# Patient Record
Sex: FEMALE | Race: BLACK OR AFRICAN AMERICAN | Employment: OTHER | URBAN - METROPOLITAN AREA
[De-identification: names, ages, dates, MRNs, and addresses within clinical notes are randomized per-mention and may not be internally consistent; named-entity substitution may affect disease eponyms.]

---

## 2017-06-01 ENCOUNTER — HOSPITAL ENCOUNTER (OUTPATIENT)
Dept: WOMENS IMAGING | Age: 62
Discharge: HOME | End: 2017-06-01
Attending: FAMILY MEDICINE | Admitting: FAMILY MEDICINE

## 2017-06-01 DIAGNOSIS — Z12.39 SCREENING BREAST EXAMINATION: ICD-10-CM

## 2018-03-26 VITALS — WEIGHT: 190 LBS | HEIGHT: 63 IN | BODY MASS INDEX: 33.66 KG/M2

## 2018-03-26 RX ORDER — ALBUTEROL SULFATE 90 UG/1
2 AEROSOL, METERED RESPIRATORY (INHALATION) EVERY 6 HOURS PRN
COMMUNITY
End: 2021-03-10 | Stop reason: SDUPTHER

## 2018-03-26 RX ORDER — SODIUM CHLORIDE 0.9 % (FLUSH) 0.9 %
10 SYRINGE (ML) INJECTION
Status: CANCELLED | OUTPATIENT
Start: 2018-03-28 | End: 2018-03-28

## 2018-03-28 ENCOUNTER — HOSPITAL ENCOUNTER (OUTPATIENT)
Dept: INTERVENTIONAL RADIOLOGY/VASCULAR | Age: 63
Discharge: HOME | End: 2018-04-26
Attending: RADIOLOGY | Admitting: RADIOLOGY

## 2018-06-08 ENCOUNTER — HOSPITAL ENCOUNTER (OUTPATIENT)
Dept: OTHER | Age: 63
Discharge: HOME | End: 2018-06-08
Admitting: INTERNAL MEDICINE

## 2018-06-08 DIAGNOSIS — Z12.31 VISIT FOR SCREENING MAMMOGRAM: ICD-10-CM

## 2019-06-04 ENCOUNTER — HOSPITAL ENCOUNTER (OUTPATIENT)
Age: 64
Setting detail: SPECIMEN
Discharge: HOME | End: 2019-06-04
Payer: COMMERCIAL

## 2019-06-04 LAB
ALBUMIN SERPL-MCNC: 4.9 GM/DL (ref 3.4–5)
ALP BLD-CCNC: 111 IU/L (ref 40–128)
ALT SERPL-CCNC: 25 U/L (ref 10–40)
ANION GAP SERPL CALCULATED.3IONS-SCNC: 17 MMOL/L (ref 4–16)
AST SERPL-CCNC: 29 IU/L (ref 15–37)
BILIRUB SERPL-MCNC: 0.4 MG/DL (ref 0–1)
BUN BLDV-MCNC: 13 MG/DL (ref 6–23)
CALCIUM SERPL-MCNC: 9.7 MG/DL (ref 8.3–10.6)
CHLORIDE BLD-SCNC: 99 MMOL/L (ref 99–110)
CO2: 23 MMOL/L (ref 21–32)
CREAT SERPL-MCNC: 0.9 MG/DL (ref 0.6–1.1)
GFR AFRICAN AMERICAN: >60 ML/MIN/1.73M2
GFR NON-AFRICAN AMERICAN: >60 ML/MIN/1.73M2
GLUCOSE BLD-MCNC: 72 MG/DL (ref 70–99)
POTASSIUM SERPL-SCNC: 4.6 MMOL/L (ref 3.5–5.1)
SODIUM BLD-SCNC: 139 MMOL/L (ref 135–145)
TOTAL PROTEIN: 8.4 GM/DL (ref 6.4–8.2)

## 2019-06-04 PROCEDURE — 80053 COMPREHEN METABOLIC PANEL: CPT

## 2019-06-05 ENCOUNTER — HOSPITAL ENCOUNTER (OUTPATIENT)
Dept: ULTRASOUND IMAGING | Age: 64
Discharge: HOME | End: 2019-06-05
Payer: COMMERCIAL

## 2019-06-05 DIAGNOSIS — K74.69 COMPENSATED CIRRHOSIS RELATED TO HEPATITIS C VIRUS (HCV) (HCC): ICD-10-CM

## 2019-06-05 DIAGNOSIS — B19.20 COMPENSATED CIRRHOSIS RELATED TO HEPATITIS C VIRUS (HCV) (HCC): ICD-10-CM

## 2019-06-05 PROCEDURE — 76705 ECHO EXAM OF ABDOMEN: CPT

## 2019-06-12 ENCOUNTER — HOSPITAL ENCOUNTER (OUTPATIENT)
Dept: WOMENS IMAGING | Age: 64
Discharge: HOME | End: 2019-06-12
Payer: COMMERCIAL

## 2019-06-12 DIAGNOSIS — Z12.31 ENCOUNTER FOR SCREENING MAMMOGRAM FOR BREAST CANCER: ICD-10-CM

## 2019-06-12 PROCEDURE — 77063 BREAST TOMOSYNTHESIS BI: CPT

## 2019-10-02 ENCOUNTER — HOSPITAL ENCOUNTER (OUTPATIENT)
Age: 64
Setting detail: SPECIMEN
Discharge: HOME | End: 2019-10-02
Payer: MEDICARE

## 2019-10-02 LAB
ALBUMIN SERPL-MCNC: 4.3 GM/DL (ref 3.4–5)
ALP BLD-CCNC: 105 IU/L (ref 40–128)
ALT SERPL-CCNC: 15 U/L (ref 10–40)
ANION GAP SERPL CALCULATED.3IONS-SCNC: 12 MMOL/L (ref 4–16)
AST SERPL-CCNC: 22 IU/L (ref 15–37)
BILIRUB SERPL-MCNC: 0.3 MG/DL (ref 0–1)
BUN BLDV-MCNC: 12 MG/DL (ref 6–23)
CALCIUM SERPL-MCNC: 9.1 MG/DL (ref 8.3–10.6)
CHLORIDE BLD-SCNC: 104 MMOL/L (ref 99–110)
CO2: 25 MMOL/L (ref 21–32)
CREAT SERPL-MCNC: 0.7 MG/DL (ref 0.6–1.1)
GFR AFRICAN AMERICAN: >60 ML/MIN/1.73M2
GFR NON-AFRICAN AMERICAN: >60 ML/MIN/1.73M2
GLUCOSE BLD-MCNC: 92 MG/DL (ref 70–99)
POTASSIUM SERPL-SCNC: 4.1 MMOL/L (ref 3.5–5.1)
SODIUM BLD-SCNC: 141 MMOL/L (ref 135–145)
TOTAL PROTEIN: 7.5 GM/DL (ref 6.4–8.2)

## 2019-10-02 PROCEDURE — 80053 COMPREHEN METABOLIC PANEL: CPT

## 2020-02-12 ENCOUNTER — HOSPITAL ENCOUNTER (OUTPATIENT)
Age: 65
Discharge: HOME | End: 2020-02-12
Payer: MEDICARE

## 2020-02-12 LAB
ALBUMIN SERPL-MCNC: 4.7 GM/DL (ref 3.4–5)
ALP BLD-CCNC: 100 IU/L (ref 40–129)
ALT SERPL-CCNC: 23 U/L (ref 10–40)
AST SERPL-CCNC: 25 IU/L (ref 15–37)
BASOPHILS ABSOLUTE: 0 K/CU MM
BASOPHILS RELATIVE PERCENT: 0.4 % (ref 0–1)
BILIRUB SERPL-MCNC: 0.2 MG/DL (ref 0–1)
BILIRUBIN DIRECT: 0.2 MG/DL (ref 0–0.3)
BILIRUBIN, INDIRECT: 0 MG/DL (ref 0–0.7)
DIFFERENTIAL TYPE: ABNORMAL
EOSINOPHILS ABSOLUTE: 0.2 K/CU MM
EOSINOPHILS RELATIVE PERCENT: 3.5 % (ref 0–3)
HAV IGM SER IA-ACNC: NON REACTIVE
HCT VFR BLD CALC: 40.4 % (ref 37–47)
HEMOGLOBIN: 12.5 GM/DL (ref 12.5–16)
HEPATITIS B CORE IGM ANTIBODY: NON REACTIVE
HEPATITIS B SURFACE ANTIGEN: NON REACTIVE
HEPATITIS C ANTIBODY: ABNORMAL
IMMATURE NEUTROPHIL %: 0.2 % (ref 0–0.43)
LYMPHOCYTES ABSOLUTE: 2.8 K/CU MM
LYMPHOCYTES RELATIVE PERCENT: 55.2 % (ref 24–44)
MCH RBC QN AUTO: 30.6 PG (ref 27–31)
MCHC RBC AUTO-ENTMCNC: 30.9 % (ref 32–36)
MCV RBC AUTO: 98.8 FL (ref 78–100)
MONOCYTES ABSOLUTE: 0.4 K/CU MM
MONOCYTES RELATIVE PERCENT: 7.8 % (ref 0–4)
NUCLEATED RBC %: 0 %
PDW BLD-RTO: 12.6 % (ref 11.7–14.9)
PLATELET # BLD: 273 K/CU MM (ref 140–440)
PMV BLD AUTO: 10.1 FL (ref 7.5–11.1)
RBC # BLD: 4.09 M/CU MM (ref 4.2–5.4)
SEGMENTED NEUTROPHILS ABSOLUTE COUNT: 1.7 K/CU MM
SEGMENTED NEUTROPHILS RELATIVE PERCENT: 32.9 % (ref 36–66)
TOTAL IMMATURE NEUTOROPHIL: 0.01 K/CU MM
TOTAL NUCLEATED RBC: 0 K/CU MM
TOTAL PROTEIN: 8 GM/DL (ref 6.4–8.2)
WBC # BLD: 5.1 K/CU MM (ref 4–10.5)

## 2020-02-12 PROCEDURE — 87902 NFCT AGT GNTYP ALYS HEP C: CPT

## 2020-02-12 PROCEDURE — 36415 COLL VENOUS BLD VENIPUNCTURE: CPT

## 2020-02-12 PROCEDURE — 87522 HEPATITIS C REVRS TRNSCRPJ: CPT

## 2020-02-12 PROCEDURE — 82105 ALPHA-FETOPROTEIN SERUM: CPT

## 2020-02-12 PROCEDURE — 80076 HEPATIC FUNCTION PANEL: CPT

## 2020-02-12 PROCEDURE — 85025 COMPLETE CBC W/AUTO DIFF WBC: CPT

## 2020-02-12 PROCEDURE — 80074 ACUTE HEPATITIS PANEL: CPT

## 2020-02-14 LAB
MS ALPHA-FETOPROTEIN: 8 NG/ML (ref 0–9)
MS ALPHA-FETOPROTEIN: NORMAL NG/ML (ref 0–9)

## 2020-02-15 LAB
HCV QUANTITATIVE: NOT DETECTED IU/ML
HEP CRNA PCR QNT INTERP: NORMAL
HEP CRNA PCR QNT INTERP: NOT DETECTED
HEPATITIS C RNA PCR QUANT: NOT DETECTED LOG IU/ML

## 2020-02-19 LAB
HEPATITIS C GENOTYPE: NORMAL
HEPATITIS C GENOTYPE: NORMAL

## 2020-03-12 RX ORDER — MONTELUKAST SODIUM 10 MG/1
10 TABLET ORAL NIGHTLY
COMMUNITY
End: 2020-09-10 | Stop reason: SDUPTHER

## 2020-03-12 NOTE — PROGRESS NOTES
.Surgery 3/16/20 @ 0830, arrival 0630                    1. Do not eat or drink anything after 12 midnight- unless instructed by your doctor prior to surgery. This includes                   no water, chewing gum or mints. 2. Follow your directions as prescribed by the doctor for your procedure and medications. 3. Check with your Doctor regarding stopping Plavix, Coumadin, Lovenox,Effient,Pradaxa,Xarelto, Fragmin or other blood thinners and                   follow their instructions. 4. Do not smoke, and do not drink any alcoholic beverages 24 hours prior to surgery. This includes NA Beer. 5. You may brush your teeth and gargle the morning of surgery. DO NOT SWALLOW WATER   6. You MUST make arrangements for a responsible adult to take you home after your surgery and be able to check on you every couple                   hours for the day. You will not be allowed to leave alone or drive yourself home. It is strongly suggested someone stay with you the first 24                   hrs. Your surgery will be cancelled if you do not have a ride home. 7. Please wear simple, loose fitting clothing to the hospital.  Daryl Shade not bring valuables (money, credit cards, checkbooks, etc.) Do not wear any                   makeup (including no eye makeup) or nail polish on your fingers or toes. 8. DO NOT wear any jewelry or piercings on day of surgery. All body piercing jewelry must be removed. 9. If you have dentures, they will be removed before going to the OR; we will provide you a container. If you wear contact lenses or glasses,                  they will be removed; please bring a case for them. 10. If you  have a Living Will and Durable Power of  for Healthcare, please bring in a copy. 11. Please bring picture ID,  insurance card, paperwork from the doctors office    (H & P, Consent, & card for implantable devices).            12. Take a shower the night before or

## 2020-03-16 ENCOUNTER — HOSPITAL ENCOUNTER (OUTPATIENT)
Dept: INTERVENTIONAL RADIOLOGY/VASCULAR | Age: 65
Discharge: HOME | End: 2020-03-16
Payer: MEDICARE

## 2020-03-16 VITALS
DIASTOLIC BLOOD PRESSURE: 63 MMHG | WEIGHT: 178 LBS | BODY MASS INDEX: 31.54 KG/M2 | OXYGEN SATURATION: 98 % | HEART RATE: 68 BPM | HEIGHT: 63 IN | RESPIRATION RATE: 16 BRPM | SYSTOLIC BLOOD PRESSURE: 123 MMHG | TEMPERATURE: 97 F

## 2020-03-16 LAB
APTT: 33.9 SECONDS (ref 25.1–37.1)
HCT VFR BLD CALC: 40.6 % (ref 37–47)
HEMOGLOBIN: 12.6 GM/DL (ref 12.5–16)
INR BLD: 1.03 INDEX
MCH RBC QN AUTO: 30.1 PG (ref 27–31)
MCHC RBC AUTO-ENTMCNC: 31 % (ref 32–36)
MCV RBC AUTO: 96.9 FL (ref 78–100)
PDW BLD-RTO: 12.7 % (ref 11.7–14.9)
PLATELET # BLD: 219 K/CU MM (ref 140–440)
PMV BLD AUTO: 10.2 FL (ref 7.5–11.1)
PROTHROMBIN TIME: 12.5 SECONDS (ref 11.7–14.5)
RBC # BLD: 4.19 M/CU MM (ref 4.2–5.4)
WBC # BLD: 4.8 K/CU MM (ref 4–10.5)

## 2020-03-16 PROCEDURE — 85730 THROMBOPLASTIN TIME PARTIAL: CPT

## 2020-03-16 PROCEDURE — 2709999900 HC NON-CHARGEABLE SUPPLY

## 2020-03-16 PROCEDURE — 2580000003 HC RX 258: Performed by: RADIOLOGY

## 2020-03-16 PROCEDURE — 85027 COMPLETE CBC AUTOMATED: CPT

## 2020-03-16 PROCEDURE — 85610 PROTHROMBIN TIME: CPT

## 2020-03-16 RX ORDER — SODIUM CHLORIDE 0.9 % (FLUSH) 0.9 %
10 SYRINGE (ML) INJECTION 2 TIMES DAILY
Status: DISCONTINUED | OUTPATIENT
Start: 2020-03-16 | End: 2020-03-17 | Stop reason: HOSPADM

## 2020-03-16 RX ORDER — LORATADINE 10 MG/1
10 TABLET ORAL DAILY
COMMUNITY
End: 2021-03-10

## 2020-03-16 RX ADMIN — Medication 10 ML: at 06:36

## 2020-03-16 ASSESSMENT — PAIN - FUNCTIONAL ASSESSMENT: PAIN_FUNCTIONAL_ASSESSMENT: 0-10

## 2020-04-24 PROBLEM — Z86.19: Status: ACTIVE | Noted: 2020-04-24

## 2020-04-24 PROBLEM — D70.9 NEUTROPENIA (HCC): Status: ACTIVE | Noted: 2020-04-24

## 2020-04-24 PROBLEM — D72.89 OTHER SPECIFIED DISORDERS OF WHITE BLOOD CELLS: Status: ACTIVE | Noted: 2020-04-24

## 2020-05-05 ENCOUNTER — HOSPITAL ENCOUNTER (OUTPATIENT)
Age: 65
Setting detail: SPECIMEN
Discharge: HOME | End: 2020-05-05

## 2020-05-05 PROCEDURE — U0002 COVID-19 LAB TEST NON-CDC: HCPCS

## 2020-05-07 LAB
SARS-COV-2: NOT DETECTED
SOURCE: NORMAL

## 2020-07-10 ENCOUNTER — HOSPITAL ENCOUNTER (OUTPATIENT)
Dept: WOMENS IMAGING | Age: 65
Discharge: HOME | End: 2020-07-10
Payer: MEDICARE

## 2020-07-10 ENCOUNTER — APPOINTMENT (OUTPATIENT)
Dept: WOMENS IMAGING | Age: 65
End: 2020-07-10
Payer: MEDICARE

## 2020-07-10 PROCEDURE — 77063 BREAST TOMOSYNTHESIS BI: CPT

## 2020-07-14 ENCOUNTER — HOSPITAL ENCOUNTER (OUTPATIENT)
Dept: INFUSION THERAPY | Age: 65
Discharge: HOME | End: 2020-07-14
Payer: MEDICARE

## 2020-07-14 PROCEDURE — 99211 OFF/OP EST MAY X REQ PHY/QHP: CPT

## 2020-08-07 ENCOUNTER — OFFICE VISIT (OUTPATIENT)
Dept: ORTHOPEDIC SURGERY | Age: 65
End: 2020-08-07
Payer: MEDICARE

## 2020-08-07 VITALS — RESPIRATION RATE: 14 BRPM | HEIGHT: 63 IN | WEIGHT: 184 LBS | BODY MASS INDEX: 32.6 KG/M2

## 2020-08-07 PROCEDURE — G8417 CALC BMI ABV UP PARAM F/U: HCPCS | Performed by: ORTHOPAEDIC SURGERY

## 2020-08-07 PROCEDURE — 3017F COLORECTAL CA SCREEN DOC REV: CPT | Performed by: ORTHOPAEDIC SURGERY

## 2020-08-07 PROCEDURE — 1036F TOBACCO NON-USER: CPT | Performed by: ORTHOPAEDIC SURGERY

## 2020-08-07 PROCEDURE — G8428 CUR MEDS NOT DOCUMENT: HCPCS | Performed by: ORTHOPAEDIC SURGERY

## 2020-08-07 PROCEDURE — G8400 PT W/DXA NO RESULTS DOC: HCPCS | Performed by: ORTHOPAEDIC SURGERY

## 2020-08-07 PROCEDURE — 1090F PRES/ABSN URINE INCON ASSESS: CPT | Performed by: ORTHOPAEDIC SURGERY

## 2020-08-07 PROCEDURE — 4040F PNEUMOC VAC/ADMIN/RCVD: CPT | Performed by: ORTHOPAEDIC SURGERY

## 2020-08-07 PROCEDURE — 1123F ACP DISCUSS/DSCN MKR DOCD: CPT | Performed by: ORTHOPAEDIC SURGERY

## 2020-08-07 PROCEDURE — 99203 OFFICE O/P NEW LOW 30 MIN: CPT | Performed by: ORTHOPAEDIC SURGERY

## 2020-08-07 RX ORDER — SENNA PLUS 8.6 MG/1
8.6 TABLET ORAL DAILY
COMMUNITY
End: 2020-12-10

## 2020-08-07 NOTE — PROGRESS NOTES
Patient is here today to discuss her left hip pain. She states that her pain began in February after she was laid off from her job. No known new injury or trauma. She states that she does have a history of back/hip pain which as treated with therapy. Pain does radiate distally to ankle. X-rays were obtained at [facility] which was provided today on CD. She has tried tylenol and ibuprofen for pain control. Pain level 5/10.

## 2020-08-07 NOTE — PROGRESS NOTES
CAROTID- Normal    Hepatitis C     dx 3/2018- for liver bx    Lumbar herniated disc     Thyroid disease     \"not on any medication- have low thyroid level\"     Family History   Problem Relation Age of Onset    No Known Problems Mother     Kidney Disease Father      Social History     Socioeconomic History    Marital status: Single     Spouse name: Not on file    Number of children: Not on file    Years of education: Not on file    Highest education level: Not on file   Occupational History    Not on file   Social Needs    Financial resource strain: Not on file    Food insecurity     Worry: Not on file     Inability: Not on file    Transportation needs     Medical: Not on file     Non-medical: Not on file   Tobacco Use    Smoking status: Never Smoker    Smokeless tobacco: Never Used   Substance and Sexual Activity    Alcohol use: Yes     Comment: Occasional wine/\"average glass of wine or beer  one time per month\"    Drug use: No    Sexual activity: Not on file   Lifestyle    Physical activity     Days per week: Not on file     Minutes per session: Not on file    Stress: Not on file   Relationships    Social connections     Talks on phone: Not on file     Gets together: Not on file     Attends [employer]ism service: Not on file     Active member of club or organization: Not on file     Attends meetings of clubs or organizations: Not on file     Relationship status: Not on file    Intimate partner violence     Fear of current or ex partner: Not on file     Emotionally abused: Not on file     Physically abused: Not on file     Forced sexual activity: Not on file   Other Topics Concern    Not on file   Social History Narrative    Not on file     Current Outpatient Medications   Medication Sig Dispense Refill    senna (SENOKOT) 8.6 MG tablet Take 8.6 mg by mouth daily      loratadine (CLARITIN) 10 MG tablet Take 10 mg by mouth daily Indications: alergies      montelukast (SINGULAIR) 10 MG tablet Take 10 mg by mouth nightly      albuterol sulfate  (90 Base) MCG/ACT inhaler Inhale 2 puffs into the lungs every 6 hours as needed for Wheezing      Multiple Vitamins-Minerals (MULTIVITAMIN PO) Take by mouth       No current facility-administered medications for this visit. Allergies   Allergen Reactions    Pcn [Penicillins] Hives       Review of Systems:   Review of Systems   Constitutional: Negative for chills and fever. HENT: Negative for congestion and sneezing. Eyes: Negative for pain and redness. Respiratory: Negative for chest tightness, shortness of breath and wheezing. Cardiovascular: Negative for chest pain and palpitations. Gastrointestinal: Negative for vomiting. Musculoskeletal: Positive for arthralgias. Skin: Negative for color change and rash. Psychiatric/Behavioral: Negative for agitation. The patient is not nervous/anxious. Examination:  General Exam:  Vitals: Resp 14   Ht 5' 3\" (1.6 m)   Wt 184 lb (83.5 kg)   BMI 32.59 kg/m²    Physical Exam  Vitals signs and nursing note reviewed. Constitutional:       Appearance: Normal appearance. HENT:      Head: Normocephalic and atraumatic. Eyes:      Conjunctiva/sclera: Conjunctivae normal.      Pupils: Pupils are equal, round, and reactive to light. Neck:      Musculoskeletal: Normal range of motion. Pulmonary:      Effort: Pulmonary effort is normal.   Musculoskeletal:      Right hip: She exhibits normal range of motion, normal strength, no tenderness, no bony tenderness, no swelling, no crepitus and no deformity. Right knee: Normal.      Left knee: She exhibits normal range of motion, no swelling, no effusion, no deformity, no erythema, normal alignment, no LCL laxity and no MCL laxity. No medial joint line and no lateral joint line tenderness noted. Lumbar back: She exhibits normal range of motion, no tenderness, no swelling and no deformity. Comments: Left Lower Extremity:  There is moderate tenderness to palpation diffusely throughout the hip both anteriorly and posteriorly. Range of motion is significantly limited and painful at the extremes of motion. Hip flexion present to 100 degrees, abduction 20 degrees, extension 5 degrees, internal rotation 10 degrees, external rotation 10 degrees. Strength is 5 out of 5 with hip flexion, extension, and abduction however this is somewhat limited due to pain with resistance testing. TRISHA and FADIR test reproduces pain to the groin and hip joint. Sensation is intact to light touch in the left lower extremity. Pulses are intact. Skin is intact without erythema. No lower extremity edema. Skin:     General: Skin is warm and dry. Neurological:      Mental Status: She is alert and oriented to person, place, and time. Psychiatric:         Mood and Affect: Mood normal.         Behavior: Behavior normal.            Diagnostic testing:  X-rays reviewed in office, I independently reviewed the films in the office today:   X-ray images from outside films done in [facility] reviewed by myself discussed with the patient:  There is evidence of degenerative joint disease of the left hip with loss of greater than 50% of the joint space throughout the hip joint with areas of subchondral sclerosis on both sides of the hip and small osteophyte formation at the acetabulum and femoral head. No evidence of acute abnormality or fracture. Normal bone density. No soft tissue swelling or effusion. No significant degenerative process identified in the right hip joint    Office Procedures:  No orders of the defined types were placed in this encounter. Assessment and Plan  1. Left hip primary osteoarthritis    I explained her that she is dealing with a flareup and possibly progression of her degenerative joint disease of the left hip. We discussed continuing nonoperative treatments for her left hip. She has responded in the past to a steroid injection and physical therapy. I recommended that we repeat this treatment course. I have ordered a an intra-articular corticosteroid injection to the left hip to be done by interventional radiology. I have ordered physical therapy to help with rehabilitation exercises to the left hip. Follow-up in 2 months for check of her response to this treatment plan    We briefly discussed potential for surgical intervention if she fails conservative measures and this would consist of a total hip replacement.     Electronically signed by Shannan Pinto MD on 8/7/2020 at 10:34 AM

## 2020-08-08 ASSESSMENT — ENCOUNTER SYMPTOMS
CHEST TIGHTNESS: 0
EYE PAIN: 0
SHORTNESS OF BREATH: 0
VOMITING: 0
WHEEZING: 0
EYE REDNESS: 0
COLOR CHANGE: 0

## 2020-09-10 ENCOUNTER — OFFICE VISIT (OUTPATIENT)
Dept: INTERNAL MEDICINE CLINIC | Age: 65
End: 2020-09-10
Payer: MEDICARE

## 2020-09-10 VITALS
DIASTOLIC BLOOD PRESSURE: 71 MMHG | WEIGHT: 180.8 LBS | SYSTOLIC BLOOD PRESSURE: 138 MMHG | HEIGHT: 62 IN | BODY MASS INDEX: 33.27 KG/M2 | OXYGEN SATURATION: 97 % | HEART RATE: 72 BPM | TEMPERATURE: 98.1 F

## 2020-09-10 PROBLEM — J30.2 SEASONAL ALLERGIC RHINITIS: Status: ACTIVE | Noted: 2020-09-10

## 2020-09-10 PROBLEM — D75.A G6PD DEFICIENCY: Status: ACTIVE | Noted: 2020-09-10

## 2020-09-10 PROBLEM — Z86.19 HISTORY OF HEPATITIS C: Status: ACTIVE | Noted: 2020-09-10

## 2020-09-10 PROBLEM — M25.552 LEFT HIP PAIN: Status: ACTIVE | Noted: 2020-09-10

## 2020-09-10 PROCEDURE — 99214 OFFICE O/P EST MOD 30 MIN: CPT | Performed by: INTERNAL MEDICINE

## 2020-09-10 PROCEDURE — 1123F ACP DISCUSS/DSCN MKR DOCD: CPT | Performed by: INTERNAL MEDICINE

## 2020-09-10 PROCEDURE — G8417 CALC BMI ABV UP PARAM F/U: HCPCS | Performed by: INTERNAL MEDICINE

## 2020-09-10 PROCEDURE — G8400 PT W/DXA NO RESULTS DOC: HCPCS | Performed by: INTERNAL MEDICINE

## 2020-09-10 PROCEDURE — 1036F TOBACCO NON-USER: CPT | Performed by: INTERNAL MEDICINE

## 2020-09-10 PROCEDURE — G8427 DOCREV CUR MEDS BY ELIG CLIN: HCPCS | Performed by: INTERNAL MEDICINE

## 2020-09-10 PROCEDURE — 4040F PNEUMOC VAC/ADMIN/RCVD: CPT | Performed by: INTERNAL MEDICINE

## 2020-09-10 PROCEDURE — 3017F COLORECTAL CA SCREEN DOC REV: CPT | Performed by: INTERNAL MEDICINE

## 2020-09-10 PROCEDURE — 1090F PRES/ABSN URINE INCON ASSESS: CPT | Performed by: INTERNAL MEDICINE

## 2020-09-10 RX ORDER — CETIRIZINE HYDROCHLORIDE 5 MG/1
5 TABLET ORAL DAILY
COMMUNITY
End: 2021-03-10

## 2020-09-10 RX ORDER — MONTELUKAST SODIUM 10 MG/1
10 TABLET ORAL NIGHTLY
Qty: 90 TABLET | Refills: 1 | Status: SHIPPED | OUTPATIENT
Start: 2020-09-10 | End: 2021-03-10 | Stop reason: SDUPTHER

## 2020-09-10 ASSESSMENT — PATIENT HEALTH QUESTIONNAIRE - PHQ9
2. FEELING DOWN, DEPRESSED OR HOPELESS: 0
SUM OF ALL RESPONSES TO PHQ QUESTIONS 1-9: 0
SUM OF ALL RESPONSES TO PHQ QUESTIONS 1-9: 0
SUM OF ALL RESPONSES TO PHQ9 QUESTIONS 1 & 2: 0
1. LITTLE INTEREST OR PLEASURE IN DOING THINGS: 0

## 2020-09-10 NOTE — PROGRESS NOTES
[facility]  1955      SUBJECTIVE:  Capri Butler is a 76 y. o.year old female presents today  For a follow up on hep C, lymphocytosis and allergies. Wants refills on Singulair  She is being followed by Dr. Tito Cordero for G6PD deficiency and lymphocytosis. She is also seeing Dr. Jono Gonzalez periodically for follow up as she was treated for Hep c about a year ago. She does complaint of pain in her left hip for which she is seeing Dr Vi Fields , an Orthopedic doctor. She is taking Tylenol and some Herbal medicine and  They seem to help her. Denies CP or SOB. No fever, cough or chest congestion. No other complaints. ALLERGIES:  Pcn [penicillins]    Current Outpatient Medications   Medication Sig Dispense Refill    cetirizine (ZYRTEC) 5 MG tablet Take 5 mg by mouth daily      montelukast (SINGULAIR) 10 MG tablet Take 1 tablet by mouth nightly 90 tablet 1    senna (SENOKOT) 8.6 MG tablet Take 8.6 mg by mouth daily      Multiple Vitamins-Minerals (MULTIVITAMIN PO) Take by mouth      loratadine (CLARITIN) 10 MG tablet Take 10 mg by mouth daily Indications: alergies      albuterol sulfate  (90 Base) MCG/ACT inhaler Inhale 2 puffs into the lungs every 6 hours as needed for Wheezing       No current facility-administered medications for this visit.         Past Medical History:   Diagnosis Date    Allergic rhinitis     Asthma     last flare up 2017 - follows with PCP    G-6-PD deficiency anemia ([address]     dx this when I was having children- per pt     H/O Doppler ultrasound 04/04/18    CAROTID- Normal    Hepatitis C     dx 3/2018- for liver bx    Lumbar herniated disc     Thyroid disease     \"not on any medication- have low thyroid level\"     Past Surgical History:   Procedure Laterality Date    COLONOSCOPY  2013    Polyps - Dr. Mary Sharp  1990's   [facility]    \"left one ovary \"    TONSILLECTOMY  1970's     Social History     Tobacco Use    Smoking status: Never Smoker    Smokeless tobacco: Never Used   Substance Use Topics    Alcohol use: Yes     Comment: Occasional wine/\"average glass of wine or beer  one time per month\"        REVIEW OF SYSTEMS:   see HPI/ Comprehensive review of systems negative except for the ones mentioned in HPI. OBJECTIVE: /71 (Site: Left Upper Arm, Position: Sitting, Cuff Size: Medium Adult)   Pulse 72   Temp 98.1 °F (36.7 °C)   Ht 5' 2\" (1.575 m)   Wt 180 lb 12.8 oz (82 kg)   SpO2 97%   BMI 33.07 kg/m²     Wt Readings from Last 3 Encounters:   09/10/20 180 lb 12.8 oz (82 kg)   08/07/20 184 lb (83.5 kg)   03/16/20 178 lb (80.7 kg)      GENERAL: - Alert, oriented, pleasant, in no apparent distress. HEENT: - Unremarkable. NECK: - Supple. No jugular venous distention noted. No carotid bruits. CARDIOVASCULAR: - Normal S1 and S2 without obvious murmur or gallop. PULMONARY: - No respiratory distress. No wheezes or rales. ABDOMEN: - No masses, tenderness, or organomegaly. EXTREMITIES: - No cyanosis, clubbing, or significant edema. Mild tenderness left hip. SKIN: Skin is warm and dry. There is no rash or diaphoresis. NEUROLOGICAL: - Cranial nerves II through XII are grossly intact. There were no gross focal neurologic abnormalities. LAB REVIEW:  CBC:   Lab Results   Component Value Date    WBC 4.8 03/16/2020    WBC 5.1 02/12/2020    HGB 12.6 03/16/2020    HGB 12.5 02/12/2020    HCT 40.6 03/16/2020    HCT 40.4 02/12/2020     03/16/2020     02/12/2020     Lipids: No results found for: CHOL, TRIG, HDL, LDLCALC, LDLDIRECT  Renal:   Lab Results   Component Value Date    BUN 12 10/02/2019    BUN 13 06/04/2019    CREATININE 0.7 10/02/2019    CREATININE 0.9 06/04/2019     10/02/2019     06/04/2019    K 4.1 10/02/2019    K 4.6 06/04/2019     PT/INR:   Lab Results   Component Value Date    INR 1.03 03/16/2020       IMPRESSION / RECOMMENDATIONS:        Encounter Diagnoses   Name Primary?    

## 2020-12-10 ENCOUNTER — OFFICE VISIT (OUTPATIENT)
Dept: INTERNAL MEDICINE CLINIC | Age: 65
End: 2020-12-10
Payer: MEDICARE

## 2020-12-10 VITALS
BODY MASS INDEX: 32.21 KG/M2 | HEART RATE: 84 BPM | TEMPERATURE: 97.4 F | DIASTOLIC BLOOD PRESSURE: 74 MMHG | OXYGEN SATURATION: 99 % | SYSTOLIC BLOOD PRESSURE: 126 MMHG | WEIGHT: 176.1 LBS

## 2020-12-10 PROBLEM — J30.9 ALLERGIC RHINITIS: Status: ACTIVE | Noted: 2020-09-10

## 2020-12-10 PROBLEM — R42 VERTIGO: Status: ACTIVE | Noted: 2020-12-10

## 2020-12-10 PROBLEM — J45.30 MILD PERSISTENT ASTHMA WITHOUT COMPLICATION: Status: ACTIVE | Noted: 2020-12-10

## 2020-12-10 PROCEDURE — G8400 PT W/DXA NO RESULTS DOC: HCPCS | Performed by: INTERNAL MEDICINE

## 2020-12-10 PROCEDURE — 3017F COLORECTAL CA SCREEN DOC REV: CPT | Performed by: INTERNAL MEDICINE

## 2020-12-10 PROCEDURE — 1090F PRES/ABSN URINE INCON ASSESS: CPT | Performed by: INTERNAL MEDICINE

## 2020-12-10 PROCEDURE — G8427 DOCREV CUR MEDS BY ELIG CLIN: HCPCS | Performed by: INTERNAL MEDICINE

## 2020-12-10 PROCEDURE — G8417 CALC BMI ABV UP PARAM F/U: HCPCS | Performed by: INTERNAL MEDICINE

## 2020-12-10 PROCEDURE — 1123F ACP DISCUSS/DSCN MKR DOCD: CPT | Performed by: INTERNAL MEDICINE

## 2020-12-10 PROCEDURE — 4040F PNEUMOC VAC/ADMIN/RCVD: CPT | Performed by: INTERNAL MEDICINE

## 2020-12-10 PROCEDURE — 99213 OFFICE O/P EST LOW 20 MIN: CPT | Performed by: INTERNAL MEDICINE

## 2020-12-10 PROCEDURE — G8484 FLU IMMUNIZE NO ADMIN: HCPCS | Performed by: INTERNAL MEDICINE

## 2020-12-10 PROCEDURE — 1036F TOBACCO NON-USER: CPT | Performed by: INTERNAL MEDICINE

## 2020-12-10 RX ORDER — MECLIZINE HYDROCHLORIDE 25 MG/1
25 TABLET ORAL 3 TIMES DAILY PRN
COMMUNITY
End: 2021-03-10 | Stop reason: SDUPTHER

## 2020-12-10 RX ORDER — PANTOPRAZOLE SODIUM 40 MG/1
40 TABLET, DELAYED RELEASE ORAL DAILY
COMMUNITY
End: 2021-03-10

## 2020-12-10 NOTE — PROGRESS NOTES
Name: Alondra Sanderson  [id number]  Age: 76 y.o. YOB: 1955  Sex: female    CHIEF COMPLAINT:    Chief Complaint   Patient presents with    Other     other chronic conditions, and Hepatitis c follow up       HISTORY OF PRESENT ILLNESS:     This is a pleasant  76 y.o. female  is seen today for management of chronic medical problems and medications refills. Previous records reviewed . Doing OK . Denies CP or SOB. No fever , sore throat or cough or congestion. Denies any abdominal pain. Appetite OK. Bowels moving [id number] [address]. She had EGD and Colonoscopy by Dr. Jaylon Flanagan on 12/8/20 . She was told she has Hiatal hernia and diverticulosis coli and a couple of polyps were removed. No urinary symptoms. Denies any significant arthritis. Left  Hip pain is better. Still with some dizziness but Meclizine helps. Takes about once daily. No other complaints. Past Medical History:    Patient Active Problem List   Diagnosis    Personal history of infectious disease    Other specified disorders of white blood cells    Neutropenia ([facility] [address] [address]    History of hepatitis C    Allergic rhinitis    Left hip pain    G6PD deficiency    Mild persistent asthma without complication    Vertigo        Past Surgical History:        Procedure Laterality Date    COLONOSCOPY  2013    Polyps - Dr. Filiberto Andres  1990's   [address]    \"left one ovary \"    TONSILLECTOMY  1970's       Social History:   Social History     Tobacco Use    Smoking status: Never Smoker    Smokeless tobacco: Never Used   Substance Use Topics    Alcohol use: Yes     Comment: Occasional wine/\"average glass of wine or beer  one time per month\"       Family History:       Problem Relation Age of Onset    No Known Problems Mother     Kidney Disease Father        Allergies:  Pcn [penicillins]    Current Medications :      Prior to Admission medications    Medication Sig Start Date End Date Taking?  Authorizing Provider ASSESSMENT/PLAN:    Allergic rhinitis, unspecified seasonality, unspecified trigger. On Claritin and singulair    Mild persistent asthma without complication. On Singulair and albuterol HFA as needed. G6PD deficiency. Stable. . being followed by Dr. Janusz Pathak. History of hepatitis C. Had been treated. .. stable. Left hip pain  Tylenol as needed. Vertigo. . On Antivert prn. I have recommended that the patient follow CDC guidelines for prevention of COVID-19 infection. Care discussed with patient. Questions answered and patient verbalizes understanding and agrees with plan. Medications reviewed and reconciled. Continue current medications. Appropriate prescriptions are ordered. Risks and benefits of meds are discussed. After visit summary provided. Advised to call for any problems, questions, or concerns. If symptoms worsen or don't improve as expected, to call us or go to ER. Follow up as directed, sooner if needed. Return in about 3 months (around 3/10/2021). This dictation was performed with a verbal recognition program and it was checked for errors. It is possible that there are still dictated errors within this office note. Any errors should be brought immediately to my attention for correction. All efforts were made to ensure that this office note is accurate.      Zak Hager MD

## 2021-01-05 ENCOUNTER — HOSPITAL ENCOUNTER (OUTPATIENT)
Dept: GENERAL RADIOLOGY | Age: 66
Discharge: HOME | End: 2021-01-05
Payer: MEDICARE

## 2021-01-05 DIAGNOSIS — D64.9 ANEMIA, UNSPECIFIED TYPE: ICD-10-CM

## 2021-01-05 PROCEDURE — 74248 X-RAY SM INT F-THRU STD: CPT

## 2021-01-08 NOTE — PROGRESS NOTES
Patient Name: Kash Tafoya  Patient : 1955  Patient MRN: [id number]     Primary Oncologist: Laron Bentley MD  Referring Provider: Tasha Inman MD     Date of Service: 2021      Chief Complaint:   Chief Complaint   Patient presents with    Follow-up     She came in for follow-up visit. Patient Active Problem List:     Personal history of infectious disease     Other specified disorders of white blood cells     Neutropenia (HCC)     History of hepatitis C     Allergic rhinitis     Left hip pain     G6PD deficiency     Mild persistent asthma without complication     Vertigo     HPI:   Perla Rosado is a pleasant 27-year-old -American female patient who was referred for evaluation of mild neutropenia. She was diagnosed with hepatitis C in . Ex spouse was IV drug user. She was treated with Harvoni for 12 weeks and completed in May 2019. I reviewed her blood test records. On May 15, 2019 WBC was 4.1, RBC 4.36, hemoglobin 13.9, hematocrit 42.3, MCV 97, platelet 354, absolute neutrophils 1.1, absolute lymphocytes 2.6. She denies any history of frequent infection. Ultrasound of abdomen in 2018 showed normal right upper quadrant ultrasound. Liver biopsy on 2018 showed chronic hepatitis grade 2-3, stage II. Mammogram in 2019 is negative. US of abdomen in 2019 showed unremarkable study. Labs in 2019 were reviewed. She has nonspecific elevated total protein. CBC is unremarkable. Labs in 2019 were reviewed. Total protein is normal. Leukopenia is stable. She denied frequent infections. In 2020 she had acute viral hepatitis serology which came back positive for hepatitis C antibodies. Serum AFP was 8. Hepatitis C RNA by PCR was negative. Hepatitis C RNA genotype was indeterminate. Mammogram in 2020 was benign. She was at [facility] emergency room on 2020 due to food poisoning. . WBC was 7.5.  Hemoglobin was 13.2, hemoglobin 13.2, platelet 738. CMP was grossly unremarkable with normal AST at 19, ALT 25. Alk phos was 92. Total bilirubin was 0.6. On January 14, 2020 when she came in for follow-up visit. Colonoscopy in December 2020 by Dr. Hugo Dailey showed diverticulosis and hemorrhoids. She was told that she has kidney stone. She has flu shot in 2020 and COVID-19 vaccine on January 3, 2021. Mammogram in July 2020 was benign. She is agreeable to have labs today. She denied acute pain. No NVD. No fever or chills. No drenching night sweat. No depression. Past Medical History  Asthma, neutropenia, hepatitis C, G6PD, thyroid disease. Surgical History  Partial hysterectomy in 2002 related to tubal pregnancy. Tonsillectomy. She had colonoscopy x2 and the last one was in 2013. Social History  Smoking Status Never smoker  She denies any illicit drug use. She drinks alcohol occasionally. She has 2 children. Family History  No malignancy in the family. Review of Systems: \"Per interval history; otherwise 10 point ROS is negative. \"     Vital Signs:  /67 (Site: Left Upper Arm, Position: Sitting, Cuff Size: Large Adult)   Pulse 73   Temp 97.9 °F (36.6 °C) (Infrared)   Resp 18   Ht 5' 2\" (1.575 m)   Wt 177 lb 3.2 oz (80.4 kg)   SpO2 97%   BMI 32.41 kg/m²     Physical Exam:  CONSTITUTIONAL: awake, alert, cooperative, no apparent distress   EYES: pupils equal, round and reactive to light, sclera clear and conjunctiva normal  ENT: Normocephalic, without obvious abnormality, atraumatic  NECK: supple, symmetrical, no jugular venous distension and no carotid bruits   HEMATOLOGIC/LYMPHATIC: no cervical, supraclavicular or axillary lymphadenopathy   LUNGS: no increased work of breathing and clear to auscultation   CARDIOVASCULAR: regular rate and rhythm, normal S1 and S2, no murmur noted  ABDOMEN: normal bowel sounds x 4, soft, non-distended, non-tender, no masses palpated, no hepatosplenomegaly   MUSCULOSKELETAL: full range of was unremarkable. 2. She completed treatment for hepatitis C in May 2019. She will follow up with GI. She is in remission. 3. Mammogram in June 2019 was benign. Colonoscopy was In 2013. Mammogram in July 2020 was benign. Colonoscopy in December 2020 showed diverticulosis and hemorrhoids. Repeat study in 5 years was recommended. 4. We discussed about healthy diet and lifestyle. He had flu shot in 2020. She had COVID-19 vaccine in January 2021. Return to clinic in 6 months or sooner. All of her questions been answered for today. Time spent was about 21 minutes. Recent imaging and labs were reviewed and discussed with the patient.       Electronically signed by Gailen Siemens, MD on 1/8/21 at 7:54 AM EST

## 2021-01-14 ENCOUNTER — OFFICE VISIT (OUTPATIENT)
Dept: ONCOLOGY | Age: 66
End: 2021-01-14
Payer: MEDICARE

## 2021-01-14 ENCOUNTER — HOSPITAL ENCOUNTER (OUTPATIENT)
Dept: INFUSION THERAPY | Age: 66
Discharge: HOME | End: 2021-01-14
Payer: MEDICARE

## 2021-01-14 VITALS
OXYGEN SATURATION: 97 % | BODY MASS INDEX: 32.61 KG/M2 | HEART RATE: 73 BPM | TEMPERATURE: 97.9 F | SYSTOLIC BLOOD PRESSURE: 138 MMHG | HEIGHT: 62 IN | WEIGHT: 177.2 LBS | RESPIRATION RATE: 18 BRPM | DIASTOLIC BLOOD PRESSURE: 67 MMHG

## 2021-01-14 DIAGNOSIS — D70.9 NEUTROPENIA, UNSPECIFIED TYPE (HCC): ICD-10-CM

## 2021-01-14 DIAGNOSIS — D70.9 NEUTROPENIA, UNSPECIFIED TYPE (HCC): Primary | ICD-10-CM

## 2021-01-14 LAB
ALBUMIN SERPL-MCNC: 4.3 GM/DL (ref 3.4–5)
ALP BLD-CCNC: 71 IU/L (ref 40–128)
ALT SERPL-CCNC: 15 U/L (ref 10–40)
ANION GAP SERPL CALCULATED.3IONS-SCNC: 10 MMOL/L (ref 4–16)
AST SERPL-CCNC: 20 IU/L (ref 15–37)
BASOPHILS ABSOLUTE: 0 K/CU MM
BASOPHILS RELATIVE PERCENT: 0.2 % (ref 0–1)
BILIRUB SERPL-MCNC: 0.3 MG/DL (ref 0–1)
BUN BLDV-MCNC: 10 MG/DL (ref 6–23)
CALCIUM SERPL-MCNC: 9.2 MG/DL (ref 8.3–10.6)
CHLORIDE BLD-SCNC: 104 MMOL/L (ref 99–110)
CO2: 25 MMOL/L (ref 21–32)
CREAT SERPL-MCNC: 0.8 MG/DL (ref 0.6–1.1)
DIFFERENTIAL TYPE: ABNORMAL
EOSINOPHILS ABSOLUTE: 0.1 K/CU MM
EOSINOPHILS RELATIVE PERCENT: 2.8 % (ref 0–3)
GFR AFRICAN AMERICAN: >60 ML/MIN/1.73M2
GFR NON-AFRICAN AMERICAN: >60 ML/MIN/1.73M2
GLUCOSE BLD-MCNC: 112 MG/DL (ref 70–99)
HCT VFR BLD CALC: 37.3 % (ref 37–47)
HEMOGLOBIN: 12.1 GM/DL (ref 12.5–16)
LYMPHOCYTES ABSOLUTE: 1.8 K/CU MM
LYMPHOCYTES RELATIVE PERCENT: 37.9 % (ref 24–44)
MCH RBC QN AUTO: 31.2 PG (ref 27–31)
MCHC RBC AUTO-ENTMCNC: 32.4 % (ref 32–36)
MCV RBC AUTO: 96.1 FL (ref 78–100)
MONOCYTES ABSOLUTE: 0.4 K/CU MM
MONOCYTES RELATIVE PERCENT: 8.8 % (ref 0–4)
PDW BLD-RTO: 12.9 % (ref 11.7–14.9)
PLATELET # BLD: 228 K/CU MM (ref 140–440)
PMV BLD AUTO: 9.7 FL (ref 7.5–11.1)
POTASSIUM SERPL-SCNC: 4.7 MMOL/L (ref 3.5–5.1)
RBC # BLD: 3.88 M/CU MM (ref 4.2–5.4)
SEGMENTED NEUTROPHILS ABSOLUTE COUNT: 2.4 K/CU MM
SEGMENTED NEUTROPHILS RELATIVE PERCENT: 50.3 % (ref 36–66)
SODIUM BLD-SCNC: 139 MMOL/L (ref 135–145)
TOTAL PROTEIN: 7.4 GM/DL (ref 6.4–8.2)
WBC # BLD: 4.7 K/CU MM (ref 4–10.5)

## 2021-01-14 PROCEDURE — G8417 CALC BMI ABV UP PARAM F/U: HCPCS | Performed by: INTERNAL MEDICINE

## 2021-01-14 PROCEDURE — 1123F ACP DISCUSS/DSCN MKR DOCD: CPT | Performed by: INTERNAL MEDICINE

## 2021-01-14 PROCEDURE — G8400 PT W/DXA NO RESULTS DOC: HCPCS | Performed by: INTERNAL MEDICINE

## 2021-01-14 PROCEDURE — 99211 OFF/OP EST MAY X REQ PHY/QHP: CPT

## 2021-01-14 PROCEDURE — G8484 FLU IMMUNIZE NO ADMIN: HCPCS | Performed by: INTERNAL MEDICINE

## 2021-01-14 PROCEDURE — 99213 OFFICE O/P EST LOW 20 MIN: CPT | Performed by: INTERNAL MEDICINE

## 2021-01-14 PROCEDURE — 85025 COMPLETE CBC W/AUTO DIFF WBC: CPT

## 2021-01-14 PROCEDURE — 36415 COLL VENOUS BLD VENIPUNCTURE: CPT

## 2021-01-14 PROCEDURE — 1090F PRES/ABSN URINE INCON ASSESS: CPT | Performed by: INTERNAL MEDICINE

## 2021-01-14 PROCEDURE — 3017F COLORECTAL CA SCREEN DOC REV: CPT | Performed by: INTERNAL MEDICINE

## 2021-01-14 PROCEDURE — G8427 DOCREV CUR MEDS BY ELIG CLIN: HCPCS | Performed by: INTERNAL MEDICINE

## 2021-01-14 PROCEDURE — 4040F PNEUMOC VAC/ADMIN/RCVD: CPT | Performed by: INTERNAL MEDICINE

## 2021-01-14 PROCEDURE — 1036F TOBACCO NON-USER: CPT | Performed by: INTERNAL MEDICINE

## 2021-01-14 PROCEDURE — 80053 COMPREHEN METABOLIC PANEL: CPT

## 2021-03-10 ENCOUNTER — OFFICE VISIT (OUTPATIENT)
Dept: INTERNAL MEDICINE CLINIC | Age: 66
End: 2021-03-10
Payer: MEDICARE

## 2021-03-10 VITALS
WEIGHT: 178.4 LBS | SYSTOLIC BLOOD PRESSURE: 140 MMHG | BODY MASS INDEX: 32.83 KG/M2 | OXYGEN SATURATION: 99 % | TEMPERATURE: 96.7 F | DIASTOLIC BLOOD PRESSURE: 92 MMHG | HEIGHT: 62 IN | HEART RATE: 77 BPM

## 2021-03-10 DIAGNOSIS — J30.2 SEASONAL ALLERGIC RHINITIS, UNSPECIFIED TRIGGER: ICD-10-CM

## 2021-03-10 DIAGNOSIS — Z00.00 ROUTINE GENERAL MEDICAL EXAMINATION AT A HEALTH CARE FACILITY: ICD-10-CM

## 2021-03-10 DIAGNOSIS — D70.9 NEUTROPENIA, UNSPECIFIED TYPE (HCC): ICD-10-CM

## 2021-03-10 DIAGNOSIS — R42 VERTIGO: ICD-10-CM

## 2021-03-10 DIAGNOSIS — Z86.19 HISTORY OF HEPATITIS C: ICD-10-CM

## 2021-03-10 DIAGNOSIS — J45.20 MILD INTERMITTENT ASTHMA WITHOUT COMPLICATION: Primary | ICD-10-CM

## 2021-03-10 DIAGNOSIS — D75.A G6PD DEFICIENCY: ICD-10-CM

## 2021-03-10 PROCEDURE — 3017F COLORECTAL CA SCREEN DOC REV: CPT | Performed by: INTERNAL MEDICINE

## 2021-03-10 PROCEDURE — G8417 CALC BMI ABV UP PARAM F/U: HCPCS | Performed by: INTERNAL MEDICINE

## 2021-03-10 PROCEDURE — G0439 PPPS, SUBSEQ VISIT: HCPCS | Performed by: INTERNAL MEDICINE

## 2021-03-10 PROCEDURE — 1090F PRES/ABSN URINE INCON ASSESS: CPT | Performed by: INTERNAL MEDICINE

## 2021-03-10 PROCEDURE — G8400 PT W/DXA NO RESULTS DOC: HCPCS | Performed by: INTERNAL MEDICINE

## 2021-03-10 PROCEDURE — 4040F PNEUMOC VAC/ADMIN/RCVD: CPT | Performed by: INTERNAL MEDICINE

## 2021-03-10 PROCEDURE — G8427 DOCREV CUR MEDS BY ELIG CLIN: HCPCS | Performed by: INTERNAL MEDICINE

## 2021-03-10 PROCEDURE — G8484 FLU IMMUNIZE NO ADMIN: HCPCS | Performed by: INTERNAL MEDICINE

## 2021-03-10 PROCEDURE — 1036F TOBACCO NON-USER: CPT | Performed by: INTERNAL MEDICINE

## 2021-03-10 PROCEDURE — 1123F ACP DISCUSS/DSCN MKR DOCD: CPT | Performed by: INTERNAL MEDICINE

## 2021-03-10 PROCEDURE — 99214 OFFICE O/P EST MOD 30 MIN: CPT | Performed by: INTERNAL MEDICINE

## 2021-03-10 RX ORDER — ALBUTEROL SULFATE 90 UG/1
2 AEROSOL, METERED RESPIRATORY (INHALATION) EVERY 6 HOURS PRN
Qty: 1 INHALER | Refills: 3 | Status: SHIPPED | OUTPATIENT
Start: 2021-03-10 | End: 2021-09-07 | Stop reason: SDUPTHER

## 2021-03-10 RX ORDER — MECLIZINE HYDROCHLORIDE 25 MG/1
25 TABLET ORAL 3 TIMES DAILY PRN
Qty: 30 TABLET | Refills: 3 | Status: SHIPPED | OUTPATIENT
Start: 2021-03-10 | End: 2021-09-07 | Stop reason: SDUPTHER

## 2021-03-10 RX ORDER — MONTELUKAST SODIUM 10 MG/1
10 TABLET ORAL NIGHTLY
Qty: 90 TABLET | Refills: 1 | Status: SHIPPED | OUTPATIENT
Start: 2021-03-10 | End: 2021-09-07 | Stop reason: SDUPTHER

## 2021-03-10 ASSESSMENT — LIFESTYLE VARIABLES
HOW OFTEN DURING THE LAST YEAR HAVE YOU FAILED TO DO WHAT WAS NORMALLY EXPECTED FROM YOU BECAUSE OF DRINKING: NEVER
AUDIT-C TOTAL SCORE: 0
HOW OFTEN DURING THE LAST YEAR HAVE YOU BEEN UNABLE TO REMEMBER WHAT HAPPENED THE NIGHT BEFORE BECAUSE YOU HAD BEEN DRINKING: NEVER
HOW OFTEN DURING THE LAST YEAR HAVE YOU NEEDED AN ALCOHOLIC DRINK FIRST THING IN THE MORNING TO GET YOURSELF GOING AFTER A NIGHT OF HEAVY DRINKING: 0
HOW OFTEN DO YOU HAVE A DRINK CONTAINING ALCOHOL: TWO TO THREE TIMES A WEEK
HOW OFTEN DO YOU HAVE SIX OR MORE DRINKS ON ONE OCCASION: NEVER
HOW OFTEN DURING THE LAST YEAR HAVE YOU FOUND THAT YOU WERE NOT ABLE TO STOP DRINKING ONCE YOU HAD STARTED: NEVER
AUDIT TOTAL SCORE: 3
HOW OFTEN DO YOU HAVE SIX OR MORE DRINKS ON ONE OCCASION: 0
HOW OFTEN DURING THE LAST YEAR HAVE YOU NEEDED AN ALCOHOLIC DRINK FIRST THING IN THE MORNING TO GET YOURSELF GOING AFTER A NIGHT OF HEAVY DRINKING: NEVER
HOW OFTEN DURING THE LAST YEAR HAVE YOU BEEN UNABLE TO REMEMBER WHAT HAPPENED THE NIGHT BEFORE BECAUSE YOU HAD BEEN DRINKING: 0
HOW OFTEN DURING THE LAST YEAR HAVE YOU FAILED TO DO WHAT WAS NORMALLY EXPECTED FROM YOU BECAUSE OF DRINKING: 0
AUDIT TOTAL SCORE: 0
HAVE YOU OR SOMEONE ELSE BEEN INJURED AS A RESULT OF YOUR DRINKING: NO
HAS A RELATIVE, FRIEND, DOCTOR, OR ANOTHER HEALTH PROFESSIONAL EXPRESSED CONCERN ABOUT YOUR DRINKING OR SUGGESTED YOU CUT DOWN: NO
HOW MANY STANDARD DRINKS CONTAINING ALCOHOL DO YOU HAVE ON A TYPICAL DAY: ONE OR TWO
HAS A RELATIVE, FRIEND, DOCTOR, OR ANOTHER HEALTH PROFESSIONAL EXPRESSED CONCERN ABOUT YOUR DRINKING OR SUGGESTED YOU CUT DOWN: 0
HOW OFTEN DURING THE LAST YEAR HAVE YOU HAD A FEELING OF GUILT OR REMORSE AFTER DRINKING: NEVER
HAVE YOU OR SOMEONE ELSE BEEN INJURED AS A RESULT OF YOUR DRINKING: 0

## 2021-03-10 ASSESSMENT — PATIENT HEALTH QUESTIONNAIRE - PHQ9
SUM OF ALL RESPONSES TO PHQ QUESTIONS 1-9: 0
SUM OF ALL RESPONSES TO PHQ9 QUESTIONS 1 & 2: 0
SUM OF ALL RESPONSES TO PHQ QUESTIONS 1-9: 0

## 2021-03-10 NOTE — PROGRESS NOTES
Care Team:  Racquel Guillaume MD as PCP - General (Internal Medicine)  Racquel Guillaume MD as PCP - [facility]    Wt Readings from Last 3 Encounters:   03/10/21 178 lb 6.4 oz (80.9 kg)   01/14/21 177 lb 3.2 oz (80.4 kg)   12/10/20 176 lb 1.6 oz (79.9 kg)     Vitals:    03/10/21 1240   BP: (!) 140/92   Site: Right Upper Arm   Position: Sitting   Cuff Size: Medium Adult   Pulse: 77   Temp: 96.7 °F (35.9 °C)   SpO2: 99%   Weight: 178 lb 6.4 oz (80.9 kg)   Height: 5' 2\" (1.575 m)     Body mass index is 32.63 kg/m². Based upon direct observation of the patient, evaluation of cognition reveals recent and remote memory intact. na    Patient's complete Health Risk Assessment and screening values have been reviewed and are found in Flowsheets. The following problems were reviewed today and where indicated follow up appointments were made and/or referrals ordered. Positive Risk Factor Screenings with Interventions:            General Health and ACP:  General  In general, how would you say your health is?: Fair  In the past 7 days, have you experienced any of the following? New or Increased Pain, New or Increased Fatigue, Loneliness, Social Isolation, Stress or Anger?: None of These  Do you get the social and emotional support that you need?: Yes  Do you have a Living Will?: (!) No  Advance Directives     Power of [address] [place] [address] Will ACP-Advance Directive ACP-Power of     Not on File Not on File Not on File Not on File      General Health Risk Interventions:  · No Living Will: patient will make a living will soon.     Health Habits/Nutrition:  Health Habits/Nutrition  Do you exercise for at least 20 minutes 2-3 times per week?: Yes  Have you lost any weight without trying in the past 3 months?: No  Do you eat only one meal per day?: No  Have you seen the dentist within the past year?: (!) No  Body mass index: (!) 32.63  Health Habits/Nutrition Interventions:  · Dental exam overdue:  patient encouraged to make appointment with his/her dentist    Hearing/Vision:  No exam data present  Hearing/Vision  Do you or your family notice any trouble with your hearing that hasn't been managed with hearing aids?: No  Do you have difficulty driving, watching TV, or doing any of your daily activities because of your eyesight?: No  Have you had an eye exam within the past year?: (!) No  Hearing/Vision Interventions:  · Vision concerns:  patient encouraged to make appointment with his/her eye specialist    Safety:  Safety  Do you have working smoke detectors?: Yes  Have all throw rugs been removed or fastened?: (!) No  Do you have non-slip mats or surfaces in all bathtubs/showers?: Yes  Do all of your stairways have a railing or banister?: (!) No  Are your doorways, halls and stairs free of clutter?: (!) No  Do you always fasten your seatbelt when you are in a car?: Yes  Safety Interventions:  · Home safety tips provided     Personalized Preventive Plan   Current Health Maintenance Status  Immunization History   Administered Date(s) Administered    Influenza, Triv, inactivated, subunit, adjuvanted, IM (Fluad 65 yrs and older) 08/19/2019    Pneumococcal Conjugate 13-valent (Coretha Cody) 08/19/2019    Tdap (Boostrix, Adacel) 07/15/2020    Zoster Recombinant (Shingrix) 01/28/2020, 07/15/2020        Health Maintenance   Topic Date Due    COVID-19 Vaccine (1 of 2) Never done    Hepatitis B vaccine (1 of 3 - Risk 3-dose series) Never done    Lipid screen  Never done    Diabetes screen  Never done    DEXA (modify frequency per FRAX score)  Never done    Annual Wellness Visit (AWV)  Never done    Pneumococcal 65+ years Vaccine (2 of 2 - PPSV23) 08/19/2020    Flu vaccine (1) 09/01/2020    Breast cancer screen  07/10/2022    DTaP/Tdap/Td vaccine (2 - Td) 07/15/2030    Colon cancer screen colonoscopy  12/23/2030    Shingles Vaccine  Completed    Hepatitis A vaccine  Aged Out    Hib vaccine  Aged Out    Meningococcal (ACWY) vaccine  Aged Out     Recommendations for [employer] Due: see orders and patient instructions/AVS.  . Recommended screening schedule for the next 5-10 years is provided to the patient in written form: see Patient Instructions/AVS.    Samia Banks was seen today for medicare awv and allergic rhinitis . Diagnoses and all orders for this visit:    Mild intermittent asthma without complication  -     albuterol sulfate  (90 Base) MCG/ACT inhaler; Inhale 2 puffs into the lungs every 6 hours as needed for Wheezing  -     [place] OFFICE OUTPATIENT VISIT 25 MINUTES    Seasonal allergic rhinitis, unspecified trigger  -     montelukast (SINGULAIR) 10 MG tablet; Take 1 tablet by mouth nightly  -     [place] OFFICE OUTPATIENT VISIT 25 MINUTES    History of hepatitis C  -     [place] OFFICE OUTPATIENT VISIT 25 MINUTES    G6PD deficiency  -     [place] OFFICE OUTPATIENT VISIT 25 MINUTES    Neutropenia, unspecified type (HCC)  -     [place] OFFICE OUTPATIENT VISIT 25 MINUTES    Vertigo  -     meclizine (ANTIVERT) 25 MG tablet; Take 1 tablet by mouth 3 times daily as needed for Dizziness  -     [place] OFFICE OUTPATIENT VISIT 25 MINUTES    Routine general medical examination at a health care facility             Name: Kj Rg  [id number]  Age: 76 y.o. YOB: 1955  Sex: female    CHIEF COMPLAINT:    Chief Complaint   Patient presents with    Medicare AWV    Allergic Rhinitis        HISTORY OF PRESENT ILLNESS:     This is a pleasant  76 y.o. female  is seen today for management of chronic medical problems and medications refills. Previous records reviewed . Doing OK . Denies CP or SOB. Asthma is better. No fever , sore throat or cough or congestion. Allergy symptoms are better now. Denies any abdominal pain. Appetite OK. Bowels moving [id number] No urinary symptoms. Denies any significant arthritis. Hearing is ok. Vision Ok with glasses. Denies  any significant skin lesions.   Denies any significant depression or anxiety. Gets dizzy very rarely. Uses meclizine which helps her. No other complaints. Patient had Covid infection 6 weeks ago. Had sinus symptoms with Covid infection and improved on its own slowly. Recently got vaccinated for Covid, first dose 4 weeks ago. She had a colonoscopy in December 2020 and had some diverticulosis coli. Dr. Ellis Has wanted it repeated in about 5 years. She had CT scan of the abdomen which showed small nonobstructing kidney stones and small 3 mm noncalcified pulmonary nodule which appeared to be benign. Past Medical History:    Patient Active Problem List   Diagnosis    Personal history of infectious disease    Other specified disorders of white blood cells    Neutropenia ([address]    History of hepatitis C    Allergic rhinitis    Left hip pain    G6PD deficiency    Mild persistent asthma without complication    Vertigo    Mild intermittent asthma without complication        Past Surgical History:        Procedure Laterality Date    COLONOSCOPY  2013    Polyps - Dr. Vince Velasquez  1990's   [facility]    \"left one ovary \"    TONSILLECTOMY  1970's       Social History:   Social History     Tobacco Use    Smoking status: Never Smoker    Smokeless tobacco: Never Used   Substance Use Topics    Alcohol use: Yes     Comment: Occasional wine/\"average glass of wine or beer  one time per month\"       Family History:       Problem Relation Age of Onset    No Known Problems Mother     Kidney Disease Father        Allergies:  Pcn [penicillins]    Current Medications :      Prior to Admission medications    Medication Sig Start Date End Date Taking?  Authorizing Provider   albuterol sulfate  (90 Base) MCG/ACT inhaler Inhale 2 puffs into the lungs every 6 hours as needed for Wheezing 3/10/21  Yes Alexandru Kenyon MD   montelukast (SINGULAIR) 10 MG tablet Take 1 tablet by mouth nightly 3/10/21  Yes Alexandru Kenyon MD   meclizine (ANTIVERT) 25 MG tablet Take 1 tablet by mouth 3 times daily as needed for Dizziness 3/10/21  Yes Candance Amabile, MD   Multiple Vitamins-Minerals (MULTIVITAMIN PO) Take by mouth   Yes Historical Provider, MD       LAB DATA: Reviewed. REVIEW OF SYSTEMS:   see HPI/ Comprehensive review of systems negative except for the ones mentioned in HPI. PHYSICAL EXAMINATION:   BP (!) 140/92 (Site: Right Upper Arm, Position: Sitting, Cuff Size: Medium Adult)   Pulse 77   Temp 96.7 °F (35.9 °C)   Ht 5' 2\" (1.575 m)   Wt 178 lb 6.4 oz (80.9 kg)   SpO2 99%   BMI 32.63 kg/m²      GENERAL APPEARANCE:    Alert, oriented x 3, well developed, cooperative, not in any distress, appears stated age. HEAD:   Normocephalic, atraumatic   EYES:   PERRLA, EOMI, lids normal, conjuctivea clear, sclera anicteric. NECK:    Supple, symmetrical,  trachea midline, no thyromegaly, no JVD, no lymphadenopathy. LUNGS:    Clear to auscultation bilaterally, respirations unlabored, accessory muscles are not used. HEART:     Regular rate and rhythm, S1 and S2 normal, no murmur, rub or gallop. PMI in MCL. ABDOMEN:    Soft, non-tender, bowel sounds are normoactive, no masses, no hepatospleenomegaly. EXTREMITY:   no bipedal edema  NEURO:  Alert, oriented to person, place and time. Grossly intact. Musculoskeletal:         No kyphosis or scoliosis, no deformity in any extremity noted, muscle strength and tone are normal.  Skin:                            Warm and dry. No rash or obvious suspicious lesions. PSYCH:  Mood euthymic, insight and judgement good. ASSESSMENT/PLAN:    1. Seasonal allergic rhinitis, unspecified trigger  Can take Claritin OTC as needed. - montelukast (SINGULAIR) 10 MG tablet; Take 1 tablet by mouth nightly  Dispense: 90 tablet; Refill: 1      2. Mild intermittent asthma without complication  - albuterol sulfate  (90 Base) MCG/ACT inhaler;  Inhale 2 puffs into the lungs every 6 hours as needed for Wheezing  Dispense: 1

## 2021-03-10 NOTE — PATIENT INSTRUCTIONS
Personalized Preventive Plan for Kristopher Huerta - 3/10/2021  Medicare offers a range of preventive health benefits. Some of the tests and screenings are paid in full while other may be subject to a deductible, co-insurance, and/or copay. Some of these benefits include a comprehensive review of your medical history including lifestyle, illnesses that may run in your family, and various assessments and screenings as appropriate. After reviewing your medical record and screening and assessments performed today your provider may have ordered immunizations, labs, imaging, and/or referrals for you. A list of these orders (if applicable) as well as your Preventive Care list are included within your After Visit Summary for your review. Other Preventive Recommendations:    · A preventive eye exam performed by an eye specialist is recommended every 1-2 years to screen for glaucoma; cataracts, macular degeneration, and other eye disorders. · A preventive dental visit is recommended every 6 months. · Try to get at least 150 minutes of exercise per week or 10,000 steps per day on a pedometer . · Order or download the FREE \"Exercise & Physical Activity: Your Everyday Guide\" from [employer]. Call 1-415.622.3346 or search [employer] online. · You need 7051-7669 mg of calcium and 1136-2113 IU of vitamin D per day. It is possible to meet your calcium requirement with diet alone, but a vitamin D supplement is usually necessary to meet this goal.  · When exposed to the sun, use a sunscreen that protects against both UVA and UVB radiation with an SPF of 30 or greater. Reapply every 2 to 3 hours or after sweating, drying off with a towel, or swimming. · Always wear a seat belt when traveling in a car. Always wear a helmet when riding a bicycle or motorcycle.

## 2021-06-09 ENCOUNTER — OFFICE VISIT (OUTPATIENT)
Dept: INTERNAL MEDICINE CLINIC | Age: 66
End: 2021-06-09
Payer: MEDICARE

## 2021-06-09 VITALS
HEIGHT: 62 IN | SYSTOLIC BLOOD PRESSURE: 122 MMHG | BODY MASS INDEX: 32.57 KG/M2 | TEMPERATURE: 97.4 F | HEART RATE: 74 BPM | OXYGEN SATURATION: 97 % | WEIGHT: 177 LBS | DIASTOLIC BLOOD PRESSURE: 88 MMHG

## 2021-06-09 DIAGNOSIS — J45.20 MILD INTERMITTENT ASTHMA WITHOUT COMPLICATION: Primary | ICD-10-CM

## 2021-06-09 DIAGNOSIS — M25.552 LEFT HIP PAIN: ICD-10-CM

## 2021-06-09 DIAGNOSIS — R42 VERTIGO: ICD-10-CM

## 2021-06-09 DIAGNOSIS — Z12.31 ENCOUNTER FOR SCREENING MAMMOGRAM FOR MALIGNANT NEOPLASM OF BREAST: ICD-10-CM

## 2021-06-09 DIAGNOSIS — Z13.220 SCREENING FOR CHOLESTEROL LEVEL: ICD-10-CM

## 2021-06-09 DIAGNOSIS — D70.9 NEUTROPENIA, UNSPECIFIED TYPE (HCC): ICD-10-CM

## 2021-06-09 DIAGNOSIS — J30.9 ALLERGIC RHINITIS, UNSPECIFIED SEASONALITY, UNSPECIFIED TRIGGER: ICD-10-CM

## 2021-06-09 DIAGNOSIS — E66.9 OBESITY (BMI 30.0-34.9): ICD-10-CM

## 2021-06-09 DIAGNOSIS — Z13.820 SCREENING FOR OSTEOPOROSIS: ICD-10-CM

## 2021-06-09 DIAGNOSIS — Z13.1 SCREENING FOR DIABETES MELLITUS: ICD-10-CM

## 2021-06-09 DIAGNOSIS — Z86.19 HISTORY OF HEPATITIS C: ICD-10-CM

## 2021-06-09 PROBLEM — J45.30 MILD PERSISTENT ASTHMA WITHOUT COMPLICATION: Status: RESOLVED | Noted: 2020-12-10 | Resolved: 2021-06-09

## 2021-06-09 PROBLEM — E66.811 OBESITY (BMI 30.0-34.9): Status: ACTIVE | Noted: 2021-06-09

## 2021-06-09 LAB
A/G RATIO: 1.4 (ref 1.1–2.2)
ALBUMIN SERPL-MCNC: 4.7 G/DL (ref 3.4–5)
ALP BLD-CCNC: 78 U/L (ref 40–129)
ALT SERPL-CCNC: 14 U/L (ref 10–40)
ANION GAP SERPL CALCULATED.3IONS-SCNC: 16 MMOL/L (ref 3–16)
AST SERPL-CCNC: 17 U/L (ref 15–37)
BASOPHILS ABSOLUTE: 0 K/UL (ref 0–0.2)
BASOPHILS RELATIVE PERCENT: 0.3 %
BILIRUB SERPL-MCNC: 0.3 MG/DL (ref 0–1)
BUN BLDV-MCNC: 13 MG/DL (ref 7–20)
CALCIUM SERPL-MCNC: 9.7 MG/DL (ref 8.3–10.6)
CHLORIDE BLD-SCNC: 100 MMOL/L (ref 99–110)
CHOLESTEROL, TOTAL: 214 MG/DL (ref 0–199)
CO2: 24 MMOL/L (ref 21–32)
CREAT SERPL-MCNC: 0.7 MG/DL (ref 0.6–1.2)
EOSINOPHILS ABSOLUTE: 0.1 K/UL (ref 0–0.6)
EOSINOPHILS RELATIVE PERCENT: 2.1 %
GFR AFRICAN AMERICAN: >60
GFR NON-AFRICAN AMERICAN: >60
GLOBULIN: 3.4 G/DL
GLUCOSE BLD-MCNC: 82 MG/DL (ref 70–99)
HCT VFR BLD CALC: 36.5 % (ref 36–48)
HDLC SERPL-MCNC: 89 MG/DL (ref 40–60)
HEMOGLOBIN: 12.3 G/DL (ref 12–16)
LDL CHOLESTEROL CALCULATED: 112 MG/DL
LYMPHOCYTES ABSOLUTE: 2.5 K/UL (ref 1–5.1)
LYMPHOCYTES RELATIVE PERCENT: 47.6 %
MCH RBC QN AUTO: 31.9 PG (ref 26–34)
MCHC RBC AUTO-ENTMCNC: 33.6 G/DL (ref 31–36)
MCV RBC AUTO: 94.8 FL (ref 80–100)
MONOCYTES ABSOLUTE: 0.6 K/UL (ref 0–1.3)
MONOCYTES RELATIVE PERCENT: 11.7 %
NEUTROPHILS ABSOLUTE: 2 K/UL (ref 1.7–7.7)
NEUTROPHILS RELATIVE PERCENT: 38.3 %
PDW BLD-RTO: 13.6 % (ref 12.4–15.4)
PLATELET # BLD: 221 K/UL (ref 135–450)
PMV BLD AUTO: 8.6 FL (ref 5–10.5)
POTASSIUM SERPL-SCNC: 4.5 MMOL/L (ref 3.5–5.1)
RBC # BLD: 3.85 M/UL (ref 4–5.2)
SODIUM BLD-SCNC: 140 MMOL/L (ref 136–145)
TOTAL PROTEIN: 8.1 G/DL (ref 6.4–8.2)
TRIGL SERPL-MCNC: 65 MG/DL (ref 0–150)
VLDLC SERPL CALC-MCNC: 13 MG/DL
WBC # BLD: 5.2 K/UL (ref 4–11)

## 2021-06-09 PROCEDURE — 99214 OFFICE O/P EST MOD 30 MIN: CPT | Performed by: INTERNAL MEDICINE

## 2021-06-09 PROCEDURE — 36415 COLL VENOUS BLD VENIPUNCTURE: CPT | Performed by: INTERNAL MEDICINE

## 2021-06-09 RX ORDER — MELOXICAM 7.5 MG/1
7.5 TABLET ORAL DAILY
Qty: 30 TABLET | Refills: 3 | Status: SHIPPED | OUTPATIENT
Start: 2021-06-09 | End: 2022-03-10 | Stop reason: SDUPTHER

## 2021-06-09 SDOH — ECONOMIC STABILITY: FOOD INSECURITY: WITHIN THE PAST 12 MONTHS, YOU WORRIED THAT YOUR FOOD WOULD RUN OUT BEFORE YOU GOT MONEY TO BUY MORE.: NEVER TRUE

## 2021-06-09 SDOH — ECONOMIC STABILITY: FOOD INSECURITY: WITHIN THE PAST 12 MONTHS, THE FOOD YOU BOUGHT JUST DIDN'T LAST AND YOU DIDN'T HAVE MONEY TO GET MORE.: NEVER TRUE

## 2021-06-09 ASSESSMENT — SOCIAL DETERMINANTS OF HEALTH (SDOH): HOW HARD IS IT FOR YOU TO PAY FOR THE VERY BASICS LIKE FOOD, HOUSING, MEDICAL CARE, AND HEATING?: NOT HARD AT ALL

## 2021-06-09 NOTE — PROGRESS NOTES
Name: Tressa Jesus  [id number]  Age: 76 y.o. YOB: 1955  Sex: female    CHIEF COMPLAINT:    Chief Complaint   Patient presents with    Hepatitis C    Other     brught hip disc to review    Other     other chronic conditions        HISTORY OF PRESENT ILLNESS:     This is a pleasant  76 y.o. female  is seen today for management of chronic medical problems and medications refills. Previous records reviewed . Doing OK . Denies CP or SOB. No fever , sore throat or cough or congestion. Asthma is better. .   Denies any abdominal pain. Appetite OK. Bowels [id number] No urinary symptoms. Complains of significant pain in her left hip. Was seeing an orthopedic doctor in [facility] and he was giving her injections in her hip without much help. She made an appointment herself with Dr. Reza Kamara and he is going to see her on 6/23/2021. She is taking Tylenol without much help. Hearing is ok. Vision Ok with glasses. Denies  any significant skin lesions. Denies any significant depression or anxiety. Dizziness is better. . uses meclizine very rarely. No other complaints. She had both shots of Covid vaccination. Sueellen Payment last one @ 2 months ago. Lg Martínez. .      Past Medical History:    Patient Active Problem List   Diagnosis    Personal history of infectious disease    Other specified disorders of white blood cells    Neutropenia (HCC)    History of hepatitis C    Allergic rhinitis    Left hip pain    G6PD deficiency    Vertigo    Mild intermittent asthma without complication    Screening for cholesterol level    Obesity (BMI 30.0-34. 9)        Past Surgical History:        Procedure Laterality Date    COLONOSCOPY  2013    Polyps - Dr. Clementina Marlow  1990's   [facility]    \"left one ovary \"    TONSILLECTOMY  1970's       Social History:   Social History     Tobacco Use    Smoking status: Never Smoker    Smokeless tobacco: Never Used   Substance Use Topics    Alcohol use: Yes     Comment: Occasional wine/\"average glass of wine or beer  one time per month\"       Family History:       Problem Relation Age of Onset    No Known Problems Mother     Kidney Disease Father        Allergies:  Pcn [penicillins]    Current Medications :      Prior to Admission medications    Medication Sig Start Date End Date Taking? Authorizing Provider   meloxicam (MOBIC) 7.5 MG tablet Take 1 tablet by mouth daily 6/9/21  Yes Jay Betancourt MD   albuterol sulfate  (90 Base) MCG/ACT inhaler Inhale 2 puffs into the lungs every 6 hours as needed for Wheezing 3/10/21  Yes Jay Betancourt MD   montelukast (SINGULAIR) 10 MG tablet Take 1 tablet by mouth nightly 3/10/21  Yes Jay Betancourt MD   meclizine (ANTIVERT) 25 MG tablet Take 1 tablet by mouth 3 times daily as needed for Dizziness 3/10/21  Yes Jay Betancourt MD   Multiple Vitamins-Minerals (MULTIVITAMIN PO) Take by mouth   Yes Historical Provider, MD       LAB DATA: Reviewed. REVIEW OF SYSTEMS:   see HPI/ Comprehensive review of systems negative except for the ones mentioned in HPI. PHYSICAL EXAMINATION:   /88 (Site: Left Upper Arm, Position: Sitting, Cuff Size: Medium Adult)   Pulse 74   Temp 97.4 °F (36.3 °C)   Ht 5' 2\" (1.575 m)   Wt 177 lb (80.3 kg)   SpO2 97%   BMI 32.37 kg/m²      GENERAL APPEARANCE:    Alert, oriented x 3, well developed, cooperative, not in any distress, appears stated age. HEAD:   Normocephalic, atraumatic   EYES:   PERRLA, EOMI, lids normal, conjuctivea clear, sclera anicteric. NECK:    Supple, symmetrical,  trachea midline, no thyromegaly, no JVD, no lymphadenopathy. LUNGS:    Clear to auscultation bilaterally, respirations unlabored, accessory muscles are not used. HEART:     Regular rate and rhythm, S1 and S2 normal, no murmur, rub or gallop. PMI in MCL. ABDOMEN:    Soft, non-tender, bowel sounds are normoactive, no masses, no hepatospleenomegaly.   EXTREMITY:   no bipedal edema, tenderness of the left hip. NEURO:  Alert, oriented to person, place and time. Grossly intact. Musculoskeletal:         No kyphosis or scoliosis, no deformity in any extremity noted, muscle strength and tone are normal.  Skin:                            Warm and dry. No rash or obvious suspicious lesions. PSYCH:  Mood euthymic, insight and judgement good. ASSESSMENT/PLAN:    1. Mild intermittent asthma without complication  Continue Singulair and albuterol HFA as needed    2. Neutropenia, unspecified type [facility])  Being followed by Dr. Franco Sethi periodically. Neutropenia is mild and stable  - CBC Auto Differential    3. History of hepatitis C  Has been treated. In remission  - Comprehensive Metabolic Panel    4. Allergic rhinitis, unspecified seasonality, unspecified trigger  Continue Singulair    5. Vertigo  On meclizine as needed    6. Encounter for screening mammogram for malignant neoplasm of breast  - DEBBI DIGITAL SCREEN W OR WO CAD BILATERAL; Future    7. Screening for osteoporosis  - DEXA BONE DENSITY AXIAL SKELETON; Future    8. Screening for diabetes mellitus  - Hemoglobin A1C    9. Screening for cholesterol level  - Lipid Panel; Future    10. Obesity (BMI 30.0-34. 9)  Extensively advised about diet, exercise and weight loss    11. Left hip pain  We will start her on meloxicam 7.5 mg daily. Advised take Tylenol as needed. Advised to keep appointment with her orthopedic doctor. - meloxicam (MOBIC) 7.5 MG tablet; Take 1 tablet by mouth daily  Dispense: 30 tablet; Refill: 3    Advised to use COVID-19 precautions    Care discussed with patient. Questions answered and patient verbalizes understanding and agrees with plan. Medications reviewed and reconciled. Continue current medications. Appropriate prescriptions are ordered. Risks and benefits of meds are discussed. After visit summary provided. Advised to call for any problems, questions, or concerns.    If symptoms worsen or don't improve as expected, to call us or go to ER. Follow up as directed, sooner if needed. Return in about 3 months (around 9/9/2021). This dictation was performed with a verbal recognition program and it was checked for errors. It is possible that there are still dictated errors within this office note. Any errors should be brought immediately to my attention for correction. All efforts were made to ensure that this office note is accurate.      Dakota Bermudez MD MD

## 2021-06-10 LAB
ESTIMATED AVERAGE GLUCOSE: 102.5 MG/DL
HBA1C MFR BLD: 5.2 %

## 2021-06-15 NOTE — PROGRESS NOTES
Patient Name: Chapo Mullins  Patient : 1955  Patient MRN: [id number]     Primary Oncologist: Vince Ponce MD  Referring Provider: Alexandro March MD     Date of Service: 7/15/2021      Chief Complaint:   Chief Complaint   Patient presents with    Follow-up     She came in for follow-up visit. Patient Active Problem List:     Personal history of infectious disease     Other specified disorders of white blood cells     Neutropenia (HCC)     History of hepatitis C     Allergic rhinitis     Left hip pain     G6PD deficiency     Mild persistent asthma without complication     Vertigo     HPI:   Katie Dietrich is a pleasant 59-year-old -American female patient who was referred for evaluation of mild neutropenia. She was diagnosed with hepatitis C in . Ex spouse was IV drug user. She was treated with Harvoni for 12 weeks and completed in May 2019. I reviewed her blood test records. On May 15, 2019 WBC was 4.1, RBC 4.36, hemoglobin 13.9, hematocrit 42.3, MCV 97, platelet 092, absolute neutrophils 1.1, absolute lymphocytes 2.6. She denies any history of frequent infection. Ultrasound of abdomen in 2018 showed normal right upper quadrant ultrasound. Liver biopsy on 2018 showed chronic hepatitis grade 2-3, stage II. Mammogram in 2019 is negative. US of abdomen in 2019 showed unremarkable study. Labs in 2019 were reviewed. She has nonspecific elevated total protein. CBC is unremarkable. Labs in 2019 were reviewed. Total protein is normal. Leukopenia is stable. She denied frequent infections. In 2020 she had acute viral hepatitis serology which came back positive for hepatitis C antibodies. Serum AFP was 8. Hepatitis C RNA by PCR was negative. Hepatitis C RNA genotype was indeterminate. Mammogram in 2020 was benign. She was at [facility] emergency room on 2020 due to food poisoning. . WBC was 7.5.  Hemoglobin was 13.2, hemoglobin 13.2, platelet 756. CMP was grossly unremarkable with normal AST at 19, ALT 25. Alk phos was 92. Total bilirubin was 0.6. Colonoscopy in December 2020 by Dr. Ja Elias showed diverticulosis and hemorrhoids. She was told that she has kidney stone. She has flu shot in 2020 and COVID-19 vaccine on January 3, 2021. Mammogram in July 2020 was benign. On July 15, 2021 she came in for follow-up visit. In June 2021 WBC was 5.2, hemoglobin 12.3, platelet 777. She is currently asymptomatic. Reportedly she will have mammogram every 2 years. Due again in July 2021. She will follow-up with family doctor closely. I will see her in the office on as needed basis. She denied acute pain. No nausea, vomiting or diarrhea. No fever or chills. No drenching night sweat. No depression. Past Medical History  Asthma, neutropenia, hepatitis C, G6PD, thyroid disease. Surgical History  Partial hysterectomy in 2002 related to tubal pregnancy. Tonsillectomy. She had colonoscopy x2 and the last one was in 2013. Social History  Smoking Status Never smoker  She denies any illicit drug use. She drinks alcohol occasionally. She has 2 children. Family History  No malignancy in the family. Review of Systems: \"Per interval history; otherwise 10 point ROS is negative. \"     Vital Signs:  /61 (Site: Right Upper Arm, Position: Sitting, Cuff Size: Large Adult)   Pulse 80   Temp 96.5 °F (35.8 °C) (Temporal)   Ht 5' 2\" (1.575 m)   Wt 183 lb (83 kg)   SpO2 97%   BMI 33.47 kg/m²     Physical Exam:  CONSTITUTIONAL: awake, alert, cooperative, no apparent distress   EYES: pupils equal, round and reactive to light, sclera clear and conjunctiva normal  ENT: Normocephalic, without obvious abnormality, atraumatic  NECK: supple, symmetrical, no jugular venous distension and no carotid bruits   HEMATOLOGIC/LYMPHATIC: no cervical, supraclavicular or axillary lymphadenopathy   LUNGS: no increased work of breathing and clear to auscultation CARDIOVASCULAR: regular rate and rhythm, normal S1 and S2, no murmur   ABDOMEN: normal bowel sound, soft, non-distended, non-tender, no masses palpated, no hepatosplenomegaly   MUSCULOSKELETAL: full range of motion noted, tone is normal  NEUROLOGIC: awake, alert, oriented to name, place and time. Motor skills grossly intact. Cranial nerves II through XII grossly intact. SKIN: Normal skin color, texture, turgor and no jaundice. appears intact   EXTREMITIES: no LE edema. No cyanosis. Labs:  Hematology:  Lab Results   Component Value Date    WBC 5.2 06/09/2021    RBC 3.85 (L) 06/09/2021    HGB 12.3 06/09/2021    HCT 36.5 06/09/2021    MCV 94.8 06/09/2021    MCH 31.9 06/09/2021    MCHC 33.6 06/09/2021    RDW 13.6 06/09/2021     06/09/2021    MPV 8.6 06/09/2021    SEGSPCT 50.3 01/14/2021    EOSRELPCT 2.1 06/09/2021    BASOPCT 0.3 06/09/2021    LYMPHOPCT 47.6 06/09/2021    MONOPCT 11.7 06/09/2021    SEGSABS 2.4 01/14/2021    EOSABS 0.1 06/09/2021    BASOSABS 0.0 06/09/2021    LYMPHSABS 2.5 06/09/2021    MONOSABS 0.6 06/09/2021    DIFFTYPE AUTOMATED DIFFERENTIAL 01/14/2021     Chemistry:  Lab Results   Component Value Date     06/09/2021    K 4.5 06/09/2021     06/09/2021    CO2 24 06/09/2021    BUN 13 06/09/2021    CREATININE 0.7 06/09/2021    GLUCOSE 82 06/09/2021    CALCIUM 9.7 06/09/2021    PROT 8.1 06/09/2021    LABALBU 4.7 06/09/2021    BILITOT 0.3 06/09/2021    ALKPHOS 78 06/09/2021    AST 17 06/09/2021    ALT 14 06/09/2021    LABGLOM >60 06/09/2021    GFRAA >60 06/09/2021    AGRATIO 1.4 06/09/2021    GLOB 3.4 06/09/2021     Lab Results   Component Value Date    TSHHS 1.182 05/13/2013     Immunology:  Lab Results   Component Value Date    PROT 8.1 06/09/2021     Coagulation Panel:  Lab Results   Component Value Date    PROTIME 12.5 03/16/2020    INR 1.03 03/16/2020    APTT 33.9 03/16/2020     Observations:  PHQ-9 Total Score: 1 (7/15/2021 11:26 AM)        Assessment & Plan:    1.  She was referred for mild neutropenia. This could be related to being -American. CBC in June 2019 was unremarkable. CBC in June 2020 was unremarkable. US of abdomen in June 2019 was unremarkable. In June 2021 WBC was 5.2, hemoglobin 12.3, platelet 725. I recommend she continue to follow-up with family doctor closely and have CBC checked every 6 months. 2. She completed treatment for hepatitis C in May 2019. She will follow up with GI. She is in remission. 3. Mammogram in June 2019 was benign. Colonoscopy was In 2013. Mammogram in July 2020 was benign. Colonoscopy in December 2020 showed diverticulosis and hemorrhoids. Repeat study in 5 years was recommended. We discussed about healthy diet and lifestyle. She had COVID-19 vaccine in January 2021. Return to clinic as needed. All of her questions have been answered for today. Recent imaging and labs were reviewed and discussed with the patient.       Electronically signed by Dick Sexton MD on 1/8/21 at 7:54 AM EST

## 2021-07-09 PROBLEM — Z13.220 SCREENING FOR CHOLESTEROL LEVEL: Status: RESOLVED | Noted: 2021-06-09 | Resolved: 2021-07-09

## 2021-07-15 ENCOUNTER — OFFICE VISIT (OUTPATIENT)
Dept: ONCOLOGY | Age: 66
End: 2021-07-15
Payer: MEDICARE

## 2021-07-15 ENCOUNTER — HOSPITAL ENCOUNTER (OUTPATIENT)
Dept: INFUSION THERAPY | Age: 66
Discharge: HOME | End: 2021-07-15
Payer: MEDICARE

## 2021-07-15 VITALS
BODY MASS INDEX: 33.68 KG/M2 | SYSTOLIC BLOOD PRESSURE: 133 MMHG | HEIGHT: 62 IN | OXYGEN SATURATION: 97 % | DIASTOLIC BLOOD PRESSURE: 61 MMHG | TEMPERATURE: 96.5 F | WEIGHT: 183 LBS | HEART RATE: 80 BPM

## 2021-07-15 DIAGNOSIS — D70.9 NEUTROPENIA, UNSPECIFIED TYPE (HCC): Primary | ICD-10-CM

## 2021-07-15 PROCEDURE — 99211 OFF/OP EST MAY X REQ PHY/QHP: CPT

## 2021-07-15 PROCEDURE — 99213 OFFICE O/P EST LOW 20 MIN: CPT | Performed by: INTERNAL MEDICINE

## 2021-07-15 ASSESSMENT — PATIENT HEALTH QUESTIONNAIRE - PHQ9
SUM OF ALL RESPONSES TO PHQ QUESTIONS 1-9: 1
1. LITTLE INTEREST OR PLEASURE IN DOING THINGS: 0
2. FEELING DOWN, DEPRESSED OR HOPELESS: 1
SUM OF ALL RESPONSES TO PHQ9 QUESTIONS 1 & 2: 1
SUM OF ALL RESPONSES TO PHQ QUESTIONS 1-9: 1
SUM OF ALL RESPONSES TO PHQ QUESTIONS 1-9: 1

## 2021-07-15 NOTE — PROGRESS NOTES
MA Rooming Questions  Patient: Alondra Sanderson  MRN: [id number]    Date: 7/15/2021        1. Do you have any new issues?   no         2. Do you need any refills on medications?    no    3. Have you had any imaging done since your last visit?   no    4. Have you been hospitalized or seen in the emergency room since your last visit here?   no    5. Did the patient have a depression screening completed today?  Yes    PHQ-9 Total Score: 1 (7/15/2021 11:26 AM)       PHQ-9 Given to (if applicable):               PHQ-9 Score (if applicable):                     [] Positive     []  Negative              Does question #9 need addressed (if applicable)                     [] Yes    []  No               Beto Valenzuela CMA

## 2021-09-07 ENCOUNTER — TELEPHONE (OUTPATIENT)
Dept: INTERNAL MEDICINE CLINIC | Age: 66
End: 2021-09-07

## 2021-09-07 ENCOUNTER — OFFICE VISIT (OUTPATIENT)
Dept: INTERNAL MEDICINE CLINIC | Age: 66
End: 2021-09-07
Payer: MEDICARE

## 2021-09-07 VITALS
WEIGHT: 186 LBS | BODY MASS INDEX: 34.02 KG/M2 | TEMPERATURE: 97.2 F | HEART RATE: 92 BPM | DIASTOLIC BLOOD PRESSURE: 66 MMHG | OXYGEN SATURATION: 98 % | SYSTOLIC BLOOD PRESSURE: 126 MMHG

## 2021-09-07 DIAGNOSIS — M25.552 LEFT HIP PAIN: ICD-10-CM

## 2021-09-07 DIAGNOSIS — E66.9 OBESITY (BMI 30.0-34.9): ICD-10-CM

## 2021-09-07 DIAGNOSIS — Z12.31 ENCOUNTER FOR SCREENING MAMMOGRAM FOR MALIGNANT NEOPLASM OF BREAST: ICD-10-CM

## 2021-09-07 DIAGNOSIS — J30.2 SEASONAL ALLERGIC RHINITIS, UNSPECIFIED TRIGGER: ICD-10-CM

## 2021-09-07 DIAGNOSIS — J45.20 MILD INTERMITTENT ASTHMA WITHOUT COMPLICATION: ICD-10-CM

## 2021-09-07 DIAGNOSIS — M81.0 AGE-RELATED OSTEOPOROSIS WITHOUT CURRENT PATHOLOGICAL FRACTURE: ICD-10-CM

## 2021-09-07 DIAGNOSIS — Z12.31 ENCOUNTER FOR SCREENING MAMMOGRAM FOR MALIGNANT NEOPLASM OF BREAST: Primary | ICD-10-CM

## 2021-09-07 DIAGNOSIS — R42 VERTIGO: Primary | ICD-10-CM

## 2021-09-07 DIAGNOSIS — R35.0 URINARY FREQUENCY: ICD-10-CM

## 2021-09-07 PROCEDURE — 99214 OFFICE O/P EST MOD 30 MIN: CPT | Performed by: INTERNAL MEDICINE

## 2021-09-07 RX ORDER — ALBUTEROL SULFATE 90 UG/1
2 AEROSOL, METERED RESPIRATORY (INHALATION) EVERY 6 HOURS PRN
Qty: 3 EACH | Refills: 1 | Status: SHIPPED | OUTPATIENT
Start: 2021-09-07 | End: 2022-03-24 | Stop reason: SDUPTHER

## 2021-09-07 RX ORDER — OXYBUTYNIN CHLORIDE 10 MG/1
10 TABLET, EXTENDED RELEASE ORAL DAILY
Qty: 30 TABLET | Refills: 3 | Status: SHIPPED | OUTPATIENT
Start: 2021-09-07 | End: 2022-08-23

## 2021-09-07 RX ORDER — MONTELUKAST SODIUM 10 MG/1
10 TABLET ORAL NIGHTLY
Qty: 90 TABLET | Refills: 1 | Status: SHIPPED | OUTPATIENT
Start: 2021-09-07 | End: 2022-03-24 | Stop reason: SDUPTHER

## 2021-09-07 RX ORDER — MECLIZINE HYDROCHLORIDE 25 MG/1
25 TABLET ORAL 3 TIMES DAILY PRN
Qty: 30 TABLET | Refills: 3 | Status: SHIPPED | OUTPATIENT
Start: 2021-09-07 | End: 2022-06-09 | Stop reason: SDUPTHER

## 2021-09-07 NOTE — PROGRESS NOTES
Name: Vasu Chen  [id number]  Age: 78 y.o. YOB: 1955  Sex: female    CHIEF COMPLAINT:    Chief Complaint   Patient presents with    Other     f/u on hip replacement about a month ago. HISTORY OF PRESENT ILLNESS:     This is a pleasant  78 y.o. female  is seen today for management of chronic medical problems and medications refills. Previous records reviewed . Doing OK . Denies CP or SOB. No fever , sore throat or cough or congestion. Asthma is better. .   Denies any abdominal pain. Appetite OK. Bowels moving Loistine Manny. C/O urinary frequency from last several days. Left hip pain is better. She had a hip replacement by Dr. Mati Rush at [facility] on 8/16/2021. Getting home PT and improving. She is taking Tylenol as needed. Sheryl Holmdel Hearing is ok. Vision Ok with glasses. Denies  any significant skin lesions. Denies any significant depression or anxiety. Dizziness is better. . uses meclizine very rarely. No other complaints. She did see Dr. Rolanda Cramer for history of G6PD def and neutropenia and he told her she is OK and no further work up needed. She had both shots of Covid vaccination. Sheryl Holmdel last one @ 2 months ago. Florentin Uribe. Past Medical History:    Patient Active Problem List   Diagnosis    Personal history of infectious disease    Other specified disorders of white blood cells    Neutropenia (HCC)    History of hepatitis C    Allergic rhinitis    Left hip pain    G6PD deficiency    Vertigo    Mild intermittent asthma without complication    Encounter for screening mammogram for malignant neoplasm of breast    Obesity (BMI 30.0-34. 9)    Urinary frequency        Past Surgical History:        Procedure Laterality Date    COLONOSCOPY  2013    Polyps - Dr. Miesha Johnson  1990's   [facility]    \"left one ovary \"    TONSILLECTOMY  1970's       Social History:   Social History     Tobacco Use    Smoking status: Never Smoker    Smokeless tobacco: Never Used Substance Use Topics    Alcohol use: Yes     Comment: Occasional wine/\"average glass of wine or beer  one time per month\"       Family History:       Problem Relation Age of Onset    No Known Problems Mother     Kidney Disease Father        Allergies:  Pcn [penicillins]    Current Medications :      Prior to Admission medications    Medication Sig Start Date End Date Taking? Authorizing Provider   albuterol sulfate  (90 Base) MCG/ACT inhaler Inhale 2 puffs into the lungs every 6 hours as needed for Wheezing 9/7/21  Yes Janet Cruz MD   montelukast (SINGULAIR) 10 MG tablet Take 1 tablet by mouth nightly 9/7/21  Yes Janet Cruz MD   meclizine (ANTIVERT) 25 MG tablet Take 1 tablet by mouth 3 times daily as needed for Dizziness 9/7/21  Yes Janet Cruz MD   oxybutynin (DITROPAN XL) 10 MG extended release tablet Take 1 tablet by mouth daily 9/7/21  Yes Janet Cruz MD   meloxicam (MOBIC) 7.5 MG tablet Take 1 tablet by mouth daily 6/9/21  Yes Janet Cruz MD   Multiple Vitamins-Minerals (MULTIVITAMIN PO) Take by mouth   Yes Historical Provider, MD       LAB DATA: Reviewed. REVIEW OF SYSTEMS:   see HPI/ Comprehensive review of systems negative except for the ones mentioned in HPI. PHYSICAL EXAMINATION:   /66   Pulse 92   Temp 97.2 °F (36.2 °C)   Wt 186 lb (84.4 kg)   SpO2 98%   BMI 34.02 kg/m²      GENERAL APPEARANCE:    Alert, oriented x 3, well developed, cooperative, not in any distress, appears stated age. HEAD:   Normocephalic, atraumatic   EYES:   PERRLA, EOMI, lids normal, conjuctivea clear, sclera anicteric. NECK:    Supple, symmetrical,  trachea midline, no thyromegaly, no JVD, no lymphadenopathy. LUNGS:    Clear to auscultation bilaterally, respirations unlabored, accessory muscles are not used. HEART:     Regular rate and rhythm, S1 and S2 normal, no murmur, rub or gallop. PMI in MCL.   ABDOMEN:    Soft, non-tender, bowel sounds are normoactive, no masses, no hepatospleenomegaly. EXTREMITY:   no bipedal edema, S/P left JAKE  NEURO:  Alert, oriented to person, place and time. Grossly intact. Musculoskeletal:         No kyphosis or scoliosis, no deformity in any extremity noted, muscle strength and tone are normal.  Skin:                            Warm and dry. No rash or obvious suspicious lesions. PSYCH:  Mood euthymic, insight and judgement good. ASSESSMENT/PLAN:     1. Mild intermittent asthma without complication  Continue Sigulair and albuterol HFA as needed. - albuterol sulfate  (90 Base) MCG/ACT inhaler; Inhale 2 puffs into the lungs every 6 hours as needed for Wheezing  Dispense: 3 each; Refill: 1    2. Seasonal allergic rhinitis, unspecified trigger  On Singulair. Claritin as needed. - montelukast (SINGULAIR) 10 MG tablet; Take 1 tablet by mouth nightly  Dispense: 90 tablet; Refill: 1    3. Vertigo  On Meclizine PRN. 4. Obesity (BMI 30.0-34. 9)  Advised diet , exercise and weight loss. 5. Left hip pain  S/P left JAKE    7. Urinary frequency. .  Ditropan xl 10 mg daily. Care discussed with patient. Questions answered and patient verbalizes understanding and agrees with plan. Medications reviewed and reconciled. Continue current medications. Appropriate prescriptions are ordered. Risks and benefits of meds are discussed. After visit summary provided. Advised to call for any problems, questions, or concerns. If symptoms worsen or don't improve as expected, to call us or go to ER. Follow up as directed, sooner if needed. Return in about 3 months (around 12/7/2021). This dictation was performed with a verbal recognition program and it was checked for errors. It is possible that there are still dictated errors within this office note. Any errors should be brought immediately to my attention for correction. All efforts were made to ensure that this office note is accurate.      Jane Allen MD MD

## 2021-09-07 NOTE — TELEPHONE ENCOUNTER
Vicky from central scheduling called and stated that pt's mammogram order needs to be changed to diagnostic due to pt having pain and also the ICD 10 code needs changed on the DEXA Bone Density due to Medicare not accepting it.  New orders are pending

## 2021-09-08 DIAGNOSIS — Z78.0 POST-MENOPAUSAL: ICD-10-CM

## 2021-09-08 DIAGNOSIS — N64.4 MASTALGIA: Primary | ICD-10-CM

## 2021-09-13 ENCOUNTER — HOSPITAL ENCOUNTER (OUTPATIENT)
Dept: WOMENS IMAGING | Age: 66
Discharge: HOME | End: 2021-09-13
Payer: MEDICARE

## 2021-09-13 DIAGNOSIS — Z78.0 POST-MENOPAUSAL: ICD-10-CM

## 2021-09-13 PROCEDURE — 77080 DXA BONE DENSITY AXIAL: CPT

## 2021-09-15 ENCOUNTER — HOSPITAL ENCOUNTER (OUTPATIENT)
Dept: ULTRASOUND IMAGING | Age: 66
Discharge: HOME | End: 2021-09-15
Payer: MEDICARE

## 2021-09-15 ENCOUNTER — HOSPITAL ENCOUNTER (OUTPATIENT)
Dept: WOMENS IMAGING | Age: 66
Discharge: HOME | End: 2021-09-15
Payer: MEDICARE

## 2021-09-15 DIAGNOSIS — N64.4 MASTALGIA: ICD-10-CM

## 2021-09-15 DIAGNOSIS — N64.4 PAINFUL BREASTS: ICD-10-CM

## 2021-09-15 DIAGNOSIS — N63.20 BREAST MASS, LEFT: Primary | ICD-10-CM

## 2021-09-15 DIAGNOSIS — R92.8 OTHER ABNORMAL AND INCONCLUSIVE FINDINGS ON DIAGNOSTIC IMAGING OF BREAST: ICD-10-CM

## 2021-09-15 PROCEDURE — 76642 ULTRASOUND BREAST LIMITED: CPT

## 2021-09-15 PROCEDURE — G0279 TOMOSYNTHESIS, MAMMO: HCPCS

## 2021-09-19 NOTE — PROGRESS NOTES
Patient Name: Reilly Ramirez  Patient : 1955  Patient MRN: [id number]     Primary Oncologist: Pooja Simon MD  Referring Provider: Ariel Leary MD     Date of Service: 2021      Reason for consult  New left breast mass. Chief Complaint:   Chief Complaint   Patient presents with    Follow-up     Patient Active Problem List:     Personal history of infectious disease     Other specified disorders of white blood cells     Neutropenia (HCC)     History of hepatitis C     Allergic rhinitis     Left hip pain     G6PD deficiency     Mild persistent asthma without complication     Vertigo     HPI:   Jenny Yu is a pleasant 40-year-old -American female patient who was referred for evaluation of mild neutropenia. She was diagnosed with hepatitis C in . Ex spouse was IV drug user. She was treated with Harvoni for 12 weeks and completed in May 2019. I reviewed her blood test records. On May 15, 2019 WBC was 4.1, RBC 4.36, hemoglobin 13.9, hematocrit 42.3, MCV 97, platelet 402, absolute neutrophils 1.1, absolute lymphocytes 2.6. She denies any history of frequent infection. Ultrasound of abdomen in 2018 showed normal right upper quadrant ultrasound. Liver biopsy on 2018 showed chronic hepatitis grade 2-3, stage II. Mammogram in 2019 is negative. US of abdomen in 2019 showed unremarkable study. Labs in 2019 were reviewed. She has nonspecific elevated total protein. CBC is unremarkable. Labs in 2019 were reviewed. Total protein is normal. Leukopenia is stable. She denied frequent infections. In 2020 she had acute viral hepatitis serology which came back positive left breast mass. hepatitis C antibodies. Serum AFP was 8. Hepatitis C RNA by PCR was negative. Hepatitis C RNA genotype was indeterminate. Mammogram in 2020 was benign. She was at [facility] emergency room on 2020 due to food poisoning. . WBC was 7.5.  Hemoglobin was 13.2, hemoglobin 13.2, platelet 110. CMP was grossly unremarkable with normal AST at 19, ALT 25. Alk phos was 92. Total bilirubin was 0.6. Colonoscopy in December 2020 by Dr. Gabriela Aguilar showed diverticulosis and hemorrhoids. She was told that she has kidney stone. She has flu shot in 2020 and COVID-19 vaccine on January 3, 2021. Mammogram in July 2020 was benign. In June 2021 WBC was 5.2, hemoglobin 12.3, platelet 441. She is currently asymptomatic. Reportedly she will have mammogram every 2 years. Due again in July 2021. She will follow-up with family doctor closely. I will see her in the office on as needed basis. On September 20, 2021 she was referred left breast mass. Bone density on September 13, 2021 showed osteopenia. Mammogram on September 15, 2021 showed  1.  Suspicious mass in the left breast at 11 o'clock, 11 cm from the nipple along with enlarged lymph nodes in the left axilla.  Ultrasound-guided biopsy of the left breast mass and left axillary lymph node is advised. [facility] results and recommendations were discussed with the patient by Dr. Nanette Flores. She will be assisted to schedule the recommended biopsy by the breast Imaging navigator.  An order will be requested from her referring physician.   2.  No mammographic evidence of malignancy in the right breast.     BIRADS - CATEGORY 4B   Intermediate suspicion for malignancy.     Suspicious Abnormality. Biopsy should be considered at this time.     OVERALL ASSESSMENT - SUSPICIOUS    In June 2021 WBC was 5.2, hemoglobin 12.3, platelet 800. CMP was grossly unremarkable. I showed her mammogram finding and she is scheduled for the left breast biopsy on September 27, 2021. If indeed she had a breast cancer, we would discuss about surgical evaluation and other options of treatment depending on the type of the breast cancer. No acute pain. Denies any nausea, vomiting or diarrhea. No fever or chills.   No chest pain, shortness of breath or palpitation. No headaches or dizzy spell. No specific bone pain. No melena or hematochezia. Denied any dysuria or hematuria. Past Medical History  Asthma, neutropenia, hepatitis C, G6PD, thyroid disease. Surgical History  Partial hysterectomy in 2002 related to tubal pregnancy. Tonsillectomy. She had colonoscopy x2 and the last one was in 2013. She had left hip replacement    Social History  Smoking Status Never smoker  She denies any illicit drug use. She drinks alcohol occasionally. She has 2 children. Family History  No malignancy in the family. Review of Systems: \"Per interval history; otherwise 10 point ROS is negative. \"     Vital Signs:  /61 (Site: Left Upper Arm, Position: Sitting, Cuff Size: Large Adult)   Pulse 83   Temp 98.2 °F (36.8 °C) (Temporal)   Resp 18   Ht 5' 2\" (1.575 m)   Wt 185 lb 12.8 oz (84.3 kg)   SpO2 94%   BMI 33.98 kg/m²     Physical Exam:  CONSTITUTIONAL: awake, alert, cooperative, no apparent distress   EYES: pupils equal, round and reactive to light, sclera clear and conjunctiva normal  ENT: Normocephalic, without obvious abnormality, atraumatic  NECK: supple, symmetrical, no jugular venous distension and no carotid bruits   HEMATOLOGIC/LYMPHATIC: no cervical, supraclavicular or axillary lymphadenopathy   LUNGS: no increased work of breathing and clear to auscultation   BREAST: I do not feel any palpable lump to either breast.  No palpable axillary lymphadenopathy. CARDIOVASCULAR: regular rate and rhythm, normal S1 and S2, no murmur   ABDOMEN: normal bowel sound, soft, non-distended, non-tender, no masses palpated, no hepatosplenomegaly   MUSCULOSKELETAL: full range of motion noted, tone is normal  NEUROLOGIC: awake, alert, oriented to name, place and time. Motor skills grossly intact. Cranial nerves II through XII grossly intact. SKIN: Normal skin color, texture, turgor and no jaundice. appears intact   EXTREMITIES: no LE edema.    No cyanosis. Labs:  Hematology:  Lab Results   Component Value Date    WBC 5.2 06/09/2021    RBC 3.85 (L) 06/09/2021    HGB 12.3 06/09/2021    HCT 36.5 06/09/2021    MCV 94.8 06/09/2021    MCH 31.9 06/09/2021    MCHC 33.6 06/09/2021    RDW 13.6 06/09/2021     06/09/2021    MPV 8.6 06/09/2021    SEGSPCT 50.3 01/14/2021    EOSRELPCT 2.1 06/09/2021    BASOPCT 0.3 06/09/2021    LYMPHOPCT 47.6 06/09/2021    MONOPCT 11.7 06/09/2021    SEGSABS 2.4 01/14/2021    EOSABS 0.1 06/09/2021    BASOSABS 0.0 06/09/2021    LYMPHSABS 2.5 06/09/2021    MONOSABS 0.6 06/09/2021    DIFFTYPE AUTOMATED DIFFERENTIAL 01/14/2021     Chemistry:  Lab Results   Component Value Date     06/09/2021    K 4.5 06/09/2021     06/09/2021    CO2 24 06/09/2021    BUN 13 06/09/2021    CREATININE 0.7 06/09/2021    GLUCOSE 82 06/09/2021    CALCIUM 9.7 06/09/2021    PROT 8.1 06/09/2021    LABALBU 4.7 06/09/2021    BILITOT 0.3 06/09/2021    ALKPHOS 78 06/09/2021    AST 17 06/09/2021    ALT 14 06/09/2021    LABGLOM >60 06/09/2021    GFRAA >60 06/09/2021    AGRATIO 1.4 06/09/2021    GLOB 3.4 06/09/2021     Lab Results   Component Value Date    TSHHS 1.182 05/13/2013     Immunology:  Lab Results   Component Value Date    PROT 8.1 06/09/2021     Coagulation Panel:  Lab Results   Component Value Date    PROTIME 12.5 03/16/2020    INR 1.03 03/16/2020    APTT 33.9 03/16/2020     Observations:  No data recorded        Assessment & Plan:    1. She was referred for mild neutropenia. She is an -American. CBC in June 2019 was unremarkable. CBC in June 2020 was unremarkable. US of abdomen in June 2019 was unremarkable. In June 2021 WBC was 5.2, hemoglobin 12.3, platelet 635. I recommend she continue to follow-up with family doctor closely and have CBC checked every 6 months. 2. She completed treatment for hepatitis C in May 2019. She will follow up with GI. She is in remission. 3. Mammogram in June 2019 was benign.   Colonoscopy was In 2013. Mammogram in July 2020 was benign. She had abnormal left breast mammogram and scheduled for the biopsy on September 27, 2021. I will see her back after the biopsy. 4. Colonoscopy in December 2020 showed diverticulosis and hemorrhoids. Repeat study in 5 years was recommended. We discussed about healthy diet and lifestyle. She had COVID-19 vaccine in January 2021. Return to clinic after the left breast biopsy. All of her questions have been answered for today. Recent imaging and labs were reviewed and discussed with the patient.

## 2021-09-22 ENCOUNTER — OFFICE VISIT (OUTPATIENT)
Dept: ONCOLOGY | Age: 66
End: 2021-09-22
Payer: MEDICARE

## 2021-09-22 ENCOUNTER — HOSPITAL ENCOUNTER (OUTPATIENT)
Dept: INFUSION THERAPY | Age: 66
Discharge: HOME | End: 2021-09-22
Payer: MEDICARE

## 2021-09-22 VITALS
SYSTOLIC BLOOD PRESSURE: 129 MMHG | HEIGHT: 62 IN | TEMPERATURE: 98.2 F | BODY MASS INDEX: 34.19 KG/M2 | HEART RATE: 83 BPM | OXYGEN SATURATION: 94 % | RESPIRATION RATE: 18 BRPM | WEIGHT: 185.8 LBS | DIASTOLIC BLOOD PRESSURE: 61 MMHG

## 2021-09-22 DIAGNOSIS — N63.20 LEFT BREAST MASS: Primary | ICD-10-CM

## 2021-09-22 PROCEDURE — 99211 OFF/OP EST MAY X REQ PHY/QHP: CPT

## 2021-09-22 PROCEDURE — 99204 OFFICE O/P NEW MOD 45 MIN: CPT | Performed by: INTERNAL MEDICINE

## 2021-09-22 RX ORDER — CELECOXIB 200 MG/1
CAPSULE ORAL
COMMUNITY
Start: 2021-08-17 | End: 2021-10-06

## 2021-09-22 RX ORDER — ONDANSETRON 4 MG/1
TABLET, FILM COATED ORAL
COMMUNITY
Start: 2021-08-17 | End: 2021-11-02

## 2021-09-22 RX ORDER — RIVAROXABAN 10 MG/1
TABLET, FILM COATED ORAL
COMMUNITY
Start: 2021-08-17 | End: 2021-09-22

## 2021-09-22 RX ORDER — OXYCODONE HYDROCHLORIDE AND ACETAMINOPHEN 5; 325 MG/1; MG/1
TABLET ORAL
COMMUNITY
Start: 2021-08-17 | End: 2022-03-08 | Stop reason: ALTCHOICE

## 2021-09-22 NOTE — PROGRESS NOTES
MA Rooming Questions  Patient: Gilberto Marquez  MRN: [id number]    Date: 9/22/2021        1. Do you have any new issues? Yes- had BX of left breast  sched on 9/27        2. Do you need any refills on medications?    no    3. Have you had any imaging done since your last visit? yes -     4. Have you been hospitalized or seen in the emergency room since your last visit here?   yes - had Left hip replacement    5. Did the patient have a depression screening completed today?  No    No data recorded     PHQ-9 Given to (if applicable):               PHQ-9 Score (if applicable):                     [] Positive     []  Negative              Does question #9 need addressed (if applicable)                     [] Yes    []  No               Marshall Miguel CMA

## 2021-09-27 ENCOUNTER — HOSPITAL ENCOUNTER (OUTPATIENT)
Dept: WOMENS IMAGING | Age: 66
Discharge: HOME | End: 2021-09-27
Payer: MEDICARE

## 2021-09-27 ENCOUNTER — HOSPITAL ENCOUNTER (OUTPATIENT)
Dept: ULTRASOUND IMAGING | Age: 66
Discharge: HOME | End: 2021-09-27
Payer: MEDICARE

## 2021-09-27 DIAGNOSIS — R92.8 OTHER ABNORMAL AND INCONCLUSIVE FINDINGS ON DIAGNOSTIC IMAGING OF BREAST: ICD-10-CM

## 2021-09-27 DIAGNOSIS — N63.20 BREAST MASS, LEFT: ICD-10-CM

## 2021-09-27 PROCEDURE — 77065 DX MAMMO INCL CAD UNI: CPT

## 2021-09-27 PROCEDURE — 88341 IMHCHEM/IMCYTCHM EA ADD ANTB: CPT

## 2021-09-27 PROCEDURE — 88374 M/PHMTRC ALYS ISHQUANT/SEMIQ: CPT

## 2021-09-27 PROCEDURE — 88342 IMHCHEM/IMCYTCHM 1ST ANTB: CPT

## 2021-09-27 PROCEDURE — 2720000010 US BREAST BIOPSY W LOC DEVICE 1ST LESION LEFT

## 2021-09-27 PROCEDURE — 2709999900 US BREAST BIOPSY W LOC DEVICE EACH ADDL LESION LEFT

## 2021-09-27 PROCEDURE — 88305 TISSUE EXAM BY PATHOLOGIST: CPT

## 2021-09-27 PROCEDURE — 88360 TUMOR IMMUNOHISTOCHEM/MANUAL: CPT

## 2021-10-01 ENCOUNTER — TELEPHONE (OUTPATIENT)
Dept: SURGERY | Age: 66
End: 2021-10-01

## 2021-10-01 ENCOUNTER — OFFICE VISIT (OUTPATIENT)
Dept: INTERNAL MEDICINE CLINIC | Age: 66
End: 2021-10-01
Payer: MEDICARE

## 2021-10-01 VITALS
DIASTOLIC BLOOD PRESSURE: 82 MMHG | HEART RATE: 83 BPM | SYSTOLIC BLOOD PRESSURE: 138 MMHG | TEMPERATURE: 97.4 F | BODY MASS INDEX: 34.02 KG/M2 | WEIGHT: 186 LBS | OXYGEN SATURATION: 96 %

## 2021-10-01 DIAGNOSIS — R35.0 URINARY FREQUENCY: ICD-10-CM

## 2021-10-01 DIAGNOSIS — J30.9 ALLERGIC RHINITIS, UNSPECIFIED SEASONALITY, UNSPECIFIED TRIGGER: ICD-10-CM

## 2021-10-01 DIAGNOSIS — K21.9 GASTROESOPHAGEAL REFLUX DISEASE WITHOUT ESOPHAGITIS: ICD-10-CM

## 2021-10-01 DIAGNOSIS — C50.912 MALIGNANT NEOPLASM OF LEFT FEMALE BREAST, UNSPECIFIED ESTROGEN RECEPTOR STATUS, UNSPECIFIED SITE OF BREAST (HCC): Primary | ICD-10-CM

## 2021-10-01 DIAGNOSIS — J45.20 MILD INTERMITTENT ASTHMA WITHOUT COMPLICATION: ICD-10-CM

## 2021-10-01 DIAGNOSIS — K58.9 IRRITABLE BOWEL SYNDROME, UNSPECIFIED TYPE: ICD-10-CM

## 2021-10-01 PROBLEM — E66.811 OBESITY (BMI 30.0-34.9): Status: RESOLVED | Noted: 2021-06-09 | Resolved: 2021-10-01

## 2021-10-01 PROBLEM — E66.9 OBESITY (BMI 30.0-34.9): Status: RESOLVED | Noted: 2021-06-09 | Resolved: 2021-10-01

## 2021-10-01 PROCEDURE — 99214 OFFICE O/P EST MOD 30 MIN: CPT | Performed by: INTERNAL MEDICINE

## 2021-10-01 RX ORDER — OMEPRAZOLE 20 MG/1
20 CAPSULE, DELAYED RELEASE ORAL DAILY
Qty: 30 CAPSULE | Refills: 3 | Status: SHIPPED | OUTPATIENT
Start: 2021-10-01 | End: 2021-12-23 | Stop reason: SDUPTHER

## 2021-10-01 RX ORDER — DICYCLOMINE HYDROCHLORIDE 10 MG/1
10 CAPSULE ORAL 4 TIMES DAILY PRN
Qty: 120 CAPSULE | Refills: 3 | Status: SHIPPED | OUTPATIENT
Start: 2021-10-01 | End: 2021-12-23 | Stop reason: SDUPTHER

## 2021-10-02 NOTE — PROGRESS NOTES
Name: Barbara Kapadia  [id number]  Age: 78 y.o. YOB: 1955  Sex: female    CHIEF COMPLAINT:    Chief Complaint   Patient presents with    Discuss Labs       HISTORY OF PRESENT ILLNESS:     This is a pleasant  78 y.o. female  is seen today for management of chronic medical problems and medications refills. Previous records reviewed . Patient recently had a mammogram which was abnormal.  Further work-up with ultrasound and diagnostic mammogram and later on biopsy of the left breast mass led to the diagnosis of breast cancer and metastatic lymph node in the left axilla. Patient is concerned about it. She follows with Dr. Franco Marks for G6PD deficiency and neutropenia. Advised her to see Dr. Franco Marks and also will make an appointment with Dr. Yan Clements  for further recommendations. Doing OK otherwise. Denies CP or SOB. No fever , sore throat or cough or congestion. Asthma is better. .   Denies any abdominal pain. Appetite OK. Bowels moving [id number] [address]  Denies any urinary symptoms. Left hip pain is better. She had a hip replacement by Dr. Margarito Timmons at [facility] on 8/16/2021. She is taking Tylenol as needed. Latricia Sanchez Hearing is ok. Vision Ok with glasses. Denies  any significant skin lesions. Denies any significant depression or anxiety. Dizziness is better. . uses meclizine very rarely. No other complaints.   She had been fully vaccinated for COVID-19    Past Medical History:    Patient Active Problem List   Diagnosis    Personal history of infectious disease    Other specified disorders of white blood cells    Neutropenia ([address]    History of hepatitis C    Allergic rhinitis    Left hip pain    G6PD deficiency    Vertigo    Mild intermittent asthma without complication    Encounter for screening mammogram for malignant neoplasm of breast    Urinary frequency    Left breast mass    Gastroesophageal reflux disease without esophagitis    Irritable bowel syndrome        Past Surgical History:        Procedure Laterality Date    COLONOSCOPY  2013    Polyps - Dr. Nieves Patella  1990's    HYSTERECTOMY  1998    \"left one ovary \"    TONSILLECTOMY  1970's    US BREAST BIOPSY NEEDLE ADDITIONAL LEFT Left 9/27/2021    US BREAST BIOPSY NEEDLE ADDITIONAL [facility]2021 [facility] Left 9/27/2021     BREAST NEEDLE BIOPSY LEFT 9/27/2021 Joy Parson [facility]       Social History:   Social History     Tobacco Use    Smoking status: Never Smoker    Smokeless tobacco: Never Used   Substance Use Topics    Alcohol use: Yes     Comment: Occasional wine/\"average glass of wine or beer  one time per month\"       Family History:       Problem Relation Age of Onset    No Known Problems Mother     Kidney Disease Father        Allergies:  Pcn [penicillins]    Current Medications :      Prior to Admission medications    Medication Sig Start Date End Date Taking? Authorizing Provider   omeprazole (PRILOSEC) 20 MG delayed release capsule Take 1 capsule by mouth daily 10/1/21  Yes Marzella Castleman, MD   dicyclomine (BENTYL) 10 MG capsule Take 1 capsule by mouth 4 times daily as needed (abdominal bloating and gas) 10/1/21  Yes Marzella Castleman, MD   ondansetron (ZOFRAN) 4 MG tablet take 1 4 milligram tablet by mouth every 6 hours if needed for nausea 8/17/21  Yes Historical Provider, MD   celecoxib (CELEBREX) 200 MG capsule take 200 milligram tablet by mouth twice a day for 4 days 8/17/21  Yes Historical Provider, MD   oxyCODONE-acetaminophen (PERCOCET) 5-325 MG per tablet take 1 tablet by mouth every 6 hours 1 weekly if needed for pain . ..  (REFER TO PRESCRIPTION NOTES).  8/17/21  Yes Historical Provider, MD   albuterol sulfate  (90 Base) MCG/ACT inhaler Inhale 2 puffs into the lungs every 6 hours as needed for Wheezing 9/7/21  Yes Marzella Castleman, MD   montelukast (SINGULAIR) 10 MG tablet Take 1 tablet by mouth nightly 9/7/21  Yes Marzella Castleman, MD meclizine (ANTIVERT) 25 MG tablet Take 1 tablet by mouth 3 times daily as needed for Dizziness 9/7/21  Yes Gabriella Zelaya MD   oxybutynin (DITROPAN XL) 10 MG extended release tablet Take 1 tablet by mouth daily 9/7/21  Yes Gabriella Zelaya MD   meloxicam (MOBIC) 7.5 MG tablet Take 1 tablet by mouth daily 6/9/21  Yes Gabriella Zelaya MD   Multiple Vitamins-Minerals (MULTIVITAMIN PO) Take by mouth   Yes Historical Provider, MD       LAB DATA: Reviewed. REVIEW OF SYSTEMS:   see HPI/ Comprehensive review of systems negative except for the ones mentioned in HPI. PHYSICAL EXAMINATION:   /82   Pulse 83   Temp 97.4 °F (36.3 °C)   Wt 186 lb (84.4 kg)   SpO2 96%   BMI 34.02 kg/m²      GENERAL APPEARANCE:    Alert, oriented x 3, well developed, cooperative, not in any distress, appears stated age. HEAD:   Normocephalic, atraumatic   EYES:   PERRLA, EOMI, lids normal, conjuctivea clear, sclera anicteric. NECK:    Supple, symmetrical,  trachea midline, no thyromegaly, no JVD, no lymphadenopathy. LUNGS:    Clear to auscultation bilaterally, respirations unlabored, accessory muscles are not used. HEART:     Regular rate and rhythm, S1 and S2 normal, no murmur, rub or gallop. PMI in MCL. ABDOMEN:    Soft, non-tender, bowel sounds are normoactive, no masses, no hepatospleenomegaly. EXTREMITY:   no bipedal edema, status post recent left total hip arthroplasty, healing  NEURO:  Alert, oriented to person, place and time. Grossly intact. Musculoskeletal:         No kyphosis or scoliosis, no deformity in any extremity noted, muscle strength and tone are normal.  Skin:                            Warm and dry. No rash or obvious suspicious lesions. PSYCH:  Mood euthymic, insight and judgement good. ASSESSMENT/PLAN:    1.  Malignant neoplasm of left female breast, unspecified estrogen receptor status, unspecified site of breast ([facility] [address]  Will refer to surgery and also he will be following with oncologist.  - Elian Fernandez MD, General Surgery, [place]    2. Mild intermittent asthma without complication  Continue Singulair and albuterol HFA as needed. 3. Allergic rhinitis, unspecified seasonality, unspecified trigger  Continue Singulair and can take Claritin as needed    4. Urinary frequency  Patient on Ditropan XL    5. Gastroesophageal reflux disease without esophagitis  Continue Prilosec  - omeprazole (PRILOSEC) 20 MG delayed release capsule; Take 1 capsule by mouth daily  Dispense: 30 capsule; Refill: 3    6. Irritable bowel syndrome, unspecified type  On Bentyl as needed  - dicyclomine (BENTYL) 10 MG capsule; Take 1 capsule by mouth 4 times daily as needed (abdominal bloating and gas)  Dispense: 120 capsule; Refill: 3    Advised to follow COVID-19 precautions    Care discussed with patient. Questions answered and patient verbalizes understanding and agrees with plan. Medications reviewed and reconciled. Continue current medications. Appropriate prescriptions are ordered. Risks and benefits of meds are discussed. After visit summary provided. Advised to call for any problems, questions, or concerns. If symptoms worsen or don't improve as expected, to call us or go to ER. Follow up as directed, sooner if needed. Return As scheduled. This dictation was performed with a verbal recognition program and it was checked for errors. It is possible that there are still dictated errors within this office note. Any errors should be brought immediately to my attention for correction. All efforts were made to ensure that this office note is accurate.      Sharonda Wood MD MD

## 2021-10-04 ENCOUNTER — OFFICE VISIT (OUTPATIENT)
Dept: SURGERY | Age: 66
End: 2021-10-04
Payer: MEDICARE

## 2021-10-04 VITALS
HEART RATE: 80 BPM | SYSTOLIC BLOOD PRESSURE: 92 MMHG | DIASTOLIC BLOOD PRESSURE: 60 MMHG | BODY MASS INDEX: 34.41 KG/M2 | HEIGHT: 62 IN | WEIGHT: 187 LBS

## 2021-10-04 DIAGNOSIS — C50.912 MALIGNANT NEOPLASM OF LEFT BREAST IN FEMALE, ESTROGEN RECEPTOR NEGATIVE, UNSPECIFIED SITE OF BREAST (HCC): Primary | ICD-10-CM

## 2021-10-04 DIAGNOSIS — Z17.1 MALIGNANT NEOPLASM OF LEFT BREAST IN FEMALE, ESTROGEN RECEPTOR NEGATIVE, UNSPECIFIED SITE OF BREAST (HCC): Primary | ICD-10-CM

## 2021-10-04 PROCEDURE — 99204 OFFICE O/P NEW MOD 45 MIN: CPT | Performed by: SURGERY

## 2021-10-04 NOTE — PROGRESS NOTES
social history and review of systems with the patienttoday in the office.           Past Surgical History:   Procedure Laterality Date    COLONOSCOPY  2013    Polyps - Dr. Destini Pratt  1990's   [facility]    \"left one ovary \"    TONSILLECTOMY  1970's    US BREAST BIOPSY NEEDLE ADDITIONAL LEFT Left 9/27/2021    US BREAST BIOPSY NEEDLE ADDITIONAL LEFT 9/27/2021 Devante Santos [facility]    US BREAST NEEDLE BIOPSY LEFT Left 9/27/2021    US BREAST NEEDLE BIOPSY LEFT 9/27/2021 Devante Santos  E Meliza Lucas     Past Medical History:   Diagnosis Date    Allergic rhinitis     Asthma     last flare up 2017 - follows with PCP    G-6-PD deficiency anemia ([address]     dx this when I was having children- per pt     H/O Doppler ultrasound 04/04/18    CAROTID- Normal    Hepatitis C     dx 3/2018- for liver bx    Lumbar herniated disc     Malignant neoplasm of left breast in female, estrogen receptor negative ([address] 10/6/2021    Thyroid disease     \"not on any medication- have low thyroid level\"     Family History   Problem Relation Age of Onset    No Known Problems Mother     Kidney Disease Father      Social History     Socioeconomic History    Marital status: Single     Spouse name: Not on file    Number of children: Not on file    Years of education: Not on file    Highest education level: Not on file   Occupational History    Not on file   Tobacco Use    Smoking status: Never Smoker    Smokeless tobacco: Never Used   Vaping Use    Vaping Use: Never used   Substance and Sexual Activity    Alcohol use: Yes     Comment: Occasional wine/\"average glass of wine or beer  one time per month\"    Drug use: No    Sexual activity: Not on file   Other Topics Concern    Not on file   Social History Narrative    Not on file     Social Determinants of Health     Financial Resource Strain: Low Risk     Difficulty of Paying Living Expenses: Not hard at all   Food current facility-administered medications for this visit. Allergies   Allergen Reactions    Pcn [Penicillins] Hives       Review of Systems:         Review of Systems   Constitutional: Negative for chills and fever. HENT: Negative for ear pain, mouth sores, sore throat and tinnitus. Eyes: Negative for photophobia, redness and itching. Respiratory: Negative for apnea, choking and stridor. Cardiovascular: Negative for chest pain and palpitations. Gastrointestinal: Negative for anal bleeding, constipation and rectal pain. Endocrine: Negative for polydipsia. Genitourinary: Negative for enuresis, flank pain and hematuria. Musculoskeletal: Negative for back pain, joint swelling and myalgias. Skin: Negative for color change and pallor. Allergic/Immunologic: Negative for environmental allergies. Neurological: Negative for syncope and speech difficulty. Psychiatric/Behavioral: Negative for confusion and hallucinations. OBJECTIVE:  Physical Exam:    BP 92/60 (Site: Left Upper Arm, Position: Sitting, Cuff Size: Medium Adult)   Pulse 80   Ht 5' 2\" (1.575 m)   Wt 187 lb (84.8 kg)   BMI 34.20 kg/m²      Physical Exam  Constitutional:       Appearance: She is well-developed. HENT:      Head: Normocephalic. Eyes:      Pupils: Pupils are equal, round, and reactive to light. Cardiovascular:      Rate and Rhythm: Normal rate. Pulmonary:      Effort: Pulmonary effort is normal.   Chest:       Abdominal:      General: There is no distension. Palpations: Abdomen is soft. There is no mass. Tenderness: There is no abdominal tenderness. There is no guarding or rebound. Musculoskeletal:         General: Normal range of motion. Cervical back: Normal range of motion and neck supple. Skin:     General: Skin is warm. Neurological:      Mental Status: She is alert and oriented to person, place, and time.        Breasts: breasts appear normal, no suspicious masses, no skin or nipple changes or axillary nodes, abnormal mass palpable left breast.    ASSESSMENT:  1. Malignant neoplasm of left breast in female, estrogen receptor negative, unspecified site of breast ([facility] [address]          PLAN:  Treatment: Given her triple negative breast cancer we will discuss care with Dr. Bhargav Melton.  Patient is likely to need neoadjuvant chemo first.  Patientcounseled on risks, benefits, and alternatives of treatment plan at length today. Patient states an understanding and willingness to proceed with plan. No orders of the defined types were placed in this encounter. No orders of the defined types were placed in this encounter. Follow Up:  No follow-ups on file.       Kathie Brown MD

## 2021-10-04 NOTE — PROGRESS NOTES
Patient Name: Sita March  Patient : 1955  Patient MRN: [id number]     Primary Oncologist: Zoey Obrien MD  Referring Provider: Cecilia Delcid MD     Date of Service: 10/6/2021        Chief Complaint:   Chief Complaint   Patient presents with    Follow-up     She came in with mother for follow up visit. Patient Active Problem List:     Personal history of infectious disease     Other specified disorders of white blood cells     Neutropenia     History of hepatitis C     Allergic rhinitis     Left hip pain     G6PD deficiency     Mild persistent asthma without complication     Vertigo     HPI:   Chiki Kidd is a pleasant 54-year-old -American female patient who was referred for evaluation of mild neutropenia. She was diagnosed with hepatitis C in . Ex spouse was IV drug user. She was treated with Harvoni for 12 weeks and completed in May 2019. I reviewed her blood test records. On May 15, 2019 WBC was 4.1, RBC 4.36, hemoglobin 13.9, hematocrit 42.3, MCV 97, platelet 526, absolute neutrophils 1.1, absolute lymphocytes 2.6. She denies any history of frequent infection. Ultrasound of abdomen in 2018 showed normal right upper quadrant ultrasound. Liver biopsy on 2018 showed chronic hepatitis grade 2-3, stage II. Mammogram in 2019 is negative. US of abdomen in 2019 showed unremarkable study. Labs in 2019 were reviewed. She has nonspecific elevated total protein. CBC is unremarkable. Labs in 2019 were reviewed. Total protein is normal. Leukopenia is stable. She denied frequent infection. In 2020 she had acute viral hepatitis serology which came back positive for hepatitis C antibody. Serum AFP was 8. Hepatitis C RNA by PCR was negative. Hepatitis C RNA genotype was indeterminate. Mammogram in 2020 was benign. She was at [facility] emergency room on 2020 due to food poisoning. . WBC was 7.5.  Hemoglobin was 13.2, hemoglobin 13.2, platelet 059. CMP was grossly unremarkable with normal AST at 19, ALT 25. Alk phos was 92. Total bilirubin was 0.6. Colonoscopy in December 2020 by Dr. Angelica Knutson showed diverticulosis and hemorrhoids. She was told that she has kidney stone. She has flu shot in 2020 and COVID-19 vaccine on January 3, 2021. Mammogram in July 2020 was benign. In June 2021 WBC was 5.2, hemoglobin 12.3, platelet 602. On September 20, 2021 she was referred for left breast mass. Bone density on September 13, 2021 showed osteopenia. Mammogram on September 15, 2021 showed : There is a new 1.5 cm mass identified in the left breast at 12 o'clock position, at posterior depth, about 12 cm from the left nipple.  This is best seen on left CC michaela slice 50 and left MLO michaela slice 54.   Left breast ultrasound 11 o'clock, 11 cm from the nipple: On ultrasound evaluation, there is a 1.1 x 1.3 x 0.5 cm oval, parallel hypoechoic mass, with microlobulated margins without any posterior acoustic shadowing or internal vascularity.  This mass corresponds to the mammographic finding.   Left axilla: Couple enlarged lymph nodes identified in the left axilla with cortical thickness of 8-9 mm.   No new suspicious masses or microcalcifications are identified in the right breast.    In June 2021 WBC was 5.2, hemoglobin 12.3, platelet 601. CMP was grossly unremarkable. On October 4, 2021 she came in for follow up visit. Path report of left breast biopsy on 9/27/2021:  A.  Breast, left at 11 o'clock, ultrasound guided needle   core biopsy:   -  INVASIVE DUCTAL CARCINOMA WITH A MARKED CHRONIC INFLAMMATORY REACTION (see comment, see stains report). - Pegge Sicard is pending and will be reported in the final   diagnosis. B.  Lymph node, left axilla, ultrasound guided needle core   biopsy:   -  METASTATIC HIGH GRADE CARCINOMA IS IDENTIFIED (see comment).      Preliminary Comment   Microscopic examination of the left breast biopsy (Part A)   reveals invasive ductal carcinoma with a marked chronic   inflammatory reaction.  Such changes may represent a   medullary carcinoma.  However, complete evaluation of the excised mass is necessary to make this distinction.       The case was reviewed in intradepartmental consultation with agreement of the diagnosis (SAF). BREAST INVASIVE CARCINOMA SUMMARY - CORE BIOPSY   Procedure: Ultrasound guided needle core biopsy. Laterality and tumor site: Left breast at 11 o'clock. Tumor size: 7 mm in greatest dimension within biopsy material present. Histologic type: Invasive ductal carcinoma with marked chronic inflammatory reaction. Histologic Grade ([place]): Grade 3        -Tubular score 3, Nuclear score 3, Mitosis score 2 (16/10 hpf)   Ductal Carcinoma In Situ (DCIS): Not present   Lobular Carcinoma In Situ (LCIS): Not present. Lympho-vascular invasion: Not identified. Microcalcifications: Not identified. Additional Findings: Marked chronic inflammatory reaction. Ancillary studies: ER/PgR/Her2 results from the current   biopsy are as follows:   -ER by [facility]*: Negative (0% staining). -PgR by IHC*: Negative (0% staining)   -Her2 by IHC*: Negative (Score 0)   -Her2 by FISH*:  Pending.   -Avg. #HER2 signals/nucleus: , Avg. #CEP17 signals/nucleus: , HER2/CEP17 ratio:     I will discuss with pathologist about follow up official report. She was seen by Dr Freedom Bautista. Most likely she has triple negative breast cancer with lymph node involvement. I discussed about neoadjuvant chemo, AC + TC. Potential AE including hair loss, increased risk of infection, secondary malignancy and etc was discussed with her. She has left hip pain s/p hip replacement. She has nonspecific chest pain and abdominal pain. I will order CT CAP. I also order ECHO and mediport placement. I will discuss with Dr Freedom Bautista also. Denies any nausea, vomiting or diarrhea. No fever or chills. No shortness of breath or palpitation.   No headaches or dizzy spell. No melena or hematochezia. Denied any dysuria or hematuria. Past Medical History  Asthma, neutropenia, hepatitis C, G6PD, thyroid disease. Surgical History  Partial hysterectomy in 2002 related to tubal pregnancy. Tonsillectomy. She had colonoscopy x2 and the last one was in 2013. She had left hip replacement    Social History  Smoking Status Never smoker  She denies any illicit drug use. She drinks alcohol occasionally. She has 2 children. Family History  Maternal great aunt had breast cancer. Review of Systems: \"Per interval history; otherwise 10 point ROS is negative. \"     Vital Signs:  /63 (Site: Right Upper Arm, Position: Sitting, Cuff Size: Medium Adult)   Pulse 88   Temp 96.6 °F (35.9 °C) (Infrared)   Ht 5' 2\" (1.575 m)   Wt 187 lb 3.2 oz (84.9 kg)   SpO2 99%   BMI 34.24 kg/m²     Physical Exam:  CONSTITUTIONAL: awake, alert, cooperative, no apparent distress   EYES: pupils equal, round and reactive to light, sclera clear and conjunctiva normal  ENT: Normocephalic, without obvious abnormality, atraumatic  NECK: supple, symmetrical, no jugular venous distension and no carotid bruits   HEMATOLOGIC/LYMPHATIC: no cervical, supraclavicular or axillary lymphadenopathy   LUNGS: no increased work of breathing and clear to auscultation   BREAST: s/p left breast biopsy. CARDIOVASCULAR: regular rate and rhythm, normal S1 and S2, no murmur   ABDOMEN: normal bowel sound, soft, non-distended, non-tender, no masses palpated, no hepatosplenomegaly   MUSCULOSKELETAL: full range of motion noted, tone is normal  NEUROLOGIC: awake, alert, oriented to name, place and time. Motor skills grossly intact. Cranial nerves II through XII grossly intact. SKIN: Normal skin color, texture, turgor and no jaundice. appears intact   EXTREMITIES: no LE edema. No cyanosis.     Labs:  Hematology:  Lab Results   Component Value Date    WBC 5.2 06/09/2021    RBC 3.85 (L) 06/09/2021    HGB 12.3 06/09/2021    HCT 36.5 06/09/2021    MCV 94.8 06/09/2021    MCH 31.9 06/09/2021    MCHC 33.6 06/09/2021    RDW 13.6 06/09/2021     06/09/2021    MPV 8.6 06/09/2021    SEGSPCT 50.3 01/14/2021    EOSRELPCT 2.1 06/09/2021    BASOPCT 0.3 06/09/2021    LYMPHOPCT 47.6 06/09/2021    MONOPCT 11.7 06/09/2021    SEGSABS 2.4 01/14/2021    EOSABS 0.1 06/09/2021    BASOSABS 0.0 06/09/2021    LYMPHSABS 2.5 06/09/2021    MONOSABS 0.6 06/09/2021    DIFFTYPE AUTOMATED DIFFERENTIAL 01/14/2021     Chemistry:  Lab Results   Component Value Date     06/09/2021    K 4.5 06/09/2021     06/09/2021    CO2 24 06/09/2021    BUN 13 06/09/2021    CREATININE 0.7 06/09/2021    GLUCOSE 82 06/09/2021    CALCIUM 9.7 06/09/2021    PROT 8.1 06/09/2021    LABALBU 4.7 06/09/2021    BILITOT 0.3 06/09/2021    ALKPHOS 78 06/09/2021    AST 17 06/09/2021    ALT 14 06/09/2021    LABGLOM >60 06/09/2021    GFRAA >60 06/09/2021    AGRATIO 1.4 06/09/2021    GLOB 3.4 06/09/2021     Lab Results   Component Value Date    TSHHS 1.182 05/13/2013     Immunology:  Lab Results   Component Value Date    PROT 8.1 06/09/2021     Coagulation Panel:  Lab Results   Component Value Date    PROTIME 12.5 03/16/2020    INR 1.03 03/16/2020    APTT 33.9 03/16/2020     Observations:  No data recorded        Assessment & Plan:    1. She was referred for mild neutropenia. She is an -American. CBC in June 2019 was unremarkable. CBC in June 2020 was unremarkable. US of abdomen in June 2019 was unremarkable. In June 2021 WBC was 5.2, hemoglobin 12.3, platelet 658. I recommend she continue to follow-up with family doctor closely and have CBC checked every 6 months. 2. She completed treatment for hepatitis C in May 2019. She will follow up with GI. She is in remission. 3. Mammogram in June 2019 was benign. Colonoscopy was In 2013. Mammogram in July 2020 was benign.   She had abnormal left breast mammogram and scheduled for the biopsy on September 27, 2021. She was found to have likely triple negative left breast cancer s/p biopsy, T1, N1 at least from biopsy. I offered neoadjuvant chemotherapy. Potential AE was discussed with her. I also discussed with Dr Minal Steel who will put mediport placement. I ordered CT CAP, bone scan and ECHO. 4. Colonoscopy in December 2020 showed diverticulosis and hemorrhoids. Repeat study in 5 years was recommended. We discussed about healthy diet and lifestyle. She had COVID-19 vaccine in January 2021. Return to clinic in 2 weeks or sooner. All of her questions have been answered for today. Recent imaging and labs were reviewed and discussed with the patient.

## 2021-10-06 ENCOUNTER — OFFICE VISIT (OUTPATIENT)
Dept: ONCOLOGY | Age: 66
End: 2021-10-06
Payer: MEDICARE

## 2021-10-06 ENCOUNTER — HOSPITAL ENCOUNTER (OUTPATIENT)
Dept: INFUSION THERAPY | Age: 66
Discharge: HOME | End: 2021-10-06
Payer: MEDICARE

## 2021-10-06 VITALS
BODY MASS INDEX: 34.45 KG/M2 | DIASTOLIC BLOOD PRESSURE: 63 MMHG | HEIGHT: 62 IN | HEART RATE: 88 BPM | SYSTOLIC BLOOD PRESSURE: 139 MMHG | OXYGEN SATURATION: 99 % | WEIGHT: 187.2 LBS | TEMPERATURE: 96.6 F

## 2021-10-06 DIAGNOSIS — Z17.1 MALIGNANT NEOPLASM OF LEFT BREAST IN FEMALE, ESTROGEN RECEPTOR NEGATIVE, UNSPECIFIED SITE OF BREAST (HCC): ICD-10-CM

## 2021-10-06 DIAGNOSIS — Z17.1 MALIGNANT NEOPLASM OF LEFT BREAST IN FEMALE, ESTROGEN RECEPTOR NEGATIVE, UNSPECIFIED SITE OF BREAST (HCC): Primary | ICD-10-CM

## 2021-10-06 DIAGNOSIS — C50.912 MALIGNANT NEOPLASM OF LEFT BREAST IN FEMALE, ESTROGEN RECEPTOR NEGATIVE, UNSPECIFIED SITE OF BREAST (HCC): Primary | ICD-10-CM

## 2021-10-06 DIAGNOSIS — C50.912 MALIGNANT NEOPLASM OF LEFT BREAST IN FEMALE, ESTROGEN RECEPTOR NEGATIVE, UNSPECIFIED SITE OF BREAST (HCC): ICD-10-CM

## 2021-10-06 DIAGNOSIS — Z79.890 NEED FOR PROPHYLACTIC HORMONE REPLACEMENT THERAPY (POSTMENOPAUSAL): ICD-10-CM

## 2021-10-06 LAB
ALBUMIN SERPL-MCNC: 4.7 GM/DL (ref 3.4–5)
ALP BLD-CCNC: 87 IU/L (ref 40–128)
ALT SERPL-CCNC: 16 U/L (ref 10–40)
ANION GAP SERPL CALCULATED.3IONS-SCNC: 11 MMOL/L (ref 4–16)
AST SERPL-CCNC: 22 IU/L (ref 15–37)
BASOPHILS ABSOLUTE: 0 K/CU MM
BASOPHILS RELATIVE PERCENT: 0.2 % (ref 0–1)
BILIRUB SERPL-MCNC: 0.2 MG/DL (ref 0–1)
BUN BLDV-MCNC: 11 MG/DL (ref 6–23)
CALCIUM SERPL-MCNC: 9.7 MG/DL (ref 8.3–10.6)
CHLORIDE BLD-SCNC: 105 MMOL/L (ref 99–110)
CO2: 24 MMOL/L (ref 21–32)
CREAT SERPL-MCNC: 0.7 MG/DL (ref 0.6–1.1)
DIFFERENTIAL TYPE: ABNORMAL
EOSINOPHILS ABSOLUTE: 0.1 K/CU MM
EOSINOPHILS RELATIVE PERCENT: 3 % (ref 0–3)
GFR AFRICAN AMERICAN: >60 ML/MIN/1.73M2
GFR NON-AFRICAN AMERICAN: >60 ML/MIN/1.73M2
GLUCOSE BLD-MCNC: 105 MG/DL (ref 70–99)
HCT VFR BLD CALC: 38.6 % (ref 37–47)
HEMOGLOBIN: 12.3 GM/DL (ref 12.5–16)
LYMPHOCYTES ABSOLUTE: 1.8 K/CU MM
LYMPHOCYTES RELATIVE PERCENT: 44.9 % (ref 24–44)
MCH RBC QN AUTO: 29.2 PG (ref 27–31)
MCHC RBC AUTO-ENTMCNC: 31.9 % (ref 32–36)
MCV RBC AUTO: 91.7 FL (ref 78–100)
MONOCYTES ABSOLUTE: 0.3 K/CU MM
MONOCYTES RELATIVE PERCENT: 8.4 % (ref 0–4)
PDW BLD-RTO: 13.3 % (ref 11.7–14.9)
PLATELET # BLD: 235 K/CU MM (ref 140–440)
PMV BLD AUTO: 10 FL (ref 7.5–11.1)
POTASSIUM SERPL-SCNC: 4.6 MMOL/L (ref 3.5–5.1)
RBC # BLD: 4.21 M/CU MM (ref 4.2–5.4)
SEGMENTED NEUTROPHILS ABSOLUTE COUNT: 1.8 K/CU MM
SEGMENTED NEUTROPHILS RELATIVE PERCENT: 43.5 % (ref 36–66)
SODIUM BLD-SCNC: 140 MMOL/L (ref 135–145)
TOTAL PROTEIN: 8.3 GM/DL (ref 6.4–8.2)
WBC # BLD: 4.1 K/CU MM (ref 4–10.5)

## 2021-10-06 PROCEDURE — 99211 OFF/OP EST MAY X REQ PHY/QHP: CPT

## 2021-10-06 PROCEDURE — 36415 COLL VENOUS BLD VENIPUNCTURE: CPT

## 2021-10-06 PROCEDURE — 85025 COMPLETE CBC W/AUTO DIFF WBC: CPT

## 2021-10-06 PROCEDURE — 99215 OFFICE O/P EST HI 40 MIN: CPT | Performed by: INTERNAL MEDICINE

## 2021-10-06 PROCEDURE — 80053 COMPREHEN METABOLIC PANEL: CPT

## 2021-10-06 PROCEDURE — 86300 IMMUNOASSAY TUMOR CA 15-3: CPT

## 2021-10-07 ENCOUNTER — TELEPHONE (OUTPATIENT)
Dept: ONCOLOGY | Age: 66
End: 2021-10-07

## 2021-10-07 PROBLEM — Z12.31 ENCOUNTER FOR SCREENING MAMMOGRAM FOR MALIGNANT NEOPLASM OF BREAST: Status: RESOLVED | Noted: 2021-06-09 | Resolved: 2021-10-07

## 2021-10-07 NOTE — TELEPHONE ENCOUNTER
Called patient regarding her CT and NM. NM is 10.13.2021 at [facility] arr 0930 then return at 1330. On 10.15.2021 for CT and echo at [facility] arr 0830. I left prep and direct number for questions.

## 2021-10-09 LAB — CA 27.29: 17.7 U/ML

## 2021-10-13 ENCOUNTER — HOSPITAL ENCOUNTER (OUTPATIENT)
Dept: NUCLEAR MEDICINE | Age: 66
Discharge: HOME | End: 2021-10-13
Payer: MEDICARE

## 2021-10-13 ENCOUNTER — TELEPHONE (OUTPATIENT)
Dept: ONCOLOGY | Age: 66
End: 2021-10-13

## 2021-10-13 DIAGNOSIS — C50.912 MALIGNANT NEOPLASM OF LEFT BREAST IN FEMALE, ESTROGEN RECEPTOR NEGATIVE, UNSPECIFIED SITE OF BREAST (HCC): ICD-10-CM

## 2021-10-13 DIAGNOSIS — Z17.1 MALIGNANT NEOPLASM OF LEFT BREAST IN FEMALE, ESTROGEN RECEPTOR NEGATIVE, UNSPECIFIED SITE OF BREAST (HCC): ICD-10-CM

## 2021-10-13 PROCEDURE — 78306 BONE IMAGING WHOLE BODY: CPT

## 2021-10-13 PROCEDURE — A9503 TC99M MEDRONATE: HCPCS | Performed by: INTERNAL MEDICINE

## 2021-10-13 PROCEDURE — 3430000000 HC RX DIAGNOSTIC RADIOPHARMACEUTICAL: Performed by: INTERNAL MEDICINE

## 2021-10-13 RX ORDER — TC 99M MEDRONATE 20 MG/10ML
26.1 INJECTION, POWDER, LYOPHILIZED, FOR SOLUTION INTRAVENOUS
Status: COMPLETED | OUTPATIENT
Start: 2021-10-13 | End: 2021-10-13

## 2021-10-13 RX ADMIN — TC 99M MEDRONATE 26.1 MILLICURIE: 20 INJECTION, POWDER, LYOPHILIZED, FOR SOLUTION INTRAVENOUS at 11:41

## 2021-10-13 NOTE — TELEPHONE ENCOUNTER
Patient called and stated that she does not want to have any surgeries or hospitalixatoion at [facility]. She will have diagnostic at this location but nothing else. The patient sees the Dr on 10/21 and I asked that she also speak with Dr. Herlinda Marcano about this.  DF

## 2021-10-14 ENCOUNTER — OFFICE VISIT (OUTPATIENT)
Dept: SURGERY | Age: 66
End: 2021-10-14
Payer: MEDICARE

## 2021-10-14 VITALS
BODY MASS INDEX: 32.96 KG/M2 | TEMPERATURE: 98.2 F | OXYGEN SATURATION: 96 % | HEART RATE: 78 BPM | DIASTOLIC BLOOD PRESSURE: 78 MMHG | HEIGHT: 63 IN | SYSTOLIC BLOOD PRESSURE: 132 MMHG | WEIGHT: 186 LBS

## 2021-10-14 DIAGNOSIS — C50.912 MALIGNANT NEOPLASM OF LEFT BREAST IN FEMALE, ESTROGEN RECEPTOR NEGATIVE, UNSPECIFIED SITE OF BREAST (HCC): Primary | ICD-10-CM

## 2021-10-14 DIAGNOSIS — Z17.1 MALIGNANT NEOPLASM OF LEFT BREAST IN FEMALE, ESTROGEN RECEPTOR NEGATIVE, UNSPECIFIED SITE OF BREAST (HCC): Primary | ICD-10-CM

## 2021-10-14 PROCEDURE — 99214 OFFICE O/P EST MOD 30 MIN: CPT | Performed by: SURGERY

## 2021-10-15 ENCOUNTER — HOSPITAL ENCOUNTER (OUTPATIENT)
Dept: NON INVASIVE DIAGNOSTICS | Age: 66
Discharge: HOME | End: 2021-10-15
Payer: MEDICARE

## 2021-10-15 ENCOUNTER — HOSPITAL ENCOUNTER (OUTPATIENT)
Dept: CT IMAGING | Age: 66
Discharge: HOME | End: 2021-10-15
Payer: MEDICARE

## 2021-10-15 ENCOUNTER — CLINICAL DOCUMENTATION (OUTPATIENT)
Dept: CASE MANAGEMENT | Age: 66
End: 2021-10-15

## 2021-10-15 DIAGNOSIS — C50.912 MALIGNANT NEOPLASM OF LEFT BREAST IN FEMALE, ESTROGEN RECEPTOR NEGATIVE, UNSPECIFIED SITE OF BREAST (HCC): ICD-10-CM

## 2021-10-15 DIAGNOSIS — Z17.1 MALIGNANT NEOPLASM OF LEFT BREAST IN FEMALE, ESTROGEN RECEPTOR NEGATIVE, UNSPECIFIED SITE OF BREAST (HCC): ICD-10-CM

## 2021-10-15 DIAGNOSIS — Z79.890 NEED FOR PROPHYLACTIC HORMONE REPLACEMENT THERAPY (POSTMENOPAUSAL): ICD-10-CM

## 2021-10-15 LAB
LV EF: 53 %
LVEF MODALITY: NORMAL

## 2021-10-15 PROCEDURE — 6360000004 HC RX CONTRAST MEDICATION: Performed by: INTERNAL MEDICINE

## 2021-10-15 PROCEDURE — 71260 CT THORAX DX C+: CPT

## 2021-10-15 PROCEDURE — 2580000003 HC RX 258: Performed by: INTERNAL MEDICINE

## 2021-10-15 PROCEDURE — 74177 CT ABD & PELVIS W/CONTRAST: CPT

## 2021-10-15 PROCEDURE — 93306 TTE W/DOPPLER COMPLETE: CPT

## 2021-10-15 RX ORDER — SODIUM CHLORIDE 0.9 % (FLUSH) 0.9 %
10 SYRINGE (ML) INJECTION PRN
Status: DISCONTINUED | OUTPATIENT
Start: 2021-10-15 | End: 2021-10-16 | Stop reason: HOSPADM

## 2021-10-15 RX ADMIN — IOHEXOL 50 ML: 240 INJECTION, SOLUTION INTRATHECAL; INTRAVASCULAR; INTRAVENOUS; ORAL at 08:30

## 2021-10-15 RX ADMIN — IOPAMIDOL 80 ML: 755 INJECTION, SOLUTION INTRAVENOUS at 10:17

## 2021-10-15 RX ADMIN — Medication 10 ML: at 10:17

## 2021-10-15 NOTE — PROGRESS NOTES
Called patient with bone scan results. Per Dr. Adilene Delaney - if patient having thoracic/lumbar spine pain order MRI. Patient denies any bone pain other than her left hip replacement. Instructed patient if she starts to have spine pain to inform us and we would then order additional imaging - voiced understanding. Patient had echo and CT's chest/A/P today - EF 50-55% and no evidence of metastatic disease in chest/A/P. Patient scheduled at [facility] on 11/1/21 with Dr. Ron Cortez for port placement. Will get patient scheduled for treatment planning in near future and start chemo beginning of November. Patient voiced understanding of all above.

## 2021-10-18 ENCOUNTER — TELEPHONE (OUTPATIENT)
Dept: SURGERY | Age: 66
End: 2021-10-18

## 2021-10-18 NOTE — TELEPHONE ENCOUNTER
SPOKE TO  Cheri Zamarripa REGARDING SURGERY (mediport placement) SCHEDULED @ [facility].  NOTIFIED OF DATES, TIMES AND LOCATION    PHONE ASSESSMENT   COVID - will be done at [facility] - 11/1/21 @ 200  P/O - 11/8/21 @ 1112    NPO AFTER MIDNIGHT  HOLD BLOOD THINNERS - not on blood thinners

## 2021-10-21 NOTE — PROGRESS NOTES
Patient Name: Cheri Zamarripa  Patient : 1955  Patient MRN: [id number]     Primary Oncologist: Artem Mcguire MD  Referring Provider: Arleen Darnell MD     Date of Service: 10/25/2021      Reason for consult  New left breast mass. Chief Complaint:   No chief complaint on file. Patient Active Problem List:     Personal history of infectious disease     Other specified disorders of white blood cells     Neutropenia      History of hepatitis C     Allergic rhinitis     Left hip pain     G6PD deficiency     Mild persistent asthma without complication     Vertigo     HPI:   Roby Corrigan is a pleasant 69-year-old -American female patient who was referred for evaluation of mild neutropenia. She was diagnosed with hepatitis C in . Ex spouse was IV drug user. She was treated with Harvoni for 12 weeks and completed in May 2019. I reviewed her blood test records. On May 15, 2019 WBC was 4.1, RBC 4.36, hemoglobin 13.9, hematocrit 42.3, MCV 97, platelet 246, absolute neutrophils 1.1, absolute lymphocytes 2.6. She denies any history of frequent infection. Ultrasound of abdomen in 2018 showed normal right upper quadrant ultrasound. Liver biopsy on 2018 showed chronic hepatitis grade 2-3, stage II. Mammogram in 2019 is negative. US of abdomen in 2019 showed unremarkable study. Labs in 2019 were reviewed. She has nonspecific elevated total protein. CBC is unremarkable. Labs in 2019 were reviewed. Total protein is normal. Leukopenia is stable. She denied frequent infections. In 2020 she had acute viral hepatitis serology which came back positive left breast mass. hepatitis C antibodies. Serum AFP was 8. Hepatitis C RNA by PCR was negative. Hepatitis C RNA genotype was indeterminate. Mammogram in 2020 was benign. She was at [facility] emergency room on 2020 due to food poisoning. . WBC was 7.5.  Hemoglobin was 13.2, hemoglobin 13.2, platelet 224. CMP was grossly unremarkable with normal AST at 19, ALT 25. Alk phos was 92. Total bilirubin was 0.6. Colonoscopy in December 2020 by Dr. Balbina Duenas showed diverticulosis and hemorrhoids. She was told that she has kidney stone. She has flu shot in 2020 and COVID-19 vaccine on January 3, 2021. Mammogram in July 2020 was benign. In June 2021 WBC was 5.2, hemoglobin 12.3, platelet 661. She is currently asymptomatic. Reportedly she will have mammogram every 2 years. Due again in July 2021. She will follow-up with family doctor closely. I will see her in the office on as needed basis. On September 20, 2021 she was referred left breast mass. Bone density on September 13, 2021 showed osteopenia. Mammogram on September 15, 2021 showed  1.  Suspicious mass in the left breast at 11 o'clock, 11 cm from the nipple along with enlarged lymph nodes in the left axilla.  Ultrasound-guided biopsy of the left breast mass and left axillary lymph node is advised. Tatum Galan results and recommendations were discussed with the patient by Dr. Shilpi Oconnell. She will be assisted to schedule the recommended biopsy by the breast Imaging navigator.  An order will be requested from her referring physician.   2.  No mammographic evidence of malignancy in the right breast.     BIRADS - CATEGORY 4B   Intermediate suspicion for malignancy.     Suspicious Abnormality. Biopsy should be considered at this time.     OVERALL ASSESSMENT - SUSPICIOUS    In June 2021 WBC was 5.2, hemoglobin 12.3, platelet 063. CMP was grossly unremarkable. I showed her mammogram finding and she is scheduled for the left breast biopsy on September 27, 2021. If indeed she had a breast cancer, we would discuss about surgical evaluation and other options of treatment depending on the type of the breast cancer. No acute pain. Denies any nausea, vomiting or diarrhea. No fever or chills. No chest pain, shortness of breath or palpitation.   No headaches or dizzy spell. No specific bone pain. No melena or hematochezia. Denied any dysuria or hematuria. Past Medical History  Asthma, neutropenia, hepatitis C, G6PD, thyroid disease. Surgical History  Partial hysterectomy in 2002 related to tubal pregnancy. Tonsillectomy. She had colonoscopy x2 and the last one was in 2013. She had left hip replacement    Social History  Smoking Status Never smoker  She denies any illicit drug use. She drinks alcohol occasionally. She has 2 children. Family History  No malignancy in the family. Review of Systems: \"Per interval history; otherwise 10 point ROS is negative. \"     Vital Signs: There were no vitals taken for this visit. Physical Exam:  CONSTITUTIONAL: awake, alert, cooperative, no apparent distress   EYES: pupils equal, round and reactive to light, sclera clear and conjunctiva normal  ENT: Normocephalic, without obvious abnormality, atraumatic  NECK: supple, symmetrical, no jugular venous distension and no carotid bruits   HEMATOLOGIC/LYMPHATIC: no cervical, supraclavicular or axillary lymphadenopathy   LUNGS: no increased work of breathing and clear to auscultation   BREAST: I do not feel any palpable lump to either breast.  No palpable axillary lymphadenopathy. CARDIOVASCULAR: regular rate and rhythm, normal S1 and S2, no murmur   ABDOMEN: normal bowel sound, soft, non-distended, non-tender, no masses palpated, no hepatosplenomegaly. MUSCULOSKELETAL: full range of motion noted, tone is normal  NEUROLOGIC: awake, alert, oriented to name, place and time. Motor skills grossly intact. Cranial nerves II through XII grossly intact. SKIN: Normal skin color, texture, turgor and no jaundice. appears intact   EXTREMITIES: no LE edema. No cyanosis.     Labs:  Hematology:  Lab Results   Component Value Date    WBC 4.1 10/06/2021    RBC 4.21 10/06/2021    HGB 12.3 (L) 10/06/2021    HCT 38.6 10/06/2021    MCV 91.7 10/06/2021    MCH 29.2 10/06/2021    MCHC 31.9 (L) 10/06/2021    RDW 13.3 10/06/2021     10/06/2021    MPV 10.0 10/06/2021    SEGSPCT 43.5 10/06/2021    EOSRELPCT 3.0 10/06/2021    BASOPCT 0.2 10/06/2021    LYMPHOPCT 44.9 (H) 10/06/2021    MONOPCT 8.4 (H) 10/06/2021    SEGSABS 1.8 10/06/2021    EOSABS 0.1 10/06/2021    BASOSABS 0.0 10/06/2021    LYMPHSABS 1.8 10/06/2021    MONOSABS 0.3 10/06/2021    DIFFTYPE AUTOMATED DIFFERENTIAL 10/06/2021     Chemistry:  Lab Results   Component Value Date     10/06/2021    K 4.6 10/06/2021     10/06/2021    CO2 24 10/06/2021    BUN 11 10/06/2021    CREATININE 0.7 10/06/2021    GLUCOSE 105 (H) 10/06/2021    CALCIUM 9.7 10/06/2021    PROT 8.3 (H) 10/06/2021    LABALBU 4.7 10/06/2021    BILITOT 0.2 10/06/2021    ALKPHOS 87 10/06/2021    AST 22 10/06/2021    ALT 16 10/06/2021    LABGLOM >60 10/06/2021    GFRAA >60 10/06/2021    AGRATIO 1.4 06/09/2021    GLOB 3.4 06/09/2021     Lab Results   Component Value Date    TSHHS 1.182 05/13/2013     Immunology:  Lab Results   Component Value Date    PROT 8.3 (H) 10/06/2021     Coagulation Panel:  Lab Results   Component Value Date    PROTIME 12.5 03/16/2020    INR 1.03 03/16/2020    APTT 33.9 03/16/2020     Observations:  No data recorded        Assessment & Plan:    1. She was referred for mild neutropenia. She is an -American. CBC in June 2019 was unremarkable. CBC in June 2020 was unremarkable. US of abdomen in June 2019 was unremarkable. In June 2021 WBC was 5.2, hemoglobin 12.3, platelet 211. I recommend she continue to follow-up with family doctor closely and have CBC checked every 6 months. 2. She completed treatment for hepatitis C in May 2019. She will follow up with GI. She is in remission. 3. Mammogram in June 2019 was benign. Colonoscopy was In 2013. Mammogram in July 2020 was benign. She had abnormal left breast mammogram and scheduled for the biopsy on September 27, 2021. I will see her back after the biopsy.     4. Colonoscopy in December 2020 showed diverticulosis and hemorrhoids. Repeat study in 5 years was recommended. We discussed about healthy diet and lifestyle. She had COVID-19 vaccine in January 2021. Return to clinic after the left breast biopsy. All of her questions have been answered for today. Recent imaging and labs were reviewed and discussed with the patient.

## 2021-10-24 ASSESSMENT — ENCOUNTER SYMPTOMS
STRIDOR: 0
APNEA: 0
SORE THROAT: 0
BACK PAIN: 0
CHOKING: 0
APNEA: 0
COLOR CHANGE: 0
COLOR CHANGE: 0
EYE REDNESS: 0
CONSTIPATION: 0
EYE ITCHING: 0
PHOTOPHOBIA: 0
STRIDOR: 0
CONSTIPATION: 0
EYE REDNESS: 0
CHOKING: 0
ANAL BLEEDING: 0
BACK PAIN: 0
SORE THROAT: 0
ANAL BLEEDING: 0
RECTAL PAIN: 0
PHOTOPHOBIA: 0
RECTAL PAIN: 0
EYE ITCHING: 0

## 2021-10-24 NOTE — PROGRESS NOTES
Chief Complaint   Patient presents with    Follow-up     consult port placement Dr Art Garcia:  HPI: Patient presentsfor evaluation of a breast abnormality found on imaging. Change was noted a few days ago. Patient does routinely doself breast exams. Breast cancer risk factors include menarche before age 14. Patient is currentlypostmenopausal. Patient denies hormonal therapy. Patient denies nippledischarge or retraction. Patient denies to previous breast biopsy. Patient denies a personal history of breast cancer. Breast mammogram showed  Impression   1.  Suspicious mass in the left breast at 11 o'clock, 11 cm from the nipple   along with enlarged lymph nodes in the left axilla.  Ultrasound-guided biopsy   of the left breast mass and left axillary lymph node is advised. "[employer]er   results and recommendations were discussed with the patient by Dr. Justin Collier. She will be assisted to schedule the recommended biopsy by the breast Imaging   navigator.  An order will be requested from her referring physician.         Biopsy was obtained and path shows    Final Pathologic Diagnosis:   A.  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 B.  Lymph node, left axilla, ultrasound guided needle core   biopsy:   -  METASTATIC HIGH GRADE CARCINOMA IS IDENTIFIED CONSISTENT   WITH METASTATIC BREAST CARCINOMA (see comment). .  Ancillary studies: ER/PgR/Her2 results from the current   biopsy are as follows:   -ER by [facility]*: Negative (0% staining)   -PgR by IHC*: Negative (0% staining)   -Her2 by IHC*: Negative (score 0)   -Her2 by FISH*: Negative (Group 5)   -Avg. #HER2 signals/nucleus: 2.4, Avg.  #CEP17   signals/nucleus: 2.2, HER2/CEP17 ratio: 1.09     I have reviewed the patient's(pertinent information to this visit) medical history, family history(scanned in  the Media tab under \"patient questioner\"), social history and review of systems with the patienttoday in the office.           Past Surgical History:   Procedure Laterality Date    COLONOSCOPY  2013    Polyps - Dr. Enrico Plascencia  1990's [facility]    \"left one ovary \"    TONSILLECTOMY  1970's    US BREAST BIOPSY NEEDLE ADDITIONAL LEFT Left 9/27/2021    US BREAST BIOPSY NEEDLE ADDITIONAL LEFT 9/27/2021 [facility] Gomez [facility]    US BREAST NEEDLE BIOPSY LEFT Left 9/27/2021    US BREAST NEEDLE BIOPSY LEFT 9/27/2021 Ivy Gomez  [facility] [place]     Past Medical History:   Diagnosis Date    Allergic rhinitis     Asthma     last flare up 2017 - follows with PCP    G-6-PD deficiency anemia ([address]     dx this when I was having children- per pt     H/O Doppler ultrasound 04/04/18    CAROTID- Normal    Hepatitis C     dx 3/2018- for liver bx    Lumbar herniated disc     Malignant neoplasm of left breast in female, estrogen receptor negative ([address] 10/6/2021    Thyroid disease     \"not on any medication- have low thyroid level\"     Family History   Problem Relation Age of Onset    No Known Problems Mother     Kidney Disease Father      Social History     Socioeconomic History    Marital status: Single     Spouse name: Not on file    Number of children: Not on file    Years of education: Not on file    Highest education level: Not on file   Occupational History    Not on file   Tobacco Use    Smoking status: Never Smoker    Smokeless tobacco: Never Used   Vaping Use    Vaping Use: Never used   Substance and Sexual Activity    Alcohol use: Yes     Comment: Occasional wine/\"average glass of wine or beer  one time per month\"    Drug use: No    Sexual activity: Not on file   Other Topics Concern    Not on file   Social History Narrative    Not on file     Social Determinants of Health     Financial Resource Strain: Low Risk     Difficulty of Paying Living Expenses: Not hard at all   Food Insecurity: No Food Insecurity    Worried About [address] [place] [address] in the Last Year: Never true    [place]lan of Food in the Last Year: Never true   Transportation Needs:     Lack of Transportation (Medical):  Lack of Transportation (Non-Medical):    Physical Activity:     Days of Exercise per Week:     Minutes of Exercise per Session:    Stress:     Feeling of Stress :    Social Connections:     Frequency of Communication with Friends and Family:     Frequency of Social Gatherings with Friends and Family:     Attends Denominational Services:     Active Member of Clubs or Organizations:     Attends Club or Organization Meetings:     Marital Status:    Intimate Partner Violence:     Fear of Current or Ex-Partner:     Emotionally Abused:     Physically Abused:     Sexually Abused:        Current Outpatient Medications   Medication Sig Dispense Refill    omeprazole (PRILOSEC) 20 MG delayed release capsule Take 1 capsule by mouth daily 30 capsule 3    dicyclomine (BENTYL) 10 MG capsule Take 1 capsule by mouth 4 times daily as needed (abdominal bloating and gas) 120 capsule 3    ondansetron (ZOFRAN) 4 MG tablet take 1 4 milligram tablet by mouth every 6 hours if needed for nausea      oxyCODONE-acetaminophen (PERCOCET) 5-325 MG per tablet take 1 tablet by mouth every 6 hours 1 weekly if needed for pain . ..  (REFER TO PRESCRIPTION NOTES).       albuterol sulfate  (90 Base) MCG/ACT inhaler Inhale 2 puffs into the lungs every 6 hours as needed for Wheezing 3 each 1    montelukast (SINGULAIR) 10 MG tablet Take 1 tablet by mouth nightly 90 tablet 1    meclizine (ANTIVERT) 25 MG tablet Take 1 tablet by mouth 3 times daily as needed for Dizziness 30 tablet 3    oxybutynin (DITROPAN XL) 10 MG extended release tablet Take 1 tablet by mouth daily 30 tablet 3    meloxicam (MOBIC) 7.5 MG tablet Take 1 tablet by mouth daily 30 tablet 3    Multiple Vitamins-Minerals (MULTIVITAMIN PO) Take by mouth       No current facility-administered medications for this visit. Allergies   Allergen Reactions    Pcn [Penicillins] Hives       Review of Systems:         Review of Systems   Constitutional: Negative for chills and fever. HENT: Negative for ear pain, mouth sores, sore throat and tinnitus. Eyes: Negative for photophobia, redness and itching. Respiratory: Negative for apnea, choking and stridor. Cardiovascular: Negative for chest pain and palpitations. Gastrointestinal: Negative for anal bleeding, constipation and rectal pain. Endocrine: Negative for polydipsia. Genitourinary: Negative for enuresis, flank pain and hematuria. Musculoskeletal: Negative for back pain, joint swelling and myalgias. Skin: Negative for color change and pallor. Allergic/Immunologic: Negative for environmental allergies. Neurological: Negative for syncope and speech difficulty. Psychiatric/Behavioral: Negative for confusion and hallucinations. OBJECTIVE:  Physical Exam:    /78   Pulse 78   Temp 98.2 °F (36.8 °C)   Ht 5' 3\" (1.6 m)   Wt 186 lb (84.4 kg)   SpO2 96%   BMI 32.95 kg/m²      Physical Exam  Constitutional:       Appearance: She is well-developed. HENT:      Head: Normocephalic. Eyes:      Pupils: Pupils are equal, round, and reactive to light. Cardiovascular:      Rate and Rhythm: Normal rate. Pulmonary:      Effort: Pulmonary effort is normal.   Chest:       Abdominal:      General: There is no distension. Palpations: Abdomen is soft. There is no mass. Tenderness: There is no abdominal tenderness. There is no guarding or rebound. Musculoskeletal:         General: Normal range of motion. Cervical back: Normal range of motion and neck supple. Skin:     General: Skin is warm. Neurological:      Mental Status: She is alert and oriented to person, place, and time.        Breasts: breasts appear normal, no suspicious masses, no skin or nipple changes or axillary nodes, abnormal mass palpable left breast.    ASSESSMENT:  1. Malignant neoplasm of left breast in female, estrogen receptor negative, unspecified site of breast ([facility] [address]          PLAN:  Treatment: We will proceed with Mediport placement. Patientcounseled on risks, benefits, and alternatives of treatment plan at length today. Patient states an understanding and willingness to proceed with plan. No orders of the defined types were placed in this encounter. No orders of the defined types were placed in this encounter. Follow Up:  No follow-ups on file.       Samuel Quiroga MD

## 2021-10-25 ENCOUNTER — HOSPITAL ENCOUNTER (OUTPATIENT)
Dept: INFUSION THERAPY | Age: 66
Discharge: HOME | End: 2021-10-25
Payer: MEDICARE

## 2021-10-25 ENCOUNTER — OFFICE VISIT (OUTPATIENT)
Dept: ONCOLOGY | Age: 66
End: 2021-10-25
Payer: MEDICARE

## 2021-10-25 VITALS
DIASTOLIC BLOOD PRESSURE: 67 MMHG | WEIGHT: 185 LBS | RESPIRATION RATE: 12 BRPM | TEMPERATURE: 96.1 F | HEART RATE: 83 BPM | OXYGEN SATURATION: 99 % | BODY MASS INDEX: 32.78 KG/M2 | HEIGHT: 63 IN | SYSTOLIC BLOOD PRESSURE: 142 MMHG

## 2021-10-25 DIAGNOSIS — E04.1 THYROID NODULE: Primary | ICD-10-CM

## 2021-10-25 PROCEDURE — 99211 OFF/OP EST MAY X REQ PHY/QHP: CPT

## 2021-10-25 PROCEDURE — 99214 OFFICE O/P EST MOD 30 MIN: CPT | Performed by: INTERNAL MEDICINE

## 2021-10-25 NOTE — PROGRESS NOTES
Patient Name: Eli Toure  Patient : 1955  Patient MRN: [id number]     Primary Oncologist: Adarsh Chavez MD  Referring Provider: Devonte Rivera MD     Date of Service: 10/25/2021        Chief Complaint:   Chief Complaint   Patient presents with    Follow-up     She came in for follow-up visit. Patient Active Problem List:     Personal history of infectious disease     Other specified disorders of white blood cells     Neutropenia     History of hepatitis C     Allergic rhinitis     Left hip pain     G6PD deficiency     Mild persistent asthma without complication     Vertigo     HPI:   Aime Rubin is a pleasant 70-year-old -American female patient who was referred for evaluation of mild neutropenia. She was diagnosed with hepatitis C in . Ex spouse was IV drug user. She was treated with Harvoni for 12 weeks and completed in May 2019. I reviewed her blood test records. On May 15, 2019 WBC was 4.1, RBC 4.36, hemoglobin 13.9, hematocrit 42.3, MCV 97, platelet 578, absolute neutrophils 1.1, absolute lymphocytes 2.6. She denies any history of frequent infection. Ultrasound of abdomen in 2018 showed normal right upper quadrant ultrasound. Liver biopsy on 2018 showed chronic hepatitis grade 2-3, stage II. Mammogram in 2019 is negative. US of abdomen in 2019 showed unremarkable study. Labs in 2019 were reviewed. She has nonspecific elevated total protein. CBC is unremarkable. Labs in 2019 were reviewed. Total protein is normal. Leukopenia is stable. She denied frequent infection. In 2020 she had acute viral hepatitis serology which came back positive for hepatitis C antibody. Serum AFP was 8. Hepatitis C RNA by PCR was negative. Hepatitis C RNA genotype was indeterminate. Mammogram in 2020 was benign. She was at [facility] emergency room on 2020 due to food poisoning. . WBC was 7.5.  Hemoglobin was 13.2, hemoglobin 13.2, platelet 224. CMP was grossly unremarkable with normal AST at 19, ALT 25. Alk phos was 92. Total bilirubin was 0.6. Colonoscopy in December 2020 by Dr. Carlos Pope showed diverticulosis and hemorrhoids. She was told that she has kidney stone. She has flu shot in 2020 and COVID-19 vaccine on January 3, 2021. Mammogram in July 2020 was benign. In June 2021 WBC was 5.2, hemoglobin 12.3, platelet 398. On September 20, 2021 she was referred for left breast mass. Bone density on September 13, 2021 showed osteopenia. Mammogram on September 15, 2021 showed : There is a new 1.5 cm mass identified in the left breast at 12 o'clock position, at posterior depth, about 12 cm from the left nipple.  This is best seen on left CC michaela slice 50 and left MLO michaela slice 48.   Left breast ultrasound 11 o'clock, 11 cm from the nipple: On ultrasound evaluation, there is a 1.1 x 1.3 x 0.5 cm oval, parallel hypoechoic mass, with microlobulated margins without any posterior acoustic shadowing or internal vascularity.  This mass corresponds to the mammographic finding.   Left axilla: Couple enlarged lymph nodes identified in the left axilla with cortical thickness of 8-9 mm.   No new suspicious masses or microcalcifications are identified in the right breast.    In June 2021 WBC was 5.2, hemoglobin 12.3, platelet 214. CMP was grossly unremarkable. Path report of left breast biopsy on 9/27/2021:  A.  Breast, left at 11 o'clock, ultrasound guided needle core biopsy:   -  INVASIVE DUCTAL CARCINOMA WITH A MARKED CHRONIC INFLAMMATORY REACTION  - Earlimart Francisca FISH is negative  B.  Lymph node, left axilla, ultrasound guided needle core biopsy:   -  METASTATIC HIGH GRADE CARCINOMA IS IDENTIFIED (see comment). BREAST INVASIVE CARCINOMA SUMMARY - CORE BIOPSY   Procedure: Ultrasound guided needle core biopsy. Laterality and tumor site: Left breast at 11 o'clock. Tumor size: 7 mm in greatest dimension within biopsy material present.    Histologic type: Invasive ductal carcinoma with marked chronic inflammatory reaction. Histologic Grade ([place]): Grade 3        -Tubular score 3, Nuclear score 3, Mitosis score 2 (16/10 hpf)   Ductal Carcinoma In Situ (DCIS): Not present   Lobular Carcinoma In Situ (LCIS): Not present. Lympho-vascular invasion: Not identified. Microcalcifications: Not identified. Additional Findings: Marked chronic inflammatory reaction. Ancillary studies: ER/PgR/Her2 results from the current   biopsy are as follows:   -ER by [facility]*: Negative (0% staining). -PgR by IHC*: Negative (0% staining)   -Her2 by IHC*: Negative (Score 0)   -Her2 by FISH*:  negative  -Avg. #HER2 signals/nucleus: , Avg. #CEP17 signals/nucleus: , HER2/CEP17 ratio:       She was seen by Dr Antoine Glover. I discussed about neoadjuvant chemo, AC + TC. Potential AE including hair loss, increased risk of infection, secondary malignancy and etc was discussed with her. She has left hip pain s/p hip replacement in August 2021. On 2021-10-25 she came in for follow-up visit. Bone scan on 2021-10-13 showed  Focal activity within the midthoracic and lower lumbar spine, typically degenerative.  Metastatic disease cannot be entirely excluded in this baseline study.  If warranted, MR could be performed for further characterization.  Otherwise, continued attention on follow-up is recommended.    Moderate activity is seen about the left hip arthroplasty.  If surgery has not been recent, loosening or infection could be considered.    Multifocal degenerative changes are otherwise favored as outlined above. CT CAP on 10/15/2021:  1. Left breast mass with level 1 left axillary lymphadenopathy. 2. Four small pulmonary nodules measuring up to 3 mm in the left lung favoring a benign etiology.  Attention at follow-up imaging recommended. 3. Otherwise no metastatic disease in the chest, abdomen or pelvis.   4. Incidental left thyroid lobe 18 mm nodule.  Thyroid ultrasound can be considered. Cardiogram in October 2021 showed LVEF at 50 to 55%. She will have chemo education on October 26, 2021. She will have Mediport placement on November 1, 2021 at [facility].  Hopefully she can start the chemo on November 2, 2021. No change on her breast exam today. Denied any nausea, vomiting or diarrhea. No fever or chills. No shortness of breath or palpitation. No headaches or dizzy spell. No melena or hematochezia. Denied any dysuria or hematuria. Past Medical History  Asthma, neutropenia, hepatitis C, G6PD, thyroid disease. Surgical History  Partial hysterectomy in 2002 related to tubal pregnancy. Tonsillectomy. She had colonoscopy x2 and the last one was in 2013. She had left hip replacement    Social History  Smoking Status Never smoker  She denies any illicit drug use. She drinks alcohol occasionally. She has 2 children. Family History  Maternal great aunt had breast cancer. Review of Systems: \"Per interval history; otherwise 10 point ROS is negative. \"     Vital Signs:  BP (!) 142/67 (Site: Right Upper Arm, Position: Sitting, Cuff Size: Medium Adult)   Pulse 83   Temp 96.1 °F (35.6 °C) (Infrared)   Resp 12   Ht 5' 3\" (1.6 m)   Wt 185 lb (83.9 kg)   SpO2 99%   BMI 32.77 kg/m²     Physical Exam:  CONSTITUTIONAL: awake, alert, cooperative, no apparent distress   EYES: pupils equal, round and reactive to light, sclera clear and conjunctiva normal  ENT: Normocephalic, without obvious abnormality, atraumatic  NECK: supple, symmetrical, no jugular venous distension and no carotid bruits   HEMATOLOGIC/LYMPHATIC: no cervical, supraclavicular or axillary lymphadenopathy   LUNGS: no increased work of breathing and clear to auscultation   BREAST: s/p left breast biopsy.   No palpable lump to the right breast.  CARDIOVASCULAR: regular rate and rhythm, normal S1 and S2, no murmur   ABDOMEN: normal bowel sound, soft, non-distended, non-tender, no masses palpated, no hepatosplenomegaly   MUSCULOSKELETAL: full range of motion noted, tone is normal  NEUROLOGIC: awake, alert, oriented to name, place and time. Motor skills grossly intact. CN II through XII grossly intact. SKIN: Normal skin color, texture, turgor and no jaundice. appears intact   EXTREMITIES: no LE edema. No cyanosis. Labs:  Hematology:  Lab Results   Component Value Date    WBC 4.1 10/06/2021    RBC 4.21 10/06/2021    HGB 12.3 (L) 10/06/2021    HCT 38.6 10/06/2021    MCV 91.7 10/06/2021    MCH 29.2 10/06/2021    MCHC 31.9 (L) 10/06/2021    RDW 13.3 10/06/2021     10/06/2021    MPV 10.0 10/06/2021    SEGSPCT 43.5 10/06/2021    EOSRELPCT 3.0 10/06/2021    BASOPCT 0.2 10/06/2021    LYMPHOPCT 44.9 (H) 10/06/2021    MONOPCT 8.4 (H) 10/06/2021    SEGSABS 1.8 10/06/2021    EOSABS 0.1 10/06/2021    BASOSABS 0.0 10/06/2021    LYMPHSABS 1.8 10/06/2021    MONOSABS 0.3 10/06/2021    DIFFTYPE AUTOMATED DIFFERENTIAL 10/06/2021     Chemistry:  Lab Results   Component Value Date     10/06/2021    K 4.6 10/06/2021     10/06/2021    CO2 24 10/06/2021    BUN 11 10/06/2021    CREATININE 0.7 10/06/2021    GLUCOSE 105 (H) 10/06/2021    CALCIUM 9.7 10/06/2021    PROT 8.3 (H) 10/06/2021    LABALBU 4.7 10/06/2021    BILITOT 0.2 10/06/2021    ALKPHOS 87 10/06/2021    AST 22 10/06/2021    ALT 16 10/06/2021    LABGLOM >60 10/06/2021    GFRAA >60 10/06/2021    AGRATIO 1.4 06/09/2021    GLOB 3.4 06/09/2021     Lab Results   Component Value Date    TSHHS 1.182 05/13/2013     Immunology:  Lab Results   Component Value Date    PROT 8.3 (H) 10/06/2021     Coagulation Panel:  Lab Results   Component Value Date    PROTIME 12.5 03/16/2020    INR 1.03 03/16/2020    APTT 33.9 03/16/2020     Observations:  No data recorded        Assessment & Plan:    1. She was referred for mild neutropenia. She is an -American. CBC in June 2019 was unremarkable. CBC in June 2020 was unremarkable.   US of abdomen in June 2019 was unremarkable. In June 2021 WBC was 5.2, hemoglobin 12.3, platelet 502. I will continue to monitor CBC. .    2. She completed treatment for hepatitis C in May 2019. She will follow up with GI. She is in remission. 3. Mammogram in June 2019 was benign. Colonoscopy was In 2013. Mammogram in July 2020 was benign. She had abnormal left breast mammogram and scheduled for the biopsy on September 27, 2021. She was found to have likely triple negative left breast cancer s/p biopsy, T1, N1 at least from biopsy. She is agreeable to neoadjuvant chemotherapy. CT chest, abdomen pelvis, bone scan and echocardiogram in October 2021 were reviewed. She is scheduled for Med[facility] on October 1, 2021 at [facility].  She will have chemo indications on October 26, 2021. Hopefully she can start the chemo sooner after Mediport placement. 4. Colonoscopy in December 2020 showed diverticulosis and hemorrhoids. Repeat study in 5 years was recommended. 5.  She is agreeable to have ultrasound of the thyroid within 1 week. We discussed about healthy diet and lifestyle. She had COVID-19 vaccine in January 2021. Return to clinic in 3 weeks or sooner. All of her questions have been answered for today. Recent imaging and labs were reviewed and discussed with the patient.

## 2021-10-25 NOTE — PROGRESS NOTES
MA Rooming Questions  Patient: Lolis Stewart  MRN: [id number]    Date: 10/25/2021        1. Do you have any new issues?   no         2. Do you need any refills on medications?    no    3. Have you had any imaging done since your last visit? yes - CT 10/15/21    4. Have you been hospitalized or seen in the emergency room since your last visit here?   no    5. Did the patient have a depression screening completed today?  No    No data recorded     PHQ-9 Given to (if applicable):               PHQ-9 Score (if applicable):                     [] Positive     []  Negative              Does question #9 need addressed (if applicable)                     [] Yes    []  No               Deb Mckay MA

## 2021-10-27 ENCOUNTER — TELEPHONE (OUTPATIENT)
Dept: ONCOLOGY | Age: 66
End: 2021-10-27

## 2021-10-27 NOTE — TELEPHONE ENCOUNTER
Spoke with patients mother regarding her appt for her [address] on 10.29.2021 at [facility] arr 1200. mother stated that would work and they will be there.

## 2021-10-28 LAB
SARS-COV-2: NEGATIVE
SARS-COV-2: NORMAL

## 2021-11-01 DIAGNOSIS — E04.1 THYROID NODULE: Primary | ICD-10-CM

## 2021-11-02 ENCOUNTER — CLINICAL DOCUMENTATION (OUTPATIENT)
Dept: CASE MANAGEMENT | Age: 66
End: 2021-11-02

## 2021-11-02 ENCOUNTER — HOSPITAL ENCOUNTER (OUTPATIENT)
Dept: INFUSION THERAPY | Age: 66
Discharge: HOME | End: 2021-11-02
Payer: MEDICARE

## 2021-11-02 ENCOUNTER — OFFICE VISIT (OUTPATIENT)
Dept: ONCOLOGY | Age: 66
End: 2021-11-02
Payer: MEDICARE

## 2021-11-02 VITALS
DIASTOLIC BLOOD PRESSURE: 62 MMHG | RESPIRATION RATE: 18 BRPM | OXYGEN SATURATION: 99 % | TEMPERATURE: 98.2 F | HEART RATE: 83 BPM | WEIGHT: 189.2 LBS | BODY MASS INDEX: 33.52 KG/M2 | HEIGHT: 63 IN | SYSTOLIC BLOOD PRESSURE: 135 MMHG

## 2021-11-02 DIAGNOSIS — Z17.1 MALIGNANT NEOPLASM OF LEFT BREAST IN FEMALE, ESTROGEN RECEPTOR NEGATIVE, UNSPECIFIED SITE OF BREAST (HCC): Primary | ICD-10-CM

## 2021-11-02 DIAGNOSIS — Z79.890 NEED FOR PROPHYLACTIC HORMONE REPLACEMENT THERAPY (POSTMENOPAUSAL): ICD-10-CM

## 2021-11-02 DIAGNOSIS — C50.912 MALIGNANT NEOPLASM OF LEFT BREAST IN FEMALE, ESTROGEN RECEPTOR NEGATIVE, UNSPECIFIED SITE OF BREAST (HCC): Primary | ICD-10-CM

## 2021-11-02 DIAGNOSIS — E04.1 THYROID NODULE: ICD-10-CM

## 2021-11-02 LAB
ALBUMIN SERPL-MCNC: 4.7 GM/DL (ref 3.4–5)
ALP BLD-CCNC: 90 IU/L (ref 40–128)
ALT SERPL-CCNC: 14 U/L (ref 10–40)
ANION GAP SERPL CALCULATED.3IONS-SCNC: 10 MMOL/L (ref 4–16)
AST SERPL-CCNC: 17 IU/L (ref 15–37)
BASOPHILS ABSOLUTE: 0 K/CU MM
BASOPHILS RELATIVE PERCENT: 0.1 % (ref 0–1)
BILIRUB SERPL-MCNC: 0.2 MG/DL (ref 0–1)
BUN BLDV-MCNC: 14 MG/DL (ref 6–23)
CALCIUM SERPL-MCNC: 9.6 MG/DL (ref 8.3–10.6)
CHLORIDE BLD-SCNC: 105 MMOL/L (ref 99–110)
CO2: 24 MMOL/L (ref 21–32)
CREAT SERPL-MCNC: 0.7 MG/DL (ref 0.6–1.1)
DIFFERENTIAL TYPE: ABNORMAL
EOSINOPHILS ABSOLUTE: 0 K/CU MM
EOSINOPHILS RELATIVE PERCENT: 0.3 % (ref 0–3)
GFR AFRICAN AMERICAN: >60 ML/MIN/1.73M2
GFR NON-AFRICAN AMERICAN: >60 ML/MIN/1.73M2
GLUCOSE BLD-MCNC: 93 MG/DL (ref 70–99)
HCT VFR BLD CALC: 36.7 % (ref 37–47)
HEMOGLOBIN: 11.8 GM/DL (ref 12.5–16)
LYMPHOCYTES ABSOLUTE: 2.8 K/CU MM
LYMPHOCYTES RELATIVE PERCENT: 29.1 % (ref 24–44)
MCH RBC QN AUTO: 28.8 PG (ref 27–31)
MCHC RBC AUTO-ENTMCNC: 32.2 % (ref 32–36)
MCV RBC AUTO: 89.5 FL (ref 78–100)
MONOCYTES ABSOLUTE: 1 K/CU MM
MONOCYTES RELATIVE PERCENT: 10 % (ref 0–4)
PDW BLD-RTO: 13.4 % (ref 11.7–14.9)
PLATELET # BLD: 221 K/CU MM (ref 140–440)
PMV BLD AUTO: 9.8 FL (ref 7.5–11.1)
POTASSIUM SERPL-SCNC: 4.4 MMOL/L (ref 3.5–5.1)
RBC # BLD: 4.1 M/CU MM (ref 4.2–5.4)
SEGMENTED NEUTROPHILS ABSOLUTE COUNT: 5.8 K/CU MM
SEGMENTED NEUTROPHILS RELATIVE PERCENT: 60.5 % (ref 36–66)
SODIUM BLD-SCNC: 139 MMOL/L (ref 135–145)
TOTAL PROTEIN: 8.2 GM/DL (ref 6.4–8.2)
WBC # BLD: 9.5 K/CU MM (ref 4–10.5)

## 2021-11-02 PROCEDURE — 36415 COLL VENOUS BLD VENIPUNCTURE: CPT

## 2021-11-02 PROCEDURE — 99215 OFFICE O/P EST HI 40 MIN: CPT | Performed by: NURSE PRACTITIONER

## 2021-11-02 PROCEDURE — 99213 OFFICE O/P EST LOW 20 MIN: CPT

## 2021-11-02 PROCEDURE — 85025 COMPLETE CBC W/AUTO DIFF WBC: CPT

## 2021-11-02 PROCEDURE — 80053 COMPREHEN METABOLIC PANEL: CPT

## 2021-11-02 RX ORDER — SODIUM CHLORIDE 9 MG/ML
INJECTION, SOLUTION INTRAVENOUS CONTINUOUS
Status: CANCELLED | OUTPATIENT
Start: 2021-11-08

## 2021-11-02 RX ORDER — ONDANSETRON HYDROCHLORIDE 8 MG/1
8 TABLET, FILM COATED ORAL EVERY 8 HOURS PRN
Qty: 30 TABLET | Refills: 0 | Status: SHIPPED | OUTPATIENT
Start: 2021-11-02 | End: 2022-08-23

## 2021-11-02 RX ORDER — EPINEPHRINE 1 MG/ML
0.3 INJECTION, SOLUTION, CONCENTRATE INTRAVENOUS PRN
Status: CANCELLED | OUTPATIENT
Start: 2021-11-08

## 2021-11-02 RX ORDER — DIPHENHYDRAMINE HYDROCHLORIDE 50 MG/ML
50 INJECTION INTRAMUSCULAR; INTRAVENOUS ONCE
Status: CANCELLED | OUTPATIENT
Start: 2021-11-08 | End: 2021-11-03

## 2021-11-02 RX ORDER — SODIUM CHLORIDE 9 MG/ML
25 INJECTION, SOLUTION INTRAVENOUS PRN
Status: CANCELLED | OUTPATIENT
Start: 2021-11-08

## 2021-11-02 RX ORDER — SODIUM CHLORIDE 0.9 % (FLUSH) 0.9 %
5-40 SYRINGE (ML) INJECTION PRN
Status: CANCELLED | OUTPATIENT
Start: 2021-11-08

## 2021-11-02 RX ORDER — DEXAMETHASONE 4 MG/1
TABLET ORAL
Qty: 16 TABLET | Refills: 0 | Status: SHIPPED | OUTPATIENT
Start: 2021-11-02 | End: 2021-12-20 | Stop reason: SDUPTHER

## 2021-11-02 RX ORDER — SODIUM CHLORIDE 9 MG/ML
20 INJECTION, SOLUTION INTRAVENOUS ONCE
Status: CANCELLED | OUTPATIENT
Start: 2021-11-08 | End: 2021-11-03

## 2021-11-02 RX ORDER — METHYLPREDNISOLONE SODIUM SUCCINATE 125 MG/2ML
125 INJECTION, POWDER, LYOPHILIZED, FOR SOLUTION INTRAMUSCULAR; INTRAVENOUS ONCE
Status: CANCELLED | OUTPATIENT
Start: 2021-11-08 | End: 2021-11-03

## 2021-11-02 RX ORDER — HEPARIN SODIUM (PORCINE) LOCK FLUSH IV SOLN 100 UNIT/ML 100 UNIT/ML
500 SOLUTION INTRAVENOUS PRN
Status: CANCELLED | OUTPATIENT
Start: 2021-11-08

## 2021-11-02 RX ORDER — OLANZAPINE 5 MG/1
TABLET ORAL
Qty: 16 TABLET | Refills: 0 | Status: SHIPPED | OUTPATIENT
Start: 2021-11-02 | End: 2021-11-22 | Stop reason: SDUPTHER

## 2021-11-02 RX ORDER — DOXORUBICIN HYDROCHLORIDE 2 MG/ML
60 INJECTION, SOLUTION INTRAVENOUS ONCE
Status: CANCELLED | OUTPATIENT
Start: 2021-11-08

## 2021-11-02 RX ORDER — PALONOSETRON 0.05 MG/ML
0.25 INJECTION, SOLUTION INTRAVENOUS ONCE
Status: CANCELLED | OUTPATIENT
Start: 2021-11-08

## 2021-11-02 NOTE — PROGRESS NOTES
Patient Name: Lolis Stewart    Patient : 1955     Patient MRN: [id number]       Date: 2021                                                                                                   CHEMOTHERAPY TREATMENT PLAN    Care Team  Medical Oncologist: Andrew Merino MD  Surgeon: Dr. Theresa Hollis Oncologist:   Primary Care Physician: Sharonda Wood MD     Learning Needs Assessment  Before the treatment plan was discussed and the consent was signed, the care team assessed the patient's learning needs. This includes their ability to assume responsibility for managing their therapy. Diagnosis      Malignant neoplasm of left breast ER negative    The Goal of My Therapy is    (x) To cure my cancer  (  ) To shrink the tumor prior to surgery  (  ) Increase my chance of cure after surgery  (  ) Help me live as long as possible with the highest quality of life. I know that a cure is not possible.      Prognosis    (X) Curable  (  ) Palliative    Plan Of Treatment    Intent:  (  ) Neoadjuvant   (  ) Adjuvant   (X) Control    Treatment Regimen  (including supportive meds)                             Frequency                                               Duration     AC every two weeks x 4 with neulasta  Followed by tc    Expected Response to Treatment    (X) This therapy could cure your disease  (  ) This therapy has a good chance of helping you live 1 year or more compared to without it  (  ) This therapy has a good chance of helping you feel better or making you live months longer compared to without it     Quality Of Life    (  ) Expect that you will be able to continue all normal activities  (X) Expect that you will have some side effects but should be able to maintain normal activities  (  ) Expect that you will be unable to work but able to perform light duties at home  (  ) Expect that you will be able to perform light duties and will need assistance with self care    Treatment Benefits and Harms 1.    Patient taught on all common and rare toxicities regarding their treatment, disease process and drug regimen. This includes the short and long-         term effects of therapy (including infertility risks when appropriate). This also includes drug-drug and drug-food interactions when appropriate. Patient was educated when to call [facility] with adverse effects and symptoms. 2.    Patient was taught safe handling and storage of medications in the home (when appropriate) and procedures for handling body sections and             waste in the home. 3.    Patient was taught on planned for missed doses and follow-up plans during treatment, including nj of provider visits and labs. 4.    Patient signed consent on treatment and drug information sheets were given. Alternative To Recommended Treatment    (  ) Palliative or Hospice  (  ) Second Opinion  (x) Other    Patient Care Management     Advance Care Planning completed: paperwork provided   ( x ) Yes   (  ) No   Pain Care Plan entered (as applicable): n/a   (  ) Yes   (  ) No   Comments:  PHQ-9 completed (Follow-up plan as applicable):   (x) Yes   (  ) No   Comments:  Distress needs addressed with the patient: see below   (  ) Yes   (  ) No   Distress areas needing addressed further:     Patient was educated on the expectations and their responsibility to schedule their follow-up appointments. The patient was also educated that if they refuse to show-up to their appointment or refuse to schedule a follow-up appointment that it is their responsibility to call [facility] in a timely manner to schedule their next appointment. The patient was educated on [facility] policy and that the patient is ultimately responsible for monitoring their appointments and adhering to the schedule. (X) Yes   (   ) No    Estimated Out of Pocket Costs discussed per the patient per financial navigator. Patient Consented and taught per Nurse Navigator.      .  Today, time spent with the patient discussing the intent and schedule of their treatment. The patient was given a copy of their treatment summary. Questions and concerns were addressed with the patient. Time spent with the patient face to face today was 60 minutes, counseling and coordination of care dominated more than 50% of this patient encounter. Milla Otero  presented for chemo education for adriamycin/cytoxan. We discussed potential side effects, including, but not limited to: myelosuppresion, cardiac toxicity, nausea, vomiting, diarrhea, stomatitis, discolored urine, alopecia, secondary blood cancers, discoloration of nail beds, TLS, diarrhea, hemorrahagic cysitis, fatigue, etc. She is given olanzapine and dexamethsone per protocol. She is also given zofran PRN. She is asked to call our office for temperature 100.4 or above. She verbalized understanding and is willing to proceed. Advance directive: paperwork provided    Counseling: referral placed    [place]: referral placed    Indigestion: omeprazole, tums PRN    Dry skin: lotion bid    Fatigue: adequate nutrition, hydration, exercise and sleep    Financial: to see FN    /62 (Site: Left Upper Arm, Position: Sitting, Cuff Size: Large Adult)   Pulse 83   Temp 98.2 °F (36.8 °C) (Temporal)   Resp 18   Ht 5' 3\" (1.6 m)   Wt 189 lb 3.2 oz (85.8 kg)   SpO2 99%   BMI 33.52 kg/m²     Physical Exam  Vitals reviewed. HENT:      Head: Normocephalic. Mouth/Throat:      Mouth: Mucous membranes are moist.      Pharynx: Oropharynx is clear. Cardiovascular:      Rate and Rhythm: Normal rate and regular rhythm. Heart sounds: Normal heart sounds. Pulmonary:      Effort: Pulmonary effort is normal.      Breath sounds: Normal breath sounds. Musculoskeletal:      Cervical back: Neck supple. Skin:     General: Skin is warm. Neurological:      Mental Status: She is alert and oriented to person, place, and time.    Psychiatric:         Mood and Affect: Mood normal.         Behavior: Behavior normal.         Thought Content:  Thought content normal.         Judgment: Judgment normal.       Keep scheduled follow up

## 2021-11-02 NOTE — PROGRESS NOTES
Patient arrived with her mother for treatment planning and chemotherapy education. Discussed treatment plan, potential side effects, prevention, and symptom management. Reviewed chemotherapy education folder and drug monograph. Patient's regimen will be AC with Neulasta every 2 weeks x4 cycles then Taxol weekly x12 cycles with Carboplatin added in every 3rd cycle. Patient signed chemotherapy consent for Adriamycin, Cytoxan, Taxol and Neulasta On-pro (instructed patient to take Claritin daily for possible bone pain from Neulasta). Copy of consent given to patient. Reviewed chemotherapy schedule/calendar. Rx for Zofran, Olanzapine and Dexamethasone sent to pharmacy per Jennifer Daily, APRN-CNP. Patient given calendar and written instructions for these medications and how/when to take. Reviewed ECHO EF 50-55%. Mediport to right upper chest intact without redness or drainage. Script for cranial prosthesis with list of local boutiques given to patient. Patient given on-call phone number for after office hours and weekends. CBC and CMP drawn today. Confirmed first chemotherapy appointment for [facility] for tomorrow, 11/3/21 at 1045.

## 2021-11-03 ENCOUNTER — HOSPITAL ENCOUNTER (OUTPATIENT)
Dept: INFUSION THERAPY | Age: 66
Discharge: HOME | End: 2021-11-03
Payer: MEDICARE

## 2021-11-03 VITALS
BODY MASS INDEX: 33.38 KG/M2 | HEART RATE: 72 BPM | WEIGHT: 188.4 LBS | OXYGEN SATURATION: 94 % | TEMPERATURE: 97 F | HEIGHT: 63 IN | RESPIRATION RATE: 16 BRPM | DIASTOLIC BLOOD PRESSURE: 66 MMHG | SYSTOLIC BLOOD PRESSURE: 133 MMHG

## 2021-11-03 RX ORDER — SULFAMETHOXAZOLE AND TRIMETHOPRIM 800; 160 MG/1; MG/1
1 TABLET ORAL 2 TIMES DAILY
Qty: 10 TABLET | Refills: 0 | Status: SHIPPED | OUTPATIENT
Start: 2021-11-03 | End: 2021-11-08

## 2021-11-03 NOTE — PROGRESS NOTES
Pt ambulated into clinic for first chemo treatment. Had Mediport placed on 01 Nov 2021. Has some soreness, redness and warmth over Mediport. Dr. Gi Martinez examined Pt at chair side. Prescribed septra DS BID and RTC on 08 Nov 2021 for treatment. Pt is in agreement.

## 2021-11-04 ENCOUNTER — OFFICE VISIT (OUTPATIENT)
Dept: SURGERY | Age: 66
End: 2021-11-04

## 2021-11-04 VITALS
HEIGHT: 63 IN | SYSTOLIC BLOOD PRESSURE: 128 MMHG | BODY MASS INDEX: 33.68 KG/M2 | WEIGHT: 190.1 LBS | DIASTOLIC BLOOD PRESSURE: 88 MMHG | HEART RATE: 91 BPM | OXYGEN SATURATION: 99 %

## 2021-11-04 DIAGNOSIS — Z48.89 ENCOUNTER FOR POSTOPERATIVE CARE: ICD-10-CM

## 2021-11-04 DIAGNOSIS — Z98.890 HISTORY OF VASCULAR ACCESS DEVICE: Primary | ICD-10-CM

## 2021-11-04 PROCEDURE — 99024 POSTOP FOLLOW-UP VISIT: CPT | Performed by: PHYSICIAN ASSISTANT

## 2021-11-04 PROCEDURE — APPNB30 APP NON BILLABLE TIME 0-30 MINS: Performed by: PHYSICIAN ASSISTANT

## 2021-11-05 NOTE — PROGRESS NOTES
Chief Complaint   Patient presents with   [address] @ [address] 11/1/21         SUBJECTIVE:  Patient presents today for her 1st post op visit following mediport placement. Pt reports that pain is mild at the incisions. Pt was seen at [facility] yesterday for her first chemo treatment. At that time, pt was noted to have some redness and warmth over the port, so she was prescribed Bactrim and recommended to come here for evaluation of the port site. Her treatment is not scheduled to start next week. Incisions: min bruising and no discharge.  Glue intact    Past Surgical History:   Procedure Laterality Date    COLONOSCOPY  2013    Polyps - Dr. Panchito Youssef  1990's   [address] [place] [address] Av    \"left one ovary \"    TONSILLECTOMY  1970's     BREAST BIOPSY NEEDLE ADDITIONAL LEFT Left 9/27/2021    US BREAST BIOPSY NEEDLE ADDITIONAL LEFT 9/27/2021 Frankie Nunez MD [address] [facility]    US BREAST NEEDLE BIOPSY LEFT Left 9/27/2021     BREAST NEEDLE BIOPSY LEFT 9/27/2021 Frankie Nunez  [facility]     Past Medical History:   Diagnosis Date    Allergic rhinitis     Asthma     last flare up 2017 - follows with PCP    G-6-PD deficiency anemia ([address]     dx this when I was having children- per pt     H/O Doppler ultrasound 04/04/18    CAROTID- Normal    Hepatitis C     dx 3/2018- for liver bx    Lumbar herniated disc     Malignant neoplasm of left breast in female, estrogen receptor negative ([address] 10/6/2021    Thyroid disease     \"not on any medication- have low thyroid level\"     Family History   Problem Relation Age of Onset    No Known Problems Mother     Kidney Disease Father      Social History     Socioeconomic History    Marital status: Single     Spouse name: Not on file    Number of children: Not on file    Years of education: Not on file    Highest education level: Not on file   Occupational History    Not on file   Tobacco Use    Smoking status: Never Smoker    Smokeless tobacco: Never Used   Vaping Use    Vaping Use: Never used   Substance and Sexual Activity    Alcohol use: Yes     Comment: Occasional wine/\"average glass of wine or beer  one time per month\"    Drug use: No    Sexual activity: Not on file   Other Topics Concern    Not on file   Social History Narrative    Not on file     Social Determinants of Health     Financial Resource Strain: Low Risk     Difficulty of Paying Living Expenses: Not hard at all   Food Insecurity: No Food Insecurity    Worried About Running Out of Food in the Last Year: Never true    Harlan of Food in the Last Year: Never true   Transportation Needs:     Lack of Transportation (Medical):  Lack of Transportation (Non-Medical):    Physical Activity:     Days of Exercise per Week:     Minutes of Exercise per Session:    Stress:     Feeling of Stress :    Social Connections:     Frequency of Communication with Friends and Family:     Frequency of Social Gatherings with Friends and Family:     Attends [employer] Services:     Active Member of Clubs or Organizations:     Attends Club or Organization Meetings:     Marital Status:    Intimate Partner Violence:     Fear of Current or Ex-Partner:     Emotionally Abused:     Physically Abused:     Sexually Abused:        OBJECTIVE:  Physical Exam  Constitutional:       Appearance: She is well-developed. HENT:      Head: Normocephalic. Eyes:      Pupils: Pupils are equal, round, and reactive to light. Cardiovascular:      Rate and Rhythm: Normal rate. Pulmonary:      Effort: Pulmonary effort is normal.   Chest:          Comments: R chest port incisions well approximated with glue intact. There is surrounding erythema. I do not appreciate any warmth. Mild tenderness to palpation. No open wound or drainage. Abdominal:      General: There is no distension. Palpations: Abdomen is soft. There is no mass. Tenderness:  There is no abdominal tenderness. There is no guarding or rebound. Musculoskeletal:         General: Normal range of motion. Cervical back: Normal range of motion and neck supple. Skin:     General: Skin is warm. Neurological:      Mental Status: She is alert and oriented to person, place, and time. ASSESSMENT:  Patient doing well on this post operative check. Wounds well healed. There is some surrounding erythema at incisions. 1. History of vascular access device    2. Encounter for postoperative care        PLAN:  I have low suspicion for infection based on my exam today (understanding that she has been on abx for about 24 hours now). However, I do agree with prophylactic Abx and holding chemo to be safe. Assuming that redness improves, she will likely be fine to get chemo next week. If erythema does not improve, she should call us and return early next week. Pt is to increase activities as tolerated. Will return here PRN. No orders of the defined types were placed in this encounter. No orders of the defined types were placed in this encounter. Follow Up: No follow-ups on file.     Neva Garcia PA-C

## 2021-11-08 ENCOUNTER — HOSPITAL ENCOUNTER (OUTPATIENT)
Dept: INFUSION THERAPY | Age: 66
Discharge: HOME | End: 2021-11-08
Payer: MEDICARE

## 2021-11-08 VITALS
HEART RATE: 99 BPM | WEIGHT: 186.4 LBS | SYSTOLIC BLOOD PRESSURE: 135 MMHG | BODY MASS INDEX: 33.03 KG/M2 | DIASTOLIC BLOOD PRESSURE: 62 MMHG | TEMPERATURE: 96.8 F | HEIGHT: 63 IN

## 2021-11-08 DIAGNOSIS — C50.912 MALIGNANT NEOPLASM OF LEFT BREAST IN FEMALE, ESTROGEN RECEPTOR NEGATIVE, UNSPECIFIED SITE OF BREAST (HCC): ICD-10-CM

## 2021-11-08 DIAGNOSIS — E04.1 THYROID NODULE: Primary | ICD-10-CM

## 2021-11-08 DIAGNOSIS — Z17.1 MALIGNANT NEOPLASM OF LEFT BREAST IN FEMALE, ESTROGEN RECEPTOR NEGATIVE, UNSPECIFIED SITE OF BREAST (HCC): ICD-10-CM

## 2021-11-08 LAB
BASOPHILS ABSOLUTE: 0 K/CU MM
BASOPHILS RELATIVE PERCENT: 0.4 % (ref 0–1)
DIFFERENTIAL TYPE: ABNORMAL
EOSINOPHILS ABSOLUTE: 0.1 K/CU MM
EOSINOPHILS RELATIVE PERCENT: 2.6 % (ref 0–3)
HCT VFR BLD CALC: 36.9 % (ref 37–47)
HEMOGLOBIN: 11.8 GM/DL (ref 12.5–16)
LYMPHOCYTES ABSOLUTE: 2 K/CU MM
LYMPHOCYTES RELATIVE PERCENT: 38.9 % (ref 24–44)
MCH RBC QN AUTO: 28.8 PG (ref 27–31)
MCHC RBC AUTO-ENTMCNC: 32 % (ref 32–36)
MCV RBC AUTO: 90 FL (ref 78–100)
MONOCYTES ABSOLUTE: 0.6 K/CU MM
MONOCYTES RELATIVE PERCENT: 12.4 % (ref 0–4)
PDW BLD-RTO: 14.1 % (ref 11.7–14.9)
PLATELET # BLD: 229 K/CU MM (ref 140–440)
PMV BLD AUTO: 9.7 FL (ref 7.5–11.1)
RBC # BLD: 4.1 M/CU MM (ref 4.2–5.4)
SEGMENTED NEUTROPHILS ABSOLUTE COUNT: 2.3 K/CU MM
SEGMENTED NEUTROPHILS RELATIVE PERCENT: 45.7 % (ref 36–66)
WBC # BLD: 5.1 K/CU MM (ref 4–10.5)

## 2021-11-08 PROCEDURE — 96367 TX/PROPH/DG ADDL SEQ IV INF: CPT

## 2021-11-08 PROCEDURE — 96377 APPLICATON ON-BODY INJECTOR: CPT

## 2021-11-08 PROCEDURE — 96411 CHEMO IV PUSH ADDL DRUG: CPT

## 2021-11-08 PROCEDURE — 85025 COMPLETE CBC W/AUTO DIFF WBC: CPT

## 2021-11-08 PROCEDURE — 96413 CHEMO IV INFUSION 1 HR: CPT

## 2021-11-08 PROCEDURE — 2580000003 HC RX 258: Performed by: INTERNAL MEDICINE

## 2021-11-08 PROCEDURE — 96375 TX/PRO/DX INJ NEW DRUG ADDON: CPT

## 2021-11-08 PROCEDURE — 6360000002 HC RX W HCPCS: Performed by: INTERNAL MEDICINE

## 2021-11-08 RX ORDER — HEPARIN SODIUM (PORCINE) LOCK FLUSH IV SOLN 100 UNIT/ML 100 UNIT/ML
500 SOLUTION INTRAVENOUS PRN
Status: DISCONTINUED | OUTPATIENT
Start: 2021-11-08 | End: 2021-11-09 | Stop reason: HOSPADM

## 2021-11-08 RX ORDER — SODIUM CHLORIDE 9 MG/ML
20 INJECTION, SOLUTION INTRAVENOUS ONCE
Status: DISCONTINUED | OUTPATIENT
Start: 2021-11-08 | End: 2021-11-09 | Stop reason: HOSPADM

## 2021-11-08 RX ORDER — PALONOSETRON 0.05 MG/ML
0.25 INJECTION, SOLUTION INTRAVENOUS ONCE
Status: COMPLETED | OUTPATIENT
Start: 2021-11-08 | End: 2021-11-08

## 2021-11-08 RX ORDER — DOXORUBICIN HYDROCHLORIDE 2 MG/ML
60 INJECTION, SOLUTION INTRAVENOUS ONCE
Status: COMPLETED | OUTPATIENT
Start: 2021-11-08 | End: 2021-11-08

## 2021-11-08 RX ADMIN — DEXAMETHASONE SODIUM PHOSPHATE 12 MG: 4 INJECTION, SOLUTION INTRAMUSCULAR; INTRAVENOUS at 11:04

## 2021-11-08 RX ADMIN — FOSAPREPITANT 150 MG: 150 INJECTION, POWDER, LYOPHILIZED, FOR SOLUTION INTRAVENOUS at 10:25

## 2021-11-08 RX ADMIN — DOXORUBICIN HYDROCHLORIDE 116 MG: 2 INJECTION, SOLUTION INTRAVENOUS at 11:26

## 2021-11-08 RX ADMIN — CYCLOPHOSPHAMIDE 1160 MG: 1 INJECTION, POWDER, FOR SOLUTION INTRAVENOUS; ORAL at 11:45

## 2021-11-08 RX ADMIN — SODIUM CHLORIDE 20 ML/HR: 9 INJECTION, SOLUTION INTRAVENOUS at 10:24

## 2021-11-08 RX ADMIN — PALONOSETRON 0.25 MG: 0.25 INJECTION, SOLUTION INTRAVENOUS at 10:21

## 2021-11-08 RX ADMIN — PEGFILGRASTIM 6 MG: KIT SUBCUTANEOUS at 12:22

## 2021-11-08 RX ADMIN — HEPARIN 500 UNITS: 100 SYRINGE at 12:23

## 2021-11-08 NOTE — PROGRESS NOTES
New patient ambulated to infusion area, here today for first day of chemotherapy. Chemo education completed 11/2. Right chest port assessed, slight bruising and redness noted at incision site. Slight discomfort noted while accessing port. Patient was placed on Bactrim BID x5 days by Dr. Melissa Stringer for redness and warmth over Mediport which was finished on 11/7. CBC drawn and is within defined limits. Chemo administered as ordered. Call light within reach. Adriamycin administered IVP in free-flowing normal saline. Blood return noted every 2-3mL during administration. Tolerated infusion without incident. Neulasta on-body education video reviewed with patient, verbally reinforced and answered all questions by patient. Neulasta placed on Right arm. Patient RTC 11/15 for OV with Teresa Francis. Discharge instructions provided. Status appropriately assessed and documented. All required labs and results reviewed. Treatment approved by provider. Treatment orders and medications verified by 2 Registered Nurses where applicable. Treatment plan was confirmed with patient prior to administration, and educated the need to report any treatment-related symptoms      Prior to administration, when applicable, the following 8 elements of medication administration were reviewed with 2nd Registered Nurse prior to dosing: drug name, drug dose, infusion volume when prepared in a syringe, rate of administration, expiration dates and/or times, appearance and integrity of drug(s), and rate of pump for infusion. The 5 rights of medication administration have been verified.

## 2021-11-09 ENCOUNTER — HOSPITAL ENCOUNTER (OUTPATIENT)
Dept: ULTRASOUND IMAGING | Age: 66
Discharge: HOME | End: 2021-11-09
Payer: MEDICARE

## 2021-11-09 DIAGNOSIS — E04.1 THYROID NODULE: ICD-10-CM

## 2021-11-09 PROCEDURE — 76536 US EXAM OF HEAD AND NECK: CPT

## 2021-11-10 ENCOUNTER — CLINICAL DOCUMENTATION (OUTPATIENT)
Dept: RADIATION ONCOLOGY | Age: 66
End: 2021-11-10

## 2021-11-10 DIAGNOSIS — R93.89 ABNORMAL CAT SCAN: Primary | ICD-10-CM

## 2021-11-10 NOTE — CARE COORDINATION
Pt referred to [facility] counselor Artist Lenora, [facility] for supportive counseling services.

## 2021-11-15 ENCOUNTER — HOSPITAL ENCOUNTER (OUTPATIENT)
Dept: LAB | Age: 66
Discharge: HOME | End: 2021-11-15
Payer: MEDICARE

## 2021-11-15 DIAGNOSIS — Z17.1 MALIGNANT NEOPLASM OF LEFT BREAST IN FEMALE, ESTROGEN RECEPTOR NEGATIVE, UNSPECIFIED SITE OF BREAST (HCC): Primary | ICD-10-CM

## 2021-11-15 DIAGNOSIS — C50.912 MALIGNANT NEOPLASM OF LEFT BREAST IN FEMALE, ESTROGEN RECEPTOR NEGATIVE, UNSPECIFIED SITE OF BREAST (HCC): Primary | ICD-10-CM

## 2021-11-15 PROCEDURE — U0005 INFEC AGEN DETEC AMPLI PROBE: HCPCS

## 2021-11-15 PROCEDURE — U0003 INFECTIOUS AGENT DETECTION BY NUCLEIC ACID (DNA OR RNA); SEVERE ACUTE RESPIRATORY SYNDROME CORONAVIRUS 2 (SARS-COV-2) (CORONAVIRUS DISEASE [COVID-19]), AMPLIFIED PROBE TECHNIQUE, MAKING USE OF HIGH THROUGHPUT TECHNOLOGIES AS DESCRIBED BY CMS-2020-01-R: HCPCS

## 2021-11-15 NOTE — PRE-PROCEDURE INSTRUCTIONS
Made contact with patient. Reviewed arrival time, department information. Patient has stopped anticoagulants. Patient may take meds. with sips of water. Patient verbalized understanding.

## 2021-11-16 ENCOUNTER — CLINICAL DOCUMENTATION (OUTPATIENT)
Dept: RADIATION ONCOLOGY | Age: 66
End: 2021-11-16

## 2021-11-16 LAB — SARS-COV-2: NOT DETECTED

## 2021-11-18 ENCOUNTER — HOSPITAL ENCOUNTER (OUTPATIENT)
Dept: INTERVENTIONAL RADIOLOGY/VASCULAR | Age: 66
Discharge: HOME | End: 2021-11-18
Payer: MEDICARE

## 2021-11-18 VITALS
RESPIRATION RATE: 18 BRPM | HEART RATE: 88 BPM | TEMPERATURE: 97 F | OXYGEN SATURATION: 99 % | DIASTOLIC BLOOD PRESSURE: 65 MMHG | SYSTOLIC BLOOD PRESSURE: 115 MMHG

## 2021-11-18 DIAGNOSIS — R93.89 ABNORMAL RADIOLOGICAL FINDINGS IN SKIN AND SUBCUTANEOUS TISSUE: ICD-10-CM

## 2021-11-18 LAB
APTT: 26.9 SECONDS (ref 25.1–37.1)
INR BLD: 0.95 INDEX
PROTHROMBIN TIME: 12.3 SECONDS (ref 11.7–14.5)

## 2021-11-18 PROCEDURE — 2580000003 HC RX 258: Performed by: RADIOLOGY

## 2021-11-18 PROCEDURE — 88305 TISSUE EXAM BY PATHOLOGIST: CPT

## 2021-11-18 PROCEDURE — 88172 CYTP DX EVAL FNA 1ST EA SITE: CPT

## 2021-11-18 PROCEDURE — 85730 THROMBOPLASTIN TIME PARTIAL: CPT

## 2021-11-18 PROCEDURE — 7100000010 HC PHASE II RECOVERY - FIRST 15 MIN

## 2021-11-18 PROCEDURE — 88173 CYTOPATH EVAL FNA REPORT: CPT

## 2021-11-18 PROCEDURE — 7100000011 HC PHASE II RECOVERY - ADDTL 15 MIN

## 2021-11-18 PROCEDURE — 60300 ASPIR/INJ THYROID CYST: CPT

## 2021-11-18 PROCEDURE — 76942 ECHO GUIDE FOR BIOPSY: CPT

## 2021-11-18 PROCEDURE — 2709999900 HC NON-CHARGEABLE SUPPLY

## 2021-11-18 PROCEDURE — 85610 PROTHROMBIN TIME: CPT

## 2021-11-18 PROCEDURE — 88177 CYTP FNA EVAL EA ADDL: CPT

## 2021-11-18 RX ORDER — SODIUM CHLORIDE 0.9 % (FLUSH) 0.9 %
10 SYRINGE (ML) INJECTION ONCE
Status: COMPLETED | OUTPATIENT
Start: 2021-11-18 | End: 2021-11-18

## 2021-11-18 RX ADMIN — Medication 10 ML: at 10:29

## 2021-11-18 ASSESSMENT — PAIN SCALES - GENERAL
PAINLEVEL_OUTOF10: 0
PAINLEVEL_OUTOF10: 0

## 2021-11-18 NOTE — PROGRESS NOTES
1320 Pt received from IR and report received. Band aid right neck puncture site dry and intact. Right neck puncture site soft and no hematoma. Pt declined beverage offer. Mother at bedside. Call light in reach. Ice pack given and put on left neck. Instructed pt 20 minutes on and 20 off at a time. 1345 Pt up to side of bed with assist. Pt tolerated well and ready to get dressed to go home. Call light in reach. Mother at bedside. Band aid right neck puncture site dry and intact. Right neck puncture site soft and no hematoma. 1400 Pt discharged to home ambulatory with mother. Pt declined wheelchair.

## 2021-11-18 NOTE — PROGRESS NOTES
PROCEDURE PERFORMED Thyroid biopsy    PRIMARY INDICATION FOR PROCEDURE: Nodules    INFORMED CONSENT:  Obtained prior to procedure. Consent placed in chart. MILLY SCORE PRE PROCEDURE:  10    PT TRANSPORTED FROM:  [facility] 23        TO THE IR ROOM:  Small    PT IN THE ROOM AT WHAT TIME: 7304    ASSESSMENT: Pt a/o,  expresses understanding of procedure. TIME OUT COMPLETE: 1302    BARRIER PRECAUTIONS & STERILE TECHNIQUE:               Pt positioned supine for comfort. Warm blankets offered. Pt placed on Cardiac Monitor. Pt prepped and draped in a sterile fashion with chlorhexadine.     PAIN/LOCAL ANESTHESIA/SEDATION MANAGEMENT:           Local: Lidocaine given by Dr Smith Junior:           ACCESS TIME: 1308          US/FLUORO:  10 US images  #25 aspiration needles used    STERILE DRESSINGS:  bandaid    SPECIMENS: 4 aspirations    EBL: <1 ml    FOLLOW- UP X-RAY:     STAFF PRESENT DURING PROCEDURE: Dr. Princess Urrutia, Jordan Melo, Cytology staff    MILLY SCORE POST PROCEDURE: 10    REPORT CALLED TO: [facility] nurse     PT LEFT ROOM AT WHAT TIME: 6870

## 2021-11-22 ENCOUNTER — HOSPITAL ENCOUNTER (OUTPATIENT)
Dept: INFUSION THERAPY | Age: 66
Discharge: HOME | End: 2021-11-22
Payer: MEDICARE

## 2021-11-22 VITALS
HEART RATE: 95 BPM | OXYGEN SATURATION: 95 % | SYSTOLIC BLOOD PRESSURE: 144 MMHG | WEIGHT: 191.2 LBS | HEIGHT: 63 IN | TEMPERATURE: 97.6 F | BODY MASS INDEX: 33.88 KG/M2 | DIASTOLIC BLOOD PRESSURE: 64 MMHG

## 2021-11-22 DIAGNOSIS — E04.1 THYROID NODULE: Primary | ICD-10-CM

## 2021-11-22 DIAGNOSIS — C50.912 MALIGNANT NEOPLASM OF LEFT BREAST IN FEMALE, ESTROGEN RECEPTOR NEGATIVE, UNSPECIFIED SITE OF BREAST (HCC): ICD-10-CM

## 2021-11-22 DIAGNOSIS — Z17.1 MALIGNANT NEOPLASM OF LEFT BREAST IN FEMALE, ESTROGEN RECEPTOR NEGATIVE, UNSPECIFIED SITE OF BREAST (HCC): ICD-10-CM

## 2021-11-22 LAB
ALBUMIN SERPL-MCNC: 4.2 GM/DL (ref 3.4–5)
ALP BLD-CCNC: 108 IU/L (ref 40–129)
ALT SERPL-CCNC: 18 U/L (ref 10–40)
ANION GAP SERPL CALCULATED.3IONS-SCNC: 12 MMOL/L (ref 4–16)
AST SERPL-CCNC: 18 IU/L (ref 15–37)
BASOPHILS ABSOLUTE: 0 K/CU MM
BASOPHILS RELATIVE PERCENT: 0.3 % (ref 0–1)
BILIRUB SERPL-MCNC: 0.1 MG/DL (ref 0–1)
BUN BLDV-MCNC: 9 MG/DL (ref 6–23)
CALCIUM SERPL-MCNC: 9 MG/DL (ref 8.3–10.6)
CHLORIDE BLD-SCNC: 104 MMOL/L (ref 99–110)
CO2: 23 MMOL/L (ref 21–32)
CREAT SERPL-MCNC: 0.7 MG/DL (ref 0.6–1.1)
DIFFERENTIAL TYPE: ABNORMAL
EOSINOPHILS ABSOLUTE: 0 K/CU MM
EOSINOPHILS RELATIVE PERCENT: 0.5 % (ref 0–3)
GFR AFRICAN AMERICAN: >60 ML/MIN/1.73M2
GFR NON-AFRICAN AMERICAN: >60 ML/MIN/1.73M2
GLUCOSE BLD-MCNC: 153 MG/DL (ref 70–99)
HCT VFR BLD CALC: 31.7 % (ref 37–47)
HEMOGLOBIN: 10 GM/DL (ref 12.5–16)
LYMPHOCYTES ABSOLUTE: 1.6 K/CU MM
LYMPHOCYTES RELATIVE PERCENT: 25.2 % (ref 24–44)
MCH RBC QN AUTO: 28.7 PG (ref 27–31)
MCHC RBC AUTO-ENTMCNC: 31.5 % (ref 32–36)
MCV RBC AUTO: 90.8 FL (ref 78–100)
MONOCYTES ABSOLUTE: 0.7 K/CU MM
MONOCYTES RELATIVE PERCENT: 10.9 % (ref 0–4)
PDW BLD-RTO: 14.1 % (ref 11.7–14.9)
PLATELET # BLD: 219 K/CU MM (ref 140–440)
PMV BLD AUTO: 9.3 FL (ref 7.5–11.1)
POTASSIUM SERPL-SCNC: 4.5 MMOL/L (ref 3.5–5.1)
RBC # BLD: 3.49 M/CU MM (ref 4.2–5.4)
SEGMENTED NEUTROPHILS ABSOLUTE COUNT: 4 K/CU MM
SEGMENTED NEUTROPHILS RELATIVE PERCENT: 63.1 % (ref 36–66)
SODIUM BLD-SCNC: 139 MMOL/L (ref 135–145)
TOTAL PROTEIN: 7 GM/DL (ref 6.4–8.2)
WBC # BLD: 6.4 K/CU MM (ref 4–10.5)

## 2021-11-22 PROCEDURE — 2580000003 HC RX 258: Performed by: INTERNAL MEDICINE

## 2021-11-22 PROCEDURE — 96411 CHEMO IV PUSH ADDL DRUG: CPT

## 2021-11-22 PROCEDURE — 96367 TX/PROPH/DG ADDL SEQ IV INF: CPT

## 2021-11-22 PROCEDURE — 96413 CHEMO IV INFUSION 1 HR: CPT

## 2021-11-22 PROCEDURE — 85025 COMPLETE CBC W/AUTO DIFF WBC: CPT

## 2021-11-22 PROCEDURE — 96375 TX/PRO/DX INJ NEW DRUG ADDON: CPT

## 2021-11-22 PROCEDURE — 80053 COMPREHEN METABOLIC PANEL: CPT

## 2021-11-22 PROCEDURE — 6360000002 HC RX W HCPCS: Performed by: INTERNAL MEDICINE

## 2021-11-22 PROCEDURE — 96377 APPLICATON ON-BODY INJECTOR: CPT

## 2021-11-22 PROCEDURE — 96409 CHEMO IV PUSH SNGL DRUG: CPT

## 2021-11-22 RX ORDER — SODIUM CHLORIDE 9 MG/ML
25 INJECTION, SOLUTION INTRAVENOUS PRN
Status: CANCELLED | OUTPATIENT
Start: 2021-12-06

## 2021-11-22 RX ORDER — DIPHENHYDRAMINE HYDROCHLORIDE 50 MG/ML
50 INJECTION INTRAMUSCULAR; INTRAVENOUS ONCE
Status: CANCELLED | OUTPATIENT
Start: 2021-11-22 | End: 2021-11-22

## 2021-11-22 RX ORDER — SODIUM CHLORIDE 9 MG/ML
20 INJECTION, SOLUTION INTRAVENOUS ONCE
Status: CANCELLED | OUTPATIENT
Start: 2021-12-06 | End: 2021-12-06

## 2021-11-22 RX ORDER — HEPARIN SODIUM (PORCINE) LOCK FLUSH IV SOLN 100 UNIT/ML 100 UNIT/ML
500 SOLUTION INTRAVENOUS PRN
Status: CANCELLED | OUTPATIENT
Start: 2021-11-22

## 2021-11-22 RX ORDER — SODIUM CHLORIDE 9 MG/ML
20 INJECTION, SOLUTION INTRAVENOUS ONCE
Status: DISCONTINUED | OUTPATIENT
Start: 2021-11-22 | End: 2021-11-23 | Stop reason: HOSPADM

## 2021-11-22 RX ORDER — SODIUM CHLORIDE 9 MG/ML
INJECTION, SOLUTION INTRAVENOUS CONTINUOUS
Status: CANCELLED | OUTPATIENT
Start: 2021-11-22

## 2021-11-22 RX ORDER — METHYLPREDNISOLONE SODIUM SUCCINATE 125 MG/2ML
125 INJECTION, POWDER, LYOPHILIZED, FOR SOLUTION INTRAMUSCULAR; INTRAVENOUS ONCE
Status: CANCELLED | OUTPATIENT
Start: 2021-11-22 | End: 2021-11-22

## 2021-11-22 RX ORDER — PALONOSETRON 0.05 MG/ML
0.25 INJECTION, SOLUTION INTRAVENOUS ONCE
Status: COMPLETED | OUTPATIENT
Start: 2021-11-22 | End: 2021-11-22

## 2021-11-22 RX ORDER — SODIUM CHLORIDE 0.9 % (FLUSH) 0.9 %
5-40 SYRINGE (ML) INJECTION PRN
Status: CANCELLED | OUTPATIENT
Start: 2021-12-06

## 2021-11-22 RX ORDER — SODIUM CHLORIDE 0.9 % (FLUSH) 0.9 %
5-40 SYRINGE (ML) INJECTION PRN
Status: CANCELLED | OUTPATIENT
Start: 2021-11-22

## 2021-11-22 RX ORDER — SODIUM CHLORIDE 9 MG/ML
20 INJECTION, SOLUTION INTRAVENOUS ONCE
Status: CANCELLED | OUTPATIENT
Start: 2021-11-22 | End: 2021-11-22

## 2021-11-22 RX ORDER — SODIUM CHLORIDE 9 MG/ML
INJECTION, SOLUTION INTRAVENOUS CONTINUOUS
Status: CANCELLED | OUTPATIENT
Start: 2021-12-06

## 2021-11-22 RX ORDER — DOXORUBICIN HYDROCHLORIDE 2 MG/ML
60 INJECTION, SOLUTION INTRAVENOUS ONCE
Status: COMPLETED | OUTPATIENT
Start: 2021-11-22 | End: 2021-11-22

## 2021-11-22 RX ORDER — DOXORUBICIN HYDROCHLORIDE 2 MG/ML
60 INJECTION, SOLUTION INTRAVENOUS ONCE
Status: CANCELLED | OUTPATIENT
Start: 2021-11-22

## 2021-11-22 RX ORDER — HEPARIN SODIUM (PORCINE) LOCK FLUSH IV SOLN 100 UNIT/ML 100 UNIT/ML
500 SOLUTION INTRAVENOUS PRN
Status: CANCELLED | OUTPATIENT
Start: 2021-12-06

## 2021-11-22 RX ORDER — DOXORUBICIN HYDROCHLORIDE 2 MG/ML
60 INJECTION, SOLUTION INTRAVENOUS ONCE
Status: CANCELLED | OUTPATIENT
Start: 2021-12-06

## 2021-11-22 RX ORDER — SODIUM CHLORIDE 9 MG/ML
25 INJECTION, SOLUTION INTRAVENOUS PRN
Status: CANCELLED | OUTPATIENT
Start: 2021-11-22

## 2021-11-22 RX ORDER — PALONOSETRON 0.05 MG/ML
0.25 INJECTION, SOLUTION INTRAVENOUS ONCE
Status: CANCELLED | OUTPATIENT
Start: 2021-11-22

## 2021-11-22 RX ORDER — EPINEPHRINE 1 MG/ML
0.3 INJECTION, SOLUTION, CONCENTRATE INTRAVENOUS PRN
Status: CANCELLED | OUTPATIENT
Start: 2021-11-22

## 2021-11-22 RX ORDER — METHYLPREDNISOLONE SODIUM SUCCINATE 125 MG/2ML
125 INJECTION, POWDER, LYOPHILIZED, FOR SOLUTION INTRAMUSCULAR; INTRAVENOUS ONCE
Status: CANCELLED | OUTPATIENT
Start: 2021-12-06 | End: 2021-12-06

## 2021-11-22 RX ORDER — HEPARIN SODIUM (PORCINE) LOCK FLUSH IV SOLN 100 UNIT/ML 100 UNIT/ML
500 SOLUTION INTRAVENOUS PRN
Status: DISCONTINUED | OUTPATIENT
Start: 2021-11-22 | End: 2021-11-23 | Stop reason: HOSPADM

## 2021-11-22 RX ORDER — OLANZAPINE 5 MG/1
TABLET ORAL
Qty: 16 TABLET | Refills: 0 | Status: SHIPPED | OUTPATIENT
Start: 2021-11-22 | End: 2022-01-03

## 2021-11-22 RX ORDER — PALONOSETRON 0.05 MG/ML
0.25 INJECTION, SOLUTION INTRAVENOUS ONCE
Status: CANCELLED | OUTPATIENT
Start: 2021-12-06

## 2021-11-22 RX ORDER — DIPHENHYDRAMINE HYDROCHLORIDE 50 MG/ML
50 INJECTION INTRAMUSCULAR; INTRAVENOUS ONCE
Status: CANCELLED | OUTPATIENT
Start: 2021-12-06 | End: 2021-12-06

## 2021-11-22 RX ORDER — EPINEPHRINE 1 MG/ML
0.3 INJECTION, SOLUTION, CONCENTRATE INTRAVENOUS PRN
Status: CANCELLED | OUTPATIENT
Start: 2021-12-06

## 2021-11-22 RX ADMIN — SODIUM CHLORIDE 20 ML/HR: 9 INJECTION, SOLUTION INTRAVENOUS at 09:50

## 2021-11-22 RX ADMIN — DEXAMETHASONE SODIUM PHOSPHATE 12 MG: 4 INJECTION, SOLUTION INTRAMUSCULAR; INTRAVENOUS at 10:46

## 2021-11-22 RX ADMIN — DOXORUBICIN HYDROCHLORIDE 116 MG: 2 INJECTION, SOLUTION INTRAVENOUS at 11:11

## 2021-11-22 RX ADMIN — PEGFILGRASTIM 6 MG: KIT SUBCUTANEOUS at 12:04

## 2021-11-22 RX ADMIN — FOSAPREPITANT 150 MG: 150 INJECTION, POWDER, LYOPHILIZED, FOR SOLUTION INTRAVENOUS at 09:59

## 2021-11-22 RX ADMIN — CYCLOPHOSPHAMIDE 1160 MG: 1 INJECTION, POWDER, FOR SOLUTION INTRAVENOUS; ORAL at 11:25

## 2021-11-22 RX ADMIN — PALONOSETRON 0.25 MG: 0.25 INJECTION, SOLUTION INTRAVENOUS at 09:50

## 2021-11-22 RX ADMIN — HEPARIN 500 UNITS: 100 SYRINGE at 12:01

## 2021-11-22 NOTE — PROGRESS NOTES
Ambulated to infusion area, here today for chemotherapy. No concerns at this time. Right chest mediport accessed, positive blood return noted. Labs drawn. Labs within defined limits. Chemo administered as ordered. Adriacymin administered via freeflowing IV, checking blood return pre, during every 2-3mL, and post administration Call light within reach. Tolerated infusion without incident. Neulasta onbody placed on R arm. Discharge instructions provided. Patient RTC 12/1 for office visit with Dr. Dai Holliday. Prior to administration, when applicable, the following 8 elements of medication administration were reviewed with 2nd Registered Nurse prior to dosing: drug name, drug dose, infusion volume when prepared in a syringe, rate of administration, expiration dates and/or times, appearance and integrity of drug(s), and rate of pump for infusion. The 5 rights of medication administration have been verified. Prior to administration, when applicable, the following 8 elements of medication administration were reviewed with 2nd Registered Nurse prior to dosing: drug name, drug dose, infusion volume when prepared in a syringe, rate of administration, expiration dates and/or times, appearance and integrity of drug(s), and rate of pump for infusion. The 5 rights of medication administration have been verified.

## 2021-11-23 ENCOUNTER — CLINICAL DOCUMENTATION (OUTPATIENT)
Dept: CASE MANAGEMENT | Age: 66
End: 2021-11-23

## 2021-11-23 NOTE — PROGRESS NOTES
Patient called in stating she took her Neulasta cartridge off and it had not infused yet - stated still flashing green and saying full. Patient instructed she took it off too early and that it infuses 27 hours after placement. Patient states she has put the cartridge into a plastic bag to bring in - scheduled for tomorrow, 11/24/21 at 0915 for Neulasta injection.

## 2021-11-24 ENCOUNTER — HOSPITAL ENCOUNTER (OUTPATIENT)
Dept: INFUSION THERAPY | Age: 66
Discharge: HOME | End: 2021-11-24
Payer: MEDICARE

## 2021-11-24 PROCEDURE — 6360000002 HC RX W HCPCS: Performed by: INTERNAL MEDICINE

## 2021-11-24 PROCEDURE — 96372 THER/PROPH/DIAG INJ SC/IM: CPT

## 2021-11-24 RX ADMIN — PEGFILGRASTIM 6 MG: 6 INJECTION SUBCUTANEOUS at 09:52

## 2021-12-01 NOTE — PROGRESS NOTES
Patient Name: Fredrick Varghese  Patient : 1955  Patient MRN: [id number]     Primary Oncologist: Magalis Arreola MD  Referring Provider: Jb Black MD     Date of Service: 2021        Chief Complaint:   Chief Complaint   Patient presents with    Follow-up     She came in for follow-up visit. Patient Active Problem List:     Personal history of infectious disease     Other specified disorders of white blood cells     Neutropenia     History of hepatitis C     Allergic rhinitis     Left hip pain     G6PD deficiency     Mild persistent asthma without complication     Vertigo     HPI:   Etienne Drake is a pleasant 41-year-old -American female patient who was referred for evaluation of mild neutropenia. She was diagnosed with hepatitis C in . Ex spouse was IV drug user. She was treated with Harvoni for 12 weeks and completed in May 2019. I reviewed her blood test records. On May 15, 2019 WBC was 4.1, RBC 4.36, hemoglobin 13.9, hematocrit 42.3, MCV 97, platelet 549, absolute neutrophils 1.1, absolute lymphocytes 2.6. She denies any history of frequent infection. Ultrasound of abdomen in 2018 showed normal right upper quadrant ultrasound. Liver biopsy on 2018 showed chronic hepatitis grade 2-3, stage II. Mammogram in 2019 is negative. US of abdomen in 2019 showed unremarkable study. Labs in 2019 were reviewed. She has nonspecific elevated total protein. CBC is unremarkable. Labs in 2019 were reviewed. Total protein is normal. Leukopenia is stable. She denied frequent infection. In 2020 she had acute viral hepatitis serology which came back positive for hepatitis C antibody. Serum AFP was 8. Hepatitis C RNA by PCR was negative. Hepatitis C RNA genotype was indeterminate. Mammogram in 2020 was benign. [facility]. . WBC was 7.5.  Hemoglobin was 13.2, hemoglobin 13.2, platelet 224. CMP was grossly unremarkable with normal AST at 19, ALT 25. Alk phos was 92. Total bilirubin was 0.6. Colonoscopy in December 2020 by Dr. Kayla Negron showed diverticulosis and hemorrhoids. She was told that she has kidney stone. She has flu shot in 2020 and COVID-19 vaccine on January 3, 2021. Mammogram in July 2020 was benign. In June 2021 WBC was 5.2, hemoglobin 12.3, platelet 313. On September 20, 2021 she was referred for left breast mass. Bone density on September 13, 2021 showed osteopenia. Mammogram on September 15, 2021 showed : There is a new 1.5 cm mass identified in the left breast at 12 o'clock position, at posterior depth, about 12 cm from the left nipple.  This is best seen on left CC michaela slice 50 and left MLO michaela slice 21.   Left breast ultrasound 11 o'clock, 11 cm from the nipple: On ultrasound evaluation, there is a 1.1 x 1.3 x 0.5 cm oval, parallel hypoechoic mass, with microlobulated margins without any posterior acoustic shadowing or internal vascularity.  This mass corresponds to the mammographic finding.   Left axilla: Couple enlarged lymph nodes identified in the left axilla with cortical thickness of 8-9 mm.   No new suspicious masses or microcalcifications are identified in the right breast.    In June 2021 WBC was 5.2, hemoglobin 12.3, platelet 064. CMP was grossly unremarkable. Path report of left breast biopsy on 9/27/2021:  A.  Breast, left at 11 o'clock, ultrasound guided needle core biopsy:   -  INVASIVE DUCTAL CARCINOMA WITH A MARKED CHRONIC INFLAMMATORY REACTION  - Blakeyndolyn Yolette FISH is negative  B.  Lymph node, left axilla, ultrasound guided needle core biopsy:   -  METASTATIC HIGH GRADE CARCINOMA IS IDENTIFIED (see comment). BREAST INVASIVE CARCINOMA SUMMARY - CORE BIOPSY   Procedure: Ultrasound guided needle core biopsy. Laterality and tumor site: Left breast at 11 o'clock. Tumor size: 7 mm in greatest dimension within biopsy material present.    Histologic type: Invasive ductal carcinoma with marked chronic inflammatory reaction. Histologic Grade (Bypro): Grade 3        -Tubular score 3, Nuclear score 3, Mitosis score 2 (16/10 hpf)   Ductal Carcinoma In Situ (DCIS): Not present   Lobular Carcinoma In Situ (LCIS): Not present. Lympho-vascular invasion: Not identified. Microcalcifications: Not identified. Additional Findings: Marked chronic inflammatory reaction. Ancillary studies: ER/PgR/Her2 results from the current   biopsy are as follows:   -ER by [facility]*: Negative (0% staining). -PgR by IHC*: Negative (0% staining)   -Her2 by IHC*: Negative (Score 0)   -Her2 by FISH*:  negative  -Avg. #HER2 signals/nucleus: , Avg. #CEP17 signals/nucleus: , HER2/CEP17 ratio:     She was seen by Dr Cherise Pinzon. I discussed about neoadjuvant chemo, AC + TC. Potential AE including hair loss, increased risk of infection, secondary malignancy and etc was discussed with her. She has left hip pain s/p hip replacement in August 2021. Bone scan on 2021-10-13 showed  Focal activity within the midthoracic and lower lumbar spine, typically degenerative. Moderate activity is seen about the left hip arthroplasty.  If surgery has not been recent, loosening or infection could be considered.    Multifocal degenerative changes are otherwise favored as outlined above. CT CAP on 10/15/2021:  1. Left breast mass with level 1 left axillary lymphadenopathy. 2. Four small pulmonary nodules measuring up to 3 mm in the left lung favoring a benign etiology.  Attention at follow-up imaging recommended. 3. Otherwise no metastatic disease in the chest, abdomen or pelvis. 4. Incidental left thyroid lobe 18 mm nodule.  Thyroid ultrasound can be considered. ECHO in October 2021 showed LVEF at 50 to 55%. She will have chemo education on October 26, 2021. She will have Mediport placement on November 1, 2021 at [facility].  Hopefully she can start the chemo on November 2, 2021.   No change on her breast exam today. On December 6, 2021 she came in for follow-up visit. She started chemotherapy on November 8, 2021. Apart from fatigue she has been tolerating the chemotherapy well. Denied any grade 3 toxicity related to chemo. Cytology of FNA of thyroid nodule was reviewed. I referred to endocrinologist.  She is agreeable to continue with the test third cycle of the chemo today. I will schedule MRI of both breasts after completion of the fourth cycle of the [facility] to see the response. Depending on the report we will decide about potential additional carboplatin to Taxol. She denied any nausea, vomiting or diarrhea. No fever or chills. No shortness of breath or palpitation. No headache or dizzy spell. No melena or hematochezia. Denied any dysuria or hematuria. Past Medical History  Asthma, neutropenia, hepatitis C, G6PD, thyroid disease. Surgical History  Partial hysterectomy in 2002 related to tubal pregnancy. Tonsillectomy. She had colonoscopy x2 and the last one was in 2013. She had left hip replacement    Social History  Smoking Status Never smoker  She denies any illicit drug use. She drinks alcohol occasionally. She has 2 children. Family History  Maternal great aunt had breast cancer. Review of Systems: \"Per interval history; otherwise 10 point ROS is negative. \"     Vital Signs: There were no vitals taken for this visit. Physical Exam:  CONSTITUTIONAL: awake, alert, cooperative, no apparent distress   EYES: pupils equal, round and reactive to light, sclera clear and conjunctiva normal  ENT: Normocephalic, without obvious abnormality, atraumatic  NECK: supple, symmetrical, no jugular venous distension and no carotid bruits   HEMATOLOGIC/LYMPHATIC: no cervical, supraclavicular or axillary lymphadenopathy   LUNGS: no increased work of breathing and clear to auscultation  Port to the right anterior chest wall noted. BREAST: s/p left breast biopsy.   No palpable lump to the right breast.  CARDIOVASCULAR: regular rate and rhythm, normal S1 and S2, no murmur   ABDOMEN: normal bowel sound, soft, non-distended, non-tender, no masses palpated, no hepatosplenomegaly   MUSCULOSKELETAL: full range of motion noted, tone is normal  NEUROLOGIC: awake, alert, oriented to name, place and time. Motor and sensory grossly intact. CN II through XII grossly intact. SKIN: Normal skin color, texture, turgor and no jaundice. appears intact   EXTREMITIES: no LE edema. No cyanosis. Labs:  Hematology:  Lab Results   Component Value Date    WBC 6.4 11/22/2021    RBC 3.49 (L) 11/22/2021    HGB 10.0 (L) 11/22/2021    HCT 31.7 (L) 11/22/2021    MCV 90.8 11/22/2021    MCH 28.7 11/22/2021    MCHC 31.5 (L) 11/22/2021    RDW 14.1 11/22/2021     11/22/2021    MPV 9.3 11/22/2021    SEGSPCT 63.1 11/22/2021    EOSRELPCT 0.5 11/22/2021    BASOPCT 0.3 11/22/2021    LYMPHOPCT 25.2 11/22/2021    MONOPCT 10.9 (H) 11/22/2021    SEGSABS 4.0 11/22/2021    EOSABS 0.0 11/22/2021    BASOSABS 0.0 11/22/2021    LYMPHSABS 1.6 11/22/2021    MONOSABS 0.7 11/22/2021    DIFFTYPE AUTOMATED DIFFERENTIAL 11/22/2021     Chemistry:  Lab Results   Component Value Date     11/22/2021    K 4.5 11/22/2021     11/22/2021    CO2 23 11/22/2021    BUN 9 11/22/2021    CREATININE 0.7 11/22/2021    GLUCOSE 153 (H) 11/22/2021    CALCIUM 9.0 11/22/2021    PROT 7.0 11/22/2021    LABALBU 4.2 11/22/2021    BILITOT 0.1 11/22/2021    ALKPHOS 108 11/22/2021    AST 18 11/22/2021    ALT 18 11/22/2021    LABGLOM >60 11/22/2021    GFRAA >60 11/22/2021    AGRATIO 1.4 06/09/2021    GLOB 3.4 06/09/2021     Lab Results   Component Value Date    TSHHS 1.182 05/13/2013     Immunology:  Lab Results   Component Value Date    PROT 7.0 11/22/2021     Coagulation Panel:  Lab Results   Component Value Date    PROTIME 12.3 11/18/2021    INR 0.95 11/18/2021    APTT 26.9 11/18/2021     Observations:  No data recorded        Assessment & Plan:    1.  She was sooner. All of her questions have been answered for today. Recent imaging and labs were reviewed and discussed with the patient.

## 2021-12-06 ENCOUNTER — OFFICE VISIT (OUTPATIENT)
Dept: ONCOLOGY | Age: 66
End: 2021-12-06
Payer: MEDICARE

## 2021-12-06 ENCOUNTER — HOSPITAL ENCOUNTER (OUTPATIENT)
Dept: INFUSION THERAPY | Age: 66
Discharge: HOME | End: 2021-12-06
Payer: MEDICARE

## 2021-12-06 VITALS
WEIGHT: 189.6 LBS | TEMPERATURE: 96.8 F | OXYGEN SATURATION: 98 % | RESPIRATION RATE: 18 BRPM | SYSTOLIC BLOOD PRESSURE: 139 MMHG | DIASTOLIC BLOOD PRESSURE: 68 MMHG | HEART RATE: 110 BPM | HEIGHT: 63 IN | BODY MASS INDEX: 33.59 KG/M2

## 2021-12-06 DIAGNOSIS — C50.912 MALIGNANT NEOPLASM OF LEFT BREAST IN FEMALE, ESTROGEN RECEPTOR NEGATIVE, UNSPECIFIED SITE OF BREAST (HCC): ICD-10-CM

## 2021-12-06 DIAGNOSIS — E04.1 THYROID NODULE: Primary | ICD-10-CM

## 2021-12-06 DIAGNOSIS — Z17.1 MALIGNANT NEOPLASM OF LEFT BREAST IN FEMALE, ESTROGEN RECEPTOR NEGATIVE, UNSPECIFIED SITE OF BREAST (HCC): ICD-10-CM

## 2021-12-06 LAB
ALBUMIN SERPL-MCNC: 4.3 GM/DL (ref 3.4–5)
ALP BLD-CCNC: 123 IU/L (ref 40–128)
ALT SERPL-CCNC: 15 U/L (ref 10–40)
ANION GAP SERPL CALCULATED.3IONS-SCNC: 11 MMOL/L (ref 4–16)
AST SERPL-CCNC: 16 IU/L (ref 15–37)
BASOPHILS ABSOLUTE: 0 K/CU MM
BASOPHILS RELATIVE PERCENT: 0.3 % (ref 0–1)
BILIRUB SERPL-MCNC: 0.2 MG/DL (ref 0–1)
BUN BLDV-MCNC: 7 MG/DL (ref 6–23)
CALCIUM SERPL-MCNC: 9.2 MG/DL (ref 8.3–10.6)
CHLORIDE BLD-SCNC: 106 MMOL/L (ref 99–110)
CO2: 27 MMOL/L (ref 21–32)
CREAT SERPL-MCNC: 0.6 MG/DL (ref 0.6–1.1)
DIFFERENTIAL TYPE: ABNORMAL
EOSINOPHILS ABSOLUTE: 0.1 K/CU MM
EOSINOPHILS RELATIVE PERCENT: 1.2 % (ref 0–3)
GFR AFRICAN AMERICAN: >60 ML/MIN/1.73M2
GFR NON-AFRICAN AMERICAN: >60 ML/MIN/1.73M2
GLUCOSE BLD-MCNC: 117 MG/DL (ref 70–99)
HCT VFR BLD CALC: 29.9 % (ref 37–47)
HEMOGLOBIN: 9.6 GM/DL (ref 12.5–16)
LYMPHOCYTES ABSOLUTE: 1.6 K/CU MM
LYMPHOCYTES RELATIVE PERCENT: 15.1 % (ref 24–44)
MCH RBC QN AUTO: 28.7 PG (ref 27–31)
MCHC RBC AUTO-ENTMCNC: 32.1 % (ref 32–36)
MCV RBC AUTO: 89.3 FL (ref 78–100)
MONOCYTES ABSOLUTE: 1.4 K/CU MM
MONOCYTES RELATIVE PERCENT: 13.8 % (ref 0–4)
PDW BLD-RTO: 15.5 % (ref 11.7–14.9)
PLATELET # BLD: 162 K/CU MM (ref 140–440)
PMV BLD AUTO: 9.2 FL (ref 7.5–11.1)
POTASSIUM SERPL-SCNC: 4.1 MMOL/L (ref 3.5–5.1)
RBC # BLD: 3.35 M/CU MM (ref 4.2–5.4)
SEGMENTED NEUTROPHILS ABSOLUTE COUNT: 7.2 K/CU MM
SEGMENTED NEUTROPHILS RELATIVE PERCENT: 69.6 % (ref 36–66)
SODIUM BLD-SCNC: 144 MMOL/L (ref 135–145)
TOTAL PROTEIN: 7 GM/DL (ref 6.4–8.2)
WBC # BLD: 10.4 K/CU MM (ref 4–10.5)

## 2021-12-06 PROCEDURE — 2580000003 HC RX 258: Performed by: INTERNAL MEDICINE

## 2021-12-06 PROCEDURE — 6360000002 HC RX W HCPCS: Performed by: INTERNAL MEDICINE

## 2021-12-06 PROCEDURE — 96411 CHEMO IV PUSH ADDL DRUG: CPT

## 2021-12-06 PROCEDURE — 96417 CHEMO IV INFUS EACH ADDL SEQ: CPT

## 2021-12-06 PROCEDURE — 80053 COMPREHEN METABOLIC PANEL: CPT

## 2021-12-06 PROCEDURE — 96375 TX/PRO/DX INJ NEW DRUG ADDON: CPT

## 2021-12-06 PROCEDURE — 96413 CHEMO IV INFUSION 1 HR: CPT

## 2021-12-06 PROCEDURE — 96374 THER/PROPH/DIAG INJ IV PUSH: CPT

## 2021-12-06 PROCEDURE — 85025 COMPLETE CBC W/AUTO DIFF WBC: CPT

## 2021-12-06 PROCEDURE — 96409 CHEMO IV PUSH SNGL DRUG: CPT

## 2021-12-06 PROCEDURE — 96377 APPLICATON ON-BODY INJECTOR: CPT

## 2021-12-06 PROCEDURE — 99214 OFFICE O/P EST MOD 30 MIN: CPT | Performed by: INTERNAL MEDICINE

## 2021-12-06 PROCEDURE — 96367 TX/PROPH/DG ADDL SEQ IV INF: CPT

## 2021-12-06 RX ORDER — DOXORUBICIN HYDROCHLORIDE 2 MG/ML
60 INJECTION, SOLUTION INTRAVENOUS ONCE
Status: COMPLETED | OUTPATIENT
Start: 2021-12-06 | End: 2021-12-06

## 2021-12-06 RX ORDER — SODIUM CHLORIDE 0.9 % (FLUSH) 0.9 %
5-40 SYRINGE (ML) INJECTION PRN
Status: CANCELLED | OUTPATIENT
Start: 2022-01-03

## 2021-12-06 RX ORDER — DOXORUBICIN HYDROCHLORIDE 2 MG/ML
60 INJECTION, SOLUTION INTRAVENOUS ONCE
Status: CANCELLED | OUTPATIENT
Start: 2021-12-20

## 2021-12-06 RX ORDER — EPINEPHRINE 1 MG/ML
0.3 INJECTION, SOLUTION, CONCENTRATE INTRAVENOUS PRN
Status: CANCELLED | OUTPATIENT
Start: 2022-01-03

## 2021-12-06 RX ORDER — SODIUM CHLORIDE 0.9 % (FLUSH) 0.9 %
5-40 SYRINGE (ML) INJECTION PRN
Status: DISCONTINUED | OUTPATIENT
Start: 2021-12-06 | End: 2021-12-07 | Stop reason: HOSPADM

## 2021-12-06 RX ORDER — SODIUM CHLORIDE 9 MG/ML
INJECTION, SOLUTION INTRAVENOUS CONTINUOUS
Status: CANCELLED | OUTPATIENT
Start: 2022-01-03

## 2021-12-06 RX ORDER — DIPHENHYDRAMINE HYDROCHLORIDE 50 MG/ML
50 INJECTION INTRAMUSCULAR; INTRAVENOUS ONCE
Status: CANCELLED | OUTPATIENT
Start: 2022-01-03 | End: 2022-01-03

## 2021-12-06 RX ORDER — SODIUM CHLORIDE 9 MG/ML
20 INJECTION, SOLUTION INTRAVENOUS ONCE
Status: CANCELLED | OUTPATIENT
Start: 2022-01-03 | End: 2022-01-03

## 2021-12-06 RX ORDER — SODIUM CHLORIDE 9 MG/ML
INJECTION, SOLUTION INTRAVENOUS CONTINUOUS
Status: CANCELLED | OUTPATIENT
Start: 2021-12-20

## 2021-12-06 RX ORDER — PALONOSETRON 0.05 MG/ML
0.25 INJECTION, SOLUTION INTRAVENOUS ONCE
Status: CANCELLED | OUTPATIENT
Start: 2021-12-20

## 2021-12-06 RX ORDER — DIPHENHYDRAMINE HYDROCHLORIDE 50 MG/ML
50 INJECTION INTRAMUSCULAR; INTRAVENOUS ONCE
Status: CANCELLED | OUTPATIENT
Start: 2022-01-03

## 2021-12-06 RX ORDER — SODIUM CHLORIDE 9 MG/ML
25 INJECTION, SOLUTION INTRAVENOUS PRN
Status: CANCELLED | OUTPATIENT
Start: 2021-12-20

## 2021-12-06 RX ORDER — HEPARIN SODIUM (PORCINE) LOCK FLUSH IV SOLN 100 UNIT/ML 100 UNIT/ML
500 SOLUTION INTRAVENOUS PRN
Status: CANCELLED | OUTPATIENT
Start: 2022-01-03

## 2021-12-06 RX ORDER — DIPHENHYDRAMINE HYDROCHLORIDE 50 MG/ML
50 INJECTION INTRAMUSCULAR; INTRAVENOUS ONCE
Status: CANCELLED | OUTPATIENT
Start: 2021-12-20 | End: 2021-12-20

## 2021-12-06 RX ORDER — PALONOSETRON 0.05 MG/ML
0.25 INJECTION, SOLUTION INTRAVENOUS ONCE
Status: COMPLETED | OUTPATIENT
Start: 2021-12-06 | End: 2021-12-06

## 2021-12-06 RX ORDER — MEPERIDINE HYDROCHLORIDE 50 MG/ML
12.5 INJECTION INTRAMUSCULAR; INTRAVENOUS; SUBCUTANEOUS ONCE
Status: CANCELLED | OUTPATIENT
Start: 2022-01-03 | End: 2022-01-03

## 2021-12-06 RX ORDER — SODIUM CHLORIDE 9 MG/ML
20 INJECTION, SOLUTION INTRAVENOUS ONCE
Status: CANCELLED | OUTPATIENT
Start: 2021-12-20 | End: 2021-12-20

## 2021-12-06 RX ORDER — SODIUM CHLORIDE 9 MG/ML
20 INJECTION, SOLUTION INTRAVENOUS ONCE
Status: DISCONTINUED | OUTPATIENT
Start: 2021-12-06 | End: 2021-12-07 | Stop reason: HOSPADM

## 2021-12-06 RX ORDER — ONDANSETRON 2 MG/ML
8 INJECTION INTRAMUSCULAR; INTRAVENOUS ONCE
Status: CANCELLED | OUTPATIENT
Start: 2022-01-03

## 2021-12-06 RX ORDER — HEPARIN SODIUM (PORCINE) LOCK FLUSH IV SOLN 100 UNIT/ML 100 UNIT/ML
500 SOLUTION INTRAVENOUS PRN
Status: CANCELLED | OUTPATIENT
Start: 2021-12-20

## 2021-12-06 RX ORDER — HEPARIN SODIUM (PORCINE) LOCK FLUSH IV SOLN 100 UNIT/ML 100 UNIT/ML
500 SOLUTION INTRAVENOUS PRN
Status: DISCONTINUED | OUTPATIENT
Start: 2021-12-06 | End: 2021-12-07 | Stop reason: HOSPADM

## 2021-12-06 RX ORDER — SODIUM CHLORIDE 9 MG/ML
25 INJECTION, SOLUTION INTRAVENOUS PRN
Status: CANCELLED | OUTPATIENT
Start: 2022-01-03

## 2021-12-06 RX ORDER — SODIUM CHLORIDE 0.9 % (FLUSH) 0.9 %
5-40 SYRINGE (ML) INJECTION PRN
Status: CANCELLED | OUTPATIENT
Start: 2021-12-20

## 2021-12-06 RX ORDER — EPINEPHRINE 1 MG/ML
0.3 INJECTION, SOLUTION, CONCENTRATE INTRAVENOUS PRN
Status: CANCELLED | OUTPATIENT
Start: 2021-12-20

## 2021-12-06 RX ORDER — METHYLPREDNISOLONE SODIUM SUCCINATE 125 MG/2ML
125 INJECTION, POWDER, LYOPHILIZED, FOR SOLUTION INTRAMUSCULAR; INTRAVENOUS ONCE
Status: CANCELLED | OUTPATIENT
Start: 2022-01-03 | End: 2022-01-03

## 2021-12-06 RX ORDER — METHYLPREDNISOLONE SODIUM SUCCINATE 125 MG/2ML
125 INJECTION, POWDER, LYOPHILIZED, FOR SOLUTION INTRAMUSCULAR; INTRAVENOUS ONCE
Status: CANCELLED | OUTPATIENT
Start: 2021-12-20 | End: 2021-12-20

## 2021-12-06 RX ADMIN — HEPARIN 500 UNITS: 100 SYRINGE at 12:18

## 2021-12-06 RX ADMIN — DOXORUBICIN HYDROCHLORIDE 116 MG: 2 INJECTION, SOLUTION INTRAVENOUS at 11:16

## 2021-12-06 RX ADMIN — FOSAPREPITANT 150 MG: 150 INJECTION, POWDER, LYOPHILIZED, FOR SOLUTION INTRAVENOUS at 09:56

## 2021-12-06 RX ADMIN — PALONOSETRON 0.25 MG: 0.25 INJECTION, SOLUTION INTRAVENOUS at 09:53

## 2021-12-06 RX ADMIN — CYCLOPHOSPHAMIDE 1160 MG: 1 INJECTION, POWDER, FOR SOLUTION INTRAVENOUS; ORAL at 11:25

## 2021-12-06 RX ADMIN — SODIUM CHLORIDE 20 ML/HR: 9 INJECTION, SOLUTION INTRAVENOUS at 09:52

## 2021-12-06 RX ADMIN — PEGFILGRASTIM 6 MG: KIT SUBCUTANEOUS at 12:21

## 2021-12-06 RX ADMIN — SODIUM CHLORIDE, PRESERVATIVE FREE 10 ML: 5 INJECTION INTRAVENOUS at 12:18

## 2021-12-06 RX ADMIN — DEXAMETHASONE SODIUM PHOSPHATE 12 MG: 4 INJECTION, SOLUTION INTRAMUSCULAR; INTRAVENOUS at 10:47

## 2021-12-06 NOTE — PROGRESS NOTES
Pt. Here for cycle 3, pt. Denies any questions or concerns. Right upper chest mediport accessed without difficulty, good blood return noted. Lab work drawn as ordered. Labs reviewed and treatment administered without incident. Doxorubicin administered IVP with free flowing normal saline and blood return noted prior to, every 2-5 ml during push, and immediately post-push. Neulasta on-body applied to right upper arm. Discharge AVS given. Patient's status assessed and documented appropriately. All labs and required results were also reviewed today. Treatment parameters have been reviewed. Today's treatment has been approved by the provider. Treatment orders and medication sequencing (when applicable) was verified by 2 registered nurses. The treatment plan was confirmed with the patient prior to administration, and the patient understands the need to report any treatment-related symptoms. Prior to administration, when applicable, the following 8 elements of medication administration were reviewed with 2nd Registered Nurse prior to dosing: drug name, drug dose, infusion volume when prepared in a syringe, rate of administration, expiration dates and/or times, appearance and integrity of drug(s), and rate of pump for infusion. The 5 rights of medication administration have been verified.

## 2021-12-06 NOTE — PROGRESS NOTES
MA Rooming Questions  Patient: Yulissa Kendrick  MRN: [id number]    Date: 12/6/2021        1. Do you have any new issues?   no         2. Do you need any refills on medications?    no    3. Have you had any imaging done since your last visit? Yes- had thyroid BX    4. Have you been hospitalized or seen in the emergency room since your last visit here?   no    5. Did the patient have a depression screening completed today?  No    No data recorded     PHQ-9 Given to (if applicable):               PHQ-9 Score (if applicable):                     [] Positive     []  Negative              Does question #9 need addressed (if applicable)                     [] Yes    []  No               Deon Gold CMA

## 2021-12-08 ENCOUNTER — TELEPHONE (OUTPATIENT)
Dept: ONCOLOGY | Age: 66
End: 2021-12-08

## 2021-12-08 NOTE — TELEPHONE ENCOUNTER
Pt is going to [facility] on 12/22 930 arrival for a 1000 appt-- no metal in clothes-- pt is aware and stated understanding

## 2021-12-10 NOTE — PROGRESS NOTES
Patient Name: Becka Koehler  Patient : 1955  Patient MRN: [id number]     Primary Oncologist: Silvana Aase, MD  Referring Provider: Anjali Magallon MD     Date of Service: 1/3/2022        Chief Complaint:   Chief Complaint   Patient presents with   [address]    Chemotherapy     She came in for follow-up visit. Patient Active Problem List:     Personal history of infectious disease     Other specified disorders of white blood cells     Neutropenia     History of hepatitis C     Allergic rhinitis     Left hip pain     G6PD deficiency     Mild persistent asthma without complication     Vertigo     HPI:   Bruce Bah is a pleasant 59-year-old -American female patient who was referred for evaluation of mild neutropenia. She was diagnosed with hepatitis C in . Ex spouse was IV drug user. She was treated with Harvoni for 12 weeks and completed in May 2019. I reviewed her blood test records. On May 15, 2019 WBC was 4.1, RBC 4.36, hemoglobin 13.9, hematocrit 42.3, MCV 97, platelet 170, absolute neutrophils 1.1, absolute lymphocytes 2.6. She denies any history of frequent infection. Ultrasound of abdomen in 2018 showed normal right upper quadrant ultrasound. Liver biopsy on 2018 showed chronic hepatitis grade 2-3, stage II. Mammogram in 2019 is negative. US of abdomen in 2019 showed unremarkable study. Labs in 2019 were reviewed. She has nonspecific elevated total protein. CBC is unremarkable. Labs in 2019 were reviewed. Total protein is normal. Leukopenia is stable. She denied frequent infection. In 2020 she had acute viral hepatitis serology which came back positive for hepatitis C antibody. Serum AFP was 8. Hepatitis C RNA by PCR was negative. Hepatitis C RNA genotype was indeterminate. Mammogram in 2020 was benign. She was at [facility] emergency room on 2020 due to food poisoning. . WBC was 7.5.  Hemoglobin was 13.2, hemoglobin 13.2, platelet 517. CMP was grossly unremarkable with normal AST at 19, ALT 25. Alk phos was 92. Total bilirubin was 0.6. Colonoscopy in December 2020 by Dr. Delaney Villalta showed diverticulosis and hemorrhoids. She was told that she has kidney stone. She has flu shot in 2020 and COVID-19 vaccine on January 3, 2021. Mammogram in July 2020 was benign. In June 2021 WBC was 5.2, hemoglobin 12.3, platelet 803. On September 20, 2021 she was referred for left breast mass. Bone density on September 13, 2021 showed osteopenia. Mammogram on September 15, 2021 showed : There is a new 1.5 cm mass identified in the left breast at 12 o'clock position, at posterior depth, about 12 cm from the left nipple.  This is best seen on left CC michaela slice 50 and left MLO michaela slice 95.   Left breast ultrasound 11 o'clock, 11 cm from the nipple: On ultrasound evaluation, there is a 1.1 x 1.3 x 0.5 cm oval, parallel hypoechoic mass, with microlobulated margins without any posterior acoustic shadowing or internal vascularity.  This mass corresponds to the mammographic finding.   Left axilla: Couple enlarged lymph nodes identified in the left axilla with cortical thickness of 8-9 mm.   No new suspicious masses or microcalcifications are identified in the right breast.    In June 2021 WBC was 5.2, hemoglobin 12.3, platelet 420. CMP was grossly unremarkable. Path report of left breast biopsy on 9/27/2021:  A.  Breast, left at 11 o'clock, ultrasound guided needle core biopsy:   -  INVASIVE DUCTAL CARCINOMA WITH A MARKED CHRONIC INFLAMMATORY REACTION  - Sameul Rim FISH is negative  B.  Lymph node, left axilla, ultrasound guided needle core biopsy:   -  METASTATIC HIGH GRADE CARCINOMA IS IDENTIFIED (see comment). BREAST INVASIVE CARCINOMA SUMMARY - CORE BIOPSY   Procedure: Ultrasound guided needle core biopsy. Laterality and tumor site: Left breast at 11 o'clock.    Tumor size: 7 mm in greatest dimension within biopsy material present. Histologic type: Invasive ductal carcinoma with marked chronic inflammatory reaction. Histologic Grade ([place]): Grade 3        -Tubular score 3, Nuclear score 3, Mitosis score 2 (16/10 hpf)   Ductal Carcinoma In Situ (DCIS): Not present   Lobular Carcinoma In Situ (LCIS): Not present. Lympho-vascular invasion: Not identified. Microcalcifications: Not identified. Additional Findings: Marked chronic inflammatory reaction. Ancillary studies: ER/PgR/Her2 results from the current   biopsy are as follows:   -ER by [facility]*: Negative (0% staining). -PgR by IHC*: Negative (0% staining)   -Her2 by IHC*: Negative (Score 0)   -Her2 by FISH*:  negative  -Avg. #HER2 signals/nucleus: , Avg. #CEP17 signals/nucleus: , HER2/CEP17 ratio:     She was seen by Dr Thor Jane. I discussed about neoadjuvant chemo, AC + TC. Potential AE including hair loss, increased risk of infection, secondary malignancy and etc was discussed with her. She has left hip pain s/p hip replacement in August 2021. Bone scan on 2021-10-13 showed  Focal activity within the midthoracic and lower lumbar spine, typically degenerative. Moderate activity is seen about the left hip arthroplasty.  If surgery has not been recent, loosening or infection could be considered.    Multifocal degenerative changes are otherwise favored as outlined above. CT CAP on 10/15/2021:  1. Left breast mass with level 1 left axillary lymphadenopathy. 2. Four small pulmonary nodules measuring up to 3 mm in the left lung favoring a benign etiology.  Attention at follow-up imaging recommended. 3. Otherwise no metastatic disease in the chest, abdomen or pelvis. 4. Incidental left thyroid lobe 18 mm nodule.  Thyroid ultrasound can be considered. ECHO in October 2021 showed LVEF at 50 to 55%. She will have chemo education on October 26, 2021. She started [facility] on November 8, 2021. Cytology of FNA of thyroid nodule was reviewed.   I referred to endocrinologist.      On January 3, 2022 she came in for follow-up visit  MRI of the brain showed favorable response to therapy. She will start weekly Taxol cycle 1 on January 2, 2022. We again revisited the potential side effects of Taxol. I advised that she remains active during the treatment. Recommend to eat healthy diet and drink enough fluid. We will see endocrinologist on January 4, 2022. She denied any nausea, vomiting or diarrhea. No fever or chills. No shortness of breath or palpitation. No headache or dizzy spell. No melena or hematochezia. Denied any dysuria or hematuria. Past Medical History  Asthma, neutropenia, hepatitis C, G6PD, thyroid disease. Surgical History  Partial hysterectomy in 2002 related to tubal pregnancy. Tonsillectomy. She had colonoscopy x2 and the last one was in 2013. She had left hip replacement    Social History  Smoking Status Never smoker  She denies any illicit drug use. She drinks alcohol occasionally. She has 2 children. Family History  Maternal great aunt had breast cancer. Review of Systems: \"Per interval history; otherwise 10 point ROS is negative. \"     Vital Signs:  BP (!) 154/77 (Site: Right Upper Arm, Position: Sitting, Cuff Size: Medium Adult)   Pulse 116   Temp 97.1 °F (36.2 °C) (Infrared)   Resp 16   Ht 5' 3\" (1.6 m)   Wt 191 lb (86.6 kg)   SpO2 96%   BMI 33.83 kg/m²     Physical Exam:  CONSTITUTIONAL: awake, alert, cooperative, no apparent distress   EYES: pupils equal, round and reactive to light, sclera clear and conjunctiva pallor  ENT: Normocephalic, without obvious abnormality, atraumatic  NECK: supple, symmetrical, no jugular venous distension and no carotid bruits   HEMATOLOGIC/LYMPHATIC: no cervical, supraclavicular or axillary lymphadenopathy   LUNGS: no increased work of breathing and clear to auscultation  Port to the right anterior chest wall noted. BREAST: s/p left breast biopsy.   No palpable lump to the right breast.  CARDIOVASCULAR: regular rate and rhythm, normal S1 and S2, no murmur   ABDOMEN: normal bowel sound, soft, non-distended, non-tender, no masses palpated, no hepatosplenomegaly   MUSCULOSKELETAL: full range of motion noted, tone is normal  NEUROLOGIC: awake, alert, oriented to name, place and time. Motor is grossly intact. CN II through XII grossly intact. SKIN: Normal skin color, texture, turgor and no jaundice. appears intact   EXTREMITIES: no LE edema. No cyanosis.     Labs:  Hematology:  Lab Results   Component Value Date    WBC 8.1 12/20/2021    RBC 2.97 (L) 12/20/2021    HGB 8.6 (L) 12/20/2021    HCT 27.4 (L) 12/20/2021    MCV 92.3 12/20/2021    MCH 29.0 12/20/2021    MCHC 31.4 (L) 12/20/2021    RDW 17.3 (H) 12/20/2021     12/20/2021    MPV 9.4 12/20/2021    SEGSPCT 64.1 12/20/2021    EOSRELPCT 0.7 12/20/2021    BASOPCT 0.4 12/20/2021    LYMPHOPCT 17.8 (L) 12/20/2021    MONOPCT 17.0 (H) 12/20/2021    SEGSABS 5.2 12/20/2021    EOSABS 0.1 12/20/2021    BASOSABS 0.0 12/20/2021    LYMPHSABS 1.5 12/20/2021    MONOSABS 1.4 12/20/2021    DIFFTYPE AUTOMATED DIFFERENTIAL 12/20/2021     Chemistry:  Lab Results   Component Value Date     12/20/2021    K 4.1 12/20/2021     12/20/2021    CO2 25 12/20/2021    BUN 8 12/20/2021    CREATININE 0.6 12/20/2021    GLUCOSE 139 (H) 12/20/2021    CALCIUM 8.9 12/20/2021    PROT 6.7 12/20/2021    LABALBU 4.2 12/20/2021    BILITOT 0.2 12/20/2021    ALKPHOS 122 12/20/2021    AST 20 12/20/2021    ALT 17 12/20/2021    LABGLOM >60 12/20/2021    GFRAA >60 12/20/2021    AGRATIO 1.4 06/09/2021    GLOB 3.4 06/09/2021     Lab Results   Component Value Date    TSHHS 1.182 05/13/2013     Immunology:  Lab Results   Component Value Date    PROT 6.7 12/20/2021     Coagulation Panel:  Lab Results   Component Value Date    PROTIME 12.3 11/18/2021    INR 0.95 11/18/2021    APTT 26.9 11/18/2021     Observations:  PHQ-9 Total Score: 0 (1/3/2022  9:44 AM)        Assessment & Plan:    1. She was referred for mild neutropenia. She is an -American. CBC in June 2019 was unremarkable. CBC in June 2020 was unremarkable. US of abdomen in June 2019 was unremarkable. In June 2021 WBC was 5.2, hemoglobin 12.3, platelet 146. I will continue to monitor CBC. 2. She completed treatment for hepatitis C in May 2019. She will follow up with GI. She is in remission. 3. Mammogram in June 2019 was benign. Colonoscopy was In 2013. Mammogram in July 2020 was benign. She had abnormal left breast mammogram and scheduled for the biopsy on September 27, 2021. She was found to have likely triple negative left breast cancer s/p biopsy, T1, N1 at least from biopsy. CT chest, abdomen pelvis, bone scan and echocardiogram in October 2021 were reviewed. Started neoadjuvant chemotherapy November 8, 2021. No grade 3 toxicity related to chemotherapy. She is due for the third cycle today. I scheduled follow-up MRI of breast after the completion of the fourth cycle of AC to see the response. Depending on the response we will decide about potential additional carboplatin to Taxol. Advised to stay active and drink enough fluid. We will check labs today prior to the chemo. 4. Colonoscopy in December 2020 showed diverticulosis and hemorrhoids. Repeat study in 5 years was recommended. 5.  Ultrasound of the thyroid on November 9, 2021 showed  TR 4 nodule of the left thyroid lobe corresponding to recent CT findings. FNA recommended for further evaluation based on TI-RADS criteria. Cytology report on November 18, 2021 showed  Final Cytologic Diagnosis:    Left Thyroid Fine Needle Aspirate Cytology with Cell Block:   - Atypical follicular cells of undetermined significance     She will see endocrinologist on January 4, 2022    6. Anemia related to chemotherapy. I will continue to monitor CBC. I may consider anemic work-up if she remains anemic after completion of the chemo.     We discussed about healthy diet and lifestyle. She had COVID-19 vaccine in January 2021. Return to clinic in 3 -4  weeks or sooner. All of her questions have been answered for today. Recent imaging and labs were reviewed and discussed with the patient.

## 2021-12-20 ENCOUNTER — HOSPITAL ENCOUNTER (OUTPATIENT)
Dept: INFUSION THERAPY | Age: 66
Discharge: HOME | End: 2021-12-20
Payer: MEDICARE

## 2021-12-20 VITALS
HEIGHT: 63 IN | HEART RATE: 104 BPM | SYSTOLIC BLOOD PRESSURE: 130 MMHG | DIASTOLIC BLOOD PRESSURE: 60 MMHG | RESPIRATION RATE: 18 BRPM | BODY MASS INDEX: 33.59 KG/M2 | TEMPERATURE: 97.1 F | OXYGEN SATURATION: 98 % | WEIGHT: 189.6 LBS

## 2021-12-20 DIAGNOSIS — C50.912 MALIGNANT NEOPLASM OF LEFT BREAST IN FEMALE, ESTROGEN RECEPTOR NEGATIVE, UNSPECIFIED SITE OF BREAST (HCC): Primary | ICD-10-CM

## 2021-12-20 DIAGNOSIS — E04.1 THYROID NODULE: Primary | ICD-10-CM

## 2021-12-20 DIAGNOSIS — C50.912 MALIGNANT NEOPLASM OF LEFT BREAST IN FEMALE, ESTROGEN RECEPTOR NEGATIVE, UNSPECIFIED SITE OF BREAST (HCC): ICD-10-CM

## 2021-12-20 DIAGNOSIS — Z17.1 MALIGNANT NEOPLASM OF LEFT BREAST IN FEMALE, ESTROGEN RECEPTOR NEGATIVE, UNSPECIFIED SITE OF BREAST (HCC): Primary | ICD-10-CM

## 2021-12-20 DIAGNOSIS — Z17.1 MALIGNANT NEOPLASM OF LEFT BREAST IN FEMALE, ESTROGEN RECEPTOR NEGATIVE, UNSPECIFIED SITE OF BREAST (HCC): ICD-10-CM

## 2021-12-20 LAB
ALBUMIN SERPL-MCNC: 4.2 GM/DL (ref 3.4–5)
ALP BLD-CCNC: 122 IU/L (ref 40–128)
ALT SERPL-CCNC: 17 U/L (ref 10–40)
ANION GAP SERPL CALCULATED.3IONS-SCNC: 11 MMOL/L (ref 4–16)
AST SERPL-CCNC: 20 IU/L (ref 15–37)
BASOPHILS ABSOLUTE: 0 K/CU MM
BASOPHILS RELATIVE PERCENT: 0.4 % (ref 0–1)
BILIRUB SERPL-MCNC: 0.2 MG/DL (ref 0–1)
BUN BLDV-MCNC: 8 MG/DL (ref 6–23)
CALCIUM SERPL-MCNC: 8.9 MG/DL (ref 8.3–10.6)
CHLORIDE BLD-SCNC: 104 MMOL/L (ref 99–110)
CO2: 25 MMOL/L (ref 21–32)
CREAT SERPL-MCNC: 0.6 MG/DL (ref 0.6–1.1)
DIFFERENTIAL TYPE: ABNORMAL
EOSINOPHILS ABSOLUTE: 0.1 K/CU MM
EOSINOPHILS RELATIVE PERCENT: 0.7 % (ref 0–3)
GFR AFRICAN AMERICAN: >60 ML/MIN/1.73M2
GFR NON-AFRICAN AMERICAN: >60 ML/MIN/1.73M2
GLUCOSE BLD-MCNC: 139 MG/DL (ref 70–99)
HCT VFR BLD CALC: 27.4 % (ref 37–47)
HEMOGLOBIN: 8.6 GM/DL (ref 12.5–16)
LYMPHOCYTES ABSOLUTE: 1.5 K/CU MM
LYMPHOCYTES RELATIVE PERCENT: 17.8 % (ref 24–44)
MCH RBC QN AUTO: 29 PG (ref 27–31)
MCHC RBC AUTO-ENTMCNC: 31.4 % (ref 32–36)
MCV RBC AUTO: 92.3 FL (ref 78–100)
MONOCYTES ABSOLUTE: 1.4 K/CU MM
MONOCYTES RELATIVE PERCENT: 17 % (ref 0–4)
PDW BLD-RTO: 17.3 % (ref 11.7–14.9)
PLATELET # BLD: 215 K/CU MM (ref 140–440)
PMV BLD AUTO: 9.4 FL (ref 7.5–11.1)
POTASSIUM SERPL-SCNC: 4.1 MMOL/L (ref 3.5–5.1)
RBC # BLD: 2.97 M/CU MM (ref 4.2–5.4)
SEGMENTED NEUTROPHILS ABSOLUTE COUNT: 5.2 K/CU MM
SEGMENTED NEUTROPHILS RELATIVE PERCENT: 64.1 % (ref 36–66)
SODIUM BLD-SCNC: 140 MMOL/L (ref 135–145)
TOTAL PROTEIN: 6.7 GM/DL (ref 6.4–8.2)
WBC # BLD: 8.1 K/CU MM (ref 4–10.5)

## 2021-12-20 PROCEDURE — 6360000002 HC RX W HCPCS: Performed by: INTERNAL MEDICINE

## 2021-12-20 PROCEDURE — 85025 COMPLETE CBC W/AUTO DIFF WBC: CPT

## 2021-12-20 PROCEDURE — 96413 CHEMO IV INFUSION 1 HR: CPT

## 2021-12-20 PROCEDURE — 2580000003 HC RX 258: Performed by: INTERNAL MEDICINE

## 2021-12-20 PROCEDURE — 96411 CHEMO IV PUSH ADDL DRUG: CPT

## 2021-12-20 PROCEDURE — 96375 TX/PRO/DX INJ NEW DRUG ADDON: CPT

## 2021-12-20 PROCEDURE — 80053 COMPREHEN METABOLIC PANEL: CPT

## 2021-12-20 PROCEDURE — 96377 APPLICATON ON-BODY INJECTOR: CPT

## 2021-12-20 PROCEDURE — 96417 CHEMO IV INFUS EACH ADDL SEQ: CPT

## 2021-12-20 PROCEDURE — 96367 TX/PROPH/DG ADDL SEQ IV INF: CPT

## 2021-12-20 RX ORDER — SODIUM CHLORIDE 0.9 % (FLUSH) 0.9 %
5-40 SYRINGE (ML) INJECTION PRN
Status: DISCONTINUED | OUTPATIENT
Start: 2021-12-20 | End: 2021-12-21 | Stop reason: HOSPADM

## 2021-12-20 RX ORDER — HEPARIN SODIUM (PORCINE) LOCK FLUSH IV SOLN 100 UNIT/ML 100 UNIT/ML
500 SOLUTION INTRAVENOUS PRN
Status: DISCONTINUED | OUTPATIENT
Start: 2021-12-20 | End: 2021-12-21 | Stop reason: HOSPADM

## 2021-12-20 RX ORDER — DEXAMETHASONE 2 MG/1
2 TABLET ORAL
Qty: 28 TABLET | Refills: 0 | Status: SHIPPED | OUTPATIENT
Start: 2021-12-20 | End: 2022-03-24 | Stop reason: ALTCHOICE

## 2021-12-20 RX ORDER — PALONOSETRON HYDROCHLORIDE 0.05 MG/ML
0.25 INJECTION, SOLUTION INTRAVENOUS ONCE
Status: COMPLETED | OUTPATIENT
Start: 2021-12-20 | End: 2021-12-20

## 2021-12-20 RX ORDER — DOXORUBICIN HYDROCHLORIDE 2 MG/ML
60 INJECTION, SOLUTION INTRAVENOUS ONCE
Status: COMPLETED | OUTPATIENT
Start: 2021-12-20 | End: 2021-12-20

## 2021-12-20 RX ORDER — SODIUM CHLORIDE 9 MG/ML
20 INJECTION, SOLUTION INTRAVENOUS ONCE
Status: COMPLETED | OUTPATIENT
Start: 2021-12-20 | End: 2021-12-20

## 2021-12-20 RX ADMIN — CYCLOPHOSPHAMIDE 1160 MG: 1 INJECTION, POWDER, FOR SOLUTION INTRAVENOUS; ORAL at 12:35

## 2021-12-20 RX ADMIN — DEXAMETHASONE SODIUM PHOSPHATE 12 MG: 4 INJECTION INTRA-ARTICULAR; INTRALESIONAL; INTRAMUSCULAR; INTRAVENOUS; SOFT TISSUE at 10:32

## 2021-12-20 RX ADMIN — PEGFILGRASTIM 6 MG: KIT SUBCUTANEOUS at 13:39

## 2021-12-20 RX ADMIN — SODIUM CHLORIDE, PRESERVATIVE FREE 30 ML: 5 INJECTION INTRAVENOUS at 13:43

## 2021-12-20 RX ADMIN — DOXORUBICIN HYDROCHLORIDE 116 MG: 2 INJECTION, SOLUTION INTRAVENOUS at 13:20

## 2021-12-20 RX ADMIN — HEPARIN 500 UNITS: 100 SYRINGE at 13:43

## 2021-12-20 RX ADMIN — PALONOSETRON 0.25 MG: 0.25 INJECTION, SOLUTION INTRAVENOUS at 10:28

## 2021-12-20 RX ADMIN — FOSAPREPITANT 150 MG: 150 INJECTION, POWDER, LYOPHILIZED, FOR SOLUTION INTRAVENOUS at 11:31

## 2021-12-20 RX ADMIN — SODIUM CHLORIDE 20 ML/HR: 9 INJECTION, SOLUTION INTRAVENOUS at 10:31

## 2021-12-20 NOTE — PROGRESS NOTES
Patient will be starting weekly Taxol infusions starting 1/3/22. New calendar given to patient with dates and how/when to take dexamethasone. New script sent for dexamethasone 2 mg to [facility] on [address] [facility]. Patient instructed to take a total of 8 mg (4 tabs) the night before first chemo infusion only - voiced understanding. To then take 2 mg in the am x2 days after each chemo infusion. Instructed patient to call with any questions/concerns.

## 2021-12-20 NOTE — PROGRESS NOTES
Pt. Here for Day 1 cycle 4, pt. Denies any questions or concerns. Pt had an Echo done on 10/15/2021 and had an EF of 50-55%. Right upper chest mediport accessed without difficulty, good blood return noted. Lab work drawn as ordered. Labs reviewed and treatment administered without incident. Doxorubicin administered IVP with free flowing normal saline and blood return noted prior to, every 2-5 ml during push, and immediately post-push. Neulasta on-body applied to right upper arm. Discharge AVS given.      Patient's status assessed and documented appropriately. All labs and required results were also reviewed today. Treatment parameters have been reviewed. Today's treatment has been approved by the provider. Treatment orders and medication sequencing (when applicable) was verified by 2 registered nurses. The treatment plan was confirmed with the patient prior to administration, and the patient understands the need to report any treatment-related symptoms.     Prior to administration, when applicable, the following 8 elements of medication administration were reviewed with 2nd Registered Nurse prior to dosing: drug name, drug dose, infusion volume when prepared in a syringe, rate of administration, expiration dates and/or times, appearance and integrity of drug(s), and rate of pump for infusion. The 5 rights of medication administration have been verified.

## 2021-12-22 ENCOUNTER — HOSPITAL ENCOUNTER (OUTPATIENT)
Dept: MRI IMAGING | Age: 66
Discharge: HOME | End: 2021-12-22

## 2021-12-22 DIAGNOSIS — C50.912 MALIGNANT NEOPLASM OF LEFT BREAST IN FEMALE, ESTROGEN RECEPTOR NEGATIVE, UNSPECIFIED SITE OF BREAST (HCC): ICD-10-CM

## 2021-12-22 DIAGNOSIS — Z17.1 MALIGNANT NEOPLASM OF LEFT BREAST IN FEMALE, ESTROGEN RECEPTOR NEGATIVE, UNSPECIFIED SITE OF BREAST (HCC): ICD-10-CM

## 2021-12-23 ENCOUNTER — OFFICE VISIT (OUTPATIENT)
Dept: INTERNAL MEDICINE CLINIC | Age: 66
End: 2021-12-23
Payer: MEDICARE

## 2021-12-23 VITALS
BODY MASS INDEX: 33.84 KG/M2 | SYSTOLIC BLOOD PRESSURE: 132 MMHG | HEIGHT: 63 IN | HEART RATE: 62 BPM | WEIGHT: 191 LBS | OXYGEN SATURATION: 97 % | TEMPERATURE: 97.2 F | DIASTOLIC BLOOD PRESSURE: 80 MMHG

## 2021-12-23 DIAGNOSIS — K58.9 IRRITABLE BOWEL SYNDROME, UNSPECIFIED TYPE: ICD-10-CM

## 2021-12-23 DIAGNOSIS — Z17.1 MALIGNANT NEOPLASM OF LEFT BREAST IN FEMALE, ESTROGEN RECEPTOR NEGATIVE, UNSPECIFIED SITE OF BREAST (HCC): ICD-10-CM

## 2021-12-23 DIAGNOSIS — J45.20 MILD INTERMITTENT ASTHMA WITHOUT COMPLICATION: Primary | ICD-10-CM

## 2021-12-23 DIAGNOSIS — Z86.19 HISTORY OF HEPATITIS C: ICD-10-CM

## 2021-12-23 DIAGNOSIS — R35.0 URINARY FREQUENCY: ICD-10-CM

## 2021-12-23 DIAGNOSIS — C50.912 MALIGNANT NEOPLASM OF LEFT BREAST IN FEMALE, ESTROGEN RECEPTOR NEGATIVE, UNSPECIFIED SITE OF BREAST (HCC): ICD-10-CM

## 2021-12-23 DIAGNOSIS — K21.9 GASTROESOPHAGEAL REFLUX DISEASE WITHOUT ESOPHAGITIS: ICD-10-CM

## 2021-12-23 DIAGNOSIS — E04.1 THYROID NODULE: ICD-10-CM

## 2021-12-23 DIAGNOSIS — J30.9 ALLERGIC RHINITIS, UNSPECIFIED SEASONALITY, UNSPECIFIED TRIGGER: ICD-10-CM

## 2021-12-23 PROCEDURE — 99214 OFFICE O/P EST MOD 30 MIN: CPT | Performed by: INTERNAL MEDICINE

## 2021-12-23 RX ORDER — DICYCLOMINE HYDROCHLORIDE 10 MG/1
10 CAPSULE ORAL 4 TIMES DAILY PRN
Qty: 120 CAPSULE | Refills: 3 | Status: SHIPPED | OUTPATIENT
Start: 2021-12-23 | End: 2022-03-08 | Stop reason: SDUPTHER

## 2021-12-23 RX ORDER — OMEPRAZOLE 20 MG/1
20 CAPSULE, DELAYED RELEASE ORAL DAILY
Qty: 30 CAPSULE | Refills: 3 | Status: SHIPPED | OUTPATIENT
Start: 2021-12-23 | End: 2022-03-08 | Stop reason: SDUPTHER

## 2021-12-23 NOTE — PROGRESS NOTES
Name: Fredrick Varghese  [id number]  Age: 78 y.o. YOB: 1955  Sex: female    CHIEF COMPLAINT:    Chief Complaint   Patient presents with    Asthma    Gastroesophageal Reflux    Other     other chronic conditions       HISTORY OF PRESENT ILLNESS:     This is a pleasant  78 y.o. female  is seen today for management of chronic medical problems and medications refills. Previous records reviewed . Patient  had a mammogram in September 2021 which was abnormal .  Further work-up with ultrasound and diagnostic mammogram and later on biopsy of the left breast mass led to the diagnosis of breast cancer and metastatic lymph node in the left axilla. She was referred to oncologist.  She had been following with Dr. Rena Guardado for G6PD deficiency and neutropenia. She is now seeing Dr. Rena Guardado for the breast cancer also. She had extensive work-up done by Dr. Rena Guardado which included tests as below:    Bone scan on 2021-10-13 showed  Focal activity within the midthoracic and lower lumbar spine, typically degenerative. Moderate activity is seen about the left hip arthroplasty.  If surgery has not been recent, loosening or infection could be considered.    Multifocal degenerative changes are otherwise favored as outlined above.     CT CAP on 10/15/2021:  1. Left breast mass with level 1 left axillary lymphadenopathy. 2. Four small pulmonary nodules measuring up to 3 mm in the left lung favoring a benign etiology.  Attention at follow-up imaging recommended. 3. Otherwise no metastatic disease in the chest, abdomen or pelvis. 4. Incidental left thyroid lobe 18 mm nodule.  Thyroid ultrasound can be considered.     ECHO in October 2021 showed LVEF at 50 to 55%. She will have chemo education on October 26, 2021. She will have Med[facility] on November 1, 2021 [facility].  Hopefully she can start the chemo on November 2, 2021. No change on her breast exam today.     She was started on chemotherapy on November 8, 2021.  She is going to have MRI of the breast after completion of the fourth cycle of chemotherapy to see the response. Also she was found to have left thyroid nodule and 4 mm nodule and she is going to have a thyroid biopsy soon.     Doing OK otherwise. Denies CP or SOB. No fever , sore throat or cough or congestion. Asthma is better. .   Denies any abdominal pain. Appetite OK. Bowels moving [id number] Edilma Lorenz Denies any urinary symptoms. Left hip pain is better. She had a hip replacement by Dr. Racquel White at [facility] on 8/16/2021. She is taking Tylenol as needed. Sarah Casiano Hearing is ok. Vision Ok with glasses. Denies  any significant skin lesions. Denies any significant depression or anxiety. Dizziness is better. . uses meclizine very rarely. No other complaints. She had been fully vaccinated for COVID-19 and also had a booster dose and a flu shot. Labs from the 12/20/2021 reviewed and explained to the patient.     Past Medical History:    Patient Active Problem List   Diagnosis    Personal history of infectious disease    Other specified disorders of white blood cells    Neutropenia ([address]    History of hepatitis C    Allergic rhinitis    Left hip pain    G6PD deficiency    Vertigo    Mild intermittent asthma without complication    Urinary frequency    Left breast mass    Gastroesophageal reflux disease without esophagitis    Irritable bowel syndrome    Malignant neoplasm of left breast in female, estrogen receptor negative ([address]        Past Surgical History:        Procedure Laterality Date    COLONOSCOPY  2013    Polyps - Dr. Humberto Esteban  1990's   [facility]    \"left one ovary \"    IR BIOPSY THYROID PERC CORE NEEDLE  11/18/2021    IR BIOPSY THYROID PERC CORE NEEDLE 11/18/2021 [facility]    TONSILLECTOMY  1970's    US BREAST BIOPSY NEEDLE ADDITIONAL LEFT Left 9/27/2021    US BREAST BIOPSY NEEDLE ADDITIONAL LEFT 9/27/2021 Sohail Luis  [facility]  BREAST NEEDLE BIOPSY LEFT Left 9/27/2021    US BREAST NEEDLE BIOPSY LEFT 9/27/2021 Matthias Govea MD [facility]       Social History:   Social History     Tobacco Use    Smoking status: Never Smoker    Smokeless tobacco: Never Used   Substance Use Topics    Alcohol use: Yes     Comment: Occasional wine/\"average glass of wine or beer  one time per month\"       Family History:       Problem Relation Age of Onset    No Known Problems Mother     Kidney Disease Father        Allergies:  Pcn [penicillins]    Current Medications :      Prior to Admission medications    Medication Sig Start Date End Date Taking? Authorizing Provider   omeprazole (PRILOSEC) 20 MG delayed release capsule Take 1 capsule by mouth daily 12/23/21  Yes Theresa Mcgregor MD   dicyclomine (BENTYL) 10 MG capsule Take 1 capsule by mouth 4 times daily as needed (abdominal bloating and gas) 12/23/21  Yes Theresa Mcgregor MD   dexamethasone (DECADRON) 2 MG tablet Take 1 tablet by mouth daily (with breakfast) for two days AFTER chemotherapy. Take 8 mg (4 tabs) the night before 1st chemo ONLY. 12/20/21  Yes Sandy Sanchez MD   OLANZapine (ZYPREXA) 5 MG tablet One tablet HS for four nights starting the night before chemotherapy 11/22/21  Yes Sandy Sanchez MD   ondansetron (ZOFRAN) 8 MG tablet Take 1 tablet by mouth every 8 hours as needed for Nausea or Vomiting 11/2/21  Yes LUIS CARLOS Cruz - CNP   oxyCODONE-acetaminophen (PERCOCET) 5-325 MG per tablet take 1 tablet by mouth every 6 hours 1 weekly if needed for pain . ..  (REFER TO PRESCRIPTION NOTES).  8/17/21  Yes Historical Provider, MD   albuterol sulfate  (90 Base) MCG/ACT inhaler Inhale 2 puffs into the lungs every 6 hours as needed for Wheezing 9/7/21  Yes Theresa Mcgregor MD   montelukast (SINGULAIR) 10 MG tablet Take 1 tablet by mouth nightly 9/7/21  Yes Theresa Mcgregor MD   meclizine (ANTIVERT) 25 MG tablet Take 1 tablet by mouth 3 times daily as needed for Dizziness 9/7/21  Yes Clemetine Pilar MD Tavia   oxybutynin (DITROPAN XL) 10 MG extended release tablet Take 1 tablet by mouth daily 9/7/21  Yes Tatiana Steen MD   meloxicam (MOBIC) 7.5 MG tablet Take 1 tablet by mouth daily 6/9/21  Yes Tatiana Steen MD   Multiple Vitamins-Minerals (MULTIVITAMIN PO) Take by mouth   Yes Historical Provider, MD       LAB DATA: Reviewed. REVIEW OF SYSTEMS:   see HPI/ Comprehensive review of systems negative except for the ones mentioned in HPI. PHYSICAL EXAMINATION:   /80 (Site: Left Upper Arm, Position: Sitting, Cuff Size: Medium Adult)   Pulse 62   Temp 97.2 °F (36.2 °C)   Ht 5' 3\" (1.6 m)   Wt 191 lb (86.6 kg)   SpO2 97%   BMI 33.83 kg/m²      GENERAL APPEARANCE:    Alert, oriented x 3, well developed, cooperative, not in any distress, appears stated age. HEAD:   Normocephalic, atraumatic   EYES:   PERRLA, EOMI, lids normal, conjuctivea clear, sclera anicteric. NECK:    Supple, symmetrical,  trachea midline, no thyromegaly, no JVD, no lymphadenopathy. LUNGS:    Clear to auscultation bilaterally, respirations unlabored, accessory muscles are not used. HEART:     Regular rate and rhythm, S1 and S2 normal, no murmur, rub or gallop. PMI in MCL. ABDOMEN:    Soft, non-tender, bowel sounds are normoactive, no masses, no hepatospleenomegaly. .  Patient is obese  EXTREMITY:   no bipedal edema  NEURO:  Alert, oriented to person, place and time. Grossly intact. Musculoskeletal:         No kyphosis or scoliosis, no deformity in any extremity noted, muscle strength and tone are normal.  Skin:                            Warm and dry. No rash or obvious suspicious lesions. PSYCH:  Mood euthymic, insight and judgement good. ASSESSMENT/PLAN:    1. Mild intermittent asthma without complication  Continue Singulair and albuterol HFA as needed. 2. Allergic rhinitis, unspecified seasonality, unspecified trigger  Continue Singulair and Claritin as needed    3.  Gastroesophageal reflux disease without esophagitis  Patient on Prilosec  - omeprazole (PRILOSEC) 20 MG delayed release capsule; Take 1 capsule by mouth daily  Dispense: 30 capsule; Refill: 3    4. Irritable bowel syndrome, unspecified type  On Bentyl as needed  - dicyclomine (BENTYL) 10 MG capsule; Take 1 capsule by mouth 4 times daily as needed (abdominal bloating and gas)  Dispense: 120 capsule; Refill: 3    5. Malignant neoplasm of left breast in female, estrogen receptor negative, unspecified site of breast ([facility] [address]  Getting chemotherapy and is being followed by oncologist.    6. Urinary frequency  Patient is on Ditropan XL    7. History of hepatitis C  She was treated with Harvoni for 12 weeks and completed therapy in May 2019. In remission. 8. Thyroid nodule  Left thyroid nodule, for biopsy soon. 9.  Anemia: Stable, being followed by oncologist.    Castillo Leyva to follow COVID-19 precautions    Care discussed with patient. Questions answered and patient verbalizes understanding and agrees with plan. Medications reviewed and reconciled. Continue current medications. Appropriate prescriptions are ordered. Risks and benefits of meds are discussed. After visit summary provided. Advised to call for any problems, questions, or concerns. If symptoms worsen or don't improve as expected, to call us or go to ER. Follow up as directed, sooner if needed. Return in about 3 months (around 3/23/2022). This dictation was performed with a verbal recognition program and it was checked for errors. It is possible that there are still dictated errors within this office note. Any errors should be brought immediately to my attention for correction. All efforts were made to ensure that this office note is accurate.      Cecilia Delcid MD MD

## 2021-12-30 ENCOUNTER — HOSPITAL ENCOUNTER (OUTPATIENT)
Dept: MRI IMAGING | Age: 66
Discharge: HOME | End: 2021-12-30
Payer: MEDICARE

## 2021-12-30 PROCEDURE — 77049 MRI BREAST C-+ W/CAD BI: CPT

## 2021-12-30 PROCEDURE — A9577 INJ MULTIHANCE: HCPCS | Performed by: INTERNAL MEDICINE

## 2021-12-30 PROCEDURE — 6360000004 HC RX CONTRAST MEDICATION: Performed by: INTERNAL MEDICINE

## 2021-12-30 RX ADMIN — GADOBENATE DIMEGLUMINE 18 ML: 529 INJECTION, SOLUTION INTRAVENOUS at 12:40

## 2022-01-03 ENCOUNTER — OFFICE VISIT (OUTPATIENT)
Dept: ONCOLOGY | Age: 67
End: 2022-01-03
Payer: MEDICARE

## 2022-01-03 ENCOUNTER — HOSPITAL ENCOUNTER (OUTPATIENT)
Dept: INFUSION THERAPY | Age: 67
Discharge: HOME | End: 2022-01-03
Payer: MEDICARE

## 2022-01-03 VITALS
SYSTOLIC BLOOD PRESSURE: 154 MMHG | HEIGHT: 63 IN | DIASTOLIC BLOOD PRESSURE: 77 MMHG | TEMPERATURE: 97.1 F | BODY MASS INDEX: 33.84 KG/M2 | RESPIRATION RATE: 16 BRPM | WEIGHT: 191 LBS | OXYGEN SATURATION: 96 % | HEART RATE: 116 BPM

## 2022-01-03 DIAGNOSIS — Z17.1 MALIGNANT NEOPLASM OF LEFT BREAST IN FEMALE, ESTROGEN RECEPTOR NEGATIVE, UNSPECIFIED SITE OF BREAST (HCC): ICD-10-CM

## 2022-01-03 DIAGNOSIS — C50.912 MALIGNANT NEOPLASM OF LEFT BREAST IN FEMALE, ESTROGEN RECEPTOR NEGATIVE, UNSPECIFIED SITE OF BREAST (HCC): ICD-10-CM

## 2022-01-03 DIAGNOSIS — Z17.1 MALIGNANT NEOPLASM OF LEFT BREAST IN FEMALE, ESTROGEN RECEPTOR NEGATIVE, UNSPECIFIED SITE OF BREAST (HCC): Primary | ICD-10-CM

## 2022-01-03 DIAGNOSIS — C50.912 MALIGNANT NEOPLASM OF LEFT BREAST IN FEMALE, ESTROGEN RECEPTOR NEGATIVE, UNSPECIFIED SITE OF BREAST (HCC): Primary | ICD-10-CM

## 2022-01-03 DIAGNOSIS — E04.1 THYROID NODULE: Primary | ICD-10-CM

## 2022-01-03 LAB
ALBUMIN SERPL-MCNC: 4.4 GM/DL (ref 3.4–5)
ALP BLD-CCNC: 134 IU/L (ref 40–128)
ALT SERPL-CCNC: 17 U/L (ref 10–40)
ANION GAP SERPL CALCULATED.3IONS-SCNC: 14 MMOL/L (ref 4–16)
AST SERPL-CCNC: 20 IU/L (ref 15–37)
BASOPHILS ABSOLUTE: 0 K/CU MM
BASOPHILS RELATIVE PERCENT: 0.4 % (ref 0–1)
BILIRUB SERPL-MCNC: 0.2 MG/DL (ref 0–1)
BUN BLDV-MCNC: 10 MG/DL (ref 6–23)
CALCIUM SERPL-MCNC: 9.4 MG/DL (ref 8.3–10.6)
CHLORIDE BLD-SCNC: 104 MMOL/L (ref 99–110)
CO2: 19 MMOL/L (ref 21–32)
CREAT SERPL-MCNC: 0.6 MG/DL (ref 0.6–1.1)
DIFFERENTIAL TYPE: ABNORMAL
EOSINOPHILS ABSOLUTE: 0 K/CU MM
EOSINOPHILS RELATIVE PERCENT: 0.1 % (ref 0–3)
GFR AFRICAN AMERICAN: >60 ML/MIN/1.73M2
GFR NON-AFRICAN AMERICAN: >60 ML/MIN/1.73M2
GLUCOSE BLD-MCNC: 230 MG/DL (ref 70–99)
HCT VFR BLD CALC: 27.4 % (ref 37–47)
HEMOGLOBIN: 8.6 GM/DL (ref 12.5–16)
LYMPHOCYTES ABSOLUTE: 0.9 K/CU MM
LYMPHOCYTES RELATIVE PERCENT: 10 % (ref 24–44)
MCH RBC QN AUTO: 29.8 PG (ref 27–31)
MCHC RBC AUTO-ENTMCNC: 31.4 % (ref 32–36)
MCV RBC AUTO: 94.8 FL (ref 78–100)
MONOCYTES ABSOLUTE: 0.3 K/CU MM
MONOCYTES RELATIVE PERCENT: 3.4 % (ref 0–4)
PDW BLD-RTO: 19.4 % (ref 11.7–14.9)
PLATELET # BLD: 216 K/CU MM (ref 140–440)
PMV BLD AUTO: 9.5 FL (ref 7.5–11.1)
POTASSIUM SERPL-SCNC: 4.6 MMOL/L (ref 3.5–5.1)
RBC # BLD: 2.89 M/CU MM (ref 4.2–5.4)
SEGMENTED NEUTROPHILS ABSOLUTE COUNT: 7.9 K/CU MM
SEGMENTED NEUTROPHILS RELATIVE PERCENT: 86.1 % (ref 36–66)
SODIUM BLD-SCNC: 137 MMOL/L (ref 135–145)
TOTAL PROTEIN: 7 GM/DL (ref 6.4–8.2)
WBC # BLD: 9.2 K/CU MM (ref 4–10.5)

## 2022-01-03 PROCEDURE — 85025 COMPLETE CBC W/AUTO DIFF WBC: CPT

## 2022-01-03 PROCEDURE — 99214 OFFICE O/P EST MOD 30 MIN: CPT | Performed by: INTERNAL MEDICINE

## 2022-01-03 PROCEDURE — 2500000003 HC RX 250 WO HCPCS: Performed by: INTERNAL MEDICINE

## 2022-01-03 PROCEDURE — 80053 COMPREHEN METABOLIC PANEL: CPT

## 2022-01-03 PROCEDURE — 2580000003 HC RX 258: Performed by: INTERNAL MEDICINE

## 2022-01-03 PROCEDURE — 96367 TX/PROPH/DG ADDL SEQ IV INF: CPT

## 2022-01-03 PROCEDURE — 96413 CHEMO IV INFUSION 1 HR: CPT

## 2022-01-03 PROCEDURE — 96375 TX/PRO/DX INJ NEW DRUG ADDON: CPT

## 2022-01-03 PROCEDURE — 6360000002 HC RX W HCPCS: Performed by: INTERNAL MEDICINE

## 2022-01-03 PROCEDURE — 36591 DRAW BLOOD OFF VENOUS DEVICE: CPT

## 2022-01-03 RX ORDER — METHYLPREDNISOLONE SODIUM SUCCINATE 125 MG/2ML
125 INJECTION, POWDER, LYOPHILIZED, FOR SOLUTION INTRAMUSCULAR; INTRAVENOUS ONCE
Status: CANCELLED | OUTPATIENT
Start: 2022-01-18 | End: 2022-01-17

## 2022-01-03 RX ORDER — MEPERIDINE HYDROCHLORIDE 50 MG/ML
12.5 INJECTION INTRAMUSCULAR; INTRAVENOUS; SUBCUTANEOUS ONCE
Status: CANCELLED | OUTPATIENT
Start: 2022-01-10 | End: 2022-01-10

## 2022-01-03 RX ORDER — METHYLPREDNISOLONE SODIUM SUCCINATE 125 MG/2ML
125 INJECTION, POWDER, LYOPHILIZED, FOR SOLUTION INTRAMUSCULAR; INTRAVENOUS ONCE
Status: CANCELLED | OUTPATIENT
Start: 2022-01-10 | End: 2022-01-10

## 2022-01-03 RX ORDER — SODIUM CHLORIDE 9 MG/ML
25 INJECTION, SOLUTION INTRAVENOUS PRN
Status: CANCELLED | OUTPATIENT
Start: 2022-01-18

## 2022-01-03 RX ORDER — DIPHENHYDRAMINE HYDROCHLORIDE 50 MG/ML
50 INJECTION INTRAMUSCULAR; INTRAVENOUS ONCE
Status: CANCELLED | OUTPATIENT
Start: 2022-01-18 | End: 2022-01-17

## 2022-01-03 RX ORDER — MEPERIDINE HYDROCHLORIDE 50 MG/ML
12.5 INJECTION INTRAMUSCULAR; INTRAVENOUS; SUBCUTANEOUS ONCE
Status: CANCELLED | OUTPATIENT
Start: 2022-01-18 | End: 2022-01-17

## 2022-01-03 RX ORDER — HEPARIN SODIUM (PORCINE) LOCK FLUSH IV SOLN 100 UNIT/ML 100 UNIT/ML
500 SOLUTION INTRAVENOUS PRN
Status: CANCELLED | OUTPATIENT
Start: 2022-01-10

## 2022-01-03 RX ORDER — SODIUM CHLORIDE 9 MG/ML
INJECTION, SOLUTION INTRAVENOUS CONTINUOUS
Status: CANCELLED | OUTPATIENT
Start: 2022-01-18

## 2022-01-03 RX ORDER — SODIUM CHLORIDE 9 MG/ML
20 INJECTION, SOLUTION INTRAVENOUS ONCE
Status: CANCELLED | OUTPATIENT
Start: 2022-01-10 | End: 2022-01-10

## 2022-01-03 RX ORDER — OLANZAPINE 5 MG/1
TABLET ORAL
Qty: 16 TABLET | Refills: 0 | Status: SHIPPED | OUTPATIENT
Start: 2022-01-03 | End: 2022-01-03

## 2022-01-03 RX ORDER — DIPHENHYDRAMINE HYDROCHLORIDE 50 MG/ML
50 INJECTION INTRAMUSCULAR; INTRAVENOUS ONCE
Status: CANCELLED | OUTPATIENT
Start: 2022-01-10 | End: 2022-01-10

## 2022-01-03 RX ORDER — SODIUM CHLORIDE 9 MG/ML
20 INJECTION, SOLUTION INTRAVENOUS ONCE
Status: DISCONTINUED | OUTPATIENT
Start: 2022-01-03 | End: 2022-01-04 | Stop reason: HOSPADM

## 2022-01-03 RX ORDER — DIPHENHYDRAMINE HYDROCHLORIDE 50 MG/ML
50 INJECTION INTRAMUSCULAR; INTRAVENOUS ONCE
Status: CANCELLED | OUTPATIENT
Start: 2022-01-10

## 2022-01-03 RX ORDER — SODIUM CHLORIDE 0.9 % (FLUSH) 0.9 %
5-40 SYRINGE (ML) INJECTION PRN
Status: CANCELLED | OUTPATIENT
Start: 2022-01-18

## 2022-01-03 RX ORDER — DEXAMETHASONE SODIUM PHOSPHATE 10 MG/ML
10 INJECTION, SOLUTION INTRAMUSCULAR; INTRAVENOUS ONCE
Status: DISCONTINUED | OUTPATIENT
Start: 2022-01-03 | End: 2022-01-03 | Stop reason: SDUPTHER

## 2022-01-03 RX ORDER — SODIUM CHLORIDE 9 MG/ML
20 INJECTION, SOLUTION INTRAVENOUS ONCE
Status: CANCELLED | OUTPATIENT
Start: 2022-01-18 | End: 2022-01-17

## 2022-01-03 RX ORDER — DIPHENHYDRAMINE HYDROCHLORIDE 50 MG/ML
50 INJECTION INTRAMUSCULAR; INTRAVENOUS ONCE
Status: COMPLETED | OUTPATIENT
Start: 2022-01-03 | End: 2022-01-03

## 2022-01-03 RX ORDER — SODIUM CHLORIDE 0.9 % (FLUSH) 0.9 %
5-40 SYRINGE (ML) INJECTION PRN
Status: CANCELLED | OUTPATIENT
Start: 2022-01-10

## 2022-01-03 RX ORDER — SODIUM CHLORIDE 0.9 % (FLUSH) 0.9 %
5-40 SYRINGE (ML) INJECTION PRN
Status: DISCONTINUED | OUTPATIENT
Start: 2022-01-03 | End: 2022-01-04 | Stop reason: HOSPADM

## 2022-01-03 RX ORDER — OLANZAPINE 5 MG/1
TABLET ORAL
Qty: 16 TABLET | Refills: 0 | Status: SHIPPED | OUTPATIENT
Start: 2022-01-03 | End: 2022-03-08 | Stop reason: ALTCHOICE

## 2022-01-03 RX ORDER — SODIUM CHLORIDE 9 MG/ML
25 INJECTION, SOLUTION INTRAVENOUS PRN
Status: CANCELLED | OUTPATIENT
Start: 2022-01-10

## 2022-01-03 RX ORDER — SODIUM CHLORIDE 9 MG/ML
INJECTION, SOLUTION INTRAVENOUS CONTINUOUS
Status: CANCELLED | OUTPATIENT
Start: 2022-01-10

## 2022-01-03 RX ORDER — EPINEPHRINE 1 MG/ML
0.3 INJECTION, SOLUTION, CONCENTRATE INTRAVENOUS PRN
Status: CANCELLED | OUTPATIENT
Start: 2022-01-18

## 2022-01-03 RX ORDER — HEPARIN SODIUM (PORCINE) LOCK FLUSH IV SOLN 100 UNIT/ML 100 UNIT/ML
500 SOLUTION INTRAVENOUS PRN
Status: CANCELLED | OUTPATIENT
Start: 2022-01-18

## 2022-01-03 RX ORDER — HEPARIN SODIUM (PORCINE) LOCK FLUSH IV SOLN 100 UNIT/ML 100 UNIT/ML
500 SOLUTION INTRAVENOUS PRN
Status: DISCONTINUED | OUTPATIENT
Start: 2022-01-03 | End: 2022-01-04 | Stop reason: HOSPADM

## 2022-01-03 RX ORDER — DIPHENHYDRAMINE HYDROCHLORIDE 50 MG/ML
50 INJECTION INTRAMUSCULAR; INTRAVENOUS ONCE
Status: CANCELLED | OUTPATIENT
Start: 2022-01-18

## 2022-01-03 RX ORDER — EPINEPHRINE 1 MG/ML
0.3 INJECTION, SOLUTION, CONCENTRATE INTRAVENOUS PRN
Status: CANCELLED | OUTPATIENT
Start: 2022-01-10

## 2022-01-03 RX ADMIN — HEPARIN 500 UNITS: 100 SYRINGE at 12:34

## 2022-01-03 RX ADMIN — PACLITAXEL 156 MG: 6 INJECTION, SOLUTION INTRAVENOUS at 11:07

## 2022-01-03 RX ADMIN — DIPHENHYDRAMINE HYDROCHLORIDE 50 MG: 50 INJECTION INTRAMUSCULAR; INTRAVENOUS at 10:38

## 2022-01-03 RX ADMIN — FAMOTIDINE 20 MG: 10 INJECTION, SOLUTION INTRAVENOUS at 10:38

## 2022-01-03 RX ADMIN — SODIUM CHLORIDE, PRESERVATIVE FREE 10 ML: 5 INJECTION INTRAVENOUS at 12:34

## 2022-01-03 RX ADMIN — SODIUM CHLORIDE 20 ML/HR: 9 INJECTION, SOLUTION INTRAVENOUS at 10:38

## 2022-01-03 RX ADMIN — DEXAMETHASONE SODIUM PHOSPHATE 10 MG: 10 INJECTION, SOLUTION INTRAMUSCULAR; INTRAVENOUS at 10:43

## 2022-01-03 ASSESSMENT — PATIENT HEALTH QUESTIONNAIRE - PHQ9
SUM OF ALL RESPONSES TO PHQ QUESTIONS 1-9: 0
1. LITTLE INTEREST OR PLEASURE IN DOING THINGS: 0
2. FEELING DOWN, DEPRESSED OR HOPELESS: 0
SUM OF ALL RESPONSES TO PHQ QUESTIONS 1-9: 0
SUM OF ALL RESPONSES TO PHQ9 QUESTIONS 1 & 2: 0

## 2022-01-03 NOTE — PROGRESS NOTES
Patient Name: Denise Arias  Patient : 1955  Patient MRN: [id number]     Primary Oncologist: Rochelle Garcia MD  Referring Provider: Juan Miguel Robin MD     Date of Service: 2022        Chief Complaint:   Chief Complaint   Patient presents with    Follow-up     She came in for follow-up visit. Patient Active Problem List:     Personal history of infectious disease     Other specified disorders of white blood cells     Neutropenia     History of hepatitis C     Allergic rhinitis     Left hip pain     G6PD deficiency     Mild persistent asthma without complication     Vertigo     HPI:   Beth Larose is a pleasant 50-year-old -American female patient who was referred for evaluation of mild neutropenia. She was diagnosed with hepatitis C in . Ex spouse was IV drug user. She was treated with Harvoni for 12 weeks and completed in May 2019. I reviewed her blood test records. On May 15, 2019 WBC was 4.1, RBC 4.36, hemoglobin 13.9, hematocrit 42.3, MCV 97, platelet 935, absolute neutrophils 1.1, absolute lymphocytes 2.6. She denies any history of frequent infection. Ultrasound of abdomen in 2018 showed normal right upper quadrant ultrasound. Liver biopsy on 2018 showed chronic hepatitis grade 2-3, stage II. Mammogram in 2019 is negative. US of abdomen in 2019 showed unremarkable study. Labs in 2019 were reviewed. She has nonspecific elevated total protein. CBC is unremarkable. Labs in 2019 were reviewed. Total protein is normal. Leukopenia is stable. She denied frequent infection. In 2020 she had acute viral hepatitis serology which came back positive for hepatitis C antibody. Serum AFP was 8. Hepatitis C RNA by PCR was negative. Hepatitis C RNA genotype was indeterminate. Mammogram in 2020 was benign. She was at [facility] emergency room on 2020 due to food poisoning. . WBC was 7.5.  Hemoglobin was 13.2, hemoglobin 13.2, platelet 224. CMP was grossly unremarkable with normal AST at 19, ALT 25. Alk phos was 92. Total bilirubin was 0.6. Colonoscopy in December 2020 by Dr. Xu Sandy showed diverticulosis and hemorrhoids. She was told that she has kidney stone. She has flu shot in 2020 and COVID-19 vaccine on January 3, 2021. Mammogram in July 2020 was benign. In June 2021 WBC was 5.2, hemoglobin 12.3, platelet 630. On September 20, 2021 she was referred for left breast mass. Bone density on September 13, 2021 showed osteopenia. Mammogram on September 15, 2021 showed : There is a new 1.5 cm mass identified in the left breast at 12 o'clock position, at posterior depth, about 12 cm from the left nipple.  This is best seen on left CC michaela slice 50 and left MLO michaela slice 33.   Left breast ultrasound 11 o'clock, 11 cm from the nipple: On ultrasound evaluation, there is a 1.1 x 1.3 x 0.5 cm oval, parallel hypoechoic mass, with microlobulated margins without any posterior acoustic shadowing or internal vascularity.  This mass corresponds to the mammographic finding.   Left axilla: Couple enlarged lymph nodes identified in the left axilla with cortical thickness of 8-9 mm.   No new suspicious masses or microcalcifications are identified in the right breast.    In June 2021 WBC was 5.2, hemoglobin 12.3, platelet 720. CMP was grossly unremarkable. Path report of left breast biopsy on 9/27/2021:  A.  Breast, left at 11 o'clock, ultrasound guided needle core biopsy:   -  INVASIVE DUCTAL CARCINOMA WITH A MARKED CHRONIC INFLAMMATORY REACTION  - Cloyce Formica FISH is negative  B.  Lymph node, left axilla, ultrasound guided needle core biopsy:   -  METASTATIC HIGH GRADE CARCINOMA IS IDENTIFIED (see comment). BREAST INVASIVE CARCINOMA SUMMARY - CORE BIOPSY   Procedure: Ultrasound guided needle core biopsy. Laterality and tumor site: Left breast at 11 o'clock. Tumor size: 7 mm in greatest dimension within biopsy material present.    Histologic type: Invasive ductal carcinoma with marked chronic inflammatory reaction. Histologic Grade ([place]): Grade 3        -Tubular score 3, Nuclear score 3, Mitosis score 2 (16/10 hpf)   Ductal Carcinoma In Situ (DCIS): Not present   Lobular Carcinoma In Situ (LCIS): Not present. Lympho-vascular invasion: Not identified. Microcalcifications: Not identified. Additional Findings: Marked chronic inflammatory reaction. Ancillary studies: ER/PgR/Her2 results from the current   biopsy are as follows:   -ER by [facility]*: Negative (0% staining). -PgR by IHC*: Negative (0% staining)   -Her2 by IHC*: Negative (Score 0)   -Her2 by FISH*:  negative  -Avg. #HER2 signals/nucleus: , Avg. #CEP17 signals/nucleus: , HER2/CEP17 ratio:     She was seen by Dr Ian Ray. I discussed about neoadjuvant chemo, AC + TC. Potential AE including hair loss, increased risk of infection, secondary malignancy and etc was discussed with her. She has left hip pain s/p hip replacement in August 2021. Bone scan on 2021-10-13 showed  Focal activity within the midthoracic and lower lumbar spine, typically degenerative. Moderate activity is seen about the left hip arthroplasty.  If surgery has not been recent, loosening or infection could be considered.    Multifocal degenerative changes are otherwise favored as outlined above. CT CAP on 10/15/2021:  1. Left breast mass with level 1 left axillary lymphadenopathy. 2. Four small pulmonary nodules measuring up to 3 mm in the left lung favoring a benign etiology.  Attention at follow-up imaging recommended. 3. Otherwise no metastatic disease in the chest, abdomen or pelvis. 4. Incidental left thyroid lobe 18 mm nodule.  Thyroid ultrasound can be considered. ECHO in October 2021 showed LVEF at 50 to 55%. She will have chemo education on October 26, 2021. She started [facility] on November 8, 2021. Cytology of FNA of thyroid nodule was reviewed.   I referred to endocrinologist.      MRI of the breast showed favorable response to therapy. She started weekly Taxol cycle 1 on January 2, 2022. .      On July 24, 2022 she came in for follow-up visit. She has seen endocrinologist.    We will start testosterone July 24, 2022. Little bit of neuropathy. I recommend to watch for the potential adverse reaction of the Taxol. She denied any nausea, vomiting or diarrhea. No fever or chills. No shortness of breath or palpitation. No headache or dizzy spell. No melena or hematochezia. Denied any dysuria or hematuria. Past Medical History  Asthma, neutropenia, hepatitis C, G6PD, thyroid disease. Surgical History  Partial hysterectomy in 2002 related to tubal pregnancy. Tonsillectomy. She had colonoscopy x2 and the last one was in 2013. She had left hip replacement    Social History  Smoking Status Never smoker  She denies any illicit drug use. She drinks alcohol occasionally. She has 2 children. Family History  Maternal great aunt had breast cancer. Review of Systems: \"Per interval history; otherwise 10 point ROS is negative. \"     Vital Signs:  BP (!) 141/62 (Site: Left Upper Arm, Position: Sitting, Cuff Size: Large Adult)   Pulse 105   Temp 97.3 °F (36.3 °C) (Temporal)   Resp 18   Ht 5' 3\" (1.6 m)   Wt 189 lb 6.4 oz (85.9 kg)   SpO2 97%   BMI 33.55 kg/m²     Physical Exam:  CONSTITUTIONAL: awake, alert, cooperative, no apparent distress   EYES: pupils equal, round and reactive to light, sclera clear and conjunctiva pallor  ENT: Normocephalic, without obvious abnormality, atraumatic  NECK: supple, symmetrical, no jugular venous distension and no carotid bruits   HEMATOLOGIC/LYMPHATIC: no cervical, supraclavicular or axillary lymphadenopathy   LUNGS: no increased work of breathing and clear to auscultation  Port to the right anterior chest wall noted. BREAST: s/p left breast biopsy.   No palpable lump to the left breast.  CARDIOVASCULAR: regular rate and rhythm, normal S1 and S2, no unremarkable. CBC in June 2020 was unremarkable. US of abdomen in June 2019 was unremarkable. In June 2021 WBC was 5.2, hemoglobin 12.3, platelet 624. I will continue to monitor CBC. 2. She completed treatment for hepatitis C in May 2019. She will follow up with GI. She is in remission. 3. Mammogram in June 2019 was benign. Colonoscopy was In 2013. Mammogram in July 2020 was benign. She had abnormal left breast mammogram and scheduled for the biopsy on September 27, 2021. She was found to have likely triple negative left breast cancer s/p biopsy, T1, N1 at least from biopsy. CT chest, abdomen pelvis, bone scan and echocardiogram in October 2021 were reviewed. She started neoadjuvant chemotherapy November 8, 2021. No grade 3 toxicity related to chemotherapy. MRI of the breast showed response to therapy. She is currently on weekly Taxol. Been tolerating Taxol well. 4. Colonoscopy in December 2020 showed diverticulosis and hemorrhoids. Repeat study in 5 years was recommended. 5.  Ultrasound of the thyroid on November 9, 2021 showed  TR 4 nodule of the left thyroid lobe corresponding to recent CT findings. FNA recommended for further evaluation based on TI-RADS criteria. Cytology report on November 18, 2021 showed  Final Cytologic Diagnosis:    Left Thyroid Fine Needle Aspirate Cytology with Cell Block:   - Atypical follicular cells of undetermined significance     She has seen endocrinologist on January 4, 2022    6. Anemia related to chemotherapy. I will continue to monitor CBC. I may consider anemic work-up if she remains anemic after completion of the chemo. We discussed about healthy diet and lifestyle. She had COVID-19 vaccine in January 2021. Return to clinic in 3  weeks or sooner. All of her questions have been answered for today. Recent imaging and labs were reviewed and discussed with the patient.

## 2022-01-03 NOTE — PROGRESS NOTES
MA Rooming Questions  Patient: Yvonne Stephens  MRN: [id number]    Date: 1/3/2022        1. Do you have any new issues?   no         2. Do you need any refills on medications?    no    3. Have you had any imaging done since your last visit? yes - MRI    4. Have you been hospitalized or seen in the emergency room since your last visit here?   no    5. Did the patient have a depression screening completed today?  Yes    PHQ-9 Total Score: 0 (1/3/2022  9:44 AM)       PHQ-9 Given to (if applicable):               PHQ-9 Score (if applicable):                     [] Positive     []  Negative              Does question #9 need addressed (if applicable)                     [] Yes    []  No               Gaurang Schmidt CMA

## 2022-01-04 ENCOUNTER — TELEPHONE (OUTPATIENT)
Dept: INTERNAL MEDICINE CLINIC | Age: 67
End: 2022-01-04

## 2022-01-04 NOTE — TELEPHONE ENCOUNTER
Patient called Singulair has not been filled at pharmacy. [id number] and patient had refill asked them to fill and asked what time will be ready. approx 2 hours. Called and notified patient and instructed her to call before leaving to  to make sure is ready.

## 2022-01-10 ENCOUNTER — HOSPITAL ENCOUNTER (OUTPATIENT)
Dept: INFUSION THERAPY | Age: 67
Discharge: HOME | End: 2022-01-10
Payer: MEDICARE

## 2022-01-10 ENCOUNTER — HOSPITAL ENCOUNTER (OUTPATIENT)
Age: 67
Setting detail: SPECIMEN
Discharge: HOME | End: 2022-01-10
Payer: MEDICARE

## 2022-01-10 VITALS
HEART RATE: 100 BPM | RESPIRATION RATE: 18 BRPM | DIASTOLIC BLOOD PRESSURE: 64 MMHG | WEIGHT: 189 LBS | TEMPERATURE: 96.4 F | SYSTOLIC BLOOD PRESSURE: 125 MMHG | HEIGHT: 63 IN | BODY MASS INDEX: 33.49 KG/M2

## 2022-01-10 DIAGNOSIS — E04.1 THYROID NODULE: Primary | ICD-10-CM

## 2022-01-10 DIAGNOSIS — Z17.1 MALIGNANT NEOPLASM OF LEFT BREAST IN FEMALE, ESTROGEN RECEPTOR NEGATIVE, UNSPECIFIED SITE OF BREAST (HCC): ICD-10-CM

## 2022-01-10 DIAGNOSIS — Z17.1 MALIGNANT NEOPLASM OF LEFT BREAST IN FEMALE, ESTROGEN RECEPTOR NEGATIVE, UNSPECIFIED SITE OF BREAST (HCC): Primary | ICD-10-CM

## 2022-01-10 DIAGNOSIS — D70.9 NEUTROPENIA, UNSPECIFIED TYPE (HCC): ICD-10-CM

## 2022-01-10 DIAGNOSIS — C50.912 MALIGNANT NEOPLASM OF LEFT BREAST IN FEMALE, ESTROGEN RECEPTOR NEGATIVE, UNSPECIFIED SITE OF BREAST (HCC): Primary | ICD-10-CM

## 2022-01-10 DIAGNOSIS — C50.912 MALIGNANT NEOPLASM OF LEFT BREAST IN FEMALE, ESTROGEN RECEPTOR NEGATIVE, UNSPECIFIED SITE OF BREAST (HCC): ICD-10-CM

## 2022-01-10 LAB
ALBUMIN SERPL-MCNC: 3.8 GM/DL (ref 3.4–5)
ALP BLD-CCNC: 83 IU/L (ref 40–129)
ALT SERPL-CCNC: 20 U/L (ref 10–40)
ANION GAP SERPL CALCULATED.3IONS-SCNC: 11 MMOL/L (ref 4–16)
AST SERPL-CCNC: 20 IU/L (ref 15–37)
BASOPHILS ABSOLUTE: 0 K/CU MM
BASOPHILS RELATIVE PERCENT: 0.9 % (ref 0–1)
BILIRUB SERPL-MCNC: 0.2 MG/DL (ref 0–1)
BUN BLDV-MCNC: 8 MG/DL (ref 6–23)
CALCIUM SERPL-MCNC: 9 MG/DL (ref 8.3–10.6)
CHLORIDE BLD-SCNC: 109 MMOL/L (ref 99–110)
CO2: 22 MMOL/L (ref 21–32)
CREAT SERPL-MCNC: 0.5 MG/DL (ref 0.6–1.1)
DIFFERENTIAL TYPE: ABNORMAL
EOSINOPHILS ABSOLUTE: 0.1 K/CU MM
EOSINOPHILS RELATIVE PERCENT: 2.2 % (ref 0–3)
GFR AFRICAN AMERICAN: >60 ML/MIN/1.73M2
GFR NON-AFRICAN AMERICAN: >60 ML/MIN/1.73M2
GLUCOSE BLD-MCNC: 116 MG/DL (ref 70–99)
HCT VFR BLD CALC: 24.8 % (ref 37–47)
HEMOGLOBIN: 7.7 GM/DL (ref 12.5–16)
LYMPHOCYTES ABSOLUTE: 1.2 K/CU MM
LYMPHOCYTES RELATIVE PERCENT: 36.5 % (ref 24–44)
MCH RBC QN AUTO: 29.6 PG (ref 27–31)
MCHC RBC AUTO-ENTMCNC: 31 % (ref 32–36)
MCV RBC AUTO: 95.4 FL (ref 78–100)
MONOCYTES ABSOLUTE: 0.5 K/CU MM
MONOCYTES RELATIVE PERCENT: 16.1 % (ref 0–4)
PDW BLD-RTO: 19 % (ref 11.7–14.9)
PLATELET # BLD: 391 K/CU MM (ref 140–440)
PMV BLD AUTO: 8.9 FL (ref 7.5–11.1)
POTASSIUM SERPL-SCNC: 4.3 MMOL/L (ref 3.5–5.1)
RBC # BLD: 2.6 M/CU MM (ref 4.2–5.4)
SEGMENTED NEUTROPHILS ABSOLUTE COUNT: 1.4 K/CU MM
SEGMENTED NEUTROPHILS RELATIVE PERCENT: 44.3 % (ref 36–66)
SODIUM BLD-SCNC: 142 MMOL/L (ref 135–145)
T3 FREE: 3.3 PG/ML (ref 2.3–4.2)
T4 FREE: 1.28 NG/DL (ref 0.9–1.8)
TOTAL PROTEIN: 6.3 GM/DL (ref 6.4–8.2)
TSH HIGH SENSITIVITY: 1.26 UIU/ML (ref 0.27–4.2)
WBC # BLD: 3.2 K/CU MM (ref 4–10.5)

## 2022-01-10 PROCEDURE — 86800 THYROGLOBULIN ANTIBODY: CPT

## 2022-01-10 PROCEDURE — 36415 COLL VENOUS BLD VENIPUNCTURE: CPT

## 2022-01-10 PROCEDURE — 84443 ASSAY THYROID STIM HORMONE: CPT

## 2022-01-10 PROCEDURE — 96413 CHEMO IV INFUSION 1 HR: CPT

## 2022-01-10 PROCEDURE — 86376 MICROSOMAL ANTIBODY EACH: CPT

## 2022-01-10 PROCEDURE — 84481 FREE ASSAY (FT-3): CPT

## 2022-01-10 PROCEDURE — 80053 COMPREHEN METABOLIC PANEL: CPT

## 2022-01-10 PROCEDURE — 82270 OCCULT BLOOD FECES: CPT

## 2022-01-10 PROCEDURE — 2500000003 HC RX 250 WO HCPCS: Performed by: INTERNAL MEDICINE

## 2022-01-10 PROCEDURE — 2580000003 HC RX 258: Performed by: INTERNAL MEDICINE

## 2022-01-10 PROCEDURE — 96375 TX/PRO/DX INJ NEW DRUG ADDON: CPT

## 2022-01-10 PROCEDURE — 85025 COMPLETE CBC W/AUTO DIFF WBC: CPT

## 2022-01-10 PROCEDURE — 84439 ASSAY OF FREE THYROXINE: CPT

## 2022-01-10 PROCEDURE — 6360000002 HC RX W HCPCS: Performed by: INTERNAL MEDICINE

## 2022-01-10 PROCEDURE — 96367 TX/PROPH/DG ADDL SEQ IV INF: CPT

## 2022-01-10 RX ORDER — SODIUM CHLORIDE 0.9 % (FLUSH) 0.9 %
5-40 SYRINGE (ML) INJECTION PRN
Status: DISCONTINUED | OUTPATIENT
Start: 2022-01-10 | End: 2022-01-11 | Stop reason: HOSPADM

## 2022-01-10 RX ORDER — SODIUM CHLORIDE 9 MG/ML
20 INJECTION, SOLUTION INTRAVENOUS ONCE
Status: DISCONTINUED | OUTPATIENT
Start: 2022-01-10 | End: 2022-01-11 | Stop reason: HOSPADM

## 2022-01-10 RX ORDER — DIPHENHYDRAMINE HYDROCHLORIDE 50 MG/ML
50 INJECTION INTRAMUSCULAR; INTRAVENOUS ONCE
Status: COMPLETED | OUTPATIENT
Start: 2022-01-10 | End: 2022-01-10

## 2022-01-10 RX ORDER — HEPARIN SODIUM (PORCINE) LOCK FLUSH IV SOLN 100 UNIT/ML 100 UNIT/ML
500 SOLUTION INTRAVENOUS PRN
Status: DISCONTINUED | OUTPATIENT
Start: 2022-01-10 | End: 2022-01-11 | Stop reason: HOSPADM

## 2022-01-10 RX ADMIN — FAMOTIDINE 20 MG: 10 INJECTION, SOLUTION INTRAVENOUS at 10:45

## 2022-01-10 RX ADMIN — DIPHENHYDRAMINE HYDROCHLORIDE 50 MG: 50 INJECTION INTRAMUSCULAR; INTRAVENOUS at 10:46

## 2022-01-10 RX ADMIN — PACLITAXEL 138 MG: 6 INJECTION, SOLUTION INTRAVENOUS at 11:30

## 2022-01-10 RX ADMIN — SODIUM CHLORIDE 20 ML/HR: 9 INJECTION, SOLUTION INTRAVENOUS at 10:45

## 2022-01-10 RX ADMIN — HEPARIN 500 UNITS: 100 SYRINGE at 12:54

## 2022-01-10 RX ADMIN — SODIUM CHLORIDE, PRESERVATIVE FREE 10 ML: 5 INJECTION INTRAVENOUS at 12:54

## 2022-01-10 RX ADMIN — DEXAMETHASONE SODIUM PHOSPHATE 10 MG: 10 INJECTION, SOLUTION INTRAMUSCULAR; INTRAVENOUS at 10:49

## 2022-01-11 ENCOUNTER — HOSPITAL ENCOUNTER (OUTPATIENT)
Age: 67
Setting detail: SPECIMEN
End: 2022-01-11
Payer: MEDICARE

## 2022-01-13 LAB — HEMOCCULT SP1 STL QL: NEGATIVE

## 2022-01-14 RX ORDER — SODIUM CHLORIDE 9 MG/ML
25 INJECTION, SOLUTION INTRAVENOUS PRN
Status: CANCELLED | OUTPATIENT
Start: 2022-01-24

## 2022-01-14 RX ORDER — METHYLPREDNISOLONE SODIUM SUCCINATE 125 MG/2ML
125 INJECTION, POWDER, LYOPHILIZED, FOR SOLUTION INTRAMUSCULAR; INTRAVENOUS ONCE
Status: CANCELLED | OUTPATIENT
Start: 2022-02-07 | End: 2022-02-08

## 2022-01-14 RX ORDER — MEPERIDINE HYDROCHLORIDE 50 MG/ML
12.5 INJECTION INTRAMUSCULAR; INTRAVENOUS; SUBCUTANEOUS ONCE
Status: CANCELLED | OUTPATIENT
Start: 2022-02-07 | End: 2022-02-08

## 2022-01-14 RX ORDER — SODIUM CHLORIDE 9 MG/ML
INJECTION, SOLUTION INTRAVENOUS CONTINUOUS
Status: CANCELLED | OUTPATIENT
Start: 2022-02-07

## 2022-01-14 RX ORDER — SODIUM CHLORIDE 9 MG/ML
INJECTION, SOLUTION INTRAVENOUS CONTINUOUS
Status: CANCELLED | OUTPATIENT
Start: 2022-01-24

## 2022-01-14 RX ORDER — SODIUM CHLORIDE 9 MG/ML
20 INJECTION, SOLUTION INTRAVENOUS ONCE
Status: CANCELLED | OUTPATIENT
Start: 2022-01-24 | End: 2022-01-25

## 2022-01-14 RX ORDER — SODIUM CHLORIDE 9 MG/ML
20 INJECTION, SOLUTION INTRAVENOUS ONCE
Status: CANCELLED | OUTPATIENT
Start: 2022-01-31 | End: 2022-02-01

## 2022-01-14 RX ORDER — HEPARIN SODIUM (PORCINE) LOCK FLUSH IV SOLN 100 UNIT/ML 100 UNIT/ML
500 SOLUTION INTRAVENOUS PRN
Status: CANCELLED | OUTPATIENT
Start: 2022-01-24

## 2022-01-14 RX ORDER — SODIUM CHLORIDE 9 MG/ML
25 INJECTION, SOLUTION INTRAVENOUS PRN
Status: CANCELLED | OUTPATIENT
Start: 2022-02-07

## 2022-01-14 RX ORDER — DIPHENHYDRAMINE HYDROCHLORIDE 50 MG/ML
50 INJECTION INTRAMUSCULAR; INTRAVENOUS ONCE
Status: CANCELLED | OUTPATIENT
Start: 2022-02-07 | End: 2022-02-08

## 2022-01-14 RX ORDER — SODIUM CHLORIDE 0.9 % (FLUSH) 0.9 %
5-40 SYRINGE (ML) INJECTION PRN
Status: CANCELLED | OUTPATIENT
Start: 2022-01-24

## 2022-01-14 RX ORDER — MEPERIDINE HYDROCHLORIDE 50 MG/ML
12.5 INJECTION INTRAMUSCULAR; INTRAVENOUS; SUBCUTANEOUS ONCE
Status: CANCELLED | OUTPATIENT
Start: 2022-01-31 | End: 2022-02-01

## 2022-01-14 RX ORDER — METHYLPREDNISOLONE SODIUM SUCCINATE 125 MG/2ML
125 INJECTION, POWDER, LYOPHILIZED, FOR SOLUTION INTRAMUSCULAR; INTRAVENOUS ONCE
Status: CANCELLED | OUTPATIENT
Start: 2022-01-31 | End: 2022-02-01

## 2022-01-14 RX ORDER — SODIUM CHLORIDE 9 MG/ML
INJECTION, SOLUTION INTRAVENOUS CONTINUOUS
Status: CANCELLED | OUTPATIENT
Start: 2022-01-31

## 2022-01-14 RX ORDER — EPINEPHRINE 1 MG/ML
0.3 INJECTION, SOLUTION, CONCENTRATE INTRAVENOUS PRN
Status: CANCELLED | OUTPATIENT
Start: 2022-01-24

## 2022-01-14 RX ORDER — EPINEPHRINE 1 MG/ML
0.3 INJECTION, SOLUTION, CONCENTRATE INTRAVENOUS PRN
Status: CANCELLED | OUTPATIENT
Start: 2022-02-07

## 2022-01-14 RX ORDER — DIPHENHYDRAMINE HYDROCHLORIDE 50 MG/ML
50 INJECTION INTRAMUSCULAR; INTRAVENOUS ONCE
Status: CANCELLED | OUTPATIENT
Start: 2022-01-24

## 2022-01-14 RX ORDER — DIPHENHYDRAMINE HYDROCHLORIDE 50 MG/ML
50 INJECTION INTRAMUSCULAR; INTRAVENOUS ONCE
Status: CANCELLED | OUTPATIENT
Start: 2022-01-31 | End: 2022-02-01

## 2022-01-14 RX ORDER — SODIUM CHLORIDE 9 MG/ML
25 INJECTION, SOLUTION INTRAVENOUS PRN
Status: CANCELLED | OUTPATIENT
Start: 2022-01-31

## 2022-01-14 RX ORDER — MEPERIDINE HYDROCHLORIDE 50 MG/ML
12.5 INJECTION INTRAMUSCULAR; INTRAVENOUS; SUBCUTANEOUS ONCE
Status: CANCELLED | OUTPATIENT
Start: 2022-01-24 | End: 2022-01-25

## 2022-01-14 RX ORDER — SODIUM CHLORIDE 0.9 % (FLUSH) 0.9 %
5-40 SYRINGE (ML) INJECTION PRN
Status: CANCELLED | OUTPATIENT
Start: 2022-02-07

## 2022-01-14 RX ORDER — DIPHENHYDRAMINE HYDROCHLORIDE 50 MG/ML
50 INJECTION INTRAMUSCULAR; INTRAVENOUS ONCE
Status: CANCELLED | OUTPATIENT
Start: 2022-01-24 | End: 2022-01-25

## 2022-01-14 RX ORDER — SODIUM CHLORIDE 0.9 % (FLUSH) 0.9 %
5-40 SYRINGE (ML) INJECTION PRN
Status: CANCELLED | OUTPATIENT
Start: 2022-01-31

## 2022-01-14 RX ORDER — METHYLPREDNISOLONE SODIUM SUCCINATE 125 MG/2ML
125 INJECTION, POWDER, LYOPHILIZED, FOR SOLUTION INTRAMUSCULAR; INTRAVENOUS ONCE
Status: CANCELLED | OUTPATIENT
Start: 2022-01-24 | End: 2022-01-25

## 2022-01-14 RX ORDER — SODIUM CHLORIDE 9 MG/ML
20 INJECTION, SOLUTION INTRAVENOUS ONCE
Status: CANCELLED | OUTPATIENT
Start: 2022-02-07 | End: 2022-02-08

## 2022-01-14 RX ORDER — DIPHENHYDRAMINE HYDROCHLORIDE 50 MG/ML
50 INJECTION INTRAMUSCULAR; INTRAVENOUS ONCE
Status: CANCELLED | OUTPATIENT
Start: 2022-02-07

## 2022-01-14 RX ORDER — HEPARIN SODIUM (PORCINE) LOCK FLUSH IV SOLN 100 UNIT/ML 100 UNIT/ML
500 SOLUTION INTRAVENOUS PRN
Status: CANCELLED | OUTPATIENT
Start: 2022-02-07

## 2022-01-14 RX ORDER — DIPHENHYDRAMINE HYDROCHLORIDE 50 MG/ML
50 INJECTION INTRAMUSCULAR; INTRAVENOUS ONCE
Status: CANCELLED | OUTPATIENT
Start: 2022-01-31

## 2022-01-14 RX ORDER — EPINEPHRINE 1 MG/ML
0.3 INJECTION, SOLUTION, CONCENTRATE INTRAVENOUS PRN
Status: CANCELLED | OUTPATIENT
Start: 2022-01-31

## 2022-01-14 RX ORDER — HEPARIN SODIUM (PORCINE) LOCK FLUSH IV SOLN 100 UNIT/ML 100 UNIT/ML
500 SOLUTION INTRAVENOUS PRN
Status: CANCELLED | OUTPATIENT
Start: 2022-01-31

## 2022-01-16 LAB
ANTITHYROGLOBULIN AB: <0.9 IU/ML (ref 0–4)
ANTITHYROID MICORSOMAL: 1.1 IU/ML (ref 0–9)

## 2022-01-18 ENCOUNTER — HOSPITAL ENCOUNTER (OUTPATIENT)
Dept: INFUSION THERAPY | Age: 67
Discharge: HOME | End: 2022-01-18
Payer: MEDICARE

## 2022-01-18 VITALS
DIASTOLIC BLOOD PRESSURE: 64 MMHG | TEMPERATURE: 96.1 F | BODY MASS INDEX: 33.49 KG/M2 | RESPIRATION RATE: 18 BRPM | OXYGEN SATURATION: 100 % | WEIGHT: 189 LBS | HEIGHT: 63 IN | HEART RATE: 102 BPM | SYSTOLIC BLOOD PRESSURE: 139 MMHG

## 2022-01-18 DIAGNOSIS — E04.1 THYROID NODULE: ICD-10-CM

## 2022-01-18 DIAGNOSIS — C50.912 MALIGNANT NEOPLASM OF LEFT BREAST IN FEMALE, ESTROGEN RECEPTOR NEGATIVE, UNSPECIFIED SITE OF BREAST (HCC): Primary | ICD-10-CM

## 2022-01-18 DIAGNOSIS — Z17.1 MALIGNANT NEOPLASM OF LEFT BREAST IN FEMALE, ESTROGEN RECEPTOR NEGATIVE, UNSPECIFIED SITE OF BREAST (HCC): Primary | ICD-10-CM

## 2022-01-18 DIAGNOSIS — D70.9 NEUTROPENIA, UNSPECIFIED TYPE (HCC): ICD-10-CM

## 2022-01-18 LAB
ALBUMIN SERPL-MCNC: 4.3 GM/DL (ref 3.4–5)
ALP BLD-CCNC: 70 IU/L (ref 40–128)
ALT SERPL-CCNC: 29 U/L (ref 10–40)
ANION GAP SERPL CALCULATED.3IONS-SCNC: 13 MMOL/L (ref 4–16)
AST SERPL-CCNC: 25 IU/L (ref 15–37)
BASOPHILS ABSOLUTE: 0 K/CU MM
BASOPHILS RELATIVE PERCENT: 0.7 % (ref 0–1)
BILIRUB SERPL-MCNC: 0.2 MG/DL (ref 0–1)
BUN BLDV-MCNC: 9 MG/DL (ref 6–23)
CALCIUM SERPL-MCNC: 9.1 MG/DL (ref 8.3–10.6)
CHLORIDE BLD-SCNC: 104 MMOL/L (ref 99–110)
CO2: 23 MMOL/L (ref 21–32)
CREAT SERPL-MCNC: 0.6 MG/DL (ref 0.6–1.1)
DIFFERENTIAL TYPE: ABNORMAL
EOSINOPHILS ABSOLUTE: 0.1 K/CU MM
EOSINOPHILS RELATIVE PERCENT: 2.4 % (ref 0–3)
GFR AFRICAN AMERICAN: >60 ML/MIN/1.73M2
GFR NON-AFRICAN AMERICAN: >60 ML/MIN/1.73M2
GLUCOSE BLD-MCNC: 145 MG/DL (ref 70–99)
HCT VFR BLD CALC: 27.3 % (ref 37–47)
HEMOGLOBIN: 8.6 GM/DL (ref 12.5–16)
LYMPHOCYTES ABSOLUTE: 0.9 K/CU MM
LYMPHOCYTES RELATIVE PERCENT: 30.3 % (ref 24–44)
MCH RBC QN AUTO: 30.3 PG (ref 27–31)
MCHC RBC AUTO-ENTMCNC: 31.5 % (ref 32–36)
MCV RBC AUTO: 96.1 FL (ref 78–100)
MONOCYTES ABSOLUTE: 0.6 K/CU MM
MONOCYTES RELATIVE PERCENT: 20.6 % (ref 0–4)
PDW BLD-RTO: 19.4 % (ref 11.7–14.9)
PLATELET # BLD: 270 K/CU MM (ref 140–440)
PMV BLD AUTO: 8.9 FL (ref 7.5–11.1)
POTASSIUM SERPL-SCNC: 4.2 MMOL/L (ref 3.5–5.1)
RBC # BLD: 2.84 M/CU MM (ref 4.2–5.4)
SEGMENTED NEUTROPHILS ABSOLUTE COUNT: 1.3 K/CU MM
SEGMENTED NEUTROPHILS RELATIVE PERCENT: 46 % (ref 36–66)
SODIUM BLD-SCNC: 140 MMOL/L (ref 135–145)
TOTAL PROTEIN: 6.7 GM/DL (ref 6.4–8.2)
WBC # BLD: 2.9 K/CU MM (ref 4–10.5)

## 2022-01-18 PROCEDURE — 2580000003 HC RX 258: Performed by: INTERNAL MEDICINE

## 2022-01-18 PROCEDURE — 96375 TX/PRO/DX INJ NEW DRUG ADDON: CPT

## 2022-01-18 PROCEDURE — 96413 CHEMO IV INFUSION 1 HR: CPT

## 2022-01-18 PROCEDURE — 2500000003 HC RX 250 WO HCPCS: Performed by: INTERNAL MEDICINE

## 2022-01-18 PROCEDURE — 80053 COMPREHEN METABOLIC PANEL: CPT

## 2022-01-18 PROCEDURE — 85025 COMPLETE CBC W/AUTO DIFF WBC: CPT

## 2022-01-18 PROCEDURE — 6360000002 HC RX W HCPCS: Performed by: INTERNAL MEDICINE

## 2022-01-18 PROCEDURE — 96367 TX/PROPH/DG ADDL SEQ IV INF: CPT

## 2022-01-18 RX ORDER — HEPARIN SODIUM (PORCINE) LOCK FLUSH IV SOLN 100 UNIT/ML 100 UNIT/ML
500 SOLUTION INTRAVENOUS PRN
Status: DISCONTINUED | OUTPATIENT
Start: 2022-01-18 | End: 2022-01-19 | Stop reason: HOSPADM

## 2022-01-18 RX ORDER — SODIUM CHLORIDE 0.9 % (FLUSH) 0.9 %
5-40 SYRINGE (ML) INJECTION PRN
Status: DISCONTINUED | OUTPATIENT
Start: 2022-01-18 | End: 2022-01-19 | Stop reason: HOSPADM

## 2022-01-18 RX ORDER — SODIUM CHLORIDE 9 MG/ML
20 INJECTION, SOLUTION INTRAVENOUS ONCE
Status: COMPLETED | OUTPATIENT
Start: 2022-01-18 | End: 2022-01-18

## 2022-01-18 RX ORDER — DIPHENHYDRAMINE HYDROCHLORIDE 50 MG/ML
50 INJECTION INTRAMUSCULAR; INTRAVENOUS ONCE
Status: COMPLETED | OUTPATIENT
Start: 2022-01-18 | End: 2022-01-18

## 2022-01-18 RX ADMIN — DIPHENHYDRAMINE HYDROCHLORIDE 50 MG: 50 INJECTION INTRAMUSCULAR; INTRAVENOUS at 11:38

## 2022-01-18 RX ADMIN — HEPARIN 500 UNITS: 100 SYRINGE at 13:58

## 2022-01-18 RX ADMIN — SODIUM CHLORIDE 20 ML/HR: 0.9 INJECTION, SOLUTION INTRAVENOUS at 11:24

## 2022-01-18 RX ADMIN — FAMOTIDINE 20 MG: 10 INJECTION, SOLUTION INTRAVENOUS at 11:37

## 2022-01-18 RX ADMIN — PACLITAXEL 138 MG: 6 INJECTION, SOLUTION INTRAVENOUS at 12:32

## 2022-01-18 RX ADMIN — SODIUM CHLORIDE, PRESERVATIVE FREE 10 ML: 5 INJECTION INTRAVENOUS at 13:58

## 2022-01-18 RX ADMIN — DEXAMETHASONE SODIUM PHOSPHATE 10 MG: 10 INJECTION, SOLUTION INTRAMUSCULAR; INTRAVENOUS at 11:42

## 2022-01-18 NOTE — PROGRESS NOTES
Pt. Here for treatment. Pt. Denies any questions or concerns. Right upper chest mediport accessed without difficulty, good blood return noted. Lab work drawn as ordered. Labs reviewed with Dr. Mac Castaneda. Treatment administered without incident. Patient's status assessed and documented appropriately. All labs and required results were also reviewed today. Treatment parameters have been reviewed. Today's treatment has been approved by the provider. Treatment orders and medication sequencing (when applicable) was verified by 2 registered nurses. The treatment plan was confirmed with the patient prior to administration, and the patient understands the need to report any treatment-related symptoms. Prior to administration, when applicable, the following 8 elements of medication administration were reviewed with 2nd Registered Nurse prior to dosing: drug name, drug dose, infusion volume when prepared in a syringe, rate of administration, expiration dates and/or times, appearance and integrity of drug(s), and rate of pump for infusion. The 5 rights of medication administration have been verified.

## 2022-01-24 ENCOUNTER — HOSPITAL ENCOUNTER (OUTPATIENT)
Dept: INFUSION THERAPY | Age: 67
Discharge: HOME | End: 2022-01-24
Payer: MEDICARE

## 2022-01-24 ENCOUNTER — OFFICE VISIT (OUTPATIENT)
Dept: ONCOLOGY | Age: 67
End: 2022-01-24
Payer: MEDICARE

## 2022-01-24 VITALS
OXYGEN SATURATION: 97 % | TEMPERATURE: 97.3 F | HEART RATE: 105 BPM | RESPIRATION RATE: 18 BRPM | DIASTOLIC BLOOD PRESSURE: 62 MMHG | BODY MASS INDEX: 33.56 KG/M2 | SYSTOLIC BLOOD PRESSURE: 141 MMHG | HEIGHT: 63 IN | WEIGHT: 189.4 LBS

## 2022-01-24 DIAGNOSIS — E04.1 THYROID NODULE: Primary | ICD-10-CM

## 2022-01-24 DIAGNOSIS — C50.912 MALIGNANT NEOPLASM OF LEFT BREAST IN FEMALE, ESTROGEN RECEPTOR NEGATIVE, UNSPECIFIED SITE OF BREAST (HCC): Primary | ICD-10-CM

## 2022-01-24 DIAGNOSIS — Z17.1 MALIGNANT NEOPLASM OF LEFT BREAST IN FEMALE, ESTROGEN RECEPTOR NEGATIVE, UNSPECIFIED SITE OF BREAST (HCC): Primary | ICD-10-CM

## 2022-01-24 DIAGNOSIS — Z17.1 MALIGNANT NEOPLASM OF LEFT BREAST IN FEMALE, ESTROGEN RECEPTOR NEGATIVE, UNSPECIFIED SITE OF BREAST (HCC): ICD-10-CM

## 2022-01-24 DIAGNOSIS — C50.912 MALIGNANT NEOPLASM OF LEFT BREAST IN FEMALE, ESTROGEN RECEPTOR NEGATIVE, UNSPECIFIED SITE OF BREAST (HCC): ICD-10-CM

## 2022-01-24 LAB
ALBUMIN SERPL-MCNC: 4.3 GM/DL (ref 3.4–5)
ALP BLD-CCNC: 65 IU/L (ref 40–128)
ALT SERPL-CCNC: 29 U/L (ref 10–40)
ANION GAP SERPL CALCULATED.3IONS-SCNC: 10 MMOL/L (ref 4–16)
AST SERPL-CCNC: 22 IU/L (ref 15–37)
BASOPHILS ABSOLUTE: 0 K/CU MM
BASOPHILS RELATIVE PERCENT: 0.8 % (ref 0–1)
BILIRUB SERPL-MCNC: 0.3 MG/DL (ref 0–1)
BUN BLDV-MCNC: 8 MG/DL (ref 6–23)
CALCIUM SERPL-MCNC: 9.4 MG/DL (ref 8.3–10.6)
CHLORIDE BLD-SCNC: 105 MMOL/L (ref 99–110)
CO2: 26 MMOL/L (ref 21–32)
CREAT SERPL-MCNC: 0.6 MG/DL (ref 0.6–1.1)
DIFFERENTIAL TYPE: ABNORMAL
EOSINOPHILS ABSOLUTE: 0.2 K/CU MM
EOSINOPHILS RELATIVE PERCENT: 4 % (ref 0–3)
GFR AFRICAN AMERICAN: >60 ML/MIN/1.73M2
GFR NON-AFRICAN AMERICAN: >60 ML/MIN/1.73M2
GLUCOSE BLD-MCNC: 129 MG/DL (ref 70–99)
HCT VFR BLD CALC: 27.5 % (ref 37–47)
HEMOGLOBIN: 8.8 GM/DL (ref 12.5–16)
LYMPHOCYTES ABSOLUTE: 1.2 K/CU MM
LYMPHOCYTES RELATIVE PERCENT: 29.9 % (ref 24–44)
MCH RBC QN AUTO: 30.8 PG (ref 27–31)
MCHC RBC AUTO-ENTMCNC: 32 % (ref 32–36)
MCV RBC AUTO: 96.2 FL (ref 78–100)
MONOCYTES ABSOLUTE: 0.4 K/CU MM
MONOCYTES RELATIVE PERCENT: 10.6 % (ref 0–4)
PDW BLD-RTO: 18.4 % (ref 11.7–14.9)
PLATELET # BLD: 283 K/CU MM (ref 140–440)
PMV BLD AUTO: 9 FL (ref 7.5–11.1)
POTASSIUM SERPL-SCNC: 3.8 MMOL/L (ref 3.5–5.1)
RBC # BLD: 2.86 M/CU MM (ref 4.2–5.4)
SEGMENTED NEUTROPHILS ABSOLUTE COUNT: 2.2 K/CU MM
SEGMENTED NEUTROPHILS RELATIVE PERCENT: 54.7 % (ref 36–66)
SODIUM BLD-SCNC: 141 MMOL/L (ref 135–145)
TOTAL PROTEIN: 7.2 GM/DL (ref 6.4–8.2)
WBC # BLD: 4 K/CU MM (ref 4–10.5)

## 2022-01-24 PROCEDURE — 2580000003 HC RX 258: Performed by: INTERNAL MEDICINE

## 2022-01-24 PROCEDURE — 96413 CHEMO IV INFUSION 1 HR: CPT

## 2022-01-24 PROCEDURE — 6360000002 HC RX W HCPCS: Performed by: INTERNAL MEDICINE

## 2022-01-24 PROCEDURE — 85025 COMPLETE CBC W/AUTO DIFF WBC: CPT

## 2022-01-24 PROCEDURE — 96375 TX/PRO/DX INJ NEW DRUG ADDON: CPT

## 2022-01-24 PROCEDURE — 2500000003 HC RX 250 WO HCPCS: Performed by: INTERNAL MEDICINE

## 2022-01-24 PROCEDURE — 99214 OFFICE O/P EST MOD 30 MIN: CPT | Performed by: INTERNAL MEDICINE

## 2022-01-24 PROCEDURE — 80053 COMPREHEN METABOLIC PANEL: CPT

## 2022-01-24 RX ORDER — DIPHENHYDRAMINE HYDROCHLORIDE 50 MG/ML
50 INJECTION INTRAMUSCULAR; INTRAVENOUS ONCE
Status: COMPLETED | OUTPATIENT
Start: 2022-01-24 | End: 2022-01-24

## 2022-01-24 RX ORDER — SODIUM CHLORIDE 9 MG/ML
20 INJECTION, SOLUTION INTRAVENOUS ONCE
Status: DISCONTINUED | OUTPATIENT
Start: 2022-01-24 | End: 2022-01-25 | Stop reason: HOSPADM

## 2022-01-24 RX ORDER — HEPARIN SODIUM (PORCINE) LOCK FLUSH IV SOLN 100 UNIT/ML 100 UNIT/ML
500 SOLUTION INTRAVENOUS PRN
Status: DISCONTINUED | OUTPATIENT
Start: 2022-01-24 | End: 2022-01-25 | Stop reason: HOSPADM

## 2022-01-24 RX ADMIN — FAMOTIDINE 20 MG: 10 INJECTION, SOLUTION INTRAVENOUS at 10:30

## 2022-01-24 RX ADMIN — DIPHENHYDRAMINE HYDROCHLORIDE 50 MG: 50 INJECTION INTRAMUSCULAR; INTRAVENOUS at 10:30

## 2022-01-24 RX ADMIN — SODIUM CHLORIDE 20 ML/HR: 9 INJECTION, SOLUTION INTRAVENOUS at 10:32

## 2022-01-24 RX ADMIN — DEXAMETHASONE SODIUM PHOSPHATE 10 MG: 10 INJECTION INTRAMUSCULAR; INTRAVENOUS at 10:35

## 2022-01-24 RX ADMIN — PACLITAXEL 138 MG: 6 INJECTION, SOLUTION, CONCENTRATE INTRAVENOUS at 11:13

## 2022-01-24 ASSESSMENT — PAIN SCALES - GENERAL: PAINLEVEL_OUTOF10: 0

## 2022-01-24 NOTE — PROGRESS NOTES
Ambulated to infusion area, here today for chemotherapy. No concerns at this time. Right chest mediport accessed, positive blood return noted. Labs drawn. Labs within defined limits. Chemo administered as ordered. Call light within reach. Tolerated infusion without incident. Discharge instructions provided. Patient RTC 01/31 for next infusion. Status appropriately assessed and documented. All required labs and results reviewed. Treatment approved by provider. Treatment orders and medications verified by 2 Registered Nurses where applicable. Treatment plan was confirmed with patient prior to administration, and educated the need to report any treatment-related symptoms    Prior to administration, when applicable, the following 8 elements of medication administration were reviewed with 2nd Registered Nurse prior to dosing: drug name, drug dose, infusion volume when prepared in a syringe, rate of administration, expiration dates and/or times, appearance and integrity of drug(s), and rate of pump for infusion. The 5 rights of medication administration have been verified.

## 2022-01-24 NOTE — PROGRESS NOTES
MA Rooming Questions  Patient: Judith Nichole  MRN: [id number]    Date: 1/24/2022        1. Do you have any new issues?   no         2. Do you need any refills on medications?    no    3. Have you had any imaging done since your last visit?   no    4. Have you been hospitalized or seen in the emergency room since your last visit here?   no    5. Did the patient have a depression screening completed today?  No    No data recorded     PHQ-9 Given to (if applicable):               PHQ-9 Score (if applicable):                     [] Positive     []  Negative              Does question #9 need addressed (if applicable)                     [] Yes    []  No               Mack Ruiz CMA

## 2022-01-31 ENCOUNTER — HOSPITAL ENCOUNTER (OUTPATIENT)
Dept: INFUSION THERAPY | Age: 67
Discharge: HOME | End: 2022-01-31
Payer: MEDICARE

## 2022-01-31 VITALS
OXYGEN SATURATION: 98 % | HEART RATE: 107 BPM | SYSTOLIC BLOOD PRESSURE: 136 MMHG | BODY MASS INDEX: 32.89 KG/M2 | HEIGHT: 63 IN | WEIGHT: 185.6 LBS | DIASTOLIC BLOOD PRESSURE: 65 MMHG | RESPIRATION RATE: 18 BRPM | TEMPERATURE: 96.6 F

## 2022-01-31 DIAGNOSIS — Z17.1 MALIGNANT NEOPLASM OF LEFT BREAST IN FEMALE, ESTROGEN RECEPTOR NEGATIVE, UNSPECIFIED SITE OF BREAST (HCC): ICD-10-CM

## 2022-01-31 DIAGNOSIS — C50.912 MALIGNANT NEOPLASM OF LEFT BREAST IN FEMALE, ESTROGEN RECEPTOR NEGATIVE, UNSPECIFIED SITE OF BREAST (HCC): ICD-10-CM

## 2022-01-31 DIAGNOSIS — D70.9 NEUTROPENIA, UNSPECIFIED TYPE (HCC): Primary | ICD-10-CM

## 2022-01-31 DIAGNOSIS — E04.1 THYROID NODULE: Primary | ICD-10-CM

## 2022-01-31 LAB
ALBUMIN SERPL-MCNC: 4 GM/DL (ref 3.4–5)
ALP BLD-CCNC: 62 IU/L (ref 40–129)
ALT SERPL-CCNC: 33 U/L (ref 10–40)
ANION GAP SERPL CALCULATED.3IONS-SCNC: 13 MMOL/L (ref 4–16)
AST SERPL-CCNC: 30 IU/L (ref 15–37)
BASOPHILS ABSOLUTE: 0 K/CU MM
BASOPHILS RELATIVE PERCENT: 0.5 % (ref 0–1)
BILIRUB SERPL-MCNC: 0.3 MG/DL (ref 0–1)
BUN BLDV-MCNC: 12 MG/DL (ref 6–23)
CALCIUM SERPL-MCNC: 9.2 MG/DL (ref 8.3–10.6)
CHLORIDE BLD-SCNC: 102 MMOL/L (ref 99–110)
CO2: 22 MMOL/L (ref 21–32)
CREAT SERPL-MCNC: 0.7 MG/DL (ref 0.6–1.1)
DIFFERENTIAL TYPE: ABNORMAL
EOSINOPHILS ABSOLUTE: 0.3 K/CU MM
EOSINOPHILS RELATIVE PERCENT: 7.7 % (ref 0–3)
GFR AFRICAN AMERICAN: >60 ML/MIN/1.73M2
GFR NON-AFRICAN AMERICAN: >60 ML/MIN/1.73M2
GLUCOSE BLD-MCNC: 157 MG/DL (ref 70–99)
HCT VFR BLD CALC: 30.3 % (ref 37–47)
HEMOGLOBIN: 9.6 GM/DL (ref 12.5–16)
LYMPHOCYTES ABSOLUTE: 0.9 K/CU MM
LYMPHOCYTES RELATIVE PERCENT: 25.7 % (ref 24–44)
MCH RBC QN AUTO: 30.2 PG (ref 27–31)
MCHC RBC AUTO-ENTMCNC: 31.7 % (ref 32–36)
MCV RBC AUTO: 95.3 FL (ref 78–100)
MONOCYTES ABSOLUTE: 0.4 K/CU MM
MONOCYTES RELATIVE PERCENT: 11.2 % (ref 0–4)
PDW BLD-RTO: 18 % (ref 11.7–14.9)
PLATELET # BLD: 274 K/CU MM (ref 140–440)
PMV BLD AUTO: 9.5 FL (ref 7.5–11.1)
POTASSIUM SERPL-SCNC: 3.9 MMOL/L (ref 3.5–5.1)
RBC # BLD: 3.18 M/CU MM (ref 4.2–5.4)
SEGMENTED NEUTROPHILS ABSOLUTE COUNT: 2 K/CU MM
SEGMENTED NEUTROPHILS RELATIVE PERCENT: 54.9 % (ref 36–66)
SODIUM BLD-SCNC: 137 MMOL/L (ref 135–145)
TOTAL PROTEIN: 7 GM/DL (ref 6.4–8.2)
WBC # BLD: 3.7 K/CU MM (ref 4–10.5)

## 2022-01-31 PROCEDURE — 85025 COMPLETE CBC W/AUTO DIFF WBC: CPT

## 2022-01-31 PROCEDURE — 2500000003 HC RX 250 WO HCPCS: Performed by: INTERNAL MEDICINE

## 2022-01-31 PROCEDURE — 80053 COMPREHEN METABOLIC PANEL: CPT

## 2022-01-31 PROCEDURE — 96413 CHEMO IV INFUSION 1 HR: CPT

## 2022-01-31 PROCEDURE — 2580000003 HC RX 258: Performed by: INTERNAL MEDICINE

## 2022-01-31 PROCEDURE — 96375 TX/PRO/DX INJ NEW DRUG ADDON: CPT

## 2022-01-31 PROCEDURE — 6360000002 HC RX W HCPCS: Performed by: INTERNAL MEDICINE

## 2022-01-31 RX ORDER — DIPHENHYDRAMINE HYDROCHLORIDE 50 MG/ML
50 INJECTION INTRAMUSCULAR; INTRAVENOUS ONCE
Status: COMPLETED | OUTPATIENT
Start: 2022-01-31 | End: 2022-01-31

## 2022-01-31 RX ORDER — HEPARIN SODIUM (PORCINE) LOCK FLUSH IV SOLN 100 UNIT/ML 100 UNIT/ML
500 SOLUTION INTRAVENOUS PRN
Status: DISCONTINUED | OUTPATIENT
Start: 2022-01-31 | End: 2022-02-01 | Stop reason: HOSPADM

## 2022-01-31 RX ORDER — SODIUM CHLORIDE 9 MG/ML
20 INJECTION, SOLUTION INTRAVENOUS ONCE
Status: COMPLETED | OUTPATIENT
Start: 2022-01-31 | End: 2022-01-31

## 2022-01-31 RX ORDER — SODIUM CHLORIDE 0.9 % (FLUSH) 0.9 %
5-40 SYRINGE (ML) INJECTION PRN
Status: DISCONTINUED | OUTPATIENT
Start: 2022-01-31 | End: 2022-02-01 | Stop reason: HOSPADM

## 2022-01-31 RX ADMIN — PACLITAXEL 138 MG: 6 INJECTION, SOLUTION INTRAVENOUS at 12:08

## 2022-01-31 RX ADMIN — HEPARIN 500 UNITS: 100 SYRINGE at 13:26

## 2022-01-31 RX ADMIN — DIPHENHYDRAMINE HYDROCHLORIDE 50 MG: 50 INJECTION INTRAMUSCULAR; INTRAVENOUS at 11:40

## 2022-01-31 RX ADMIN — DEXAMETHASONE SODIUM PHOSPHATE 10 MG: 10 INJECTION INTRAMUSCULAR; INTRAVENOUS at 11:43

## 2022-01-31 RX ADMIN — SODIUM CHLORIDE 20 ML/HR: 9 INJECTION, SOLUTION INTRAVENOUS at 11:43

## 2022-01-31 RX ADMIN — FAMOTIDINE 20 MG: 10 INJECTION, SOLUTION INTRAVENOUS at 11:40

## 2022-01-31 RX ADMIN — SODIUM CHLORIDE, PRESERVATIVE FREE 20 ML: 5 INJECTION INTRAVENOUS at 13:25

## 2022-01-31 NOTE — PROGRESS NOTES
Pt. Here for treatment, Denies any questions or concerns.  Right upper chest mediport accessed without difficulty, good blood return noted.  Lab work drawn as ordered.  Labs reviewed and treatment administered without incident. Pt tolerated well.  Discharge AVS given.      Patient's status assessed and documented appropriately.  All labs and required results were also reviewed today.  Treatment parameters have been reviewed.  Today's treatment has been approved by the provider.  Treatment orders and medication sequencing (when applicable) was verified by 2 registered nurses.  The treatment plan was confirmed with the patient prior to administration, and the patient understands the need to report any treatment-related symptoms.     Prior to administration, when applicable, the following 8 elements of medication administration were reviewed with 2nd Registered Nurse prior to dosing: drug name, drug dose, infusion volume when prepared in a syringe, rate of administration, expiration dates and/or times, appearance and integrity of drug(s), and rate of pump for infusion.  The 5 rights of medication administration have been verified.

## 2022-02-07 ENCOUNTER — HOSPITAL ENCOUNTER (OUTPATIENT)
Dept: INFUSION THERAPY | Age: 67
Discharge: HOME | End: 2022-02-07
Payer: MEDICARE

## 2022-02-07 VITALS
WEIGHT: 187 LBS | TEMPERATURE: 96.8 F | HEART RATE: 101 BPM | OXYGEN SATURATION: 98 % | HEIGHT: 63 IN | SYSTOLIC BLOOD PRESSURE: 130 MMHG | BODY MASS INDEX: 33.13 KG/M2 | DIASTOLIC BLOOD PRESSURE: 61 MMHG

## 2022-02-07 DIAGNOSIS — Z17.1 MALIGNANT NEOPLASM OF LEFT BREAST IN FEMALE, ESTROGEN RECEPTOR NEGATIVE, UNSPECIFIED SITE OF BREAST (HCC): ICD-10-CM

## 2022-02-07 DIAGNOSIS — D70.9 NEUTROPENIA, UNSPECIFIED TYPE (HCC): Primary | ICD-10-CM

## 2022-02-07 DIAGNOSIS — C50.912 MALIGNANT NEOPLASM OF LEFT BREAST IN FEMALE, ESTROGEN RECEPTOR NEGATIVE, UNSPECIFIED SITE OF BREAST (HCC): ICD-10-CM

## 2022-02-07 LAB
BASOPHILS ABSOLUTE: 0 K/CU MM
BASOPHILS RELATIVE PERCENT: 0.5 % (ref 0–1)
DIFFERENTIAL TYPE: ABNORMAL
EOSINOPHILS ABSOLUTE: 0.3 K/CU MM
EOSINOPHILS RELATIVE PERCENT: 7.9 % (ref 0–3)
HCT VFR BLD CALC: 28.6 % (ref 37–47)
HEMOGLOBIN: 9 GM/DL (ref 12.5–16)
LYMPHOCYTES ABSOLUTE: 1.1 K/CU MM
LYMPHOCYTES RELATIVE PERCENT: 28.8 % (ref 24–44)
MCH RBC QN AUTO: 30.3 PG (ref 27–31)
MCHC RBC AUTO-ENTMCNC: 31.5 % (ref 32–36)
MCV RBC AUTO: 96.3 FL (ref 78–100)
MONOCYTES ABSOLUTE: 0.4 K/CU MM
MONOCYTES RELATIVE PERCENT: 11 % (ref 0–4)
PDW BLD-RTO: 17.8 % (ref 11.7–14.9)
PLATELET # BLD: 259 K/CU MM (ref 140–440)
PMV BLD AUTO: 9 FL (ref 7.5–11.1)
RBC # BLD: 2.97 M/CU MM (ref 4.2–5.4)
SEGMENTED NEUTROPHILS ABSOLUTE COUNT: 1.9 K/CU MM
SEGMENTED NEUTROPHILS RELATIVE PERCENT: 51.8 % (ref 36–66)
WBC # BLD: 3.7 K/CU MM (ref 4–10.5)

## 2022-02-07 PROCEDURE — 96413 CHEMO IV INFUSION 1 HR: CPT

## 2022-02-07 PROCEDURE — 2580000003 HC RX 258: Performed by: INTERNAL MEDICINE

## 2022-02-07 PROCEDURE — 2500000003 HC RX 250 WO HCPCS: Performed by: INTERNAL MEDICINE

## 2022-02-07 PROCEDURE — 85025 COMPLETE CBC W/AUTO DIFF WBC: CPT

## 2022-02-07 PROCEDURE — 6360000002 HC RX W HCPCS: Performed by: INTERNAL MEDICINE

## 2022-02-07 PROCEDURE — 96375 TX/PRO/DX INJ NEW DRUG ADDON: CPT

## 2022-02-07 RX ORDER — SODIUM CHLORIDE 9 MG/ML
20 INJECTION, SOLUTION INTRAVENOUS ONCE
Status: DISCONTINUED | OUTPATIENT
Start: 2022-02-07 | End: 2022-02-08 | Stop reason: HOSPADM

## 2022-02-07 RX ORDER — DIPHENHYDRAMINE HYDROCHLORIDE 50 MG/ML
50 INJECTION INTRAMUSCULAR; INTRAVENOUS ONCE
Status: COMPLETED | OUTPATIENT
Start: 2022-02-07 | End: 2022-02-07

## 2022-02-07 RX ORDER — HEPARIN SODIUM (PORCINE) LOCK FLUSH IV SOLN 100 UNIT/ML 100 UNIT/ML
500 SOLUTION INTRAVENOUS PRN
Status: DISCONTINUED | OUTPATIENT
Start: 2022-02-07 | End: 2022-02-08 | Stop reason: HOSPADM

## 2022-02-07 RX ADMIN — HEPARIN 500 UNITS: 100 SYRINGE at 13:22

## 2022-02-07 RX ADMIN — DEXAMETHASONE SODIUM PHOSPHATE 10 MG: 10 INJECTION INTRAMUSCULAR; INTRAVENOUS at 11:44

## 2022-02-07 RX ADMIN — FAMOTIDINE 20 MG: 10 INJECTION, SOLUTION INTRAVENOUS at 11:42

## 2022-02-07 RX ADMIN — PACLITAXEL 138 MG: 6 INJECTION, SOLUTION INTRAVENOUS at 12:09

## 2022-02-07 RX ADMIN — SODIUM CHLORIDE 20 ML/HR: 9 INJECTION, SOLUTION INTRAVENOUS at 11:43

## 2022-02-07 RX ADMIN — DIPHENHYDRAMINE HYDROCHLORIDE 50 MG: 50 INJECTION INTRAMUSCULAR; INTRAVENOUS at 11:43

## 2022-02-07 ASSESSMENT — PAIN SCALES - GENERAL: PAINLEVEL_OUTOF10: 0

## 2022-02-07 NOTE — PROGRESS NOTES
Ambulated to infusion area. Here for treatment. No concerns voiced at this time. Right chest mediport accessed, labs drawn. CBC within defined parameters. Taxol administered as ordered. Call light within reach. Tolerated infusion without incident. AVS provided. Discharged in stable condition.

## 2022-02-08 ENCOUNTER — CLINICAL DOCUMENTATION (OUTPATIENT)
Dept: CASE MANAGEMENT | Age: 67
End: 2022-02-08

## 2022-02-08 NOTE — PROGRESS NOTES
Patient called in asking if she could fast for her Religious and the best way to go about doing it. Per Dr. Perry Comp - patient should not do any fasting while undergoing chemotherapy. Explained to patient that she needs to continue to get enough calories and protein in her diet and drink plenty of fluids while undergoing treatment. Patient voiced understanding and appreciated the call back.

## 2022-02-11 RX ORDER — HEPARIN SODIUM (PORCINE) LOCK FLUSH IV SOLN 100 UNIT/ML 100 UNIT/ML
500 SOLUTION INTRAVENOUS PRN
Status: CANCELLED | OUTPATIENT
Start: 2022-02-28

## 2022-02-11 RX ORDER — SODIUM CHLORIDE 0.9 % (FLUSH) 0.9 %
5-40 SYRINGE (ML) INJECTION PRN
Status: CANCELLED | OUTPATIENT
Start: 2022-02-21

## 2022-02-11 RX ORDER — SODIUM CHLORIDE 9 MG/ML
25 INJECTION, SOLUTION INTRAVENOUS PRN
Status: CANCELLED | OUTPATIENT
Start: 2022-02-21

## 2022-02-11 RX ORDER — SODIUM CHLORIDE 9 MG/ML
20 INJECTION, SOLUTION INTRAVENOUS ONCE
Status: CANCELLED | OUTPATIENT
Start: 2022-02-28 | End: 2022-02-28

## 2022-02-11 RX ORDER — HEPARIN SODIUM (PORCINE) LOCK FLUSH IV SOLN 100 UNIT/ML 100 UNIT/ML
500 SOLUTION INTRAVENOUS PRN
Status: CANCELLED | OUTPATIENT
Start: 2022-03-07

## 2022-02-11 RX ORDER — SODIUM CHLORIDE 9 MG/ML
25 INJECTION, SOLUTION INTRAVENOUS PRN
Status: CANCELLED | OUTPATIENT
Start: 2022-03-07

## 2022-02-11 RX ORDER — METHYLPREDNISOLONE SODIUM SUCCINATE 125 MG/2ML
125 INJECTION, POWDER, LYOPHILIZED, FOR SOLUTION INTRAMUSCULAR; INTRAVENOUS ONCE
Status: CANCELLED | OUTPATIENT
Start: 2022-02-14 | End: 2022-02-14

## 2022-02-11 RX ORDER — DIPHENHYDRAMINE HYDROCHLORIDE 50 MG/ML
50 INJECTION INTRAMUSCULAR; INTRAVENOUS ONCE
Status: CANCELLED | OUTPATIENT
Start: 2022-02-14

## 2022-02-11 RX ORDER — DIPHENHYDRAMINE HYDROCHLORIDE 50 MG/ML
50 INJECTION INTRAMUSCULAR; INTRAVENOUS ONCE
Status: CANCELLED | OUTPATIENT
Start: 2022-02-28

## 2022-02-11 RX ORDER — SODIUM CHLORIDE 9 MG/ML
INJECTION, SOLUTION INTRAVENOUS CONTINUOUS
Status: CANCELLED | OUTPATIENT
Start: 2022-03-07

## 2022-02-11 RX ORDER — DIPHENHYDRAMINE HYDROCHLORIDE 50 MG/ML
50 INJECTION INTRAMUSCULAR; INTRAVENOUS ONCE
Status: CANCELLED | OUTPATIENT
Start: 2022-02-21 | End: 2022-02-21

## 2022-02-11 RX ORDER — DIPHENHYDRAMINE HYDROCHLORIDE 50 MG/ML
50 INJECTION INTRAMUSCULAR; INTRAVENOUS ONCE
Status: CANCELLED | OUTPATIENT
Start: 2022-03-07

## 2022-02-11 RX ORDER — EPINEPHRINE 1 MG/ML
0.3 INJECTION, SOLUTION, CONCENTRATE INTRAVENOUS PRN
Status: CANCELLED | OUTPATIENT
Start: 2022-02-21

## 2022-02-11 RX ORDER — MEPERIDINE HYDROCHLORIDE 50 MG/ML
12.5 INJECTION INTRAMUSCULAR; INTRAVENOUS; SUBCUTANEOUS ONCE
Status: CANCELLED | OUTPATIENT
Start: 2022-03-07 | End: 2022-03-07

## 2022-02-11 RX ORDER — SODIUM CHLORIDE 9 MG/ML
20 INJECTION, SOLUTION INTRAVENOUS ONCE
Status: CANCELLED | OUTPATIENT
Start: 2022-03-07 | End: 2022-03-07

## 2022-02-11 RX ORDER — DIPHENHYDRAMINE HYDROCHLORIDE 50 MG/ML
50 INJECTION INTRAMUSCULAR; INTRAVENOUS ONCE
Status: CANCELLED | OUTPATIENT
Start: 2022-03-07 | End: 2022-03-07

## 2022-02-11 RX ORDER — EPINEPHRINE 1 MG/ML
0.3 INJECTION, SOLUTION, CONCENTRATE INTRAVENOUS PRN
Status: CANCELLED | OUTPATIENT
Start: 2022-02-14

## 2022-02-11 RX ORDER — SODIUM CHLORIDE 9 MG/ML
20 INJECTION, SOLUTION INTRAVENOUS ONCE
Status: CANCELLED | OUTPATIENT
Start: 2022-02-14 | End: 2022-02-14

## 2022-02-11 RX ORDER — MEPERIDINE HYDROCHLORIDE 50 MG/ML
12.5 INJECTION INTRAMUSCULAR; INTRAVENOUS; SUBCUTANEOUS ONCE
Status: CANCELLED | OUTPATIENT
Start: 2022-02-14 | End: 2022-02-14

## 2022-02-11 RX ORDER — SODIUM CHLORIDE 0.9 % (FLUSH) 0.9 %
5-40 SYRINGE (ML) INJECTION PRN
Status: CANCELLED | OUTPATIENT
Start: 2022-02-28

## 2022-02-11 RX ORDER — METHYLPREDNISOLONE SODIUM SUCCINATE 125 MG/2ML
125 INJECTION, POWDER, LYOPHILIZED, FOR SOLUTION INTRAMUSCULAR; INTRAVENOUS ONCE
Status: CANCELLED | OUTPATIENT
Start: 2022-02-21 | End: 2022-02-21

## 2022-02-11 RX ORDER — HEPARIN SODIUM (PORCINE) LOCK FLUSH IV SOLN 100 UNIT/ML 100 UNIT/ML
500 SOLUTION INTRAVENOUS PRN
Status: CANCELLED | OUTPATIENT
Start: 2022-02-14

## 2022-02-11 RX ORDER — SODIUM CHLORIDE 9 MG/ML
INJECTION, SOLUTION INTRAVENOUS CONTINUOUS
Status: CANCELLED | OUTPATIENT
Start: 2022-02-14

## 2022-02-11 RX ORDER — MEPERIDINE HYDROCHLORIDE 50 MG/ML
12.5 INJECTION INTRAMUSCULAR; INTRAVENOUS; SUBCUTANEOUS ONCE
Status: CANCELLED | OUTPATIENT
Start: 2022-02-21 | End: 2022-02-21

## 2022-02-11 RX ORDER — METHYLPREDNISOLONE SODIUM SUCCINATE 125 MG/2ML
125 INJECTION, POWDER, LYOPHILIZED, FOR SOLUTION INTRAMUSCULAR; INTRAVENOUS ONCE
Status: CANCELLED | OUTPATIENT
Start: 2022-02-28 | End: 2022-02-28

## 2022-02-11 RX ORDER — SODIUM CHLORIDE 9 MG/ML
INJECTION, SOLUTION INTRAVENOUS CONTINUOUS
Status: CANCELLED | OUTPATIENT
Start: 2022-02-21

## 2022-02-11 RX ORDER — SODIUM CHLORIDE 9 MG/ML
25 INJECTION, SOLUTION INTRAVENOUS PRN
Status: CANCELLED | OUTPATIENT
Start: 2022-02-28

## 2022-02-11 RX ORDER — METHYLPREDNISOLONE SODIUM SUCCINATE 125 MG/2ML
125 INJECTION, POWDER, LYOPHILIZED, FOR SOLUTION INTRAMUSCULAR; INTRAVENOUS ONCE
Status: CANCELLED | OUTPATIENT
Start: 2022-03-07 | End: 2022-03-07

## 2022-02-11 RX ORDER — DIPHENHYDRAMINE HYDROCHLORIDE 50 MG/ML
50 INJECTION INTRAMUSCULAR; INTRAVENOUS ONCE
Status: CANCELLED | OUTPATIENT
Start: 2022-02-14 | End: 2022-02-14

## 2022-02-11 RX ORDER — EPINEPHRINE 1 MG/ML
0.3 INJECTION, SOLUTION, CONCENTRATE INTRAVENOUS PRN
Status: CANCELLED | OUTPATIENT
Start: 2022-02-28

## 2022-02-11 RX ORDER — MEPERIDINE HYDROCHLORIDE 50 MG/ML
12.5 INJECTION INTRAMUSCULAR; INTRAVENOUS; SUBCUTANEOUS ONCE
Status: CANCELLED | OUTPATIENT
Start: 2022-02-28 | End: 2022-02-28

## 2022-02-11 RX ORDER — HEPARIN SODIUM (PORCINE) LOCK FLUSH IV SOLN 100 UNIT/ML 100 UNIT/ML
500 SOLUTION INTRAVENOUS PRN
Status: CANCELLED | OUTPATIENT
Start: 2022-02-21

## 2022-02-11 RX ORDER — SODIUM CHLORIDE 9 MG/ML
25 INJECTION, SOLUTION INTRAVENOUS PRN
Status: CANCELLED | OUTPATIENT
Start: 2022-02-14

## 2022-02-11 RX ORDER — DIPHENHYDRAMINE HYDROCHLORIDE 50 MG/ML
50 INJECTION INTRAMUSCULAR; INTRAVENOUS ONCE
Status: CANCELLED | OUTPATIENT
Start: 2022-02-21

## 2022-02-11 RX ORDER — DIPHENHYDRAMINE HYDROCHLORIDE 50 MG/ML
50 INJECTION INTRAMUSCULAR; INTRAVENOUS ONCE
Status: CANCELLED | OUTPATIENT
Start: 2022-02-28 | End: 2022-02-28

## 2022-02-11 RX ORDER — SODIUM CHLORIDE 0.9 % (FLUSH) 0.9 %
5-40 SYRINGE (ML) INJECTION PRN
Status: CANCELLED | OUTPATIENT
Start: 2022-03-07

## 2022-02-11 RX ORDER — EPINEPHRINE 1 MG/ML
0.3 INJECTION, SOLUTION, CONCENTRATE INTRAVENOUS PRN
Status: CANCELLED | OUTPATIENT
Start: 2022-03-07

## 2022-02-11 RX ORDER — SODIUM CHLORIDE 9 MG/ML
INJECTION, SOLUTION INTRAVENOUS CONTINUOUS
Status: CANCELLED | OUTPATIENT
Start: 2022-02-28

## 2022-02-11 RX ORDER — SODIUM CHLORIDE 9 MG/ML
20 INJECTION, SOLUTION INTRAVENOUS ONCE
Status: CANCELLED | OUTPATIENT
Start: 2022-02-21 | End: 2022-02-21

## 2022-02-11 RX ORDER — SODIUM CHLORIDE 0.9 % (FLUSH) 0.9 %
5-40 SYRINGE (ML) INJECTION PRN
Status: CANCELLED | OUTPATIENT
Start: 2022-02-14

## 2022-02-13 ENCOUNTER — HOSPITAL ENCOUNTER (EMERGENCY)
Age: 67
Discharge: HOME | End: 2022-02-13
Payer: MEDICARE

## 2022-02-13 ENCOUNTER — APPOINTMENT (OUTPATIENT)
Dept: CT IMAGING | Age: 67
End: 2022-02-13
Payer: MEDICARE

## 2022-02-13 VITALS
BODY MASS INDEX: 33.13 KG/M2 | OXYGEN SATURATION: 100 % | DIASTOLIC BLOOD PRESSURE: 78 MMHG | WEIGHT: 187 LBS | TEMPERATURE: 98.8 F | RESPIRATION RATE: 16 BRPM | SYSTOLIC BLOOD PRESSURE: 140 MMHG | HEART RATE: 107 BPM | HEIGHT: 63 IN
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
## 2022-02-13 DIAGNOSIS — R91.1 PULMONARY NODULE: ICD-10-CM

## 2022-02-13 DIAGNOSIS — E04.1 THYROID NODULE: ICD-10-CM

## 2022-02-13 DIAGNOSIS — M54.9 UPPER BACK PAIN ON LEFT SIDE: ICD-10-CM

## 2022-02-13 PROCEDURE — 71250 CT THORAX DX C-: CPT

## 2022-02-13 PROCEDURE — 6370000000 HC RX 637 (ALT 250 FOR IP): Performed by: PHYSICIAN ASSISTANT

## 2022-02-13 PROCEDURE — 99283 EMERGENCY DEPT VISIT LOW MDM: CPT

## 2022-02-13 RX ORDER — HYDROCODONE BITARTRATE AND ACETAMINOPHEN 5; 325 MG/1; MG/1
1 TABLET ORAL ONCE
Status: COMPLETED | OUTPATIENT
Start: 2022-02-13 | End: 2022-02-13

## 2022-02-13 RX ADMIN — HYDROCODONE BITARTRATE AND ACETAMINOPHEN 1 TABLET: 5; 325 TABLET ORAL at 16:21

## 2022-02-13 ASSESSMENT — PAIN DESCRIPTION - ORIENTATION: ORIENTATION: LEFT

## 2022-02-13 ASSESSMENT — PAIN DESCRIPTION - LOCATION: LOCATION: BACK;RIB CAGE

## 2022-02-13 ASSESSMENT — PAIN DESCRIPTION - PAIN TYPE: TYPE: ACUTE PAIN

## 2022-02-13 ASSESSMENT — PAIN SCALES - GENERAL
PAINLEVEL_OUTOF10: 7
PAINLEVEL_OUTOF10: 8

## 2022-02-14 ENCOUNTER — HOSPITAL ENCOUNTER (OUTPATIENT)
Dept: INFUSION THERAPY | Age: 67
Discharge: HOME | End: 2022-02-14
Payer: MEDICARE

## 2022-02-14 ENCOUNTER — OFFICE VISIT (OUTPATIENT)
Dept: ONCOLOGY | Age: 67
End: 2022-02-14
Payer: MEDICARE

## 2022-02-14 VITALS
TEMPERATURE: 95.8 F | BODY MASS INDEX: 32.99 KG/M2 | HEART RATE: 108 BPM | DIASTOLIC BLOOD PRESSURE: 66 MMHG | OXYGEN SATURATION: 100 % | HEIGHT: 63 IN | SYSTOLIC BLOOD PRESSURE: 150 MMHG | WEIGHT: 186.2 LBS

## 2022-02-14 VITALS
TEMPERATURE: 95.8 F | BODY MASS INDEX: 32.96 KG/M2 | SYSTOLIC BLOOD PRESSURE: 150 MMHG | DIASTOLIC BLOOD PRESSURE: 66 MMHG | HEART RATE: 108 BPM | OXYGEN SATURATION: 100 % | WEIGHT: 186 LBS | HEIGHT: 63 IN

## 2022-02-14 DIAGNOSIS — Z79.890 NEED FOR PROPHYLACTIC HORMONE REPLACEMENT THERAPY (POSTMENOPAUSAL): ICD-10-CM

## 2022-02-14 DIAGNOSIS — C50.912 MALIGNANT NEOPLASM OF LEFT BREAST IN FEMALE, ESTROGEN RECEPTOR NEGATIVE, UNSPECIFIED SITE OF BREAST (HCC): ICD-10-CM

## 2022-02-14 DIAGNOSIS — Z17.1 MALIGNANT NEOPLASM OF LEFT BREAST IN FEMALE, ESTROGEN RECEPTOR NEGATIVE, UNSPECIFIED SITE OF BREAST (HCC): ICD-10-CM

## 2022-02-14 DIAGNOSIS — Z17.1 MALIGNANT NEOPLASM OF LEFT BREAST IN FEMALE, ESTROGEN RECEPTOR NEGATIVE, UNSPECIFIED SITE OF BREAST (HCC): Primary | ICD-10-CM

## 2022-02-14 DIAGNOSIS — C50.912 MALIGNANT NEOPLASM OF LEFT BREAST IN FEMALE, ESTROGEN RECEPTOR NEGATIVE, UNSPECIFIED SITE OF BREAST (HCC): Primary | ICD-10-CM

## 2022-02-14 DIAGNOSIS — D70.9 NEUTROPENIA, UNSPECIFIED TYPE (HCC): Primary | ICD-10-CM

## 2022-02-14 DIAGNOSIS — E04.1 THYROID NODULE: ICD-10-CM

## 2022-02-14 LAB
BASOPHILS ABSOLUTE: 0 K/CU MM
BASOPHILS RELATIVE PERCENT: 0.3 % (ref 0–1)
DIFFERENTIAL TYPE: ABNORMAL
EOSINOPHILS ABSOLUTE: 0.1 K/CU MM
EOSINOPHILS RELATIVE PERCENT: 4.6 % (ref 0–3)
HCT VFR BLD CALC: 28.6 % (ref 37–47)
HEMOGLOBIN: 9.2 GM/DL (ref 12.5–16)
LYMPHOCYTES ABSOLUTE: 1 K/CU MM
LYMPHOCYTES RELATIVE PERCENT: 32.1 % (ref 24–44)
MCH RBC QN AUTO: 30.8 PG (ref 27–31)
MCHC RBC AUTO-ENTMCNC: 32.2 % (ref 32–36)
MCV RBC AUTO: 95.7 FL (ref 78–100)
MONOCYTES ABSOLUTE: 0.3 K/CU MM
MONOCYTES RELATIVE PERCENT: 9.8 % (ref 0–4)
PDW BLD-RTO: 17.5 % (ref 11.7–14.9)
PLATELET # BLD: 275 K/CU MM (ref 140–440)
PMV BLD AUTO: 9 FL (ref 7.5–11.1)
RBC # BLD: 2.99 M/CU MM (ref 4.2–5.4)
SEGMENTED NEUTROPHILS ABSOLUTE COUNT: 1.6 K/CU MM
SEGMENTED NEUTROPHILS RELATIVE PERCENT: 53.2 % (ref 36–66)
WBC # BLD: 3.1 K/CU MM (ref 4–10.5)

## 2022-02-14 PROCEDURE — 2580000003 HC RX 258: Performed by: INTERNAL MEDICINE

## 2022-02-14 PROCEDURE — 96413 CHEMO IV INFUSION 1 HR: CPT

## 2022-02-14 PROCEDURE — 85025 COMPLETE CBC W/AUTO DIFF WBC: CPT

## 2022-02-14 PROCEDURE — 6360000002 HC RX W HCPCS: Performed by: INTERNAL MEDICINE

## 2022-02-14 PROCEDURE — 99214 OFFICE O/P EST MOD 30 MIN: CPT | Performed by: NURSE PRACTITIONER

## 2022-02-14 PROCEDURE — 2500000003 HC RX 250 WO HCPCS: Performed by: INTERNAL MEDICINE

## 2022-02-14 PROCEDURE — 96375 TX/PRO/DX INJ NEW DRUG ADDON: CPT

## 2022-02-14 RX ORDER — DIPHENHYDRAMINE HYDROCHLORIDE 50 MG/ML
50 INJECTION INTRAMUSCULAR; INTRAVENOUS ONCE
Status: COMPLETED | OUTPATIENT
Start: 2022-02-14 | End: 2022-02-14

## 2022-02-14 RX ORDER — HEPARIN SODIUM (PORCINE) LOCK FLUSH IV SOLN 100 UNIT/ML 100 UNIT/ML
500 SOLUTION INTRAVENOUS PRN
Status: DISCONTINUED | OUTPATIENT
Start: 2022-02-14 | End: 2022-02-15 | Stop reason: HOSPADM

## 2022-02-14 RX ORDER — SODIUM CHLORIDE 9 MG/ML
20 INJECTION, SOLUTION INTRAVENOUS ONCE
Status: DISCONTINUED | OUTPATIENT
Start: 2022-02-14 | End: 2022-02-15 | Stop reason: HOSPADM

## 2022-02-14 RX ADMIN — FAMOTIDINE 20 MG: 10 INJECTION, SOLUTION INTRAVENOUS at 11:45

## 2022-02-14 RX ADMIN — DIPHENHYDRAMINE HYDROCHLORIDE 50 MG: 50 INJECTION INTRAMUSCULAR; INTRAVENOUS at 11:46

## 2022-02-14 RX ADMIN — DEXAMETHASONE SODIUM PHOSPHATE 10 MG: 10 INJECTION INTRAMUSCULAR; INTRAVENOUS at 11:48

## 2022-02-14 RX ADMIN — HEPARIN 500 UNITS: 100 SYRINGE at 13:32

## 2022-02-14 RX ADMIN — SODIUM CHLORIDE 138 MG: 9 INJECTION, SOLUTION INTRAVENOUS at 12:18

## 2022-02-14 RX ADMIN — SODIUM CHLORIDE 20 ML/HR: 9 INJECTION, SOLUTION INTRAVENOUS at 11:47

## 2022-02-14 ASSESSMENT — PAIN DESCRIPTION - ORIENTATION: ORIENTATION: LEFT

## 2022-02-14 ASSESSMENT — PAIN SCALES - GENERAL: PAINLEVEL_OUTOF10: 10

## 2022-02-14 ASSESSMENT — PAIN DESCRIPTION - LOCATION: LOCATION: BREAST;CHEST

## 2022-02-14 NOTE — ED PROVIDER NOTES
Alexis      PCP: Anjali Magallon MD    [address] [place] [address]    Chief Complaint   Patient presents with    Fall     slipped on ice on Thursday    Back Pain    Rib Pain     left sided     This patient was not evaluated by the attending physician. I have independently evaluated this patient. HPI    Becka Koehler is a 78 y.o. female who presents to the emergency department today with a fall, complaining of left upper back pain. This happened 2 or 3 days ago. She denies hitting her head. States he slipped on ice fell backward and landed onto her rib cage. Has had no trouble breathing, no coughing. Patient states that she has chemotherapy tomorrow and just wants to be sure that she has no underlying injury. Otherwise her baseline state of health. REVIEW OF SYSTEMS    General: No Fever  ENT:  No visual changes. No headache. Cardiac: No Chest Pain, No syncope  Respiratory: No cough or difficulty breathing  GI: No vomiting. No Bloody Stool or Diarrhea  : No Dysuria or Hematuria  MSKTL:  See HPI.   .  Skin:  Denies rash  Neurologic:  Denies headache, focal weakness or sensory changes   Endocrine:  Denies polyuria or polydypsia   Lymphatic:  Denies swollen glands   See HPI and nursing notes for additional information       PAST MEDICAL & SURGICAL HISTORY    Past Medical History:   Diagnosis Date    Allergic rhinitis     Asthma     last flare up 2017 - follows with PCP    G-6-PD deficiency anemia ([address]     dx this when I was having children- per pt     H/O Doppler ultrasound 04/04/18    CAROTID- Normal    Hepatitis C     dx 3/2018- for liver bx    Lumbar herniated disc     Malignant neoplasm of left breast in female, estrogen receptor negative ([address] 10/6/2021    Thyroid disease     \"not on any medication- have low thyroid level\"     Past Surgical History:   Procedure Laterality Date    COLONOSCOPY  2013    Polyps - Dr. Leanne Martinez  1990's    HYSTERECTOMY  1998    \"left one ovary \"    IR BIOPSY THYROID PERC CORE NEEDLE  11/18/2021    IR BIOPSY THYROID PERC CORE NEEDLE 11/18/2021 [facility]    TONSILLECTOMY  1970's    US BREAST BIOPSY NEEDLE ADDITIONAL LEFT Left 9/27/2021     BREAST BIOPSY NEEDLE ADDITIONAL LEFT 9/27/2021 [facility]    US BREAST NEEDLE BIOPSY LEFT Left 9/27/2021    US BREAST NEEDLE BIOPSY LEFT 9/27/2021 Fe [facility]       CURRENT MEDICATIONS    Current Outpatient Rx   Medication Sig Dispense Refill    OLANZapine (ZYPREXA) 5 MG tablet One tablet HS for four nights starting the night before chemotherapy 16 tablet 0    omeprazole (PRILOSEC) 20 MG delayed release capsule Take 1 capsule by mouth daily 30 capsule 3    dicyclomine (BENTYL) 10 MG capsule Take 1 capsule by mouth 4 times daily as needed (abdominal bloating and gas) 120 capsule 3    dexamethasone (DECADRON) 2 MG tablet Take 1 tablet by mouth daily (with breakfast) for two days AFTER chemotherapy. Take 8 mg (4 tabs) the night before 1st chemo ONLY. 28 tablet 0    ondansetron (ZOFRAN) 8 MG tablet Take 1 tablet by mouth every 8 hours as needed for Nausea or Vomiting 30 tablet 0    oxyCODONE-acetaminophen (PERCOCET) 5-325 MG per tablet take 1 tablet by mouth every 6 hours 1 weekly if needed for pain . ..  (REFER TO PRESCRIPTION NOTES).       albuterol sulfate  (90 Base) MCG/ACT inhaler Inhale 2 puffs into the lungs every 6 hours as needed for Wheezing 3 each 1    montelukast (SINGULAIR) 10 MG tablet Take 1 tablet by mouth nightly 90 tablet 1    meclizine (ANTIVERT) 25 MG tablet Take 1 tablet by mouth 3 times daily as needed for Dizziness 30 tablet 3    oxybutynin (DITROPAN XL) 10 MG extended release tablet Take 1 tablet by mouth daily 30 tablet 3    meloxicam (MOBIC) 7.5 MG tablet Take 1 tablet by mouth daily 30 tablet 3    Multiple Vitamins-Minerals (MULTIVITAMIN PO) Take by mouth         ALLERGIES Allergies   Allergen Reactions    Pcn [Penicillins] Hives       SOCIAL & FAMILY HISTORY    Social History     Socioeconomic History    Marital status: Single     Spouse name: None    Number of children: None    Years of education: None    Highest education level: None   Occupational History    None   Tobacco Use    Smoking status: Never Smoker    Smokeless tobacco: Never Used   Vaping Use    Vaping Use: Never used   Substance and Sexual Activity    Alcohol use: Yes     Comment: Occasional wine/\"average glass of wine or beer  one time per month\"    Drug use: No    Sexual activity: None   Other Topics Concern    None   Social History Narrative    None     Social Determinants of Health     Financial Resource Strain: Low Risk     Difficulty of Paying Living Expenses: Not hard at all   Food Insecurity: No Food Insecurity    Worried About Running Out of Food in the Last Year: Never true    [place] of Food in the Last Year: Never true   Transportation Needs:     Lack of Transportation (Medical): Not on file    Lack of Transportation (Non-Medical):  Not on file   Physical Activity:     Days of Exercise per Week: Not on file    Minutes of Exercise per Session: Not on file   Stress:     Feeling of Stress : Not on file   Social Connections:     Frequency of Communication with Friends and Family: Not on file    Frequency of Social Gatherings with Friends and Family: Not on file    Attends Bahai Services: Not on file    Active Member of Clubs or Organizations: Not on file    Attends Club or Organization Meetings: Not on file    Marital Status: Not on file   Intimate Partner Violence:     Fear of Current or Ex-Partner: Not on file    Emotionally Abused: Not on file    Physically Abused: Not on file    Sexually Abused: Not on file   Housing Stability:     Unable to Pay for Housing in the Last Year: Not on file    Number of [place] in the Last Year: Not on file    Unstable Housing in the Last Year: Not on file     Family History   Problem Relation Age of Onset    No Known Problems Mother     Kidney Disease Father        PHYSICAL EXAM    VITAL SIGNS: BP (!) 140/78   Pulse 107   Temp 98.8 °F (37.1 °C) (Oral)   Resp 16   Ht 5' 3\" (1.6 m)   Wt 187 lb (84.8 kg)   SpO2 100%   BMI 33.13 kg/m²    Constitutional:  Well developed, well nourished, no acute distress   Eyes: EOMI. PERRL, sclera nonicteric. Anterior chambers clear. Funduscopic exam without any gross abnormality or hemorrhages. HENT:  Atraumatic, no trismus. Ears canals and TMs free of blood or clear fluid. Nasal passages and oropharynx free of blood or clear fluid. No circumferential periorbital ecchymosis or mastoid ecchymosis noted. Neck/Lymphatics: supple, no JVD, no swollen nodes. No posterior neck tenderness. Range of motion without obvious pain or deficit. Respiratory:  Lungs Clear, no retractions   Cardiovascular:   normal rate, no murmurs no obvious paradoxical movement no bruising into the left anterior rib distribution. GI:  Soft, nontender, normal bowel sounds  Musculoskeletal:  No edema, no cervical, thoracic or lumbar midline tenderness, no step-off. No palpable swelling. Integument:  Well hydrated, no petechiae     Neurologic:    - Alert & oriented person, place, time, and situation, no speech difficulties or slurring.  - No obvious gross motor deficits  - Cranial nerves 2-12 grossly intact  - Negative meningeal signs.  - Sensation intact to light touch  - Strength 5/5 in upper and lower extremities bilaterally  - Normal finger to nose test bilaterally  - Rapid alternating movements intact  - Normal heel-shin bilaterally  - No pronator drift. - Light touch sensation intact throughout. - Upper and lower extremity DTRs 2+ bilaterally. - Gait steady and without difficulty    Psych: Pleasant affect, no hallucinations        LABS:  No results found for this visit on 02/13/22.         RADIOLOGY   CT CHEST WO CONTRAST    Result Date: 2/13/2022  EXAMINATION: CT OF THE CHEST WITHOUT CONTRAST 2/13/2022 3:17 pm TECHNIQUE: CT of the chest was performed without the administration of intravenous contrast. Multiplanar reformatted images are provided for review. Dose modulation, iterative reconstruction, and/or weight based adjustment of the mA/kV was utilized to reduce the radiation dose to as low as reasonably achievable. COMPARISON: CT chest 10/15/2021. HISTORY: ORDERING SYSTEM PROVIDED HISTORY: left posterio rib pain from fall TECHNOLOGIST PROVIDED HISTORY: Reason for exam:->left posterio rib pain from fall Decision Support Exception - unselect if not a suspected or confirmed emergency medical condition->Emergency Medical Condition (MA) Reason for Exam: left posterior rib pain from fall FINDINGS: Mediastinum: The thoracic aorta is unremarkable. The coronary arteries are unremarkable. A right chest port terminates at the cavoatrial junction. The main pulmonary artery is normal in caliber. The heart is normal in size. No pericardial effusion. The mediastinal esophagus is unremarkable. There is a 2 cm left thyroid nodule without significant change from the previous exam. No lymphadenopathy. Lungs/pleura: The central airways are patent. No pleural effusion or pneumothorax. No consolidation or interlobular septal thickening. Focal area of subpleural scarring in the right lung apex without significant change. 4 mm nodule in the left lower lobe on image 68 series 3 without significant change. 4 mm nodule in the left lung base on image 84 without significant change. 3 mm nodule in the left lower lobe on image 76 appears smaller compared to the previous study. 3 mm nodule in the right lung base on image 61 without significant change. No new pulmonary nodule identified. Upper Abdomen: Limited images through the upper abdomen are unremarkable. Soft Tissues/Bones:  The previously identified left axillary lymphadenopathy is no longer seen. No right axillary lymphadenopathy. No acute osseous or soft tissue abnormality. No suspicious lytic or blastic osseous lesion. 1. No acute abnormality in the chest.  No rib fracture identified. 2. 3 and 4 mm nodules in the bilateral lower lobes stable or decreased in size from 10/15/2021. 3. Resolution of the previously identified left axillary lymphadenopathy. 4. 2 cm left thyroid nodule without significant change. RECOMMENDATIONS: Fleischner Society guidelines for follow-up and management of incidentally detected pulmonary nodules: Multiple Solid Nodules: Nodule size less than 6 mm In a high-risk patient, optional CT at 12 months. - Low risk patients include individuals with minimal or absent history of smoking and other known risk factors. - High risk patients include individuals with a history or smoking or known risk factors. Radiology 2017 http://pubs. rsna.org/doi/full/10.1148/radiol. 0713103000 Managing Incidental Thyroid Nodule Detected at CT or MRI or US 1. Further evaluation by thyroid Ultrasound recommended for these incidental nodules: Patient Age 25 years or less - Nodule of any size Patient Age 21-27 years old - Nodule 1 cm in size or greater Patient Age 28 years or more - Nodule 1.5 cm in size or greater 2. Follow up thyroid ultrasound also recommend in these scenarios - Solitary nodule with high risk imaging features (locally invasive nodule or suspicious lymph nodes) - Heterogeneous, enlarged thyroid gland. - Increased uptake on PET 3. No further imaging is recommended in the following scenarios - Any nodule not meeting above criteria. - Those patients with limited life expectancy or significant Co-morbidities.  Note: These recommendations do not apply to pts. w/ increased risk for thyroid cancer or pts. with symptomatic thyroid disease. ________________________________________________________________ Recommendations for f/u of Incidental Thyroid Nodules (ITN) found on CT, MR, NM and Extrathyroidal US are based upon the ACR white paper and [facility] 3-tiered system for managing ITNs: J Am Robert Radiol. 2015 Feb;12(2): 143-50         ED COURSE & MEDICAL DECISION MAKING        Patient presents as above. Patient presenting 2 or 3 days after a fall. Is now complaining of left upper back pain but no trouble breathing. Denies hitting head. Is just concerned she was to make sure she has no underlying injury to the posterior rib cage because she has chemotherapy tomorrow, obtain a CT scan of the chest without contrast that was negative, there was evidence of pulmonary nodules and thyroid nodules but no signs of pulmonary contusion. Patient otherwise did well was given pain control while in the ED. We discharged home in stable condition. At this time do not feel that there is any reason that she should not be able to receive her chemotherapy tomorrow. Return to emergency Department precautions were discussed in detail with patient and include worsening pain, new symptoms. All pertinent Lab data and radiographic results reviewed with patient at bedside. The patient and/or the family were informed of the results of any tests/labs/imaging, the treatment plan, and time was allotted to answer questions. Clinical  IMPRESSION    1. Fall, initial encounter    2. Upper back pain on left side    3. Pulmonary nodule    4. Thyroid nodule        Comment: Please note this report has been produced using speech recognition software and may contain errors related to that system including errors in grammar, punctuation, and spelling, as well as words and phrases that may be inappropriate. If there are any questions or concerns please feel free to contact the dictating provider for clarification.       Lizbeth Forbes 411, PA  02/13/22 2042

## 2022-02-14 NOTE — PROGRESS NOTES
MA Rooming Questions  Patient: Jody Rasheed  MRN: [id number]    Date: 2/14/2022        1. Do you have any new issues? yes - Pt fell Thursday and went to ER         2. Do you need any refills on medications?    no    3. Have you had any imaging done since your last visit?   no    4. Have you been hospitalized or seen in the emergency room since your last visit here?   yes - ER 2/13/22    5. Did the patient have a depression screening completed today?  No    No data recorded     PHQ-9 Given to (if applicable):               PHQ-9 Score (if applicable):                     [] Positive     []  Negative              Does question #9 need addressed (if applicable)                     [] Yes    []  No               Gloria North MA

## 2022-02-14 NOTE — PROGRESS NOTES
Patient Name: Denise Arias  Patient : 1955  Patient MRN: [id number]     Primary Oncologist: Rochelle Garcia MD  Referring Provider: Juan Miguel Robin MD     Date of Service: 2022        Chief Complaint:   Chief Complaint   Patient presents with    Follow-up     She came in for follow-up visit. Patient Active Problem List:     Personal history of infectious disease     Other specified disorders of white blood cells     Neutropenia     History of hepatitis C     Allergic rhinitis     Left hip pain     G6PD deficiency     Mild persistent asthma without complication     Vertigo     HPI:   Beth Larose is a pleasant 21-year-old -American female patient who was referred for evaluation of mild neutropenia. She was diagnosed with hepatitis C in . Ex spouse was IV drug user. She was treated with Harvoni for 12 weeks and completed in May 2019. I reviewed her blood test records. On May 15, 2019 WBC was 4.1, RBC 4.36, hemoglobin 13.9, hematocrit 42.3, MCV 97, platelet 540, absolute neutrophils 1.1, absolute lymphocytes 2.6. She denies any history of frequent infection. Ultrasound of abdomen in 2018 showed normal right upper quadrant ultrasound. Liver biopsy on 2018 showed chronic hepatitis grade 2-3, stage II. Mammogram in 2019 is negative. US of abdomen in 2019 showed unremarkable study. Labs in 2019 were reviewed. She has nonspecific elevated total protein. CBC is unremarkable. Labs in 2019 were reviewed. Total protein is normal. Leukopenia is stable. She denied frequent infection. In 2020 she had acute viral hepatitis serology which came back positive for hepatitis C antibody. Serum AFP was 8. Hepatitis C RNA by PCR was negative. Hepatitis C RNA genotype was indeterminate. Mammogram in 2020 was benign. She was at [facility] emergency room on 2020 due to food poisoning. . WBC was 7.5.  Hemoglobin was 13.2, hemoglobin 13.2, platelet 224. CMP was grossly unremarkable with normal AST at 19, ALT 25. Alk phos was 92. Total bilirubin was 0.6. Colonoscopy in December 2020 by Dr. Darin Gupta showed diverticulosis and hemorrhoids. She was told that she has kidney stone. She has flu shot in 2020 and COVID-19 vaccine on January 3, 2021. Mammogram in July 2020 was benign. In June 2021 WBC was 5.2, hemoglobin 12.3, platelet 191. On September 20, 2021 she was referred for left breast mass. Bone density on September 13, 2021 showed osteopenia. Mammogram on September 15, 2021 showed : There is a new 1.5 cm mass identified in the left breast at 12 o'clock position, at posterior depth, about 12 cm from the left nipple.  This is best seen on left CC michaela slice 50 and left MLO michaela slice 24.   Left breast ultrasound 11 o'clock, 11 cm from the nipple: On ultrasound evaluation, there is a 1.1 x 1.3 x 0.5 cm oval, parallel hypoechoic mass, with microlobulated margins without any posterior acoustic shadowing or internal vascularity.  This mass corresponds to the mammographic finding.   Left axilla: Couple enlarged lymph nodes identified in the left axilla with cortical thickness of 8-9 mm.   No new suspicious masses or microcalcifications are identified in the right breast.    In June 2021 WBC was 5.2, hemoglobin 12.3, platelet 416. CMP was grossly unremarkable. Path report of left breast biopsy on 9/27/2021:  A.  Breast, left at 11 o'clock, ultrasound guided needle core biopsy:   -  INVASIVE DUCTAL CARCINOMA WITH A MARKED CHRONIC INFLAMMATORY REACTION  - Danielle Nunez FISH is negative  B.  Lymph node, left axilla, ultrasound guided needle core biopsy:   -  METASTATIC HIGH GRADE CARCINOMA IS IDENTIFIED (see comment). BREAST INVASIVE CARCINOMA SUMMARY - CORE BIOPSY   Procedure: Ultrasound guided needle core biopsy. Laterality and tumor site: Left breast at 11 o'clock. Tumor size: 7 mm in greatest dimension within biopsy material present.    Histologic type: Invasive ductal carcinoma with marked chronic inflammatory reaction. Histologic Grade ([place]): Grade 3        -Tubular score 3, Nuclear score 3, Mitosis score 2 (16/10 hpf)   Ductal Carcinoma In Situ (DCIS): Not present   Lobular Carcinoma In Situ (LCIS): Not present. Lympho-vascular invasion: Not identified. Microcalcifications: Not identified. Additional Findings: Marked chronic inflammatory reaction. Ancillary studies: ER/PgR/Her2 results from the current   biopsy are as follows:   -ER by [facility]*: Negative (0% staining). -PgR by IHC*: Negative (0% staining)   -Her2 by IHC*: Negative (Score 0)   -Her2 by FISH*:  negative  -Avg. #HER2 signals/nucleus: , Avg. #CEP17 signals/nucleus: , HER2/CEP17 ratio:     She was seen by Dr Gonzalez Corcoran. I discussed about neoadjuvant chemo, AC + TC. Potential AE including hair loss, increased risk of infection, secondary malignancy and etc was discussed with her. She has left hip pain s/p hip replacement in August 2021. Bone scan on 2021-10-13 showed  Focal activity within the midthoracic and lower lumbar spine, typically degenerative. Moderate activity is seen about the left hip arthroplasty.  If surgery has not been recent, loosening or infection could be considered.    Multifocal degenerative changes are otherwise favored as outlined above. CT CAP on 10/15/2021:  1. Left breast mass with level 1 left axillary lymphadenopathy. 2. Four small pulmonary nodules measuring up to 3 mm in the left lung favoring a benign etiology.  Attention at follow-up imaging recommended. 3. Otherwise no metastatic disease in the chest, abdomen or pelvis. 4. Incidental left thyroid lobe 18 mm nodule.  Thyroid ultrasound can be considered. ECHO in October 2021 showed LVEF at 50 to 55%. She will have chemo education on October 26, 2021. She started [facility] on November 8, 2021. Cytology of FNA of thyroid nodule was reviewed.   I referred to endocrinologist.      MRI of the breast showed favorable response to therapy. She started weekly Taxol cycle 1 on January 2, 2022. Johnny Morena She has seen endocrinologist.    Dose dense AC started 11 8/2021 and completed on 12/20/2021. She started Taxol/2022- anticipated to complete 3/21/2022    She presented for scheduled follow-up on February 14, 2022. She reports she slipped and fell yesterday for which she was seen in the emergency department. No acute findings were found. She did have CT chest which showed: Impression  1. No acute abnormality in the chest.  No rib fracture identified. 2. 3 and 4 mm nodules in the bilateral lower lobes stable or decreased in  size from 10/15/2021.  3. Resolution of the previously identified left axillary lymphadenopathy. 4. 2 cm left thyroid nodule without significant change. Slight tenderness to her left side without bruising. We did review CBC which revealed WBC 3.1, globin 9.2, platelets 239, absolute segs 1.6. We will proceed with treatment today. She has some ongoing intermittent neuropathy which she reports is mild. She has occasional nausea which is well controlled. She denies issues with her bowels. Weight appears to be stable. She has taste changes and we discussed use of melon and mouthwashes. We will start Taxol July 24, 2022. Little bit of neuropathy. I recommend to watch for the potential adverse reaction of the Taxol. She denied any  vomiting or diarrhea. No fever or chills. No shortness of breath or palpitation. No headache or dizzy spell. No melena or hematochezia. Denied any dysuria or hematuria. Past Medical History  Asthma, neutropenia, hepatitis C, G6PD, thyroid disease. Surgical History  Partial hysterectomy in 2002 related to tubal pregnancy. Tonsillectomy. She had colonoscopy x2 and the last one was in 2013. She had left hip replacement    Social History  Smoking Status Never smoker  She denies any illicit drug use. She drinks alcohol occasionally.   She has 2 2020 showed diverticulosis and hemorrhoids. Repeat study in 5 years was recommended. 5.  Ultrasound of the thyroid on November 9, 2021 showed  TR 4 nodule of the left thyroid lobe corresponding to recent CT findings. FNA recommended for further evaluation based on TI-RADS criteria. Cytology report on November 18, 2021 showed  Final Cytologic Diagnosis:    Left Thyroid Fine Needle Aspirate Cytology with Cell Block:   - Atypical follicular cells of undetermined significance     She has seen endocrinologist on January 4, 2022    6. Anemia related to chemotherapy. I will continue to monitor CBC. I may consider anemic work-up if she remains anemic after completion of the chemo. We discussed about healthy diet and lifestyle. She had COVID-19 vaccine in January 2021. Return to clinic in 3  weeks or sooner. All of her questions have been answered for today. Recent imaging and labs were reviewed and discussed with the patient.

## 2022-02-14 NOTE — PROGRESS NOTES
Ambulated to infusion area after office visit with Raz Bonner NP. Here for treatment. Reports ER visit yesterday for evaluation of left rib pain after falling on ice last week. Right chest mediport accessed, labs drawn. CBC within defined parameters. Taxol administered as ordered. Call light within reach. Tolerated infusion without incident. AVS provided. Discharged in stable condition.

## 2022-02-15 NOTE — PROGRESS NOTES
Patient Name: Dacia Berg  Patient : 1955  Patient MRN: [id number]     Primary Oncologist: Balwinder Prince MD  Referring Provider: Kelly Boyle MD     Date of Service: 3/7/2022        Chief Complaint:   Chief Complaint   Patient presents with    Follow-up     She came in for follow-up visit. Patient Active Problem List:     Personal history of infectious disease     Other specified disorders of white blood cells     Neutropenia     History of hepatitis C     Allergic rhinitis     Left hip pain     G6PD deficiency     Mild persistent asthma without complication     Vertigo     HPI:   Ayush Michaud is a pleasant 48-year-old -American female patient who was referred for evaluation of mild neutropenia. She was diagnosed with hepatitis C in . Ex spouse was IV drug user. She was treated with Harvoni for 12 weeks and completed in May 2019. I reviewed her blood test records. On May 15, 2019 WBC was 4.1, RBC 4.36, hemoglobin 13.9, hematocrit 42.3, MCV 97, platelet 203, absolute neutrophils 1.1, absolute lymphocytes 2.6. She denies any history of frequent infection. Ultrasound of abdomen in 2018 showed normal right upper quadrant ultrasound. Liver biopsy on 2018 showed chronic hepatitis grade 2-3, stage II. Mammogram in 2019 is negative. US of abdomen in 2019 showed unremarkable study. Labs in 2019 were reviewed. She has nonspecific elevated total protein. CBC is unremarkable. Labs in 2019 were reviewed. Total protein is normal. Leukopenia is stable. She denied frequent infection. In 2020 she had acute viral hepatitis serology which came back positive for hepatitis C antibody. Serum AFP was 8. Hepatitis C RNA by PCR was negative. Hepatitis C RNA genotype was indeterminate. Mammogram in 2020 was benign. She was at [facility] emergency room on 2020 due to food poisoning. . WBC was 7.5.  Hemoglobin was 13.2, hemoglobin 13.2, platelet 224. CMP was grossly unremarkable with normal AST at 19, ALT 25. Alk phos was 92. Total bilirubin was 0.6. Colonoscopy in December 2020 by Dr. Cabrera Carias showed diverticulosis and hemorrhoids. She was told that she has kidney stone. She has flu shot in 2020 and COVID-19 vaccine on January 3, 2021. Mammogram in July 2020 was benign. In June 2021 WBC was 5.2, hemoglobin 12.3, platelet 419. On September 20, 2021 she was referred for left breast mass. Bone density on September 13, 2021 showed osteopenia. Mammogram on September 15, 2021 showed : There is a new 1.5 cm mass identified in the left breast at 12 o'clock position, at posterior depth, about 12 cm from the left nipple.  This is best seen on left CC michaela slice 50 and left MLO michaela slice 94.   Left breast ultrasound 11 o'clock, 11 cm from the nipple: On ultrasound evaluation, there is a 1.1 x 1.3 x 0.5 cm oval, parallel hypoechoic mass, with microlobulated margins without any posterior acoustic shadowing or internal vascularity.  This mass corresponds to the mammographic finding.   Left axilla: Couple enlarged lymph nodes identified in the left axilla with cortical thickness of 8-9 mm.   No new suspicious masses or microcalcifications are identified in the right breast.    In June 2021 WBC was 5.2, hemoglobin 12.3, platelet 485. CMP was grossly unremarkable. Path report of left breast biopsy on 9/27/2021:  A.  Breast, left at 11 o'clock, ultrasound guided needle core biopsy:   -  INVASIVE DUCTAL CARCINOMA WITH A MARKED CHRONIC INFLAMMATORY REACTION  - Deneise Binet FISH is negative  B.  Lymph node, left axilla, ultrasound guided needle core biopsy:   -  METASTATIC HIGH GRADE CARCINOMA IS IDENTIFIED (see comment). BREAST INVASIVE CARCINOMA SUMMARY - CORE BIOPSY   Procedure: Ultrasound guided needle core biopsy. Laterality and tumor site: Left breast at 11 o'clock. Tumor size: 7 mm in greatest dimension within biopsy material present.    Histologic type: Invasive ductal carcinoma with marked chronic inflammatory reaction. Histologic Grade ([place]): Grade 3        -Tubular score 3, Nuclear score 3, Mitosis score 2 (16/10 hpf)   Ductal Carcinoma In Situ (DCIS): Not present   Lobular Carcinoma In Situ (LCIS): Not present. Lympho-vascular invasion: Not identified. Microcalcifications: Not identified. Additional Findings: Marked chronic inflammatory reaction. Ancillary studies: ER/PgR/Her2 results from the current   biopsy are as follows:   -ER by [facility]*: Negative (0% staining). -PgR by IHC*: Negative (0% staining)   -Her2 by IHC*: Negative (Score 0)   -Her2 by FISH*:  negative  -Avg. #HER2 signals/nucleus: , Avg. #CEP17 signals/nucleus: , HER2/CEP17 ratio:     She was seen by Dr Thor Jane. I discussed about neoadjuvant chemo, AC + TC. Potential AE including hair loss, increased risk of infection, secondary malignancy and etc was discussed with her. She has left hip pain s/p hip replacement in August 2021. Bone scan on 2021-10-13 showed  Focal activity within the midthoracic and lower lumbar spine, typically degenerative. Moderate activity is seen about the left hip arthroplasty.  If surgery has not been recent, loosening or infection could be considered.    Multifocal degenerative changes are otherwise favored as outlined above. CT CAP on 10/15/2021:  1. Left breast mass with level 1 left axillary lymphadenopathy. 2. Four small pulmonary nodules measuring up to 3 mm in the left lung favoring a benign etiology.  Attention at follow-up imaging recommended. 3. Otherwise no metastatic disease in the chest, abdomen or pelvis. 4. Incidental left thyroid lobe 18 mm nodule.  Thyroid ultrasound can be considered. ECHO in October 2021 showed LVEF at 50 to 55%. She will have chemo education on October 26, 2021. She started [facility] on November 8, 2021. Cytology of FNA of thyroid nodule was reviewed.   I referred to endocrinologist.      MRI of the breast showed favorable response to therapy. She started weekly Taxol cycle 1 on January 2, 2022. She has seen endocrinologist.  Dose dense AC started 11 8/2021 and completed on 12/20/2021. She started Taxol/2022- anticipated to complete 3/21/2022    On March 7, 2022 she came in for follow-up visit. I recommend to follow-up with surgeon since she almost complete the neoadjuvant Taxol. CT chest 2/2022 showed:  1. No acute abnormality in the chest.  No rib fracture identified. 2. 3 and 4 mm nodules in the bilateral lower lobes stable or decreased in size from 10/15/2021.  3. Resolution of the previously identified left axillary lymphadenopathy. 4. 2 cm left thyroid nodule without significant change. Clinically she responded to neoadjuvant chemo. She has been tolerating Taxol. Denied any worsening peripheral neuropathy. No acute pain. Denied any nausea, vomiting or diarrhea. No fever or chills. No chest pain, shortness of breath or palpitation. No headache or dizzy spell. No specific bone pain. No melena or hematochezia. Denied any dysuria or hematuria. Past Medical History  Asthma, neutropenia, hepatitis C, G6PD, thyroid disease. Surgical History  Partial hysterectomy in 2002 related to tubal pregnancy. Tonsillectomy. She had colonoscopy x2 and the last one was in 2013. She had left hip replacement    Social History  Smoking Status Never smoker  She denies any illicit drug use. She drinks alcohol occasionally. She has 2 children. Family History  Maternal great aunt had breast cancer. Review of Systems: \"Per interval history; otherwise 10 point ROS is negative. \"     Vital Signs:  BP (!) 144/82 (Site: Right Upper Arm, Position: Sitting, Cuff Size: Large Adult)   Pulse 119   Temp 96.8 °F (36 °C) (Temporal)   Resp 18   Ht 5' 3\" (1.6 m)   Wt 183 lb 9.6 oz (83.3 kg)   SpO2 97%   BMI 32.52 kg/m²     Physical Exam:  CONSTITUTIONAL: awake, alert, cooperative, no apparent distress EYES: pupils equal, round and reactive to light, sclera clear and conjunctiva pallor  ENT: Normocephalic, without obvious abnormality, atraumatic  NECK: supple, symmetrical, no jugular venous distension and no carotid bruits   HEMATOLOGIC/LYMPHATIC: no cervical, supraclavicular or axillary lymphadenopathy   LUNGS: no increased work of breathing and clear to auscultation. Port to the right anterior chest wall noted. BREAST: s/p left breast biopsy. No palpable lump to the left breast.    CARDIOVASCULAR: regular rate and rhythm, normal S1 and S2, no murmur   ABDOMEN: normal bowel sound, soft, non-distended, non-tender, no masses palpated, no hepatosplenomegaly   MUSCULOSKELETAL: full range of motion noted, tone is normal  NEUROLOGIC: Motor is grossly intact. CN II - XII grossly intact. SKIN: Normal skin color, texture, turgor and no jaundice. appears intact   EXTREMITIES: no LE edema. No cyanosis.     Labs:  Hematology:  Lab Results   Component Value Date    WBC 3.9 (L) 03/07/2022    RBC 3.07 (L) 03/07/2022    HGB 9.2 (L) 03/07/2022    HCT 29.4 (L) 03/07/2022    MCV 95.8 03/07/2022    MCH 30.0 03/07/2022    MCHC 31.3 (L) 03/07/2022    RDW 17.2 (H) 03/07/2022     03/07/2022    MPV 9.1 03/07/2022    SEGSPCT 59.2 03/07/2022    EOSRELPCT 3.6 (H) 03/07/2022    BASOPCT 0.5 03/07/2022    LYMPHOPCT 23.2 (L) 03/07/2022    MONOPCT 13.5 (H) 03/07/2022    SEGSABS 2.3 03/07/2022    EOSABS 0.1 03/07/2022    BASOSABS 0.0 03/07/2022    LYMPHSABS 0.9 03/07/2022    MONOSABS 0.5 03/07/2022    DIFFTYPE AUTOMATED DIFFERENTIAL 03/07/2022     Chemistry:  Lab Results   Component Value Date     02/28/2022    K 4.1 02/28/2022     02/28/2022    CO2 21 02/28/2022    BUN 10 02/28/2022    CREATININE 0.7 02/28/2022    GLUCOSE 164 (H) 02/28/2022    CALCIUM 9.1 02/28/2022    PROT 6.9 02/28/2022    LABALBU 4.2 02/28/2022    BILITOT 0.2 02/28/2022    ALKPHOS 63 02/28/2022    AST 24 02/28/2022    ALT 25 02/28/2022    LABGLOM >60 02/28/2022    GFRAA >60 02/28/2022    AGRATIO 1.4 06/09/2021    GLOB 3.4 06/09/2021     Lab Results   Component Value Date    TSHHS 1.260 01/10/2022    T4FREE 1.28 01/10/2022    FT3 3.3 01/10/2022     Immunology:  Lab Results   Component Value Date    PROT 6.9 02/28/2022     Coagulation Panel:  Lab Results   Component Value Date    PROTIME 12.3 11/18/2021    INR 0.95 11/18/2021    APTT 26.9 11/18/2021     Observations:  No data recorded      Assessment & Plan:  1. She was referred for mild neutropenia. She is an -American. CBC in June 2019 was unremarkable. CBC in June 2020 was unremarkable. US of abdomen in June 2019 was unremarkable. In June 2021 WBC was 5.2, hemoglobin 12.3, platelet 018. She has anemia related to chemotherapy. I will continue to monitor CBC. 2. She completed treatment for hepatitis C in May 2019. She will follow up with GI. She is in remission. 3. Mammogram in June 2019 was benign. Colonoscopy was In 2013. Mammogram in July 2020 was benign. She had abnormal left breast mammogram and scheduled for the biopsy on September 27, 2021. She was found to have likely triple negative left breast cancer s/p biopsy, T1, N1 at least from biopsy. CT chest, abdomen pelvis, bone scan and echocardiogram in October 2021 were reviewed. She started neoadjuvant chemotherapy November 8, 2021. MRI of the breast showed response to therapy. So far she has been tolerating Taxol. She will complete Taxol on March 21, 2022. I recommend to follow-up with the surgeon. 4. Colonoscopy in December 2020 showed diverticulosis and hemorrhoids. Repeat study in 5 years was recommended. 5.  Ultrasound of the thyroid on November 9, 2021 showed  TR 4 nodule of the left thyroid lobe corresponding to recent CT findings. FNA recommended for further evaluation based on TI-RADS criteria.   Cytology report on November 18, 2021 showed  Final Cytologic Diagnosis:    Left Thyroid Fine Needle Aspirate Cytology with Cell Block:   - Atypical follicular cells of undetermined significance     She has seen endocrinologist on January 4, 2022    6. Anemia related to chemotherapy. I will continue to monitor CBC. I may consider anemic work-up if she remains anemic after completion of the chemo. We discussed about healthy diet and lifestyle. She had COVID-19 vaccine in January 2021. Return to clinic in 3  weeks or sooner. All of her questions have been answered for today. Recent imaging and labs were reviewed and discussed with the patient.

## 2022-02-21 ENCOUNTER — HOSPITAL ENCOUNTER (OUTPATIENT)
Dept: INFUSION THERAPY | Age: 67
Discharge: HOME | End: 2022-02-21
Payer: MEDICARE

## 2022-02-21 VITALS
WEIGHT: 186 LBS | SYSTOLIC BLOOD PRESSURE: 140 MMHG | TEMPERATURE: 96.8 F | DIASTOLIC BLOOD PRESSURE: 66 MMHG | HEART RATE: 106 BPM | BODY MASS INDEX: 32.96 KG/M2 | OXYGEN SATURATION: 99 % | HEIGHT: 63 IN

## 2022-02-21 DIAGNOSIS — C50.912 MALIGNANT NEOPLASM OF LEFT BREAST IN FEMALE, ESTROGEN RECEPTOR NEGATIVE, UNSPECIFIED SITE OF BREAST (HCC): ICD-10-CM

## 2022-02-21 DIAGNOSIS — Z17.1 MALIGNANT NEOPLASM OF LEFT BREAST IN FEMALE, ESTROGEN RECEPTOR NEGATIVE, UNSPECIFIED SITE OF BREAST (HCC): ICD-10-CM

## 2022-02-21 DIAGNOSIS — D70.9 NEUTROPENIA, UNSPECIFIED TYPE (HCC): Primary | ICD-10-CM

## 2022-02-21 LAB
ALBUMIN SERPL-MCNC: 3.8 GM/DL (ref 3.4–5)
ALP BLD-CCNC: 70 IU/L (ref 40–129)
ALT SERPL-CCNC: 19 U/L (ref 10–40)
ANION GAP SERPL CALCULATED.3IONS-SCNC: 11 MMOL/L (ref 4–16)
AST SERPL-CCNC: 18 IU/L (ref 15–37)
BASOPHILS ABSOLUTE: 0 K/CU MM
BASOPHILS RELATIVE PERCENT: 0.3 % (ref 0–1)
BILIRUB SERPL-MCNC: 0.1 MG/DL (ref 0–1)
BUN BLDV-MCNC: 8 MG/DL (ref 6–23)
CALCIUM SERPL-MCNC: 9.1 MG/DL (ref 8.3–10.6)
CHLORIDE BLD-SCNC: 103 MMOL/L (ref 99–110)
CO2: 23 MMOL/L (ref 21–32)
CREAT SERPL-MCNC: 0.6 MG/DL (ref 0.6–1.1)
DIFFERENTIAL TYPE: ABNORMAL
EOSINOPHILS ABSOLUTE: 0.2 K/CU MM
EOSINOPHILS RELATIVE PERCENT: 4.8 % (ref 0–3)
GFR AFRICAN AMERICAN: >60 ML/MIN/1.73M2
GFR NON-AFRICAN AMERICAN: >60 ML/MIN/1.73M2
GLUCOSE BLD-MCNC: 133 MG/DL (ref 70–99)
HCT VFR BLD CALC: 29.2 % (ref 37–47)
HEMOGLOBIN: 9.3 GM/DL (ref 12.5–16)
LYMPHOCYTES ABSOLUTE: 1 K/CU MM
LYMPHOCYTES RELATIVE PERCENT: 28.9 % (ref 24–44)
MCH RBC QN AUTO: 30.5 PG (ref 27–31)
MCHC RBC AUTO-ENTMCNC: 31.8 % (ref 32–36)
MCV RBC AUTO: 95.7 FL (ref 78–100)
MONOCYTES ABSOLUTE: 0.3 K/CU MM
MONOCYTES RELATIVE PERCENT: 9.9 % (ref 0–4)
PDW BLD-RTO: 17.2 % (ref 11.7–14.9)
PLATELET # BLD: 286 K/CU MM (ref 140–440)
PMV BLD AUTO: 8.9 FL (ref 7.5–11.1)
POTASSIUM SERPL-SCNC: 4 MMOL/L (ref 3.5–5.1)
RBC # BLD: 3.05 M/CU MM (ref 4.2–5.4)
SEGMENTED NEUTROPHILS ABSOLUTE COUNT: 1.9 K/CU MM
SEGMENTED NEUTROPHILS RELATIVE PERCENT: 56.1 % (ref 36–66)
SODIUM BLD-SCNC: 137 MMOL/L (ref 135–145)
TOTAL PROTEIN: 6.7 GM/DL (ref 6.4–8.2)
WBC # BLD: 3.3 K/CU MM (ref 4–10.5)

## 2022-02-21 PROCEDURE — 85025 COMPLETE CBC W/AUTO DIFF WBC: CPT

## 2022-02-21 PROCEDURE — 96413 CHEMO IV INFUSION 1 HR: CPT

## 2022-02-21 PROCEDURE — 6360000002 HC RX W HCPCS: Performed by: INTERNAL MEDICINE

## 2022-02-21 PROCEDURE — 96375 TX/PRO/DX INJ NEW DRUG ADDON: CPT

## 2022-02-21 PROCEDURE — 2500000003 HC RX 250 WO HCPCS: Performed by: INTERNAL MEDICINE

## 2022-02-21 PROCEDURE — 2580000003 HC RX 258: Performed by: INTERNAL MEDICINE

## 2022-02-21 PROCEDURE — 80053 COMPREHEN METABOLIC PANEL: CPT

## 2022-02-21 RX ORDER — DIPHENHYDRAMINE HYDROCHLORIDE 50 MG/ML
50 INJECTION INTRAMUSCULAR; INTRAVENOUS ONCE
Status: COMPLETED | OUTPATIENT
Start: 2022-02-21 | End: 2022-02-21

## 2022-02-21 RX ORDER — SODIUM CHLORIDE 9 MG/ML
20 INJECTION, SOLUTION INTRAVENOUS ONCE
Status: COMPLETED | OUTPATIENT
Start: 2022-02-21 | End: 2022-02-21

## 2022-02-21 RX ORDER — HEPARIN SODIUM (PORCINE) LOCK FLUSH IV SOLN 100 UNIT/ML 100 UNIT/ML
500 SOLUTION INTRAVENOUS PRN
Status: DISCONTINUED | OUTPATIENT
Start: 2022-02-21 | End: 2022-02-22 | Stop reason: HOSPADM

## 2022-02-21 RX ORDER — SODIUM CHLORIDE 0.9 % (FLUSH) 0.9 %
5-40 SYRINGE (ML) INJECTION PRN
Status: DISCONTINUED | OUTPATIENT
Start: 2022-02-21 | End: 2022-02-22 | Stop reason: HOSPADM

## 2022-02-21 RX ADMIN — HEPARIN 500 UNITS: 100 SYRINGE at 14:56

## 2022-02-21 RX ADMIN — SODIUM CHLORIDE 20 ML/HR: 9 INJECTION, SOLUTION INTRAVENOUS at 12:16

## 2022-02-21 RX ADMIN — FAMOTIDINE 20 MG: 10 INJECTION, SOLUTION INTRAVENOUS at 12:14

## 2022-02-21 RX ADMIN — DIPHENHYDRAMINE HYDROCHLORIDE 50 MG: 50 INJECTION INTRAMUSCULAR; INTRAVENOUS at 12:13

## 2022-02-21 RX ADMIN — PACLITAXEL 138 MG: 6 INJECTION, SOLUTION INTRAVENOUS at 13:36

## 2022-02-21 RX ADMIN — SODIUM CHLORIDE, PRESERVATIVE FREE 10 ML: 5 INJECTION INTRAVENOUS at 14:56

## 2022-02-21 RX ADMIN — DEXAMETHASONE SODIUM PHOSPHATE 10 MG: 10 INJECTION INTRAMUSCULAR; INTRAVENOUS at 12:17

## 2022-02-21 ASSESSMENT — PAIN SCALES - GENERAL: PAINLEVEL_OUTOF10: 0

## 2022-02-21 NOTE — PROGRESS NOTES
Pt. Here for treatment, Has no questions or concerns for  At this time. Right upper chest mediport accessed without difficulty, good blood return noted.  Lab work drawn as ordered. Nate Dears reviewed and treatment administered without incident. Pt tolerated well.  Discharge AVS given.      Patient's status assessed and documented appropriately.  All labs and required results were also reviewed today.  Treatment parameters have been reviewed.  Today's treatment has been approved by the provider.  Treatment orders and medication sequencing (when applicable) was verified by 2 registered nurses.  The treatment plan was confirmed with the patient prior to administration, and the patient understands the need to report any treatment-related symptoms.     Prior to administration, when applicable, the following 8 elements of medication administration were reviewed with 2nd Registered Nurse prior to dosing: drug name, drug dose, infusion volume when prepared in a syringe, rate of administration, expiration dates and/or times, appearance and integrity of drug(s), and rate of pump for infusion.  The 5 rights of medication administration have been verified.

## 2022-02-28 ENCOUNTER — HOSPITAL ENCOUNTER (OUTPATIENT)
Dept: INFUSION THERAPY | Age: 67
Discharge: HOME | End: 2022-02-28
Payer: MEDICARE

## 2022-02-28 VITALS
TEMPERATURE: 97.1 F | DIASTOLIC BLOOD PRESSURE: 63 MMHG | HEIGHT: 63 IN | HEART RATE: 105 BPM | OXYGEN SATURATION: 99 % | WEIGHT: 183.6 LBS | BODY MASS INDEX: 32.53 KG/M2 | SYSTOLIC BLOOD PRESSURE: 118 MMHG

## 2022-02-28 DIAGNOSIS — C50.912 MALIGNANT NEOPLASM OF LEFT BREAST IN FEMALE, ESTROGEN RECEPTOR NEGATIVE, UNSPECIFIED SITE OF BREAST (HCC): ICD-10-CM

## 2022-02-28 DIAGNOSIS — Z17.1 MALIGNANT NEOPLASM OF LEFT BREAST IN FEMALE, ESTROGEN RECEPTOR NEGATIVE, UNSPECIFIED SITE OF BREAST (HCC): ICD-10-CM

## 2022-02-28 DIAGNOSIS — D70.9 NEUTROPENIA, UNSPECIFIED TYPE (HCC): Primary | ICD-10-CM

## 2022-02-28 LAB
ALBUMIN SERPL-MCNC: 4.2 GM/DL (ref 3.4–5)
ALP BLD-CCNC: 63 IU/L (ref 40–128)
ALT SERPL-CCNC: 25 U/L (ref 10–40)
ANION GAP SERPL CALCULATED.3IONS-SCNC: 14 MMOL/L (ref 4–16)
AST SERPL-CCNC: 24 IU/L (ref 15–37)
BASOPHILS ABSOLUTE: 0 K/CU MM
BASOPHILS RELATIVE PERCENT: 0.5 % (ref 0–1)
BILIRUB SERPL-MCNC: 0.2 MG/DL (ref 0–1)
BUN BLDV-MCNC: 10 MG/DL (ref 6–23)
CALCIUM SERPL-MCNC: 9.1 MG/DL (ref 8.3–10.6)
CHLORIDE BLD-SCNC: 101 MMOL/L (ref 99–110)
CO2: 21 MMOL/L (ref 21–32)
CREAT SERPL-MCNC: 0.7 MG/DL (ref 0.6–1.1)
DIFFERENTIAL TYPE: ABNORMAL
EOSINOPHILS ABSOLUTE: 0.2 K/CU MM
EOSINOPHILS RELATIVE PERCENT: 4.8 % (ref 0–3)
GFR AFRICAN AMERICAN: >60 ML/MIN/1.73M2
GFR NON-AFRICAN AMERICAN: >60 ML/MIN/1.73M2
GLUCOSE BLD-MCNC: 164 MG/DL (ref 70–99)
HCT VFR BLD CALC: 29.8 % (ref 37–47)
HEMOGLOBIN: 9.5 GM/DL (ref 12.5–16)
LYMPHOCYTES ABSOLUTE: 1.3 K/CU MM
LYMPHOCYTES RELATIVE PERCENT: 33.3 % (ref 24–44)
MCH RBC QN AUTO: 30.4 PG (ref 27–31)
MCHC RBC AUTO-ENTMCNC: 31.9 % (ref 32–36)
MCV RBC AUTO: 95.2 FL (ref 78–100)
MONOCYTES ABSOLUTE: 0.4 K/CU MM
MONOCYTES RELATIVE PERCENT: 11.1 % (ref 0–4)
PDW BLD-RTO: 16.9 % (ref 11.7–14.9)
PLATELET # BLD: 287 K/CU MM (ref 140–440)
PMV BLD AUTO: 9.5 FL (ref 7.5–11.1)
POTASSIUM SERPL-SCNC: 4.1 MMOL/L (ref 3.5–5.1)
RBC # BLD: 3.13 M/CU MM (ref 4.2–5.4)
SEGMENTED NEUTROPHILS ABSOLUTE COUNT: 1.9 K/CU MM
SEGMENTED NEUTROPHILS RELATIVE PERCENT: 50.3 % (ref 36–66)
SODIUM BLD-SCNC: 136 MMOL/L (ref 135–145)
TOTAL PROTEIN: 6.9 GM/DL (ref 6.4–8.2)
WBC # BLD: 3.8 K/CU MM (ref 4–10.5)

## 2022-02-28 PROCEDURE — 2500000003 HC RX 250 WO HCPCS: Performed by: INTERNAL MEDICINE

## 2022-02-28 PROCEDURE — 96413 CHEMO IV INFUSION 1 HR: CPT

## 2022-02-28 PROCEDURE — 85025 COMPLETE CBC W/AUTO DIFF WBC: CPT

## 2022-02-28 PROCEDURE — 80053 COMPREHEN METABOLIC PANEL: CPT

## 2022-02-28 PROCEDURE — 96375 TX/PRO/DX INJ NEW DRUG ADDON: CPT

## 2022-02-28 PROCEDURE — 6360000002 HC RX W HCPCS: Performed by: INTERNAL MEDICINE

## 2022-02-28 PROCEDURE — 2580000003 HC RX 258: Performed by: INTERNAL MEDICINE

## 2022-02-28 RX ORDER — SODIUM CHLORIDE 9 MG/ML
20 INJECTION, SOLUTION INTRAVENOUS ONCE
Status: DISCONTINUED | OUTPATIENT
Start: 2022-02-28 | End: 2022-03-01 | Stop reason: HOSPADM

## 2022-02-28 RX ORDER — DIPHENHYDRAMINE HYDROCHLORIDE 50 MG/ML
50 INJECTION INTRAMUSCULAR; INTRAVENOUS ONCE
Status: COMPLETED | OUTPATIENT
Start: 2022-02-28 | End: 2022-02-28

## 2022-02-28 RX ORDER — HEPARIN SODIUM (PORCINE) LOCK FLUSH IV SOLN 100 UNIT/ML 100 UNIT/ML
500 SOLUTION INTRAVENOUS PRN
Status: DISCONTINUED | OUTPATIENT
Start: 2022-02-28 | End: 2022-03-01 | Stop reason: HOSPADM

## 2022-02-28 RX ADMIN — DEXAMETHASONE SODIUM PHOSPHATE 10 MG: 10 INJECTION INTRAMUSCULAR; INTRAVENOUS at 11:26

## 2022-02-28 RX ADMIN — DIPHENHYDRAMINE HYDROCHLORIDE 50 MG: 50 INJECTION INTRAMUSCULAR; INTRAVENOUS at 11:21

## 2022-02-28 RX ADMIN — HEPARIN 500 UNITS: 100 SYRINGE at 13:09

## 2022-02-28 RX ADMIN — PACLITAXEL 138 MG: 6 INJECTION, SOLUTION INTRAVENOUS at 11:56

## 2022-02-28 RX ADMIN — SODIUM CHLORIDE 20 ML/HR: 9 INJECTION, SOLUTION INTRAVENOUS at 11:55

## 2022-02-28 RX ADMIN — FAMOTIDINE 20 MG: 10 INJECTION, SOLUTION INTRAVENOUS at 11:22

## 2022-02-28 NOTE — PROGRESS NOTES
Ambulated to infusion area, here today for chemotherapy. Patient reports that she hasn't been able to taste anything. Patient reported she is using biotene, but has not tried the baking soda, salt, and water. Discussed using this mouthwash 4 times daily, once in the morning and then after meals. Right chest mediport accessed, positive blood return noted. Labs drawn. Labs within defined limits. Chemo administered as ordered. Call light within reach. Tolerated infusion without incident. Discharge instructions provided. Patient RTC 03/07 for next infusion and OV with Dr. Mac Goncalves. Status appropriately assessed and documented. All required labs and results reviewed. Treatment approved by provider. Treatment orders and medications verified by 2 Registered Nurses where applicable. Treatment plan was confirmed with patient prior to administration, and educated the need to report any treatment-related symptoms      Prior to administration, when applicable, the following 8 elements of medication administration were reviewed with 2nd Registered Nurse prior to dosing: drug name, drug dose, infusion volume when prepared in a syringe, rate of administration, expiration dates and/or times, appearance and integrity of drug(s), and rate of pump for infusion. The 5 rights of medication administration have been verified.

## 2022-03-07 ENCOUNTER — OFFICE VISIT (OUTPATIENT)
Dept: ONCOLOGY | Age: 67
End: 2022-03-07
Payer: MEDICARE

## 2022-03-07 ENCOUNTER — HOSPITAL ENCOUNTER (OUTPATIENT)
Dept: INFUSION THERAPY | Age: 67
Discharge: HOME | End: 2022-03-07
Payer: MEDICARE

## 2022-03-07 VITALS
WEIGHT: 183.6 LBS | OXYGEN SATURATION: 97 % | HEIGHT: 63 IN | BODY MASS INDEX: 32.53 KG/M2 | DIASTOLIC BLOOD PRESSURE: 82 MMHG | RESPIRATION RATE: 18 BRPM | HEART RATE: 119 BPM | SYSTOLIC BLOOD PRESSURE: 144 MMHG | TEMPERATURE: 96.8 F

## 2022-03-07 DIAGNOSIS — C50.912 MALIGNANT NEOPLASM OF LEFT BREAST IN FEMALE, ESTROGEN RECEPTOR NEGATIVE, UNSPECIFIED SITE OF BREAST (HCC): Primary | ICD-10-CM

## 2022-03-07 DIAGNOSIS — Z17.1 MALIGNANT NEOPLASM OF LEFT BREAST IN FEMALE, ESTROGEN RECEPTOR NEGATIVE, UNSPECIFIED SITE OF BREAST (HCC): ICD-10-CM

## 2022-03-07 DIAGNOSIS — D70.9 NEUTROPENIA, UNSPECIFIED TYPE (HCC): Primary | ICD-10-CM

## 2022-03-07 DIAGNOSIS — Z17.1 MALIGNANT NEOPLASM OF LEFT BREAST IN FEMALE, ESTROGEN RECEPTOR NEGATIVE, UNSPECIFIED SITE OF BREAST (HCC): Primary | ICD-10-CM

## 2022-03-07 DIAGNOSIS — C50.912 MALIGNANT NEOPLASM OF LEFT BREAST IN FEMALE, ESTROGEN RECEPTOR NEGATIVE, UNSPECIFIED SITE OF BREAST (HCC): ICD-10-CM

## 2022-03-07 LAB
BASOPHILS ABSOLUTE: 0 K/CU MM
BASOPHILS RELATIVE PERCENT: 0.5 % (ref 0–1)
DIFFERENTIAL TYPE: ABNORMAL
EOSINOPHILS ABSOLUTE: 0.1 K/CU MM
EOSINOPHILS RELATIVE PERCENT: 3.6 % (ref 0–3)
HCT VFR BLD CALC: 29.4 % (ref 37–47)
HEMOGLOBIN: 9.2 GM/DL (ref 12.5–16)
LYMPHOCYTES ABSOLUTE: 0.9 K/CU MM
LYMPHOCYTES RELATIVE PERCENT: 23.2 % (ref 24–44)
MCH RBC QN AUTO: 30 PG (ref 27–31)
MCHC RBC AUTO-ENTMCNC: 31.3 % (ref 32–36)
MCV RBC AUTO: 95.8 FL (ref 78–100)
MONOCYTES ABSOLUTE: 0.5 K/CU MM
MONOCYTES RELATIVE PERCENT: 13.5 % (ref 0–4)
PDW BLD-RTO: 17.2 % (ref 11.7–14.9)
PLATELET # BLD: 304 K/CU MM (ref 140–440)
PMV BLD AUTO: 9.1 FL (ref 7.5–11.1)
RBC # BLD: 3.07 M/CU MM (ref 4.2–5.4)
SEGMENTED NEUTROPHILS ABSOLUTE COUNT: 2.3 K/CU MM
SEGMENTED NEUTROPHILS RELATIVE PERCENT: 59.2 % (ref 36–66)
WBC # BLD: 3.9 K/CU MM (ref 4–10.5)

## 2022-03-07 PROCEDURE — 85025 COMPLETE CBC W/AUTO DIFF WBC: CPT

## 2022-03-07 PROCEDURE — 96375 TX/PRO/DX INJ NEW DRUG ADDON: CPT

## 2022-03-07 PROCEDURE — 6360000002 HC RX W HCPCS: Performed by: INTERNAL MEDICINE

## 2022-03-07 PROCEDURE — 99213 OFFICE O/P EST LOW 20 MIN: CPT | Performed by: INTERNAL MEDICINE

## 2022-03-07 PROCEDURE — 96413 CHEMO IV INFUSION 1 HR: CPT

## 2022-03-07 PROCEDURE — 2580000003 HC RX 258: Performed by: INTERNAL MEDICINE

## 2022-03-07 PROCEDURE — 2500000003 HC RX 250 WO HCPCS: Performed by: INTERNAL MEDICINE

## 2022-03-07 RX ORDER — DIPHENHYDRAMINE HYDROCHLORIDE 50 MG/ML
50 INJECTION INTRAMUSCULAR; INTRAVENOUS ONCE
Status: CANCELLED | OUTPATIENT
Start: 2022-03-14

## 2022-03-07 RX ORDER — MEPERIDINE HYDROCHLORIDE 50 MG/ML
12.5 INJECTION INTRAMUSCULAR; INTRAVENOUS; SUBCUTANEOUS ONCE
Status: CANCELLED | OUTPATIENT
Start: 2022-03-14 | End: 2022-03-14

## 2022-03-07 RX ORDER — SODIUM CHLORIDE 9 MG/ML
INJECTION, SOLUTION INTRAVENOUS CONTINUOUS
Status: CANCELLED | OUTPATIENT
Start: 2022-03-14

## 2022-03-07 RX ORDER — DIPHENHYDRAMINE HYDROCHLORIDE 50 MG/ML
50 INJECTION INTRAMUSCULAR; INTRAVENOUS ONCE
Status: CANCELLED | OUTPATIENT
Start: 2022-03-14 | End: 2022-03-14

## 2022-03-07 RX ORDER — SODIUM CHLORIDE 9 MG/ML
25 INJECTION, SOLUTION INTRAVENOUS PRN
Status: CANCELLED | OUTPATIENT
Start: 2022-03-14

## 2022-03-07 RX ORDER — SODIUM CHLORIDE 9 MG/ML
25 INJECTION, SOLUTION INTRAVENOUS PRN
Status: CANCELLED | OUTPATIENT
Start: 2022-03-21

## 2022-03-07 RX ORDER — MEPERIDINE HYDROCHLORIDE 50 MG/ML
12.5 INJECTION INTRAMUSCULAR; INTRAVENOUS; SUBCUTANEOUS ONCE
Status: CANCELLED | OUTPATIENT
Start: 2022-03-21 | End: 2022-03-21

## 2022-03-07 RX ORDER — DIPHENHYDRAMINE HYDROCHLORIDE 50 MG/ML
50 INJECTION INTRAMUSCULAR; INTRAVENOUS ONCE
Status: CANCELLED | OUTPATIENT
Start: 2022-03-21

## 2022-03-07 RX ORDER — EPINEPHRINE 1 MG/ML
0.3 INJECTION, SOLUTION, CONCENTRATE INTRAVENOUS PRN
Status: CANCELLED | OUTPATIENT
Start: 2022-03-14

## 2022-03-07 RX ORDER — SODIUM CHLORIDE 9 MG/ML
20 INJECTION, SOLUTION INTRAVENOUS ONCE
Status: CANCELLED | OUTPATIENT
Start: 2022-03-14 | End: 2022-03-14

## 2022-03-07 RX ORDER — EPINEPHRINE 1 MG/ML
0.3 INJECTION, SOLUTION, CONCENTRATE INTRAVENOUS PRN
Status: CANCELLED | OUTPATIENT
Start: 2022-03-21

## 2022-03-07 RX ORDER — DIPHENHYDRAMINE HYDROCHLORIDE 50 MG/ML
50 INJECTION INTRAMUSCULAR; INTRAVENOUS ONCE
Status: COMPLETED | OUTPATIENT
Start: 2022-03-07 | End: 2022-03-07

## 2022-03-07 RX ORDER — SODIUM CHLORIDE 9 MG/ML
20 INJECTION, SOLUTION INTRAVENOUS ONCE
Status: CANCELLED | OUTPATIENT
Start: 2022-03-21 | End: 2022-03-21

## 2022-03-07 RX ORDER — DIPHENHYDRAMINE HYDROCHLORIDE 50 MG/ML
50 INJECTION INTRAMUSCULAR; INTRAVENOUS ONCE
Status: CANCELLED | OUTPATIENT
Start: 2022-03-21 | End: 2022-03-21

## 2022-03-07 RX ORDER — METHYLPREDNISOLONE SODIUM SUCCINATE 125 MG/2ML
125 INJECTION, POWDER, LYOPHILIZED, FOR SOLUTION INTRAMUSCULAR; INTRAVENOUS ONCE
Status: CANCELLED | OUTPATIENT
Start: 2022-03-14 | End: 2022-03-14

## 2022-03-07 RX ORDER — SODIUM CHLORIDE 0.9 % (FLUSH) 0.9 %
5-40 SYRINGE (ML) INJECTION PRN
Status: CANCELLED | OUTPATIENT
Start: 2022-03-14

## 2022-03-07 RX ORDER — HEPARIN SODIUM (PORCINE) LOCK FLUSH IV SOLN 100 UNIT/ML 100 UNIT/ML
500 SOLUTION INTRAVENOUS PRN
Status: DISCONTINUED | OUTPATIENT
Start: 2022-03-07 | End: 2022-03-08 | Stop reason: HOSPADM

## 2022-03-07 RX ORDER — METHYLPREDNISOLONE SODIUM SUCCINATE 125 MG/2ML
125 INJECTION, POWDER, LYOPHILIZED, FOR SOLUTION INTRAMUSCULAR; INTRAVENOUS ONCE
Status: CANCELLED | OUTPATIENT
Start: 2022-03-21 | End: 2022-03-21

## 2022-03-07 RX ORDER — HEPARIN SODIUM (PORCINE) LOCK FLUSH IV SOLN 100 UNIT/ML 100 UNIT/ML
500 SOLUTION INTRAVENOUS PRN
Status: CANCELLED | OUTPATIENT
Start: 2022-03-14

## 2022-03-07 RX ORDER — SODIUM CHLORIDE 9 MG/ML
INJECTION, SOLUTION INTRAVENOUS CONTINUOUS
Status: CANCELLED | OUTPATIENT
Start: 2022-03-21

## 2022-03-07 RX ORDER — SODIUM CHLORIDE 9 MG/ML
20 INJECTION, SOLUTION INTRAVENOUS ONCE
Status: DISCONTINUED | OUTPATIENT
Start: 2022-03-07 | End: 2022-03-08 | Stop reason: HOSPADM

## 2022-03-07 RX ORDER — HEPARIN SODIUM (PORCINE) LOCK FLUSH IV SOLN 100 UNIT/ML 100 UNIT/ML
500 SOLUTION INTRAVENOUS PRN
Status: CANCELLED | OUTPATIENT
Start: 2022-03-21

## 2022-03-07 RX ORDER — SODIUM CHLORIDE 0.9 % (FLUSH) 0.9 %
5-40 SYRINGE (ML) INJECTION PRN
Status: CANCELLED | OUTPATIENT
Start: 2022-03-21

## 2022-03-07 RX ORDER — SODIUM CHLORIDE 0.9 % (FLUSH) 0.9 %
5-40 SYRINGE (ML) INJECTION PRN
Status: DISCONTINUED | OUTPATIENT
Start: 2022-03-07 | End: 2022-03-08 | Stop reason: HOSPADM

## 2022-03-07 RX ADMIN — SODIUM CHLORIDE, PRESERVATIVE FREE 10 ML: 5 INJECTION INTRAVENOUS at 13:35

## 2022-03-07 RX ADMIN — PACLITAXEL 138 MG: 6 INJECTION, SOLUTION INTRAVENOUS at 12:25

## 2022-03-07 RX ADMIN — FAMOTIDINE 20 MG: 10 INJECTION, SOLUTION INTRAVENOUS at 11:50

## 2022-03-07 RX ADMIN — HEPARIN 500 UNITS: 100 SYRINGE at 13:35

## 2022-03-07 RX ADMIN — DIPHENHYDRAMINE HYDROCHLORIDE 50 MG: 50 INJECTION INTRAMUSCULAR; INTRAVENOUS at 11:50

## 2022-03-07 RX ADMIN — SODIUM CHLORIDE 20 ML/HR: 9 INJECTION, SOLUTION INTRAVENOUS at 11:54

## 2022-03-07 RX ADMIN — DEXAMETHASONE SODIUM PHOSPHATE 10 MG: 10 INJECTION INTRAMUSCULAR; INTRAVENOUS at 11:54

## 2022-03-07 NOTE — PROGRESS NOTES
MA Rooming Questions  Patient: Iván Rosario  MRN: [id number]    Date: 3/7/2022        1. Do you have any new issues? yes - skin itchy all over         2. Do you need any refills on medications?    no    3. Have you had any imaging done since your last visit?   no    4. Have you been hospitalized or seen in the emergency room since your last visit here?   no    5. Did the patient have a depression screening completed today?  No    No data recorded     PHQ-9 Given to (if applicable):               PHQ-9 Score (if applicable):                     [] Positive     []  Negative              Does question #9 need addressed (if applicable)                     [] Yes    []  No               Brooklynn Osborne CMA

## 2022-03-08 ENCOUNTER — CLINICAL DOCUMENTATION (OUTPATIENT)
Dept: CASE MANAGEMENT | Age: 67
End: 2022-03-08

## 2022-03-08 DIAGNOSIS — K58.9 IRRITABLE BOWEL SYNDROME, UNSPECIFIED TYPE: ICD-10-CM

## 2022-03-08 DIAGNOSIS — K21.9 GASTROESOPHAGEAL REFLUX DISEASE WITHOUT ESOPHAGITIS: ICD-10-CM

## 2022-03-08 RX ORDER — OMEPRAZOLE 20 MG/1
20 CAPSULE, DELAYED RELEASE ORAL DAILY
Qty: 30 CAPSULE | Refills: 3 | Status: SHIPPED | OUTPATIENT
Start: 2022-03-08 | End: 2022-04-26 | Stop reason: SDUPTHER

## 2022-03-08 RX ORDER — DICYCLOMINE HYDROCHLORIDE 10 MG/1
10 CAPSULE ORAL 4 TIMES DAILY PRN
Qty: 120 CAPSULE | Refills: 3 | Status: SHIPPED | OUTPATIENT
Start: 2022-03-08

## 2022-03-08 NOTE — PROGRESS NOTES
Patient Name: Barb Osborne  Patient : 1955  Patient MRN: [id number]     Primary Oncologist: Anshu Perales MD  Referring Provider: Bel Newberry MD     Date of Service: 3/28/2022        Chief Complaint:   No chief complaint on file. She came in for follow-up visit. Patient Active Problem List:     Personal history of infectious disease     Other specified disorders of white blood cells     Neutropenia     History of hepatitis C     Allergic rhinitis     Left hip pain     G6PD deficiency     Mild persistent asthma without complication     Vertigo     HPI:   Buddy Sanches is a pleasant 79-year-old -American female patient who was referred for evaluation of mild neutropenia. She was diagnosed with hepatitis C in . Ex spouse was IV drug user. She was treated with Harvoni for 12 weeks and completed in May 2019. I reviewed her blood test records. On May 15, 2019 WBC was 4.1, RBC 4.36, hemoglobin 13.9, hematocrit 42.3, MCV 97, platelet 266, absolute neutrophils 1.1, absolute lymphocytes 2.6. She denies any history of frequent infection. Ultrasound of abdomen in 2018 showed normal right upper quadrant ultrasound. Liver biopsy on 2018 showed chronic hepatitis grade 2-3, stage II. Mammogram in 2019 is negative. US of abdomen in 2019 showed unremarkable study. Labs in 2019 were reviewed. She has nonspecific elevated total protein. CBC is unremarkable. Labs in 2019 were reviewed. Total protein is normal. Leukopenia is stable. She denied frequent infection. In 2020 she had acute viral hepatitis serology which came back positive for hepatitis C antibody. Serum AFP was 8. Hepatitis C RNA by PCR was negative. Hepatitis C RNA genotype was indeterminate. Mammogram in 2020 was benign. She was at [facility] emergency room on 2020 due to food poisoning. . WBC was 7.5. Hemoglobin was 13.2, hemoglobin 13.2, platelet 714.  CMP was grossly unremarkable with normal AST at 19, ALT 25. Alk phos was 92. Total bilirubin was 0.6. Colonoscopy in December 2020 by Dr. Anna Holden showed diverticulosis and hemorrhoids. She was told that she has kidney stone. She has flu shot in 2020 and COVID-19 vaccine on January 3, 2021. Mammogram in July 2020 was benign. In June 2021 WBC was 5.2, hemoglobin 12.3, platelet 606. On September 20, 2021 she was referred for left breast mass. Bone density on September 13, 2021 showed osteopenia. Mammogram on September 15, 2021 showed : There is a new 1.5 cm mass identified in the left breast at 12 o'clock position, at posterior depth, about 12 cm from the left nipple.  This is best seen on left CC michaela slice 50 and left MLO michaela slice 61.   Left breast ultrasound 11 o'clock, 11 cm from the nipple: On ultrasound evaluation, there is a 1.1 x 1.3 x 0.5 cm oval, parallel hypoechoic mass, with microlobulated margins without any posterior acoustic shadowing or internal vascularity.  This mass corresponds to the mammographic finding.   Left axilla: Couple enlarged lymph nodes identified in the left axilla with cortical thickness of 8-9 mm.   No new suspicious masses or microcalcifications are identified in the right breast.    In June 2021 WBC was 5.2, hemoglobin 12.3, platelet 063. CMP was grossly unremarkable. Path report of left breast biopsy on 9/27/2021:  A.  Breast, left at 11 o'clock, ultrasound guided needle core biopsy:   -  INVASIVE DUCTAL CARCINOMA WITH A MARKED CHRONIC INFLAMMATORY REACTION  - [place] Rotunda FISH is negative  B.  Lymph node, left axilla, ultrasound guided needle core biopsy:   -  METASTATIC HIGH GRADE CARCINOMA IS IDENTIFIED   BREAST INVASIVE CARCINOMA SUMMARY - CORE BIOPSY   Procedure: Ultrasound guided needle core biopsy. Laterality and tumor site: Left breast at 11 o'clock. Tumor size: 7 mm in greatest dimension within biopsy material present. Histologic type:  Invasive ductal carcinoma with marked chronic inflammatory reaction. Histologic Grade ([place]): Grade 3        -Tubular score 3, Nuclear score 3, Mitosis score 2 (16/10 hpf)   Ductal Carcinoma In Situ (DCIS): Not present   Lobular Carcinoma In Situ (LCIS): Not present. Lympho-vascular invasion: Not identified. Microcalcifications: Not identified. Additional Findings: Marked chronic inflammatory reaction. Ancillary studies: ER/PgR/Her2 results from the current   biopsy are as follows:   -ER by [facility]*: Negative (0% staining). -PgR by IHC*: Negative (0% staining)   -Her2 by IHC*: Negative (Score 0)   -Her2 by FISH*:  negative  -Avg. #HER2 signals/nucleus: , Avg. #CEP17 signals/nucleus: , HER2/CEP17 ratio:     She was seen by Dr Darryn oMndragon. I discussed about neoadjuvant chemo, AC + TC. Potential AE including hair loss, increased risk of infection, secondary malignancy and etc was discussed with her. She has left hip pain s/p hip replacement in August 2021. Bone scan on 2021-10-13 showed : focal activity within the midthoracic and lower lumbar spine, typically degenerative. Moderate activity is seen about the left hip arthroplasty.  If surgery has not been recent, loosening or infection could be considered.    Multifocal degenerative changes are otherwise favored as outlined above. CT CAP on 10/15/2021:  1. Left breast mass with level 1 left axillary lymphadenopathy. 2. Four small pulmonary nodules measuring up to 3 mm in the left lung favoring a benign etiology.  Attention at follow-up imaging recommended. 3. Otherwise no metastatic disease in the chest, abdomen or pelvis. 4. Incidental left thyroid lobe 18 mm nodule.  Thyroid ultrasound can be considered. ECHO in October 2021 showed LVEF at 50 to 55%. She will have chemo education on October 26, 2021. Cytology of FNA of thyroid nodule was reviewed. I referred to endocrinologist.   MRI of the breast showed favorable response to therapy.   She started weekly Taxol cycle 1 on January 2, 2022. She has seen endocrinologist.  Dose dense AC started 11/8/2021 and completed on 12/20/2021. She started Taxol/2022- completed on 3/21/2022    CT chest 2/2022 showed:  1. No acute abnormality in the chest.  No rib fracture identified. 2. 3 and 4 mm nodules in the bilateral lower lobes stable or decreased in size from 10/15/2021.  3. Resolution of the previously identified left axillary lymphadenopathy. 4. 2 cm left thyroid nodule without significant change. Clinically she responded to neoadjuvant chemo. On 3/28/2020 she came in for follow up visit. She is scheduled for the mastectomy on April 5, 2022. She wants to have double mastectomy. I will see her back in the office after the surgery. If she had a residual disease I would offer adjuvant Xeloda. She denied acute complaint today. Denied any worsening peripheral neuropathy. No acute pain. Denied any nausea, vomiting or diarrhea. No fever or chills. No chest pain, shortness of breath or palpitation. No headache or dizzy spell. No specific bone pain. No melena or hematochezia. Denied any dysuria or hematuria. Past Medical History  Asthma, neutropenia, hepatitis C, G6PD, thyroid disease. Surgical History  Partial hysterectomy in 2002 related to tubal pregnancy. Tonsillectomy. She had colonoscopy x2 and the last one was in 2013. She had left hip replacement    Social History  Smoking Status Never smoker  She denies any illicit drug use. She drinks alcohol occasionally. She has 2 children. Family History  Maternal great aunt had breast cancer. Review of Systems: \"Per interval history; otherwise 10 point ROS is negative. \"     Vital Signs: There were no vitals taken for this visit.     Physical Exam:  CONSTITUTIONAL: awake, alert, cooperative, no apparent distress   EYES: pupils equal, round and reactive to light, sclera clear and conjunctiva pallor  ENT: Normocephalic, without obvious abnormality, atraumatic  NECK: supple, symmetrical, no jugular venous distension and no carotid bruits   HEMATOLOGIC/LYMPHATIC: no cervical, supraclavicular or axillary lymphadenopathy   LUNGS: no increased work of breathing and clear to auscultation. Medi port to the right anterior chest wall noted. BREAST: s/p left breast biopsy. No palpable lump to the left breast.    CARDIOVASCULAR: regular rate and rhythm, normal S1 and S2, no murmur   ABDOMEN: normal bowel sound, soft, non-distended, non-tender, no masses palpated, no hepatosplenomegaly   MUSCULOSKELETAL: full range of motion noted, tone is normal  NEUROLOGIC: Motor and sensory grossly intact. CN II - XII grossly intact. SKIN: Normal skin color, texture, turgor and no jaundice. appears intact   EXTREMITIES: no LE edema. No cyanosis.     Labs:  Hematology:  Lab Results   Component Value Date    WBC 3.9 (L) 03/07/2022    RBC 3.07 (L) 03/07/2022    HGB 9.2 (L) 03/07/2022    HCT 29.4 (L) 03/07/2022    MCV 95.8 03/07/2022    MCH 30.0 03/07/2022    MCHC 31.3 (L) 03/07/2022    RDW 17.2 (H) 03/07/2022     03/07/2022    MPV 9.1 03/07/2022    SEGSPCT 59.2 03/07/2022    EOSRELPCT 3.6 (H) 03/07/2022    BASOPCT 0.5 03/07/2022    LYMPHOPCT 23.2 (L) 03/07/2022    MONOPCT 13.5 (H) 03/07/2022    SEGSABS 2.3 03/07/2022    EOSABS 0.1 03/07/2022    BASOSABS 0.0 03/07/2022    LYMPHSABS 0.9 03/07/2022    MONOSABS 0.5 03/07/2022    DIFFTYPE AUTOMATED DIFFERENTIAL 03/07/2022     Chemistry:  Lab Results   Component Value Date     02/28/2022    K 4.1 02/28/2022     02/28/2022    CO2 21 02/28/2022    BUN 10 02/28/2022    CREATININE 0.7 02/28/2022    GLUCOSE 164 (H) 02/28/2022    CALCIUM 9.1 02/28/2022    PROT 6.9 02/28/2022    LABALBU 4.2 02/28/2022    BILITOT 0.2 02/28/2022    ALKPHOS 63 02/28/2022    AST 24 02/28/2022    ALT 25 02/28/2022    LABGLOM >60 02/28/2022    GFRAA >60 02/28/2022    AGRATIO 1.4 06/09/2021    GLOB 3.4 06/09/2021     Lab Results   Component Value Date TSHHS 1.260 01/10/2022    T4FREE 1.28 01/10/2022    FT3 3.3 01/10/2022     Immunology:  Lab Results   Component Value Date    PROT 6.9 02/28/2022     Coagulation Panel:  Lab Results   Component Value Date    PROTIME 12.3 11/18/2021    INR 0.95 11/18/2021    APTT 26.9 11/18/2021     Observations:  No data recorded      Assessment & Plan:  1. She was referred for mild neutropenia. She is an -American. CBC in June 2019 was unremarkable. CBC in June 2020 was unremarkable. US of abdomen in June 2019 was unremarkable. In June 2021 WBC was 5.2, hemoglobin 12.3, platelet 103. She has anemia related to chemotherapy. I will continue to monitor CBC. 2. She completed treatment for hepatitis C in May 2019. She will follow up with GI. She is in remission. 3. Mammogram in June 2019 was benign. Colonoscopy was In 2013. Mammogram in July 2020 was benign. She had abnormal left breast mammogram and scheduled for the biopsy on September 27, 2021. She was found to have likely triple negative left breast cancer s/p biopsy, T1, N1 at least from biopsy. CT chest, abdomen pelvis, bone scan and echocardiogram in October 2021 were reviewed. She started neoadjuvant chemotherapy November 8, 2021. MRI of the breast showed response to therapy. So far she has been tolerating Taxol. She completed Taxol on March 21, 2022. She is scheduled for double mastectomy on April 5, 2022. I will see her back in the office after the surgery. If she had residual disease, I would have 6 months of oral Xeloda. 4. Colonoscopy in December 2020 showed diverticulosis and hemorrhoids. Repeat study in 5 years was recommended. 5.  Ultrasound of the thyroid on November 9, 2021 showed  TR 4 nodule of the left thyroid lobe corresponding to recent CT findings. FNA recommended for further evaluation based on TI-RADS criteria.   Cytology report on November 18, 2021 showed  Final Cytologic Diagnosis:    Left Thyroid Fine Needle Aspirate Cytology with Cell Block:   - Atypical follicular cells of undetermined significance     She has seen endocrinologist on January 4, 2022    6. Anemia related to chemotherapy. I will continue to monitor CBC. I may consider anemic work-up if she remains anemic after completion of the chemo. We discussed about healthy diet and lifestyle. She had COVID-19 vaccine in January 2021. Return to clinic in 4  weeks or sooner. All of her questions have been answered for today. Recent imaging and labs were reviewed and discussed with the patient.

## 2022-03-10 ENCOUNTER — OFFICE VISIT (OUTPATIENT)
Dept: SURGERY | Age: 67
End: 2022-03-10
Payer: MEDICARE

## 2022-03-10 ENCOUNTER — OFFICE VISIT (OUTPATIENT)
Dept: INTERNAL MEDICINE CLINIC | Age: 67
End: 2022-03-10
Payer: MEDICARE

## 2022-03-10 VITALS
HEART RATE: 85 BPM | HEIGHT: 63 IN | DIASTOLIC BLOOD PRESSURE: 78 MMHG | WEIGHT: 185.5 LBS | SYSTOLIC BLOOD PRESSURE: 116 MMHG | BODY MASS INDEX: 32.87 KG/M2

## 2022-03-10 VITALS
HEART RATE: 107 BPM | TEMPERATURE: 98.1 F | BODY MASS INDEX: 32.85 KG/M2 | DIASTOLIC BLOOD PRESSURE: 78 MMHG | OXYGEN SATURATION: 99 % | SYSTOLIC BLOOD PRESSURE: 126 MMHG | HEIGHT: 63 IN | WEIGHT: 185.4 LBS

## 2022-03-10 DIAGNOSIS — Z23 NEED FOR 23-POLYVALENT PNEUMOCOCCAL POLYSACCHARIDE VACCINE: Primary | ICD-10-CM

## 2022-03-10 DIAGNOSIS — Z00.00 MEDICARE ANNUAL WELLNESS VISIT, SUBSEQUENT: ICD-10-CM

## 2022-03-10 DIAGNOSIS — C50.012 MALIGNANT NEOPLASM OF NIPPLE OF LEFT BREAST IN FEMALE, UNSPECIFIED ESTROGEN RECEPTOR STATUS (HCC): Primary | ICD-10-CM

## 2022-03-10 DIAGNOSIS — M25.552 LEFT HIP PAIN: ICD-10-CM

## 2022-03-10 PROCEDURE — G0439 PPPS, SUBSEQ VISIT: HCPCS | Performed by: INTERNAL MEDICINE

## 2022-03-10 PROCEDURE — 99214 OFFICE O/P EST MOD 30 MIN: CPT | Performed by: SURGERY

## 2022-03-10 RX ORDER — MELOXICAM 7.5 MG/1
7.5 TABLET ORAL DAILY
Qty: 90 TABLET | Refills: 1 | Status: SHIPPED | OUTPATIENT
Start: 2022-03-10

## 2022-03-10 RX ORDER — PNEUMOCOCCAL VACCINE POLYVALENT 25; 25; 25; 25; 25; 25; 25; 25; 25; 25; 25; 25; 25; 25; 25; 25; 25; 25; 25; 25; 25; 25; 25 UG/.5ML; UG/.5ML; UG/.5ML; UG/.5ML; UG/.5ML; UG/.5ML; UG/.5ML; UG/.5ML; UG/.5ML; UG/.5ML; UG/.5ML; UG/.5ML; UG/.5ML; UG/.5ML; UG/.5ML; UG/.5ML; UG/.5ML; UG/.5ML; UG/.5ML; UG/.5ML; UG/.5ML; UG/.5ML; UG/.5ML
0.5 INJECTION, SOLUTION INTRAMUSCULAR; SUBCUTANEOUS ONCE
Qty: 0.5 ML | Refills: 0 | Status: SHIPPED | OUTPATIENT
Start: 2022-03-10 | End: 2022-03-10

## 2022-03-10 ASSESSMENT — LIFESTYLE VARIABLES: HOW OFTEN DO YOU HAVE A DRINK CONTAINING ALCOHOL: NEVER

## 2022-03-10 ASSESSMENT — PATIENT HEALTH QUESTIONNAIRE - PHQ9
SUM OF ALL RESPONSES TO PHQ QUESTIONS 1-9: 0
SUM OF ALL RESPONSES TO PHQ QUESTIONS 1-9: 0
SUM OF ALL RESPONSES TO PHQ9 QUESTIONS 1 & 2: 0
2. FEELING DOWN, DEPRESSED OR HOPELESS: 0
SUM OF ALL RESPONSES TO PHQ QUESTIONS 1-9: 0
1. LITTLE INTEREST OR PLEASURE IN DOING THINGS: 0
SUM OF ALL RESPONSES TO PHQ QUESTIONS 1-9: 0

## 2022-03-10 NOTE — PATIENT INSTRUCTIONS
We are committed to providing you the best care possible. If you receive a survey after visiting one of our offices, please take time to share your experience concerning your physician office visit. These surveys are confidential and no health information about you is shared. We are eager to improve for you and we are counting on your feedback to help make that happen. Personalized Preventive Plan for [place]beulah Fluke - 3/10/2022  Medicare offers a range of preventive health benefits. Some of the tests and screenings are paid in full while other may be subject to a deductible, co-insurance, and/or copay. Some of these benefits include a comprehensive review of your medical history including lifestyle, illnesses that may run in your family, and various assessments and screenings as appropriate. After reviewing your medical record and screening and assessments performed today your provider may have ordered immunizations, labs, imaging, and/or referrals for you. A list of these orders (if applicable) as well as your Preventive Care list are included within your After Visit Summary for your review. Other Preventive Recommendations:    · A preventive eye exam performed by an eye specialist is recommended every 1-2 years to screen for glaucoma; cataracts, macular degeneration, and other eye disorders. · A preventive dental visit is recommended every 6 months. · Try to get at least 150 minutes of exercise per week or 10,000 steps per day on a pedometer . · Order or download the FREE \"Exercise & Physical Activity: Your Everyday Guide\" from [employer]. Call 4-516.884.6471 or search [employer] online. · You need 7708-5012 mg of calcium and 5062-9594 IU of vitamin D per day.  It is possible to meet your calcium requirement with diet alone, but a vitamin D supplement is usually necessary to meet this goal.  · When exposed to the sun, use a sunscreen that protects against both UVA and UVB radiation with an SPF of 30 or greater. Reapply every 2 to 3 hours or after sweating, drying off with a towel, or swimming. · Always wear a seat belt when traveling in a car. Always wear a helmet when riding a bicycle or motorcycle.

## 2022-03-10 NOTE — PROGRESS NOTES
Medicare Annual Wellness Visit    Kaushal Tovar is here for Medicare AWV    Assessment & Plan   Need for 23-polyvalent pneumococcal polysaccharide vaccine  -     pneumococcal polyvalent (PNEUMOVAX 23) 25 MCG/0.5ML inj; Inject 0.5 mLs into the muscle once for 1 dose, Disp-0.5 mL, R-0Print  Left hip pain  -     meloxicam (MOBIC) 7.5 MG tablet; Take 1 tablet by mouth daily, Disp-90 tablet, R-1Normal  Medicare annual wellness visit, subsequent      Recommendations for Preventive Services Due: see orders and patient instructions/AVS.  Recommended screening schedule for the next 5-10 years is provided to the patient in written form: see Patient Instructions/AVS.     Return in 1 year (on 3/10/2023) for Medicare Annual Wellness Visit in 1 year. Patient's complete Health Risk Assessment and screening values have been reviewed and are found in Flowsheets. The following problems were reviewed today and where indicated follow up appointments were made and/or referrals ordered. Positive Risk Factor Screenings with Interventions:    Fall Risk:  Do you feel unsteady or are you worried about falling? : (!) yes  2 or more falls in past year?: no  Fall with injury in past year?: no     Fall Risk Interventions:    Since her chemo she is slightly dizzy bu has no falls. · Home safety tips provided              Opioid Risk: (Low risk score <55) Opioid risk score: 4    Patient is low risk for opioid use disorder or overdose.   Last PDMP Jose Miguel Miller as Reviewed:  Review User Review Instant Review Result             General Health and ACP:  General  In general, how would you say your health is?: Fair  In the past 7 days, have you experienced any of the following: New or Increased Pain, New or Increased Fatigue, Loneliness, Social Isolation, Stress or Anger?: No  Do you get the social and emotional support that you need?: Yes  Do you have a Living Will?: Yes    Advance Directives     Power of  Living Will ACP-Advance Directive CARYN-Reji conley Margie Edgar on 11/04/21 Not on File Not on File Filed      General Health Risk Interventions:  · patient has a living will. Health Habits/Nutrition:     Physical Activity: Insufficiently Active    Days of Exercise per Week: 7 days    Minutes of Exercise per Session: 10 min     Have you lost any weight without trying in the past 3 months?: No  Body mass index: (!) 32.84  Have you seen the dentist within the past year?: (!) No    Health Habits/Nutrition Interventions:  · Nutritional issues:  educational materials for healthy, well-balanced diet provided    Hearing/Vision:  Do you or your family notice any trouble with your hearing that hasn't been managed with hearing aids?: No  Do you have difficulty driving, watching TV, or doing any of your daily activities because of your eyesight?: (!) Yes  Have you had an eye exam within the past year?: (!) No  No exam data present    Hearing/Vision Interventions:  · Vision concerns:  patient encouraged to make appointment with his/her eye specialist    Safety:  Do you have working smoke detectors?: (!) No  Do you have any tripping hazards - loose or unsecured carpets or rugs?: No  Do you have any tripping hazards - clutter in doorways, halls, or stairs?: No  Do you have either shower bars, grab bars, non-slip mats or non-slip surfaces in your shower or bathtub?: Yes  Do all of your stairways have a railing or banister?: Yes  Do you always fasten your seatbelt when you are in a car?: Yes    Safety Interventions:  · Home safety tips provided        Patient agrees to place smoke detector at home. Objective   Vitals:    03/10/22 1246   BP: 126/78   Site: Left Upper Arm   Position: Sitting   Cuff Size: Medium Adult   Pulse: 107   Temp: 98.1 °F (36.7 °C)   SpO2: 99%   Weight: 185 lb 6.4 oz (84.1 kg)   Height: 5' 3\" (1.6 m)      Body mass index is 32.84 kg/m².                Allergies   Allergen Reactions    Pcn [Penicillins] Hives     Prior to Visit Medications    Medication Sig Taking? Authorizing Provider   meloxicam (MOBIC) 7.5 MG tablet Take 1 tablet by mouth daily Yes iDmas Mukherjee MD   pneumococcal polyvalent (PNEUMOVAX 23) 25 MCG/0.5ML inj Inject 0.5 mLs into the muscle once for 1 dose Yes Dimas Mukherjee MD   omeprazole (PRILOSEC) 20 MG delayed release capsule Take 1 capsule by mouth daily Yes Dimas Mukherjee MD   dicyclomine (BENTYL) 10 MG capsule Take 1 capsule by mouth 4 times daily as needed (abdominal bloating and gas) Yes Dimas Mukherjee MD   dexamethasone (DECADRON) 2 MG tablet Take 1 tablet by mouth daily (with breakfast) for two days AFTER chemotherapy. Take 8 mg (4 tabs) the night before 1st chemo ONLY.  Yes Belinda Piper MD   ondansetron (ZOFRAN) 8 MG tablet Take 1 tablet by mouth every 8 hours as needed for Nausea or Vomiting Yes Lottie Ingram, APRN - CNP   albuterol sulfate  (90 Base) MCG/ACT inhaler Inhale 2 puffs into the lungs every 6 hours as needed for Wheezing Yes Dimas Mukherjee MD   montelukast (SINGULAIR) 10 MG tablet Take 1 tablet by mouth nightly Yes Dimas Mukherjee MD   meclizine (ANTIVERT) 25 MG tablet Take 1 tablet by mouth 3 times daily as needed for Dizziness Yes Dimas Mukherjee MD   oxybutynin (DITROPAN XL) 10 MG extended release tablet Take 1 tablet by mouth daily Yes Dimas Mukherjee MD   Multiple Vitamins-Minerals (MULTIVITAMIN PO) Take by mouth Yes Historical Provider, MD Stewart (Including outside providers/suppliers regularly involved in providing care):   Patient Care Team:  Dimas Mukherjee MD as PCP - General (Internal Medicine)  Dimas Mukherjee MD as PCP - [facility]  Tricia Keys RN as Nurse Navigator (Oncology)  Belinda Piper MD as Consulting Physician (Hematology and Oncology)    Reviewed and updated this visit:  Tobacco  Allergies  Meds  Problems  Med Hx  Surg Hx  Soc Hx  Fam Hx 27-Aug-2021

## 2022-03-11 ENCOUNTER — TELEPHONE (OUTPATIENT)
Dept: SURGERY | Age: 67
End: 2022-03-11

## 2022-03-14 ENCOUNTER — HOSPITAL ENCOUNTER (OUTPATIENT)
Dept: INFUSION THERAPY | Age: 67
Discharge: HOME | End: 2022-03-14
Payer: MEDICARE

## 2022-03-14 VITALS
TEMPERATURE: 97 F | BODY MASS INDEX: 31.93 KG/M2 | WEIGHT: 180.2 LBS | HEIGHT: 63 IN | SYSTOLIC BLOOD PRESSURE: 111 MMHG | HEART RATE: 112 BPM | DIASTOLIC BLOOD PRESSURE: 72 MMHG | RESPIRATION RATE: 16 BRPM | OXYGEN SATURATION: 97 %

## 2022-03-14 DIAGNOSIS — C50.912 MALIGNANT NEOPLASM OF LEFT BREAST IN FEMALE, ESTROGEN RECEPTOR NEGATIVE, UNSPECIFIED SITE OF BREAST (HCC): ICD-10-CM

## 2022-03-14 DIAGNOSIS — D70.9 NEUTROPENIA, UNSPECIFIED TYPE (HCC): Primary | ICD-10-CM

## 2022-03-14 DIAGNOSIS — Z17.1 MALIGNANT NEOPLASM OF LEFT BREAST IN FEMALE, ESTROGEN RECEPTOR NEGATIVE, UNSPECIFIED SITE OF BREAST (HCC): ICD-10-CM

## 2022-03-14 LAB
BASOPHILS ABSOLUTE: 0 K/CU MM
BASOPHILS RELATIVE PERCENT: 0.3 % (ref 0–1)
DIFFERENTIAL TYPE: ABNORMAL
EOSINOPHILS ABSOLUTE: 0.1 K/CU MM
EOSINOPHILS RELATIVE PERCENT: 1.6 % (ref 0–3)
HCT VFR BLD CALC: 30.3 % (ref 37–47)
HEMOGLOBIN: 9.6 GM/DL (ref 12.5–16)
LYMPHOCYTES ABSOLUTE: 1.1 K/CU MM
LYMPHOCYTES RELATIVE PERCENT: 15.7 % (ref 24–44)
MCH RBC QN AUTO: 30.2 PG (ref 27–31)
MCHC RBC AUTO-ENTMCNC: 31.7 % (ref 32–36)
MCV RBC AUTO: 95.3 FL (ref 78–100)
MONOCYTES ABSOLUTE: 0.9 K/CU MM
MONOCYTES RELATIVE PERCENT: 13.4 % (ref 0–4)
PDW BLD-RTO: 17.3 % (ref 11.7–14.9)
PLATELET # BLD: 258 K/CU MM (ref 140–440)
PMV BLD AUTO: 9.3 FL (ref 7.5–11.1)
RBC # BLD: 3.18 M/CU MM (ref 4.2–5.4)
SEGMENTED NEUTROPHILS ABSOLUTE COUNT: 4.8 K/CU MM
SEGMENTED NEUTROPHILS RELATIVE PERCENT: 69 % (ref 36–66)
WBC # BLD: 6.9 K/CU MM (ref 4–10.5)

## 2022-03-14 PROCEDURE — 6360000002 HC RX W HCPCS: Performed by: INTERNAL MEDICINE

## 2022-03-14 PROCEDURE — 96413 CHEMO IV INFUSION 1 HR: CPT

## 2022-03-14 PROCEDURE — 2500000003 HC RX 250 WO HCPCS: Performed by: INTERNAL MEDICINE

## 2022-03-14 PROCEDURE — 85025 COMPLETE CBC W/AUTO DIFF WBC: CPT

## 2022-03-14 PROCEDURE — 96375 TX/PRO/DX INJ NEW DRUG ADDON: CPT

## 2022-03-14 PROCEDURE — 2580000003 HC RX 258: Performed by: INTERNAL MEDICINE

## 2022-03-14 RX ORDER — SODIUM CHLORIDE 9 MG/ML
20 INJECTION, SOLUTION INTRAVENOUS ONCE
Status: COMPLETED | OUTPATIENT
Start: 2022-03-14 | End: 2022-03-14

## 2022-03-14 RX ORDER — DIPHENHYDRAMINE HYDROCHLORIDE 50 MG/ML
50 INJECTION INTRAMUSCULAR; INTRAVENOUS ONCE
Status: COMPLETED | OUTPATIENT
Start: 2022-03-14 | End: 2022-03-14

## 2022-03-14 RX ORDER — SODIUM CHLORIDE 0.9 % (FLUSH) 0.9 %
5-40 SYRINGE (ML) INJECTION PRN
Status: DISCONTINUED | OUTPATIENT
Start: 2022-03-14 | End: 2022-03-15 | Stop reason: HOSPADM

## 2022-03-14 RX ORDER — HEPARIN SODIUM (PORCINE) LOCK FLUSH IV SOLN 100 UNIT/ML 100 UNIT/ML
500 SOLUTION INTRAVENOUS PRN
Status: DISCONTINUED | OUTPATIENT
Start: 2022-03-14 | End: 2022-03-15 | Stop reason: HOSPADM

## 2022-03-14 RX ADMIN — DIPHENHYDRAMINE HYDROCHLORIDE 50 MG: 50 INJECTION INTRAMUSCULAR; INTRAVENOUS at 11:55

## 2022-03-14 RX ADMIN — SODIUM CHLORIDE, PRESERVATIVE FREE 20 ML: 5 INJECTION INTRAVENOUS at 13:41

## 2022-03-14 RX ADMIN — HEPARIN 500 UNITS: 100 SYRINGE at 13:41

## 2022-03-14 RX ADMIN — FAMOTIDINE 20 MG: 10 INJECTION, SOLUTION INTRAVENOUS at 11:54

## 2022-03-14 RX ADMIN — PACLITAXEL 138 MG: 6 INJECTION, SOLUTION INTRAVENOUS at 12:27

## 2022-03-14 RX ADMIN — DEXAMETHASONE SODIUM PHOSPHATE 10 MG: 10 INJECTION, SOLUTION INTRAMUSCULAR; INTRAVENOUS at 11:59

## 2022-03-14 RX ADMIN — SODIUM CHLORIDE 20 ML/HR: 9 INJECTION, SOLUTION INTRAVENOUS at 11:57

## 2022-03-14 NOTE — PROGRESS NOTES
Pt. Here for treatment, Has no questions or concerns for  At this time. Right upper chest mediport accessed without difficulty, good blood return noted.  Lab work drawn as ordered. Tim Wing reviewed and treatment administered without incident. Pt tolerated well.  Discharge AVS given.      Patient's status assessed and documented appropriately.  All labs and required results were also reviewed today.  Treatment parameters have been reviewed.  Today's treatment has been approved by the provider.  Treatment orders and medication sequencing (when applicable) was verified by 2 registered nurses.  The treatment plan was confirmed with the patient prior to administration, and the patient understands the need to report any treatment-related symptoms.     Prior to administration, when applicable, the following 8 elements of medication administration were reviewed with 2nd Registered Nurse prior to dosing: drug name, drug dose, infusion volume when prepared in a syringe, rate of administration, expiration dates and/or times, appearance and integrity of drug(s), and rate of pump for infusion.  The 5 rights of medication administration have been verified

## 2022-03-21 ENCOUNTER — HOSPITAL ENCOUNTER (OUTPATIENT)
Dept: INFUSION THERAPY | Age: 67
Discharge: HOME | End: 2022-03-21
Payer: MEDICARE

## 2022-03-21 VITALS
SYSTOLIC BLOOD PRESSURE: 141 MMHG | OXYGEN SATURATION: 98 % | BODY MASS INDEX: 32.43 KG/M2 | TEMPERATURE: 97.5 F | HEART RATE: 105 BPM | HEIGHT: 63 IN | DIASTOLIC BLOOD PRESSURE: 64 MMHG | WEIGHT: 183 LBS

## 2022-03-21 DIAGNOSIS — Z17.1 MALIGNANT NEOPLASM OF LEFT BREAST IN FEMALE, ESTROGEN RECEPTOR NEGATIVE, UNSPECIFIED SITE OF BREAST (HCC): ICD-10-CM

## 2022-03-21 DIAGNOSIS — D70.9 NEUTROPENIA, UNSPECIFIED TYPE (HCC): Primary | ICD-10-CM

## 2022-03-21 DIAGNOSIS — C50.912 MALIGNANT NEOPLASM OF LEFT BREAST IN FEMALE, ESTROGEN RECEPTOR NEGATIVE, UNSPECIFIED SITE OF BREAST (HCC): ICD-10-CM

## 2022-03-21 LAB
ALBUMIN SERPL-MCNC: 3.8 GM/DL (ref 3.4–5)
ALP BLD-CCNC: 58 IU/L (ref 40–128)
ALT SERPL-CCNC: 21 U/L (ref 10–40)
ANION GAP SERPL CALCULATED.3IONS-SCNC: 14 MMOL/L (ref 4–16)
AST SERPL-CCNC: 19 IU/L (ref 15–37)
BASOPHILS ABSOLUTE: 0 K/CU MM
BASOPHILS RELATIVE PERCENT: 0.6 % (ref 0–1)
BILIRUB SERPL-MCNC: 0.2 MG/DL (ref 0–1)
BUN BLDV-MCNC: 6 MG/DL (ref 6–23)
CALCIUM SERPL-MCNC: 8.6 MG/DL (ref 8.3–10.6)
CHLORIDE BLD-SCNC: 101 MMOL/L (ref 99–110)
CO2: 21 MMOL/L (ref 21–32)
CREAT SERPL-MCNC: 0.7 MG/DL (ref 0.6–1.1)
DIFFERENTIAL TYPE: ABNORMAL
EOSINOPHILS ABSOLUTE: 0.1 K/CU MM
EOSINOPHILS RELATIVE PERCENT: 2.8 % (ref 0–3)
GFR AFRICAN AMERICAN: >60 ML/MIN/1.73M2
GFR NON-AFRICAN AMERICAN: >60 ML/MIN/1.73M2
GLUCOSE BLD-MCNC: 163 MG/DL (ref 70–99)
HCT VFR BLD CALC: 27.3 % (ref 37–47)
HEMOGLOBIN: 8.6 GM/DL (ref 12.5–16)
LYMPHOCYTES ABSOLUTE: 0.8 K/CU MM
LYMPHOCYTES RELATIVE PERCENT: 23.6 % (ref 24–44)
MCH RBC QN AUTO: 30.2 PG (ref 27–31)
MCHC RBC AUTO-ENTMCNC: 31.5 % (ref 32–36)
MCV RBC AUTO: 95.8 FL (ref 78–100)
MONOCYTES ABSOLUTE: 0.5 K/CU MM
MONOCYTES RELATIVE PERCENT: 12.8 % (ref 0–4)
PDW BLD-RTO: 17.7 % (ref 11.7–14.9)
PLATELET # BLD: 320 K/CU MM (ref 140–440)
PMV BLD AUTO: 9.3 FL (ref 7.5–11.1)
POTASSIUM SERPL-SCNC: 3.9 MMOL/L (ref 3.5–5.1)
RBC # BLD: 2.85 M/CU MM (ref 4.2–5.4)
SEGMENTED NEUTROPHILS ABSOLUTE COUNT: 2.1 K/CU MM
SEGMENTED NEUTROPHILS RELATIVE PERCENT: 60.2 % (ref 36–66)
SODIUM BLD-SCNC: 136 MMOL/L (ref 135–145)
TOTAL PROTEIN: 6.2 GM/DL (ref 6.4–8.2)
WBC # BLD: 3.5 K/CU MM (ref 4–10.5)

## 2022-03-21 PROCEDURE — 6360000002 HC RX W HCPCS: Performed by: INTERNAL MEDICINE

## 2022-03-21 PROCEDURE — 36591 DRAW BLOOD OFF VENOUS DEVICE: CPT

## 2022-03-21 PROCEDURE — 80053 COMPREHEN METABOLIC PANEL: CPT

## 2022-03-21 PROCEDURE — 96367 TX/PROPH/DG ADDL SEQ IV INF: CPT

## 2022-03-21 PROCEDURE — 85025 COMPLETE CBC W/AUTO DIFF WBC: CPT

## 2022-03-21 PROCEDURE — 96413 CHEMO IV INFUSION 1 HR: CPT

## 2022-03-21 PROCEDURE — 2580000003 HC RX 258: Performed by: INTERNAL MEDICINE

## 2022-03-21 PROCEDURE — 2500000003 HC RX 250 WO HCPCS: Performed by: INTERNAL MEDICINE

## 2022-03-21 PROCEDURE — 96375 TX/PRO/DX INJ NEW DRUG ADDON: CPT

## 2022-03-21 RX ORDER — SODIUM CHLORIDE 9 MG/ML
20 INJECTION, SOLUTION INTRAVENOUS ONCE
Status: DISCONTINUED | OUTPATIENT
Start: 2022-03-21 | End: 2022-03-22 | Stop reason: HOSPADM

## 2022-03-21 RX ORDER — HEPARIN SODIUM (PORCINE) LOCK FLUSH IV SOLN 100 UNIT/ML 100 UNIT/ML
500 SOLUTION INTRAVENOUS PRN
Status: DISCONTINUED | OUTPATIENT
Start: 2022-03-21 | End: 2022-03-22 | Stop reason: HOSPADM

## 2022-03-21 RX ORDER — DIPHENHYDRAMINE HYDROCHLORIDE 50 MG/ML
50 INJECTION INTRAMUSCULAR; INTRAVENOUS ONCE
Status: COMPLETED | OUTPATIENT
Start: 2022-03-21 | End: 2022-03-21

## 2022-03-21 RX ORDER — SODIUM CHLORIDE 0.9 % (FLUSH) 0.9 %
5-40 SYRINGE (ML) INJECTION PRN
Status: DISCONTINUED | OUTPATIENT
Start: 2022-03-21 | End: 2022-03-22 | Stop reason: HOSPADM

## 2022-03-21 RX ADMIN — PACLITAXEL 124 MG: 6 INJECTION, SOLUTION INTRAVENOUS at 11:40

## 2022-03-21 RX ADMIN — HEPARIN 500 UNITS: 100 SYRINGE at 12:50

## 2022-03-21 RX ADMIN — DIPHENHYDRAMINE HYDROCHLORIDE 50 MG: 50 INJECTION INTRAMUSCULAR; INTRAVENOUS at 11:19

## 2022-03-21 RX ADMIN — SODIUM CHLORIDE 20 ML/HR: 9 INJECTION, SOLUTION INTRAVENOUS at 11:19

## 2022-03-21 RX ADMIN — FAMOTIDINE 20 MG: 10 INJECTION, SOLUTION INTRAVENOUS at 11:19

## 2022-03-21 RX ADMIN — DEXAMETHASONE SODIUM PHOSPHATE 10 MG: 10 INJECTION INTRAMUSCULAR; INTRAVENOUS at 11:20

## 2022-03-21 NOTE — PROGRESS NOTES
Patient arrived to treatment suite for blood draw, pre-medications and chemotherapy infusion. Right chest mediport accessed and blood drawn from site and sent to lab for processing. Patient stated numbness in fingers and toes is a little worse this week. Discussed with Dr. Eren Schaefer. Treatment approved and given. Patient tolerated well. Left treatment suite ambulatory. Discharge instructions provided.      Patient's status assessed and documented appropriately. All labs and required results were also reviewed today. Treatment parameters have been reviewed. Today's treatment has been approved by the provider. Treatment orders and medication sequencing (when applicable) was verified by 2 registered nurses. The treatment plan was confirmed with the patient prior to administration, and the patient understands the need to report any treatment-related symptoms.     Prior to administration, when applicable, the following 8 elements of medication administration were reviewed with 2nd Registered Nurse prior to dosing: drug name, drug dose, infusion volume when prepared in a syringe, rate of administration, expiration dates and/or times, appearance and integrity of drug(s), and rate of pump for infusion. The 5 rights of medication administration have been verified.

## 2022-03-23 ENCOUNTER — TELEPHONE (OUTPATIENT)
Dept: SURGERY | Age: 67
End: 2022-03-23

## 2022-03-23 NOTE — TELEPHONE ENCOUNTER
SPOKE TO  [address] (LEFT MODIFIED RADICAL MASTECYOMY, RIGHT MASTECTOMY) SCHEDULED @ [facility].  NOTIFIED OF DATES, TIMES AND LOCATION    PHONE ASSESSMENT   SURGERY - 4/5/22 @ 100  P/O - 4/18/22 @ 1000    NPO AFTER MIDNIGHT  HOLD BLOOD THINNERS

## 2022-03-24 ENCOUNTER — OFFICE VISIT (OUTPATIENT)
Dept: INTERNAL MEDICINE CLINIC | Age: 67
End: 2022-03-24
Payer: MEDICARE

## 2022-03-24 VITALS
HEART RATE: 72 BPM | DIASTOLIC BLOOD PRESSURE: 70 MMHG | BODY MASS INDEX: 32.49 KG/M2 | OXYGEN SATURATION: 98 % | RESPIRATION RATE: 18 BRPM | SYSTOLIC BLOOD PRESSURE: 120 MMHG | WEIGHT: 183.4 LBS

## 2022-03-24 DIAGNOSIS — K21.9 GASTROESOPHAGEAL REFLUX DISEASE WITHOUT ESOPHAGITIS: Primary | ICD-10-CM

## 2022-03-24 DIAGNOSIS — C50.912 MALIGNANT NEOPLASM OF LEFT BREAST IN FEMALE, ESTROGEN RECEPTOR NEGATIVE, UNSPECIFIED SITE OF BREAST (HCC): ICD-10-CM

## 2022-03-24 DIAGNOSIS — J45.20 MILD INTERMITTENT ASTHMA WITHOUT COMPLICATION: ICD-10-CM

## 2022-03-24 DIAGNOSIS — R35.0 URINARY FREQUENCY: ICD-10-CM

## 2022-03-24 DIAGNOSIS — D64.9 ANEMIA, UNSPECIFIED TYPE: ICD-10-CM

## 2022-03-24 DIAGNOSIS — J30.2 SEASONAL ALLERGIC RHINITIS, UNSPECIFIED TRIGGER: ICD-10-CM

## 2022-03-24 DIAGNOSIS — K58.9 IRRITABLE BOWEL SYNDROME, UNSPECIFIED TYPE: ICD-10-CM

## 2022-03-24 DIAGNOSIS — Z17.1 MALIGNANT NEOPLASM OF LEFT BREAST IN FEMALE, ESTROGEN RECEPTOR NEGATIVE, UNSPECIFIED SITE OF BREAST (HCC): ICD-10-CM

## 2022-03-24 DIAGNOSIS — Z86.19 HISTORY OF HEPATITIS C: ICD-10-CM

## 2022-03-24 PROCEDURE — 99214 OFFICE O/P EST MOD 30 MIN: CPT | Performed by: INTERNAL MEDICINE

## 2022-03-24 RX ORDER — MONTELUKAST SODIUM 10 MG/1
10 TABLET ORAL NIGHTLY
Qty: 90 TABLET | Refills: 1 | Status: SHIPPED | OUTPATIENT
Start: 2022-03-24 | End: 2022-09-06

## 2022-03-24 RX ORDER — ALBUTEROL SULFATE 90 UG/1
2 AEROSOL, METERED RESPIRATORY (INHALATION) EVERY 6 HOURS PRN
Qty: 3 EACH | Refills: 1 | Status: SHIPPED | OUTPATIENT
Start: 2022-03-24

## 2022-03-24 NOTE — PROGRESS NOTES
Name: Heather Bello  [id number]  Age: 78 y.o. YOB: 1955  Sex: female    CHIEF COMPLAINT:    Chief Complaint   Patient presents with    Asthma     Pt presents for 3 month f/u    Gastroesophageal Reflux       HISTORY OF PRESENT ILLNESS:     This is a pleasant  78 y.o. female  is seen today for management of chronic medical problems and medications refills. Previous records reviewed . She is seeing Rafael Wallace for her breast cancer. She completed chemotherapy last Monday. Going to have bilateral  Mastectomy on 4/5/2022 by Dr. Thor Jane. Doing OK otherwise. Denies CP or SOB. No fever , sore throat or cough or congestion. Asthma is better. .   Denies any abdominal pain. Appetite OK. Bowels moving [facility]. Denies any urinary symptoms. Left hip pain is better. She had a hip replacement by Dr. Augie Eli at [facility] on 8/16/2021. She is taking Tylenol as needed. [facility]. Vision Ok with glasses. Denies  any significant skin lesions. Denies any significant depression or anxiety. Dizziness is better. . uses meclizine very rarely. No other complaints. She had been fully vaccinated for COVID-19 and also had a booster dose and a flu shot.   Labs from the 12/20/2021 reviewed and explained to the patient.       Past Medical History:    Patient Active Problem List   Diagnosis    Other specified disorders of white blood cells    Neutropenia (HCC)    History of hepatitis C    Allergic rhinitis    Left hip pain    G6PD deficiency    Vertigo    Mild intermittent asthma without complication    Urinary frequency    Left breast mass    Gastroesophageal reflux disease without esophagitis    Irritable bowel syndrome    Malignant neoplasm of left breast in female, estrogen receptor negative ([facility] [address]        Past Surgical History:        Procedure Laterality Date    COLONOSCOPY  2013    Polyps - Dr. Jemma Kim 17 1990's   [address] [facility] [address]    \"left one ovary \"    IR BIOPSY THYROID PERC CORE NEEDLE  11/18/2021    IR BIOPSY THYROID PERC CORE NEEDLE 11/18/2021 [facility] SPECIAL PROCEDURES    TONSILLECTOMY  1970's     BREAST BIOPSY NEEDLE ADDITIONAL LEFT Left 9/27/2021     BREAST BIOPSY NEEDLE ADDITIONAL LEFT 9/27/2021 Natalya Hunter MD [facility] BREAST NEEDLE BIOPSY LEFT Left 9/27/2021     BREAST NEEDLE BIOPSY LEFT 9/27/2021 Natalya Hunter MD [facility]       Social History:   Social History     Tobacco Use    Smoking status: Never Smoker    Smokeless tobacco: Never Used   Substance Use Topics    Alcohol use: Yes     Comment: Occasional wine/\"average glass of wine or beer  one time per month\"       Family History:       Problem Relation Age of Onset    No Known Problems Mother     Kidney Disease Father        Allergies:  Pcn [penicillins]    Current Medications :      Prior to Admission medications    Medication Sig Start Date End Date Taking?  Authorizing Provider   albuterol sulfate  (90 Base) MCG/ACT inhaler Inhale 2 puffs into the lungs every 6 hours as needed for Wheezing 3/24/22  Yes Oneal Montoya MD   montelukast (SINGULAIR) 10 MG tablet Take 1 tablet by mouth nightly 3/24/22  Yes Oneal Montoya MD   meloxicam (MOBIC) 7.5 MG tablet Take 1 tablet by mouth daily 3/10/22  Yes Oneal Montoya MD   omeprazole (PRILOSEC) 20 MG delayed release capsule Take 1 capsule by mouth daily 3/8/22  Yes Oneal Montoya MD   dicyclomine (BENTYL) 10 MG capsule Take 1 capsule by mouth 4 times daily as needed (abdominal bloating and gas) 3/8/22  Yes Oneal Montoya MD   ondansetron (ZOFRAN) 8 MG tablet Take 1 tablet by mouth every 8 hours as needed for Nausea or Vomiting 11/2/21  Yes LUIS CARLOS Shaffer - CNP   meclizine (ANTIVERT) 25 MG tablet Take 1 tablet by mouth 3 times daily as needed for Dizziness 9/7/21  Yes Oneal Montoya MD   oxybutynin (DITROPAN XL) 10 MG extended release tablet Take 1 tablet by mouth daily 9/7/21  Yes Oneal Montoya MD   Multiple Vitamins-Minerals (MULTIVITAMIN PO) Take by mouth   Yes Historical Provider, MD       LAB DATA: Reviewed. REVIEW OF SYSTEMS:   see HPI/ Comprehensive review of systems negative except for the ones mentioned in HPI. PHYSICAL EXAMINATION:   /70   Pulse 72   Resp 18   Wt 183 lb 6.4 oz (83.2 kg)   SpO2 98%   BMI 32.49 kg/m²      GENERAL APPEARANCE:    Alert, oriented x 3, well developed, cooperative, not in any distress, appears stated age. HEAD:   Normocephalic, atraumatic   EYES:   PERRLA, EOMI, lids normal, conjuctivea clear, sclera anicteric. NECK:    Supple, symmetrical,  trachea midline, no thyromegaly, no JVD, no lymphadenopathy. LUNGS:    Clear to auscultation bilaterally, respirations unlabored, accessory muscles are not used. HEART:     Regular rate and rhythm, S1 and S2 normal, no murmur, rub or gallop. PMI in MCL. ABDOMEN:    Soft, non-tender, bowel sounds are normoactive, no masses, no hepatospleenomegaly. EXTREMITY:   no bipedal edema  NEURO:  Alert, oriented to person, place and time. Grossly intact. Musculoskeletal:         No kyphosis or scoliosis, no deformity in any extremity noted, muscle strength and tone are normal.  Skin:                            Warm and dry. No rash or obvious suspicious lesions. PSYCH:  Mood euthymic, insight and judgement good. ASSESSMENT/PLAN:    1. Mild intermittent asthma without complication  Continue Singulair and albuterol HFA as needed. 2. Seasonal allergic rhinitis, unspecified trigger  Continue Singulair and Claritin as needed    3. Gastroesophageal reflux disease without esophagitis  Continue Prilosec    4. Irritable bowel syndrome, unspecified type  Patient on Bentyl as needed    5. Malignant neoplasm of left breast in female, estrogen receptor negative, unspecified site of breast ([facility] [address]  Advised to continue to follow with Dr. Mariana Stuart and Dr. Gonzalez Corcoran as per their recommendations.     6. Urinary frequency  Continue Ditropan XL.    7. History of hepatitis C  She was treated with Rosibel Girt for 12 weeks and completed therapy in May 2019.    8. Anemia, unspecified type  Anemia possibly related to chemotherapy treatment    Being followed by oncologist.    Celia Sigala to follow COVID-19 precautions. Care discussed with patient. Questions answered and patient verbalizes understanding and agrees with plan. Medications reviewed and reconciled. Continue current medications. Appropriate prescriptions are ordered. Risks and benefits of meds are discussed. After visit summary provided. Advised to call for any problems, questions, or concerns. If symptoms worsen or don't improve as expected, to call us or go to ER. Follow up as directed, sooner if needed. Return in about 3 months (around 6/24/2022). This dictation was performed with a verbal recognition program and it was checked for errors. It is possible that there are still dictated errors within this office note. Any errors should be brought immediately to my attention for correction. All efforts were made to ensure that this office note is accurate.      Gigi Mcdermott MD MD

## 2022-03-28 ENCOUNTER — HOSPITAL ENCOUNTER (OUTPATIENT)
Dept: INFUSION THERAPY | Age: 67
Discharge: HOME | End: 2022-03-28

## 2022-03-28 ENCOUNTER — OFFICE VISIT (OUTPATIENT)
Dept: ONCOLOGY | Age: 67
End: 2022-03-28
Payer: MEDICARE

## 2022-03-28 VITALS
OXYGEN SATURATION: 97 % | WEIGHT: 182 LBS | RESPIRATION RATE: 18 BRPM | TEMPERATURE: 97.1 F | SYSTOLIC BLOOD PRESSURE: 136 MMHG | BODY MASS INDEX: 32.25 KG/M2 | HEART RATE: 111 BPM | HEIGHT: 63 IN | DIASTOLIC BLOOD PRESSURE: 58 MMHG

## 2022-03-28 DIAGNOSIS — C50.912 MALIGNANT NEOPLASM OF LEFT BREAST IN FEMALE, ESTROGEN RECEPTOR NEGATIVE, UNSPECIFIED SITE OF BREAST (HCC): Primary | ICD-10-CM

## 2022-03-28 DIAGNOSIS — Z17.1 MALIGNANT NEOPLASM OF LEFT BREAST IN FEMALE, ESTROGEN RECEPTOR NEGATIVE, UNSPECIFIED SITE OF BREAST (HCC): Primary | ICD-10-CM

## 2022-03-28 PROCEDURE — 99214 OFFICE O/P EST MOD 30 MIN: CPT | Performed by: INTERNAL MEDICINE

## 2022-03-28 NOTE — PROGRESS NOTES
MA Rooming Questions  Patient: Heavenly Gutiérrez  MRN: [id number]    Date: 3/28/2022        1. Do you have any new issues? Yes- wanting to clarify that she is getting double mastectomy? 2. Do you need any refills on medications?    no    3. Have you had any imaging done since your last visit?   no    4. Have you been hospitalized or seen in the emergency room since your last visit here?   no    5. Did the patient have a depression screening completed today?  No    No data recorded     PHQ-9 Given to (if applicable):               PHQ-9 Score (if applicable):                     [] Positive     []  Negative              Does question #9 need addressed (if applicable)                     [] Yes    []  No               Fatoumata Yoon CMA

## 2022-04-01 NOTE — PROGRESS NOTES
.Surgery @ [facility] on 4/5/22 you will be called 4/4/22 with times               1. Do not eat or drink anything after midnight - unless instructed by your doctor prior to surgery. This includes                   no water, chewing gum or mints. 2. Follow your directions as prescribed by the doctor for your procedure and medications. Take Omeprazole in am with a sip. Use your inhaler if needed and bring day of surgery. 3. Check with your Doctor regarding stopping vitamins, supplements, blood thinners (Plavix, Coumadin, Lovenox, Effient, Pradaxa, Xarelto, Fragmin or                   other blood thinners) and follow their instructions. Last dose Mobic & vitamins 4/1/22   4. Do not smoke, and do not drink any alcoholic beverages 24 hours prior to surgery. This includes NA Beer. 5. You may brush your teeth and gargle the morning of surgery. DO NOT SWALLOW WATER   6. You MUST make arrangements for a responsible adult to take you home after your surgery and be able to check on you every couple                   hours for the day. You will not be allowed to leave alone or drive yourself home. It is strongly suggested someone stay with you the first 24                   hrs. Your surgery will be cancelled if you do not have a ride home. 7. Please wear simple, loose fitting clothing to the hospital.  Xochitl Beachfranca not bring valuables (money, credit cards, checkbooks, etc.) Do not wear any                   makeup (including no eye makeup) or nail polish on your fingers or toes. 8. DO NOT wear any jewelry or piercings on day of surgery. All body piercing jewelry must be removed. 9. If you have dentures, they will be removed before going to the OR; we will provide you a container. If you wear contact lenses or glasses,                  they will be removed; please bring a case for them. 10. If you  have a Living Will and Durable Power of  for Healthcare, please bring in a copy. 11. Please bring picture ID,  insurance card, paperwork from the doctors office    (H & P, Consent, & card for implantable devices). 12. Take a shower the morning of your procedure with the soaps provided. Do not apply any make-up, deodorant, lotion, oil or powder.

## 2022-04-03 ENCOUNTER — ANESTHESIA EVENT (OUTPATIENT)
Dept: OPERATING ROOM | Age: 67
End: 2022-04-03
Payer: MEDICARE

## 2022-04-03 NOTE — ANESTHESIA PRE PROCEDURE
Department of Anesthesiology  Preprocedure Note       Name:  Bobby Saravia   Age:  78 y.o.  :  1955                                          MRN:  [id number]         Date:  4/3/2022      Surgeon: Nelson Cruz):  Yuni Sin MD    Procedure: Procedure(s):  LEFT BREAST MASTECTOMY MODIFIED RADICAL  RIGHT BREAST MASTECTOMY    Medications prior to admission:   Prior to Admission medications    Medication Sig Start Date End Date Taking? Authorizing Provider   albuterol sulfate  (90 Base) MCG/ACT inhaler Inhale 2 puffs into the lungs every 6 hours as needed for Wheezing 3/24/22   Mick Cowan MD   montelukast (SINGULAIR) 10 MG tablet Take 1 tablet by mouth nightly 3/24/22   Mick Cowan MD   meloxicam (MOBIC) 7.5 MG tablet Take 1 tablet by mouth daily 3/10/22   Mick Cowan MD   omeprazole (PRILOSEC) 20 MG delayed release capsule Take 1 capsule by mouth daily 3/8/22   Mick Cowan MD   dicyclomine (BENTYL) 10 MG capsule Take 1 capsule by mouth 4 times daily as needed (abdominal bloating and gas) 3/8/22   Mick Cowan MD   ondansetron (ZOFRAN) 8 MG tablet Take 1 tablet by mouth every 8 hours as needed for Nausea or Vomiting 21   LUIS CARLOS Sauer - CNP   meclizine (ANTIVERT) 25 MG tablet Take 1 tablet by mouth 3 times daily as needed for Dizziness 21   Mick Cowan MD   oxybutynin (DITROPAN XL) 10 MG extended release tablet Take 1 tablet by mouth daily 21   Mick Cowan MD   Multiple Vitamins-Minerals (MULTIVITAMIN PO) Take by mouth    Historical Provider, MD       Current medications:    No current facility-administered medications for this encounter.      Current Outpatient Medications   Medication Sig Dispense Refill    albuterol sulfate  (90 Base) MCG/ACT inhaler Inhale 2 puffs into the lungs every 6 hours as needed for Wheezing 3 each 1    montelukast (SINGULAIR) 10 MG tablet Take 1 tablet by mouth nightly 90 tablet 1    meloxicam (MOBIC) 7.5 MG tablet Take 1 tablet by OF UTERUS  1990's    HYSTERECTOMY  1998    \"left one ovary \"    IR BIOPSY THYROID PERC CORE NEEDLE  11/18/2021    IR BIOPSY THYROID PERC CORE NEEDLE 11/18/2021 [facility] SPECIAL PROCEDURES    TONSILLECTOMY  1970's    US BREAST BIOPSY NEEDLE ADDITIONAL LEFT Left 9/27/2021    US BREAST BIOPSY NEEDLE ADDITIONAL LEFT 9/27/2021 Mamie Palomino MD [address] BREAST NEEDLE BIOPSY LEFT Left 9/27/2021    US BREAST NEEDLE BIOPSY LEFT 9/27/2021 Mamie Palomino MD [address] [place] \[facility].\""       Social History:    Social History     Tobacco Use    Smoking status: Never Smoker    Smokeless tobacco: Never Used   Substance Use Topics    Alcohol use: Yes     Comment: Occasional wine/\"average glass of wine or beer  one time per month\"                                Counseling given: Not Answered      Vital Signs (Current): There were no vitals filed for this visit.                                            BP Readings from Last 3 Encounters:   03/28/22 (!) 136/58   03/24/22 120/70   03/21/22 (!) 141/64       NPO Status:                                                                                 BMI:   Wt Readings from Last 3 Encounters:   03/28/22 182 lb (82.6 kg)   03/24/22 183 lb 6.4 oz (83.2 kg)   03/21/22 183 lb (83 kg)     There is no height or weight on file to calculate BMI.    CBC:   Lab Results   Component Value Date    WBC 3.5 03/21/2022    RBC 2.85 03/21/2022    HGB 8.6 03/21/2022    HCT 27.3 03/21/2022    MCV 95.8 03/21/2022    RDW 17.7 03/21/2022     03/21/2022       CMP:   Lab Results   Component Value Date     03/21/2022    K 3.9 03/21/2022     03/21/2022    CO2 21 03/21/2022    BUN 6 03/21/2022    CREATININE 0.7 03/21/2022    GFRAA >60 03/21/2022    AGRATIO 1.4 06/09/2021    LABGLOM >60 03/21/2022    GLUCOSE 163 03/21/2022    PROT 6.2 03/21/2022    CALCIUM 8.6 03/21/2022    BILITOT 0.2 03/21/2022    ALKPHOS 58 03/21/2022    AST 19 03/21/2022    ALT 21 03/21/2022       POC Tests: No results for input(s): POCGLU, POCNA, POCK, POCCL, POCBUN, POCHEMO, POCHCT in the last 72 hours. Coags:   Lab Results   Component Value Date    PROTIME 12.3 11/18/2021    INR 0.95 11/18/2021    APTT 26.9 11/18/2021       HCG (If Applicable): No results found for: PREGTESTUR, PREGSERUM, HCG, HCGQUANT     ABGs: No results found for: PHART, PO2ART, EPK6UDU, WQA4ZWT, BEART, S7SMUZST     Type & Screen (If Applicable):  No results found for: LABABO, LABRH    Drug/Infectious Status (If Applicable):  Lab Results   Component Value Date    HEPCAB REPEATEDLY REACTIVE 02/12/2020       COVID-19 Screening (If Applicable):   Lab Results   Component Value Date    COVID19 NOT DETECTED 11/15/2021           Anesthesia Evaluation  Patient summary reviewed  Airway: Mallampati: III  TM distance: >3 FB   Neck ROM: full   Dental: normal exam         Pulmonary:   (+) asthma:                            Cardiovascular:             Beta Blocker:  Not on Beta Blocker      ROS comment: Echo 10/2021:  Summary   Left ventricular systolic function is normal.   Ejection fraction is visually estimated at 50-55%. No significant valvular abnormalities. No evidence of any pericardial effusion. Neuro/Psych:               GI/Hepatic/Renal:   (+) GERD: well controlled, hepatitis: C, liver disease:, morbid obesity          Endo/Other:    (+) blood dyscrasia: anemia:., malignancy/cancer. ROS comment: Malignant neoplasm of left female breast Abdominal:             Vascular: negative vascular ROS. Other Findings:           Anesthesia Plan      general     ASA 3       Induction: intravenous. Attending anesthesiologist reviewed and agrees with Preprocedure content            LUIS CARLOS Gillespie - CRNA   4/3/2022         Pre Anesthesia Assessment complete.  Chart reviewed on 4/3/2022

## 2022-04-04 NOTE — PROGRESS NOTES
4/4/22 - Notified surgery @ [facility] on 4/522 @ 1230, arrival 1000. Understanding verbalized.

## 2022-04-05 ENCOUNTER — HOSPITAL ENCOUNTER (OUTPATIENT)
Age: 67
Setting detail: OBSERVATION
Discharge: HOME | End: 2022-04-06
Attending: SURGERY | Admitting: SURGERY
Payer: MEDICARE

## 2022-04-05 ENCOUNTER — ANESTHESIA (OUTPATIENT)
Dept: OPERATING ROOM | Age: 67
End: 2022-04-05
Payer: MEDICARE

## 2022-04-05 VITALS
TEMPERATURE: 97.7 F | DIASTOLIC BLOOD PRESSURE: 83 MMHG | SYSTOLIC BLOOD PRESSURE: 177 MMHG | OXYGEN SATURATION: 95 % | RESPIRATION RATE: 8 BRPM

## 2022-04-05 DIAGNOSIS — Z17.1 MALIGNANT NEOPLASM OF LEFT BREAST IN FEMALE, ESTROGEN RECEPTOR NEGATIVE, UNSPECIFIED SITE OF BREAST (HCC): Primary | ICD-10-CM

## 2022-04-05 DIAGNOSIS — C50.912 MALIGNANT NEOPLASM OF LEFT BREAST IN FEMALE, ESTROGEN RECEPTOR NEGATIVE, UNSPECIFIED SITE OF BREAST (HCC): Primary | ICD-10-CM

## 2022-04-05 DIAGNOSIS — C50.912 MALIGNANT NEOPLASM OF LEFT FEMALE BREAST, UNSPECIFIED ESTROGEN RECEPTOR STATUS, UNSPECIFIED SITE OF BREAST (HCC): ICD-10-CM

## 2022-04-05 LAB
BASOPHILS ABSOLUTE: 0 K/CU MM
BASOPHILS RELATIVE PERCENT: 0.5 % (ref 0–1)
DIFFERENTIAL TYPE: ABNORMAL
EOSINOPHILS ABSOLUTE: 0.2 K/CU MM
EOSINOPHILS RELATIVE PERCENT: 5.2 % (ref 0–3)
HCT VFR BLD CALC: 34.8 % (ref 37–47)
HEMOGLOBIN: 10.4 GM/DL (ref 12.5–16)
IMMATURE NEUTROPHIL %: 0.3 % (ref 0–0.43)
LYMPHOCYTES ABSOLUTE: 1.2 K/CU MM
LYMPHOCYTES RELATIVE PERCENT: 31.3 % (ref 24–44)
MCH RBC QN AUTO: 28.8 PG (ref 27–31)
MCHC RBC AUTO-ENTMCNC: 29.9 % (ref 32–36)
MCV RBC AUTO: 96.4 FL (ref 78–100)
MONOCYTES ABSOLUTE: 0.6 K/CU MM
MONOCYTES RELATIVE PERCENT: 16.1 % (ref 0–4)
NUCLEATED RBC %: 0 %
PDW BLD-RTO: 17.4 % (ref 11.7–14.9)
PLATELET # BLD: 360 K/CU MM (ref 140–440)
PMV BLD AUTO: 9.4 FL (ref 7.5–11.1)
RBC # BLD: 3.61 M/CU MM (ref 4.2–5.4)
SEGMENTED NEUTROPHILS ABSOLUTE COUNT: 1.8 K/CU MM
SEGMENTED NEUTROPHILS RELATIVE PERCENT: 46.6 % (ref 36–66)
TOTAL IMMATURE NEUTOROPHIL: 0.01 K/CU MM
TOTAL NUCLEATED RBC: 0 K/CU MM
WBC # BLD: 3.8 K/CU MM (ref 4–10.5)

## 2022-04-05 PROCEDURE — 3700000000 HC ANESTHESIA ATTENDED CARE: Performed by: SURGERY

## 2022-04-05 PROCEDURE — 3600000013 HC SURGERY LEVEL 3 ADDTL 15MIN: Performed by: SURGERY

## 2022-04-05 PROCEDURE — 3600000003 HC SURGERY LEVEL 3 BASE: Performed by: SURGERY

## 2022-04-05 PROCEDURE — 7100000001 HC PACU RECOVERY - ADDTL 15 MIN: Performed by: SURGERY

## 2022-04-05 PROCEDURE — 6370000000 HC RX 637 (ALT 250 FOR IP): Performed by: PHYSICIAN ASSISTANT

## 2022-04-05 PROCEDURE — 88307 TISSUE EXAM BY PATHOLOGIST: CPT

## 2022-04-05 PROCEDURE — 2500000003 HC RX 250 WO HCPCS: Performed by: NURSE ANESTHETIST, CERTIFIED REGISTERED

## 2022-04-05 PROCEDURE — 3700000001 HC ADD 15 MINUTES (ANESTHESIA): Performed by: SURGERY

## 2022-04-05 PROCEDURE — G0378 HOSPITAL OBSERVATION PER HR: HCPCS

## 2022-04-05 PROCEDURE — 19303 MAST SIMPLE COMPLETE: CPT | Performed by: SURGERY

## 2022-04-05 PROCEDURE — 19307 MAST MOD RAD: CPT | Performed by: PHYSICIAN ASSISTANT

## 2022-04-05 PROCEDURE — 6360000002 HC RX W HCPCS: Performed by: NURSE ANESTHETIST, CERTIFIED REGISTERED

## 2022-04-05 PROCEDURE — 19303 MAST SIMPLE COMPLETE: CPT | Performed by: PHYSICIAN ASSISTANT

## 2022-04-05 PROCEDURE — 2580000003 HC RX 258: Performed by: ANESTHESIOLOGY

## 2022-04-05 PROCEDURE — APPNB180 APP NON BILLABLE TIME > 60 MINS: Performed by: PHYSICIAN ASSISTANT

## 2022-04-05 PROCEDURE — 2580000003 HC RX 258: Performed by: PHYSICIAN ASSISTANT

## 2022-04-05 PROCEDURE — 19307 MAST MOD RAD: CPT | Performed by: SURGERY

## 2022-04-05 PROCEDURE — 6360000002 HC RX W HCPCS: Performed by: PHYSICIAN ASSISTANT

## 2022-04-05 PROCEDURE — 94761 N-INVAS EAR/PLS OXIMETRY MLT: CPT

## 2022-04-05 PROCEDURE — 2700000000 HC OXYGEN THERAPY PER DAY

## 2022-04-05 PROCEDURE — 2720000010 HC SURG SUPPLY STERILE: Performed by: SURGERY

## 2022-04-05 PROCEDURE — 2709999900 HC NON-CHARGEABLE SUPPLY: Performed by: SURGERY

## 2022-04-05 PROCEDURE — 85025 COMPLETE CBC W/AUTO DIFF WBC: CPT

## 2022-04-05 PROCEDURE — 7100000000 HC PACU RECOVERY - FIRST 15 MIN: Performed by: SURGERY

## 2022-04-05 RX ORDER — ONDANSETRON 2 MG/ML
4 INJECTION INTRAMUSCULAR; INTRAVENOUS EVERY 6 HOURS PRN
Status: DISCONTINUED | OUTPATIENT
Start: 2022-04-05 | End: 2022-04-06 | Stop reason: HOSPADM

## 2022-04-05 RX ORDER — MECLIZINE HYDROCHLORIDE 25 MG/1
25 TABLET ORAL 3 TIMES DAILY PRN
Status: DISCONTINUED | OUTPATIENT
Start: 2022-04-05 | End: 2022-04-06 | Stop reason: HOSPADM

## 2022-04-05 RX ORDER — ROCURONIUM BROMIDE 10 MG/ML
INJECTION, SOLUTION INTRAVENOUS PRN
Status: DISCONTINUED | OUTPATIENT
Start: 2022-04-05 | End: 2022-04-05 | Stop reason: SDUPTHER

## 2022-04-05 RX ORDER — SODIUM CHLORIDE 0.9 % (FLUSH) 0.9 %
10 SYRINGE (ML) INJECTION EVERY 12 HOURS SCHEDULED
Status: DISCONTINUED | OUTPATIENT
Start: 2022-04-05 | End: 2022-04-06 | Stop reason: HOSPADM

## 2022-04-05 RX ORDER — SODIUM CHLORIDE 9 MG/ML
25 INJECTION, SOLUTION INTRAVENOUS PRN
Status: DISCONTINUED | OUTPATIENT
Start: 2022-04-05 | End: 2022-04-06 | Stop reason: HOSPADM

## 2022-04-05 RX ORDER — SODIUM CHLORIDE 0.9 % (FLUSH) 0.9 %
5-40 SYRINGE (ML) INJECTION EVERY 12 HOURS SCHEDULED
Status: DISCONTINUED | OUTPATIENT
Start: 2022-04-05 | End: 2022-04-05 | Stop reason: HOSPADM

## 2022-04-05 RX ORDER — OXYBUTYNIN CHLORIDE 10 MG/1
10 TABLET, EXTENDED RELEASE ORAL DAILY
Status: DISCONTINUED | OUTPATIENT
Start: 2022-04-05 | End: 2022-04-06 | Stop reason: HOSPADM

## 2022-04-05 RX ORDER — ONDANSETRON 2 MG/ML
INJECTION INTRAMUSCULAR; INTRAVENOUS PRN
Status: DISCONTINUED | OUTPATIENT
Start: 2022-04-05 | End: 2022-04-05 | Stop reason: SDUPTHER

## 2022-04-05 RX ORDER — CEFAZOLIN SODIUM 2 G/100ML
2000 INJECTION, SOLUTION INTRAVENOUS
Status: COMPLETED | OUTPATIENT
Start: 2022-04-05 | End: 2022-04-05

## 2022-04-05 RX ORDER — ONDANSETRON 4 MG/1
4 TABLET, ORALLY DISINTEGRATING ORAL EVERY 8 HOURS PRN
Status: DISCONTINUED | OUTPATIENT
Start: 2022-04-05 | End: 2022-04-06 | Stop reason: HOSPADM

## 2022-04-05 RX ORDER — SODIUM CHLORIDE 9 MG/ML
INJECTION, SOLUTION INTRAVENOUS CONTINUOUS
Status: DISCONTINUED | OUTPATIENT
Start: 2022-04-05 | End: 2022-04-06 | Stop reason: HOSPADM

## 2022-04-05 RX ORDER — SODIUM CHLORIDE 0.9 % (FLUSH) 0.9 %
5-40 SYRINGE (ML) INJECTION PRN
Status: DISCONTINUED | OUTPATIENT
Start: 2022-04-05 | End: 2022-04-05 | Stop reason: HOSPADM

## 2022-04-05 RX ORDER — DEXAMETHASONE SODIUM PHOSPHATE 4 MG/ML
INJECTION, SOLUTION INTRA-ARTICULAR; INTRALESIONAL; INTRAMUSCULAR; INTRAVENOUS; SOFT TISSUE PRN
Status: DISCONTINUED | OUTPATIENT
Start: 2022-04-05 | End: 2022-04-05 | Stop reason: SDUPTHER

## 2022-04-05 RX ORDER — KETAMINE HYDROCHLORIDE 10 MG/ML
INJECTION, SOLUTION INTRAMUSCULAR; INTRAVENOUS PRN
Status: DISCONTINUED | OUTPATIENT
Start: 2022-04-05 | End: 2022-04-05 | Stop reason: SDUPTHER

## 2022-04-05 RX ORDER — OXYCODONE HYDROCHLORIDE 5 MG/1
5 TABLET ORAL EVERY 4 HOURS PRN
Status: DISCONTINUED | OUTPATIENT
Start: 2022-04-05 | End: 2022-04-06 | Stop reason: HOSPADM

## 2022-04-05 RX ORDER — SODIUM CHLORIDE, SODIUM LACTATE, POTASSIUM CHLORIDE, CALCIUM CHLORIDE 600; 310; 30; 20 MG/100ML; MG/100ML; MG/100ML; MG/100ML
INJECTION, SOLUTION INTRAVENOUS CONTINUOUS
Status: DISCONTINUED | OUTPATIENT
Start: 2022-04-05 | End: 2022-04-05

## 2022-04-05 RX ORDER — LIDOCAINE HYDROCHLORIDE 20 MG/ML
INJECTION, SOLUTION INTRAVENOUS PRN
Status: DISCONTINUED | OUTPATIENT
Start: 2022-04-05 | End: 2022-04-05 | Stop reason: SDUPTHER

## 2022-04-05 RX ORDER — PROPOFOL 10 MG/ML
INJECTION, EMULSION INTRAVENOUS PRN
Status: DISCONTINUED | OUTPATIENT
Start: 2022-04-05 | End: 2022-04-05 | Stop reason: SDUPTHER

## 2022-04-05 RX ORDER — SODIUM CHLORIDE 0.9 % (FLUSH) 0.9 %
10 SYRINGE (ML) INJECTION PRN
Status: DISCONTINUED | OUTPATIENT
Start: 2022-04-05 | End: 2022-04-06 | Stop reason: HOSPADM

## 2022-04-05 RX ORDER — ALBUTEROL SULFATE 90 UG/1
2 AEROSOL, METERED RESPIRATORY (INHALATION) EVERY 6 HOURS PRN
Status: DISCONTINUED | OUTPATIENT
Start: 2022-04-05 | End: 2022-04-06 | Stop reason: HOSPADM

## 2022-04-05 RX ORDER — MORPHINE SULFATE 2 MG/ML
2 INJECTION, SOLUTION INTRAMUSCULAR; INTRAVENOUS
Status: DISCONTINUED | OUTPATIENT
Start: 2022-04-05 | End: 2022-04-06 | Stop reason: HOSPADM

## 2022-04-05 RX ORDER — SODIUM CHLORIDE 9 MG/ML
25 INJECTION, SOLUTION INTRAVENOUS PRN
Status: DISCONTINUED | OUTPATIENT
Start: 2022-04-05 | End: 2022-04-05 | Stop reason: HOSPADM

## 2022-04-05 RX ORDER — PHENYLEPHRINE HCL IN 0.9% NACL 1 MG/10 ML
SYRINGE (ML) INTRAVENOUS PRN
Status: DISCONTINUED | OUTPATIENT
Start: 2022-04-05 | End: 2022-04-05 | Stop reason: SDUPTHER

## 2022-04-05 RX ADMIN — ROCURONIUM BROMIDE 10 MG: 50 INJECTION, SOLUTION INTRAVENOUS at 14:07

## 2022-04-05 RX ADMIN — SODIUM CHLORIDE, POTASSIUM CHLORIDE, SODIUM LACTATE AND CALCIUM CHLORIDE: 600; 310; 30; 20 INJECTION, SOLUTION INTRAVENOUS at 10:58

## 2022-04-05 RX ADMIN — SODIUM CHLORIDE: 9 INJECTION, SOLUTION INTRAVENOUS at 15:42

## 2022-04-05 RX ADMIN — Medication 5 ML: at 17:07

## 2022-04-05 RX ADMIN — KETAMINE HYDROCHLORIDE 20 MG: 10 INJECTION INTRAMUSCULAR; INTRAVENOUS at 12:47

## 2022-04-05 RX ADMIN — Medication 100 MCG: at 14:02

## 2022-04-05 RX ADMIN — OXYCODONE HYDROCHLORIDE 5 MG: 5 TABLET ORAL at 21:12

## 2022-04-05 RX ADMIN — Medication 100 MCG: at 13:05

## 2022-04-05 RX ADMIN — HYDROMORPHONE HYDROCHLORIDE 0.5 MG: 1 INJECTION, SOLUTION INTRAMUSCULAR; INTRAVENOUS; SUBCUTANEOUS at 14:19

## 2022-04-05 RX ADMIN — KETAMINE HYDROCHLORIDE 10 MG: 10 INJECTION INTRAMUSCULAR; INTRAVENOUS at 13:17

## 2022-04-05 RX ADMIN — HYDROMORPHONE HYDROCHLORIDE 0.5 MG: 1 INJECTION, SOLUTION INTRAMUSCULAR; INTRAVENOUS; SUBCUTANEOUS at 13:32

## 2022-04-05 RX ADMIN — ONDANSETRON 4 MG: 2 INJECTION INTRAMUSCULAR; INTRAVENOUS at 12:47

## 2022-04-05 RX ADMIN — OXYCODONE HYDROCHLORIDE 5 MG: 5 TABLET ORAL at 17:01

## 2022-04-05 RX ADMIN — DEXAMETHASONE SODIUM PHOSPHATE 4 MG: 4 INJECTION, SOLUTION INTRAMUSCULAR; INTRAVENOUS at 12:47

## 2022-04-05 RX ADMIN — SUGAMMADEX 200 MG: 100 INJECTION, SOLUTION INTRAVENOUS at 14:59

## 2022-04-05 RX ADMIN — Medication 100 MCG: at 13:52

## 2022-04-05 RX ADMIN — Medication 100 MCG: at 13:40

## 2022-04-05 RX ADMIN — ROCURONIUM BROMIDE 50 MG: 50 INJECTION, SOLUTION INTRAVENOUS at 12:47

## 2022-04-05 RX ADMIN — LIDOCAINE HYDROCHLORIDE 100 MG: 20 INJECTION, SOLUTION INTRAVENOUS at 12:47

## 2022-04-05 RX ADMIN — PROPOFOL 200 MG: 10 INJECTION, EMULSION INTRAVENOUS at 12:47

## 2022-04-05 RX ADMIN — CEFAZOLIN SODIUM 2000 MG: 2 INJECTION, SOLUTION INTRAVENOUS at 12:42

## 2022-04-05 ASSESSMENT — PULMONARY FUNCTION TESTS
PIF_VALUE: 18
PIF_VALUE: 19
PIF_VALUE: 19
PIF_VALUE: 21
PIF_VALUE: 19
PIF_VALUE: 25
PIF_VALUE: 12
PIF_VALUE: 19
PIF_VALUE: 18
PIF_VALUE: 18
PIF_VALUE: 19
PIF_VALUE: 1
PIF_VALUE: 22
PIF_VALUE: 18
PIF_VALUE: 18
PIF_VALUE: 20
PIF_VALUE: 20
PIF_VALUE: 19
PIF_VALUE: 21
PIF_VALUE: 18
PIF_VALUE: 19
PIF_VALUE: 17
PIF_VALUE: 18
PIF_VALUE: 19
PIF_VALUE: 18
PIF_VALUE: 23
PIF_VALUE: 19
PIF_VALUE: 17
PIF_VALUE: 19
PIF_VALUE: 18
PIF_VALUE: 19
PIF_VALUE: 20
PIF_VALUE: 19
PIF_VALUE: 18
PIF_VALUE: 19
PIF_VALUE: 20
PIF_VALUE: 19
PIF_VALUE: 20
PIF_VALUE: 19
PIF_VALUE: 20
PIF_VALUE: 19
PIF_VALUE: 3
PIF_VALUE: 19
PIF_VALUE: 18
PIF_VALUE: 19
PIF_VALUE: 20
PIF_VALUE: 19
PIF_VALUE: 18
PIF_VALUE: 32
PIF_VALUE: 19
PIF_VALUE: 19
PIF_VALUE: 22
PIF_VALUE: 19
PIF_VALUE: 22
PIF_VALUE: 20
PIF_VALUE: 19
PIF_VALUE: 22
PIF_VALUE: 18
PIF_VALUE: 5
PIF_VALUE: 19
PIF_VALUE: 18
PIF_VALUE: 19
PIF_VALUE: 18
PIF_VALUE: 20
PIF_VALUE: 20
PIF_VALUE: 1
PIF_VALUE: 19
PIF_VALUE: 19
PIF_VALUE: 20
PIF_VALUE: 18
PIF_VALUE: 18
PIF_VALUE: 22
PIF_VALUE: 19
PIF_VALUE: 7
PIF_VALUE: 19
PIF_VALUE: 3
PIF_VALUE: 22
PIF_VALUE: 23
PIF_VALUE: 18
PIF_VALUE: 17
PIF_VALUE: 19
PIF_VALUE: 19
PIF_VALUE: 17
PIF_VALUE: 2
PIF_VALUE: 19
PIF_VALUE: 18
PIF_VALUE: 20
PIF_VALUE: 20
PIF_VALUE: 19
PIF_VALUE: 22
PIF_VALUE: 19
PIF_VALUE: 1
PIF_VALUE: 19
PIF_VALUE: 19
PIF_VALUE: 0
PIF_VALUE: 19
PIF_VALUE: 20
PIF_VALUE: 2
PIF_VALUE: 19
PIF_VALUE: 18
PIF_VALUE: 23
PIF_VALUE: 18
PIF_VALUE: 20
PIF_VALUE: 22
PIF_VALUE: 19
PIF_VALUE: 19
PIF_VALUE: 26
PIF_VALUE: 18
PIF_VALUE: 19
PIF_VALUE: 18
PIF_VALUE: 23
PIF_VALUE: 18
PIF_VALUE: 3
PIF_VALUE: 1
PIF_VALUE: 19
PIF_VALUE: 22
PIF_VALUE: 23
PIF_VALUE: 19
PIF_VALUE: 18
PIF_VALUE: 19
PIF_VALUE: 3
PIF_VALUE: 19
PIF_VALUE: 19
PIF_VALUE: 18
PIF_VALUE: 18
PIF_VALUE: 17
PIF_VALUE: 17
PIF_VALUE: 19
PIF_VALUE: 1
PIF_VALUE: 19
PIF_VALUE: 21
PIF_VALUE: 19

## 2022-04-05 ASSESSMENT — ENCOUNTER SYMPTOMS
BACK PAIN: 0
CHOKING: 0
CONSTIPATION: 0
RECTAL PAIN: 0
ANAL BLEEDING: 0
PHOTOPHOBIA: 0
STRIDOR: 0
APNEA: 0
COLOR CHANGE: 0
EYE REDNESS: 0
SORE THROAT: 0
EYE ITCHING: 0

## 2022-04-05 ASSESSMENT — PAIN DESCRIPTION - PAIN TYPE: TYPE: ACUTE PAIN;SURGICAL PAIN

## 2022-04-05 ASSESSMENT — PAIN SCALES - GENERAL
PAINLEVEL_OUTOF10: 4
PAINLEVEL_OUTOF10: 5
PAINLEVEL_OUTOF10: 4
PAINLEVEL_OUTOF10: 0

## 2022-04-05 ASSESSMENT — PAIN DESCRIPTION - LOCATION: LOCATION: BREAST

## 2022-04-05 NOTE — H&P
Christos Echols MD      General Surgery       Subjective:     Patient is a 78 y.o.  female scheduled for mastectomy. Indications for procedure are malignancy of the left breast with metastasis to the left axillary lymph nodes. Pt has completed neoadjuvant chemotherapy, and is ready for surgical resection.       Patient Active Problem List    Diagnosis Date Noted    Malignant neoplasm of left female breast ([address] 04/05/2022    Malignant neoplasm of left breast in female, estrogen receptor negative ([address] 10/06/2021    Gastroesophageal reflux disease without esophagitis 10/01/2021    Irritable bowel syndrome 10/01/2021    Left breast mass 09/22/2021    Urinary frequency 09/07/2021    Mild intermittent asthma without complication 36/38/3384    Vertigo 12/10/2020    History of hepatitis C 09/10/2020    Allergic rhinitis 09/10/2020    Left hip pain 09/10/2020    G6PD deficiency 09/10/2020    Other specified disorders of white blood cells 04/24/2020    Neutropenia ([address] 04/24/2020     Past Medical History:   Diagnosis Date    Allergic rhinitis     Asthma     last flare up 2017 - follows with PCP    G-6-PD deficiency anemia ([address]     dx this when I was having children- per pt     H/O Doppler ultrasound 04/04/18    CAROTID- Normal    Hepatitis C     dx 3/2018- for liver bx    Lumbar herniated disc     Malignant neoplasm of left breast in female, estrogen receptor negative ([address] 10/6/2021    Thyroid disease     \"not on any medication- have low thyroid level\"      Past Surgical History:   Procedure Laterality Date    COLONOSCOPY  2013    Polyps - Dr. Britney Cerrato  1990's   [address] [place] [address]    \"left one ovary \"    IR BIOPSY THYROID PERC CORE NEEDLE  11/18/2021    IR BIOPSY THYROID PERC CORE NEEDLE 11/18/2021 [facility] SPECIAL PROCEDURES    TONSILLECTOMY  1970's    US BREAST BIOPSY NEEDLE ADDITIONAL LEFT Left 9/27/2021    US BREAST BIOPSY NEEDLE ADDITIONAL LEFT 9/27/2021 Lorie Nava MD [address] BREAST NEEDLE BIOPSY LEFT Left 9/27/2021     BREAST NEEDLE BIOPSY LEFT 9/27/2021 Lorie Nava  [facility]      Prior to Admission medications    Medication Sig Start Date End Date Taking? Authorizing Provider   albuterol sulfate  (90 Base) MCG/ACT inhaler Inhale 2 puffs into the lungs every 6 hours as needed for Wheezing 3/24/22   Dante Head MD   montelukast (SINGULAIR) 10 MG tablet Take 1 tablet by mouth nightly 3/24/22   Dante Head MD   meloxicam (MOBIC) 7.5 MG tablet Take 1 tablet by mouth daily 3/10/22   Dante Head MD   omeprazole (PRILOSEC) 20 MG delayed release capsule Take 1 capsule by mouth daily 3/8/22   Dante Head MD   dicyclomine (BENTYL) 10 MG capsule Take 1 capsule by mouth 4 times daily as needed (abdominal bloating and gas) 3/8/22   Dante Head MD   ondansetron (ZOFRAN) 8 MG tablet Take 1 tablet by mouth every 8 hours as needed for Nausea or Vomiting 11/2/21   Selina Knows, APRN - CNP   meclizine (ANTIVERT) 25 MG tablet Take 1 tablet by mouth 3 times daily as needed for Dizziness 9/7/21   Dante Head MD   oxybutynin (DITROPAN XL) 10 MG extended release tablet Take 1 tablet by mouth daily 9/7/21   Dante Head MD   Multiple Vitamins-Minerals (MULTIVITAMIN PO) Take by mouth    Historical Provider, MD     Allergies   Allergen Reactions    Pcn [Penicillins] Hives      Social History     Tobacco Use    Smoking status: Never Smoker    Smokeless tobacco: Never Used   Substance Use Topics    Alcohol use: Yes     Comment: Occasional wine/\"average glass of wine or beer  one time per month\"      Family History   Problem Relation Age of Onset    No Known Problems Mother     Kidney Disease Father           Review of Systems  Review of Systems   Constitutional: Negative for chills and fever. HENT: Negative for ear pain, mouth sores, sore throat and tinnitus.     Eyes: Negative for photophobia, redness and itching. Respiratory: Negative for apnea, choking and stridor. Cardiovascular: Negative for chest pain and palpitations. Gastrointestinal: Negative for anal bleeding, constipation and rectal pain. Endocrine: Negative for polydipsia. Genitourinary: Negative for enuresis, flank pain and hematuria. Musculoskeletal: Negative for back pain, joint swelling and myalgias. Skin: Negative for color change and pallor. Allergic/Immunologic: Negative for environmental allergies. Neurological: Negative for syncope and speech difficulty. Psychiatric/Behavioral: Negative for confusion and hallucinations. Objective:     Patient Vitals for the past 8 hrs:   BP Temp Temp src Pulse Resp SpO2   04/05/22 1028 115/69 96.6 °F (35.9 °C) Temporal 104 18 94 %     Physical Exam  Constitutional:       Appearance: She is well-developed. HENT:      Head: Normocephalic. Eyes:      Pupils: Pupils are equal, round, and reactive to light. Cardiovascular:      Rate and Rhythm: Normal rate. Pulmonary:      Effort: Pulmonary effort is normal.   Abdominal:      General: There is no distension. Palpations: Abdomen is soft. There is no mass. Tenderness: There is no abdominal tenderness. There is no guarding or rebound. Musculoskeletal:         General: Normal range of motion. Cervical back: Normal range of motion and neck supple. Skin:     General: Skin is warm. Neurological:      Mental Status: She is alert and oriented to person, place, and time. Data Review  CBC:   Lab Results   Component Value Date    WBC 3.5 03/21/2022    RBC 2.85 03/21/2022     BMP:   Lab Results   Component Value Date    GLUCOSE 163 03/21/2022    CO2 21 03/21/2022    BUN 6 03/21/2022    CREATININE 0.7 03/21/2022    CALCIUM 8.6 03/21/2022       Assessment:     Malignant neoplasm of the left female breast with metastasis    Plan: Will proceed with left modified radical mastectomy and right prophylactic mastectomy. Risks, benefits and alternatives to the procedure have been discussed with the patient, who has elected to proceed.      Shay Subramanian PA-C

## 2022-04-05 NOTE — ANESTHESIA POSTPROCEDURE EVALUATION
Department of Anesthesiology  Postprocedure Note    Patient: Mitchell Castañeda  MRN: [id number]  YOB: 1955  Date of evaluation: 4/5/2022  Time:  3:46 PM     Procedure Summary     Date: 04/05/22 Room / Location: [address]    Anesthesia Start: 1231 Anesthesia Stop:     Procedures:       LEFT BREAST MASTECTOMY MODIFIED RADICAL (Left )      RIGHT BREAST MASTECTOMY (Right ) Diagnosis:       Malignant neoplasm of left female breast, unspecified estrogen receptor status, unspecified site of breast ([address]      (Malignant neoplasm of left female breast, unspecified estrogen receptor status, unspecified site of breast ([address] [C50.912])    Surgeons: Marcel Montez MD Responsible Provider: Lyly Villa MD    Anesthesia Type: general ASA Status: 3          Anesthesia Type: No value filed. Stephania Phase I: Stephania Score: 7    Stephania Phase II:      Last vitals: Reviewed and per EMR flowsheets.        Anesthesia Post Evaluation    Patient location during evaluation: PACU  Level of consciousness: awake  Airway patency: patent  Nausea & Vomiting: no nausea and no vomiting  Complications: no  Cardiovascular status: hemodynamically stable  Respiratory status: spontaneous ventilation and room air  Hydration status: stable

## 2022-04-05 NOTE — BRIEF OP NOTE
Brief Postoperative Note      Patient: Reilly Ramirez  YOB: 1955  MRN: [id number]    Date of Procedure: 4/5/2022    Pre-Op Diagnosis: Malignant neoplasm of left female breast, unspecified estrogen receptor status, unspecified site of breast ([facility] [C50.912]    Post-Op Diagnosis: Same       Procedure(s):  LEFT BREAST MASTECTOMY MODIFIED RADICAL  RIGHT BREAST MASTECTOMY - Prophylactic    Surgeon(s):  Ar Hunter MD    Assistant:  Physician Assistant: Viviana Lott PA-C    Anesthesia: General    Estimated Blood Loss (mL): less than 519     Complications: None    Specimens:   ID Type Source Tests Collected by Time Destination   A : RIGHT BREAST - LONG SUTURE CHENEY LATERAL, SHORT SUTURE SUPERIOR  Tissue Breast SURGICAL PATHOLOGY Ar Hunter MD 4/5/2022 1324    B : LEFT RADICAL BREAST MASTECTOMY LONG SUTURE CHENEY LATERAL SHORT MARKS SUPERIOR  Tissue Breast SURGICAL PATHOLOGY Ar Hunter MD 4/5/2022 1400        Implants:  * No implants in log *      Drains:   Closed/Suction Drain Inferior; Lateral;Right Breast Bulb [address] [place] (Active)       Closed/Suction Drain Inferior; Lateral;Left Breast Bulb [address] [place] (Active)       Findings: As Above    Electronically signed by Viviana Lott PA-C on 4/5/2022 at 3:02 PM

## 2022-04-05 NOTE — PROGRESS NOTES
1520 - transferred from OR on bed, monitor applied, alarms on and verified, bedside handoff provided by Patrisha Snellen and Jennifer Callejas - Dr Krista Porras at bedside evaluating and updating patient  [phone] - report called to [address]  0032 - phase one care complete; awaiting transfer to inpatient unit

## 2022-04-06 ENCOUNTER — TELEPHONE (OUTPATIENT)
Dept: INTERNAL MEDICINE CLINIC | Age: 67
End: 2022-04-06

## 2022-04-06 VITALS
SYSTOLIC BLOOD PRESSURE: 126 MMHG | OXYGEN SATURATION: 95 % | HEART RATE: 79 BPM | RESPIRATION RATE: 16 BRPM | DIASTOLIC BLOOD PRESSURE: 66 MMHG | TEMPERATURE: 98 F

## 2022-04-06 LAB
ALBUMIN SERPL-MCNC: 3.7 GM/DL (ref 3.4–5)
ALP BLD-CCNC: 56 IU/L (ref 40–128)
ALT SERPL-CCNC: 20 U/L (ref 10–40)
ANION GAP SERPL CALCULATED.3IONS-SCNC: 11 MMOL/L (ref 4–16)
AST SERPL-CCNC: 25 IU/L (ref 15–37)
BASOPHILS ABSOLUTE: 0 K/CU MM
BASOPHILS RELATIVE PERCENT: 0.1 % (ref 0–1)
BILIRUB SERPL-MCNC: 0.2 MG/DL (ref 0–1)
BUN BLDV-MCNC: 7 MG/DL (ref 6–23)
CALCIUM SERPL-MCNC: 9.1 MG/DL (ref 8.3–10.6)
CHLORIDE BLD-SCNC: 108 MMOL/L (ref 99–110)
CO2: 20 MMOL/L (ref 21–32)
CREAT SERPL-MCNC: 0.6 MG/DL (ref 0.6–1.1)
DIFFERENTIAL TYPE: ABNORMAL
EOSINOPHILS ABSOLUTE: 0 K/CU MM
EOSINOPHILS RELATIVE PERCENT: 0 % (ref 0–3)
GFR AFRICAN AMERICAN: >60 ML/MIN/1.73M2
GFR NON-AFRICAN AMERICAN: >60 ML/MIN/1.73M2
GLUCOSE BLD-MCNC: 134 MG/DL (ref 70–99)
HCT VFR BLD CALC: 26.9 % (ref 37–47)
HEMOGLOBIN: 8.2 GM/DL (ref 12.5–16)
IMMATURE NEUTROPHIL %: 0.3 % (ref 0–0.43)
LYMPHOCYTES ABSOLUTE: 0.9 K/CU MM
LYMPHOCYTES RELATIVE PERCENT: 11 % (ref 24–44)
MCH RBC QN AUTO: 29 PG (ref 27–31)
MCHC RBC AUTO-ENTMCNC: 30.5 % (ref 32–36)
MCV RBC AUTO: 95.1 FL (ref 78–100)
MONOCYTES ABSOLUTE: 0.6 K/CU MM
MONOCYTES RELATIVE PERCENT: 8.1 % (ref 0–4)
NUCLEATED RBC %: 0 %
PDW BLD-RTO: 17.3 % (ref 11.7–14.9)
PLATELET # BLD: 303 K/CU MM (ref 140–440)
PMV BLD AUTO: 9.6 FL (ref 7.5–11.1)
POTASSIUM SERPL-SCNC: 4.9 MMOL/L (ref 3.5–5.1)
RBC # BLD: 2.83 M/CU MM (ref 4.2–5.4)
SEGMENTED NEUTROPHILS ABSOLUTE COUNT: 6.4 K/CU MM
SEGMENTED NEUTROPHILS RELATIVE PERCENT: 80.5 % (ref 36–66)
SODIUM BLD-SCNC: 139 MMOL/L (ref 135–145)
TOTAL IMMATURE NEUTOROPHIL: 0.02 K/CU MM
TOTAL NUCLEATED RBC: 0 K/CU MM
TOTAL PROTEIN: 5.8 GM/DL (ref 6.4–8.2)
WBC # BLD: 7.9 K/CU MM (ref 4–10.5)

## 2022-04-06 PROCEDURE — 2580000003 HC RX 258: Performed by: PHYSICIAN ASSISTANT

## 2022-04-06 PROCEDURE — 6370000000 HC RX 637 (ALT 250 FOR IP): Performed by: PHYSICIAN ASSISTANT

## 2022-04-06 PROCEDURE — 80053 COMPREHEN METABOLIC PANEL: CPT

## 2022-04-06 PROCEDURE — 36415 COLL VENOUS BLD VENIPUNCTURE: CPT

## 2022-04-06 PROCEDURE — 94761 N-INVAS EAR/PLS OXIMETRY MLT: CPT

## 2022-04-06 PROCEDURE — 85025 COMPLETE CBC W/AUTO DIFF WBC: CPT

## 2022-04-06 PROCEDURE — 6360000002 HC RX W HCPCS: Performed by: PHYSICIAN ASSISTANT

## 2022-04-06 PROCEDURE — 99024 POSTOP FOLLOW-UP VISIT: CPT | Performed by: PHYSICIAN ASSISTANT

## 2022-04-06 PROCEDURE — APPNB60 APP NON BILLABLE TIME 46-60 MINS: Performed by: PHYSICIAN ASSISTANT

## 2022-04-06 PROCEDURE — 96372 THER/PROPH/DIAG INJ SC/IM: CPT

## 2022-04-06 PROCEDURE — G0378 HOSPITAL OBSERVATION PER HR: HCPCS

## 2022-04-06 RX ORDER — CEPHALEXIN 500 MG/1
500 CAPSULE ORAL 3 TIMES DAILY
Qty: 21 CAPSULE | Refills: 0 | Status: SHIPPED | OUTPATIENT
Start: 2022-04-06 | End: 2022-04-13

## 2022-04-06 RX ORDER — HYDROCODONE BITARTRATE AND ACETAMINOPHEN 5; 325 MG/1; MG/1
1 TABLET ORAL EVERY 6 HOURS PRN
Qty: 12 TABLET | Refills: 0 | Status: SHIPPED | OUTPATIENT
Start: 2022-04-06 | End: 2022-04-09

## 2022-04-06 RX ADMIN — OXYCODONE HYDROCHLORIDE 5 MG: 5 TABLET ORAL at 01:15

## 2022-04-06 RX ADMIN — SODIUM CHLORIDE: 9 INJECTION, SOLUTION INTRAVENOUS at 05:27

## 2022-04-06 RX ADMIN — OXYCODONE HYDROCHLORIDE 5 MG: 5 TABLET ORAL at 05:24

## 2022-04-06 RX ADMIN — OXYCODONE HYDROCHLORIDE 5 MG: 5 TABLET ORAL at 09:04

## 2022-04-06 RX ADMIN — SODIUM CHLORIDE, PRESERVATIVE FREE 10 ML: 5 INJECTION INTRAVENOUS at 09:04

## 2022-04-06 RX ADMIN — OXYBUTYNIN CHLORIDE 10 MG: 10 TABLET, EXTENDED RELEASE ORAL at 09:04

## 2022-04-06 RX ADMIN — ENOXAPARIN SODIUM 40 MG: 100 INJECTION SUBCUTANEOUS at 09:04

## 2022-04-06 ASSESSMENT — PAIN SCALES - GENERAL
PAINLEVEL_OUTOF10: 3
PAINLEVEL_OUTOF10: 4
PAINLEVEL_OUTOF10: 3
PAINLEVEL_OUTOF10: 2

## 2022-04-06 NOTE — CARE COORDINATION
.Chart reviewed. The patient is from home alone and plans to return. The patient states she has a son who is a RN and he is very supportive. The patient has a PCP and insurance and can afford and take her medications as prescribed. She states she is done with chemo but still has radiation treatments left. The patient has reliable transportation. The patient does not require HHC or any DME and is independent in ADL's. The patient denies any other needs at this time.

## 2022-04-06 NOTE — TELEPHONE ENCOUNTER
[facility]    Outreach made within 2 business days of discharge: Yes    Patient: Brigitte Ball Patient : 1955   MRN: [id number]  Reason for Admission: There are no discharge diagnoses documented for the most recent discharge. Discharge Date: 22       Spoke with: Smith Vergara    Discharge department/facility: [facility]    TCM Interactive Patient Contact:  Was patient able to fill all prescriptions: Yes  Was patient instructed to bring all medications to the follow-up visit: Yes  Is patient taking all medications as directed in the discharge summary? Yes  Does patient understand their discharge instructions: Yes  Does patient have questions or concerns that need addressed prior to 7-14 day follow up office visit: no      She is following up with Dr Andrae Ventura and the Oncologist. She did not want another appointment here at this time. She will keep her regular appointment in . She will call us if she needs anything sooner.      Scheduled appointment with PCP within 7-14 days    Follow Up  Future Appointments   Date Time Provider Cheyenne Varela   2022 10:00 AM Aniyah Montano MD [address] [facility]   2022 11:00 AM SRMZ, MED ONC NURSE [facility]   2022 11:15 AM Monse Hunter MD [address] [facility]   2022  1:45 PM SCHEDULE, [facility] LAB [facility] ON[facility] [place]   2022  2:00 PM Marcela Yang, APRN - CNP [address] [facility]   2022 11:45 AM MD Kelby [facility]       Mery Dickerson

## 2022-04-06 NOTE — DISCHARGE SUMMARY
Physician Discharge Summary     Patient ID:  Mitchell Castañeda  [id number]  78 y.o.  1955    Admit date: 4/5/2022    Discharge date: 04/06/22    Admitting Physician: Marcel Montez MD     Discharge Physician:same    Admission Diagnoses: Malignant neoplasm of left female breast, unspecified estrogen receptor status, unspecified site of breast ([facility] [C50.912]  Malignant neoplasm of left female breast [facility]) [C50.912]    Discharge Diagnoses: same    Admission Condition:good    Discharged Condition: Good    Indication for Admission: elective mastectomy    Hospital Course:  Patient had an uneventful hospital course. Patient was able to tolerate diet, ambulate and have regular bowel function present discharge    Consults: none    Outstanding Order Results     No orders found for last 30 day(s). Treatments: surgery: left radical mastectomy, right mastectomy    Discharge Exam:  Physical Exam  Constitutional:       Appearance: She is well-developed. HENT:      Head: Normocephalic. Eyes:      Pupils: Pupils are equal, round, and reactive to light. Cardiovascular:      Rate and Rhythm: Normal rate. Pulmonary:      Effort: Pulmonary effort is normal.   Chest:   Breasts:      Right: Absent. Left: Absent. Comments: Perineo intact to bilateral chest incisions. SUSY drains in place with bloody s/s drainage. Abdominal:      General: There is no distension. Palpations: Abdomen is soft. There is no mass. Tenderness: There is no abdominal tenderness. There is no guarding or rebound. Musculoskeletal:         General: Normal range of motion. Cervical back: Normal range of motion and neck supple. Skin:     General: Skin is warm. Neurological:      Mental Status: She is alert and oriented to person, place, and time.          Disposition: home    Patient Instructions:      Medication List      START taking these medications    cephALEXin 500 MG capsule  Commonly known as: KEFLEX  Take 1 capsule by mouth 3 times daily for 7 days     HYDROcodone-acetaminophen 5-325 MG per tablet  Commonly known as: Norco  Take 1 tablet by mouth every 6 hours as needed for Pain for up to 3 days. Intended supply: 3 days. Take lowest dose possible to manage pain        CONTINUE taking these medications    albuterol sulfate  (90 Base) MCG/ACT inhaler  Inhale 2 puffs into the lungs every 6 hours as needed for Wheezing     dicyclomine 10 MG capsule  Commonly known as: Bentyl  Take 1 capsule by mouth 4 times daily as needed (abdominal bloating and gas)     meclizine 25 MG tablet  Commonly known as: ANTIVERT  Take 1 tablet by mouth 3 times daily as needed for Dizziness     meloxicam 7.5 MG tablet  Commonly known as: Mobic  Take 1 tablet by mouth daily     montelukast 10 MG tablet  Commonly known as: SINGULAIR  Take 1 tablet by mouth nightly     MULTIVITAMIN PO     omeprazole 20 MG delayed release capsule  Commonly known as: PriLOSEC  Take 1 capsule by mouth daily     ondansetron 8 MG tablet  Commonly known as: Zofran  Take 1 tablet by mouth every 8 hours as needed for Nausea or Vomiting     oxybutynin 10 MG extended release tablet  Commonly known as: Ditropan XL  Take 1 tablet by mouth daily           Where to Get Your Medications      These medications were sent to [address] [place] [address], [address] [place] [address]     Phone: [phone]   · cephALEXin 500 MG capsule  · HYDROcodone-acetaminophen 5-325 MG per tablet         Activity: activity as tolerated and no lifting, Driving, or Strenuous exercise for 2 weeks    Diet: regular diet    Wound Care: as directed    Follow-up with Dr Rodolfo Bah or Palmira Sorto PA-C by calling [phone]. The Office staff will determine follow up time per protocol.     Signed:  Palmira Sorto PA-C

## 2022-04-07 ENCOUNTER — TELEPHONE (OUTPATIENT)
Dept: INTERNAL MEDICINE CLINIC | Age: 67
End: 2022-04-07

## 2022-04-07 NOTE — TELEPHONE ENCOUNTER
----- Message from Hiwot Munoz sent at 4/7/2022 11:27 AM EDT -----  Subject: Appointment Request    Reason for Call: Routine Hospital Follow Up    QUESTIONS  Type of Appointment? Established Patient  Reason for appointment request? No appointments available during search  Additional Information for Provider? is trying to schedule a hosp follow   up. was seen 04/05. needs to follow up in 2 weeks. nothing avail till may. please give pt a call to schedule.  ---------------------------------------------------------------------------  --------------  CALL BACK INFO  What is the best way for the office to contact you? OK to leave message on   voicemail  Preferred Call Back Phone Number? [phone]  ---------------------------------------------------------------------------  --------------  SCRIPT ANSWERS  Relationship to Patient? Self  Do you have any questions for your primary care provider that need to be   answered prior to your appointment? (Use RN Triage if question pertains to   anything on the red flag list)? No  (Patient needs follow up visit after hospital discharge) Book first   available appointment within 7 days OF DISCHARGE, if no appt, proceed to   book the next available time slot within 14 days OF DISCHARGE AND Send   Message to Provider. [address] Follow Up appointment cannot be booked   beyond 14 Days and should result in a Message to Provider. ? Yes   Have you been diagnosed with, awaiting test results for, or told that you   are suspected of having COVID-19 (Coronavirus)? (If patient has tested   negative or was tested as a requirement for work, school, or travel and   not based on symptoms, answer no)? No  Within the past 10 days have you developed any of the following symptoms   (answer no if symptoms have been present longer than 10 days or began   more than 10 days ago)?  Fever or Chills, Cough, Shortness of breath or   difficulty breathing, Loss of taste or smell, Sore throat, Nasal congestion, Sneezing or runny nose, Fatigue or generalized body aches   (answer no if pain is specific to a body part e.g. back pain), Diarrhea,   Headache? No  Have you had close contact with someone with COVID-19 in the last 7 days? No  (Service Expert  click yes below to proceed with [employer] As Usual   Scheduling)?  Yes

## 2022-04-08 NOTE — OP NOTE
Procedure Note      Patient ID:  Jt Galan  [id number]  78 y.o.  1955      Indications: This patient presents for surgical treatment of Breast Cancer. Pre-operative Diagnosis: left breast cancer    Post-operative Diagnosis: same    Procedure:  1. Modified Radical left breast Mastectomy      2. Left breast mastectomy    Surgeon: Raissa Lopez MD     First Assistant: Ta Alcazar PA-C  The  Use of a first assistant was necessary for the proper positioning, prepping, and draping of the patient, as well as the safe and expeditious execution of the case and closure of skin and subcutaneous tissues. Findings:same    Estimated Blood Loss:  Minimal           Drains: x2 SUSY drain(s)           Total IV Fluids: 1000 ml           Specimens: left Breast with axillary contents and right breast    Complications:  None; patient tolerated the procedure well. Disposition: PACU - hemodynamically stable. Condition: stable    Anesthesia: General endotracheal anesthesia    ASA Class: 3    Procedure Details   The patient was seen again in the Holding Room. The risks, benefits, indications, potential complications, treatment options, and expected outcomes were discussed with the patient. The possibilities of reaction to medication, pulmonary aspiration, bleeding, recurrent infection, the need for additional procedures, failure to diagnose a condition, and creating a complication requiring transfusion or further operation were discussed with the patient. The patient and/or family concurred with the proposed plan, giving informed consent. The site of surgery was properly noted/marked. The patient was taken to the Operating Room, identified as Jt Galan and the procedure verified as bilateral modified radical mastectomy. A Time Out was held and the above information confirmed. The patient was placed supine and general anesthetic was administered.   The right arm, breast, and chest were prepped and draped in standard fashion. An oblique elliptical incision was made encompassing the nipple. Skin flaps were created meticulously to preserve the subdermal blood supply and maintain a clear margin of dissection. No axillary dissection was performed on the right breast as this was a prophylactic mastectomy. The specimen was submitted to pathology. The wound was irrigated. #15 fr J-P drain was placed within the lower flap . The skin incision was closed in layers with a 3-0 and 4-0 Vicryl subcuticular closure. At this point, we proceeded to address the left breast cancer as follows. An oblique elliptical incision was made encompassing the nipple on the left side. Skin flaps were created meticulously to preserve the subdermal blood supply and maintain a clear margin of resection around the tumor. Dissection was carried down to the pectoralis fascia, which was included with the specimen, and an axillary dissection was performed in continuity with the mastectomy. This included levels I and II. This was accomplished by exposing the axillary vein anteriorly and inferiorly to the level of the pectoralis minor and laterally. Small venous tributaries, lymphatics, and vessels were clipped and ligated or cauterized and divided. The subscapularis muscle was skeletonized. The long thoracic and thoracodorsal neurovascular bundles were identified and preserved. The specimen was submitted to pathology. The wound was irrigated. A J-P drains was placed within the lower flap . The skin incision was closed in layers with 3-0 and 4-0 Vicryl subcuticular closure. The drains were secured with 2-0 silk sutures. Skin glue was used for final dressing. Instrument, sponge, and needle counts were correct at closure and at the conclusion of the case.        Keller Fabry, MD

## 2022-04-10 ASSESSMENT — ENCOUNTER SYMPTOMS
PHOTOPHOBIA: 0
EYE REDNESS: 0
RECTAL PAIN: 0
APNEA: 0
BACK PAIN: 0
SORE THROAT: 0
CHOKING: 0
CONSTIPATION: 0
STRIDOR: 0
ANAL BLEEDING: 0
EYE ITCHING: 0
COLOR CHANGE: 0

## 2022-04-10 NOTE — PROGRESS NOTES
Chief Complaint   Patient presents with    Follow-up     F/U Discuss Surgery, Chemo to be complete on 3/21/22       SUBJECTIVE:  HPI: Patient presentsfor evaluation of a breast abnormality found on imaging. Change was noted a few days ago. Patient does routinely doself breast exams. Breast cancer risk factors include menarche before age 14. Patient is currentlypostmenopausal. Patient denies hormonal therapy. Patient denies nippledischarge or retraction. Patient denies to previous breast biopsy. Patient denies a personal history of breast cancer. Breast mammogram showed  Impression   1.  Suspicious mass in the left breast at 11 o'clock, 11 cm from the nipple   along with enlarged lymph nodes in the left axilla.  Ultrasound-guided biopsy   of the left breast mass and left axillary lymph node is advised. Bertrand Richter   results and recommendations were discussed with the patient by Dr. Cayla Rosado. She will be assisted to schedule the recommended biopsy by the breast Imaging   navigator.  An order will be requested from her referring physician.         Biopsy was obtained and path shows    Final Pathologic Diagnosis:   A.  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 B.  Lymph node, left axilla, ultrasound guided needle core   biopsy:   -  METASTATIC HIGH GRADE CARCINOMA IS IDENTIFIED CONSISTENT   WITH METASTATIC BREAST CARCINOMA (see comment). .  Ancillary studies: ER/PgR/Her2 results from the current   biopsy are as follows:   -ER by [facility]*: Negative (0% staining)   -PgR by IHC*: Negative (0% staining)   -Her2 by IHC*: Negative (score 0)   -Her2 by FISH*: Negative (Group 5)   -Avg. #HER2 signals/nucleus: 2.4, Avg.  #CEP17   signals/nucleus: 2.2, HER2/CEP17 ratio: 1.09     I have reviewed the patient's(pertinent information to this visit) medical history, family history(scanned in  the Media tab under \"patient questioner\"), social history and review of systems with the patienttoday in the office.           Past Surgical History:   Procedure Laterality Date    COLONOSCOPY  2013    Polyps - Dr. Matthias Meredith  1990's   [facility]    \"left one ovary \"    IR BIOPSY THYROID PERC CORE NEEDLE  11/18/2021    IR BIOPSY THYROID PERC CORE NEEDLE 11/18/2021 [facility] SPECIAL PROCEDURES    MASTECTOMY Right 4/5/2022    RIGHT BREAST [facility]ST[facility], MODIFIED RADICAL Left 4/5/2022    LEFT BREAST MASTECTOMY MODIFIED RADICAL performed [facility] at [facility]  1970[facility]    US BREAST BIOPSY NEEDLE ADDITIONAL LEFT Left 9/27/2021    US BREAST BIOPSY NEEDLE ADDITIONAL LEFT 9/27/2021 [facility]    US BREAST NEEDLE BIOPSY LEFT Left 9/27/2021    US BREAST NEEDLE BIOPSY LEFT 9/27/2021 Brittani Arroyo  [place]     Past Medical History:   Diagnosis Date    Allergic rhinitis     Asthma     last flare up 2017 - follows with PCP    G-6-PD deficiency anemia ([address]     dx this when I was having children- per pt     H/O Doppler ultrasound 04/04/18    CAROTID- Normal    Hepatitis C     dx 3/2018- for liver bx    Lumbar herniated disc     Malignant neoplasm of left breast in female, estrogen receptor negative ([address] 10/6/2021    Thyroid disease     \"not on any medication- have low thyroid level\"     Family History   Problem Relation Age of Onset    No Known Problems Mother     Kidney Disease Father      Social History     Socioeconomic History    Marital status: Single     Spouse name: Not on file    Number of children: Not on file    Years of education: Not on file    Highest education level: Not on file   Occupational History    Not on file   Tobacco Use    Smoking status: Never Smoker    Smokeless tobacco: Never Used   Vaping Use    Vaping Use: Never used   Substance and Sexual Activity    Alcohol use: Yes     Comment: Occasional wine/\"average glass of wine or beer  one time per month\"    Drug use: No    Sexual activity: Not on file   Other Topics Concern    Not on file   Social History Narrative    Not on file     Social Determinants of Health     Financial Resource Strain: Low Risk     Difficulty of Paying Living Expenses: Not hard at all   Food Insecurity: No Food Insecurity    Worried About [facility] in the Last Year: Never true    [facility] in the Last Year: Never true   Transportation Needs:     Lack of Transportation (Medical): Not on file    Lack of Transportation (Non-Medical):  Not on file   Physical Activity: Insufficiently Active    Days of Exercise per Week: 7 days    Minutes of Exercise per Session: 10 min   Stress:     Feeling of Stress : Not on file   Social Connections:     Frequency of Communication with Friends and Family: Not on file    Frequency of Social Gatherings with Friends and Family: Not on file    Attends Bahai Services: Not on file    Active Member of [address] [place] [address] or Organizations: Not on file    Attends Club or Organization Meetings: Not on file    Marital Status: Not on file   Intimate Partner Violence:     Fear of Current or Ex-Partner: Not on file    Emotionally Abused: Not on file    Physically Abused: Not on file    Sexually Abused: Not on file   Housing Stability:     Unable to Pay for Housing in the Last Year: Not on file    Number of [place] in the Last Year: Not on file    Unstable Housing in the Last Year: Not on file       Current Outpatient Medications   Medication Sig Dispense Refill    omeprazole (PRILOSEC) 20 MG delayed release capsule Take 1 capsule by mouth daily 30 capsule 3    dicyclomine (BENTYL) 10 MG capsule Take 1 capsule by mouth 4 times daily as needed (abdominal bloating and gas) 120 capsule 3    ondansetron (ZOFRAN) 8 MG tablet Take 1 tablet by mouth every 8 hours as needed for Nausea or Vomiting 30 tablet 0    meclizine (ANTIVERT) 25 MG tablet Take 1 tablet by mouth 3 times daily as needed for Dizziness 30 tablet 3    oxybutynin (DITROPAN XL) 10 MG extended release tablet Take 1 tablet by mouth daily 30 tablet 3    Multiple Vitamins-Minerals (MULTIVITAMIN PO) Take by mouth      cephALEXin (KEFLEX) 500 MG capsule Take 1 capsule by mouth 3 times daily for 7 days 21 capsule 0    albuterol sulfate  (90 Base) MCG/ACT inhaler Inhale 2 puffs into the lungs every 6 hours as needed for Wheezing 3 each 1    montelukast (SINGULAIR) 10 MG tablet Take 1 tablet by mouth nightly 90 tablet 1    meloxicam (MOBIC) 7.5 MG tablet Take 1 tablet by mouth daily 90 tablet 1     No current facility-administered medications for this visit. Allergies   Allergen Reactions    Pcn [Penicillins] Hives       Review of Systems:         Review of Systems   Constitutional: Negative for chills and fever. HENT: Negative for ear pain, mouth sores, sore throat and tinnitus. Eyes: Negative for photophobia, redness and itching. Respiratory: Negative for apnea, choking and stridor. Cardiovascular: Negative for chest pain and palpitations. Gastrointestinal: Negative for anal bleeding, constipation and rectal pain. Endocrine: Negative for polydipsia. Genitourinary: Negative for enuresis, flank pain and hematuria. Musculoskeletal: Negative for back pain, joint swelling and myalgias. Skin: Negative for color change and pallor. Allergic/Immunologic: Negative for environmental allergies. Neurological: Negative for syncope and speech difficulty. Psychiatric/Behavioral: Negative for confusion and hallucinations. OBJECTIVE:  Physical Exam:    /78 (Site: Right Upper Arm, Position: Sitting, Cuff Size: Medium Adult)   Pulse 85   Ht 5' 3\" (1.6 m)   Wt 185 lb 8 oz (84.1 kg)   BMI 32.86 kg/m²      Physical Exam  Constitutional:       Appearance: She is well-developed.    HENT:      Head: Normocephalic. Eyes:      Pupils: Pupils are equal, round, and reactive to light. Cardiovascular:      Rate and Rhythm: Normal rate. Pulmonary:      Effort: Pulmonary effort is normal.   Chest:       Abdominal:      General: There is no distension. Palpations: Abdomen is soft. There is no mass. Tenderness: There is no abdominal tenderness. There is no guarding or rebound. Musculoskeletal:         General: Normal range of motion. Cervical back: Normal range of motion and neck supple. Skin:     General: Skin is warm. Neurological:      Mental Status: She is alert and oriented to person, place, and time. Breasts: breasts appear normal, no suspicious masses, no skin or nipple changes or axillary nodes, abnormal mass palpable left breast.    ASSESSMENT:  1. Malignant neoplasm of nipple of left breast in female, unspecified estrogen receptor status ([facility]          PLAN:  Treatment: Patient has done really well with chemo therapy. She has 2 more rounds of chemotherapy left and will plan for left breast modified radical mastectomy and right prophylactic mastectomy. Patientcounseled on risks, benefits, and alternatives of treatment plan at length today. Patient states an understanding and willingness to proceed with plan. No orders of the defined types were placed in this encounter. No orders of the defined types were placed in this encounter. Follow Up:  No follow-ups on file.       Becca Ram MD

## 2022-04-14 ENCOUNTER — OFFICE VISIT (OUTPATIENT)
Dept: INTERNAL MEDICINE CLINIC | Age: 67
End: 2022-04-14
Payer: MEDICARE

## 2022-04-14 VITALS — BODY MASS INDEX: 30.58 KG/M2 | WEIGHT: 172.6 LBS | HEART RATE: 104 BPM | OXYGEN SATURATION: 98 % | HEIGHT: 63 IN

## 2022-04-14 DIAGNOSIS — J30.9 ALLERGIC RHINITIS, UNSPECIFIED SEASONALITY, UNSPECIFIED TRIGGER: ICD-10-CM

## 2022-04-14 DIAGNOSIS — J45.20 MILD INTERMITTENT ASTHMA WITHOUT COMPLICATION: ICD-10-CM

## 2022-04-14 DIAGNOSIS — K58.9 IRRITABLE BOWEL SYNDROME, UNSPECIFIED TYPE: ICD-10-CM

## 2022-04-14 DIAGNOSIS — K21.9 GASTROESOPHAGEAL REFLUX DISEASE WITHOUT ESOPHAGITIS: ICD-10-CM

## 2022-04-14 DIAGNOSIS — Z86.19 HISTORY OF HEPATITIS C: ICD-10-CM

## 2022-04-14 DIAGNOSIS — D75.A G6PD DEFICIENCY: ICD-10-CM

## 2022-04-14 DIAGNOSIS — C50.012 MALIGNANT NEOPLASM OF NIPPLE OF LEFT BREAST IN FEMALE, UNSPECIFIED ESTROGEN RECEPTOR STATUS (HCC): Primary | ICD-10-CM

## 2022-04-14 DIAGNOSIS — D70.9 NEUTROPENIA, UNSPECIFIED TYPE (HCC): ICD-10-CM

## 2022-04-14 DIAGNOSIS — D64.9 ANEMIA, UNSPECIFIED TYPE: ICD-10-CM

## 2022-04-14 PROCEDURE — 1111F DSCHRG MED/CURRENT MED MERGE: CPT | Performed by: INTERNAL MEDICINE

## 2022-04-14 PROCEDURE — 99495 TRANSJ CARE MGMT MOD F2F 14D: CPT | Performed by: INTERNAL MEDICINE

## 2022-04-14 NOTE — PROGRESS NOTES
Name: Sage Hogue  [id number]  Age: 78 y.o. YOB: 1955  Sex: female    CHIEF COMPLAINT:    Chief Complaint   Patient presents with    Follow-Up from Hospital      left and right Mastectomy      Post-Discharge Transitional Care  Follow Up      Sage Hogue   YOB: 1955     Date of Office Visit:  4/14/2022  Date of Hospital Admission: 4/5/22  Date of Hospital Discharge: 4/6/22  Risk of hospital readmission (high >=14%. Medium >=10%) :No data recorded    Care management risk score Rising risk (score 2-5) and Complex Care (Scores >=6): 1     Non face to face  following discharge, date last encounter closed (first attempt may have been earlier): 4/6/2022  2:48 PM    Call initiated 2 business days of discharge: Yes    ASSESSMENT/PLAN:   Malignant neoplasm of nipple of left breast in female, unspecified estrogen  receptor status ([address]  Status post chemotherapy and now bilateral mastectomy  For radiation treatment soon. Advised to continue to follow with her oncologist.    Neutropenia, unspecified type ([address]  Improved. Anemia, unspecified type  Possible chemotherapy related. Advised to continue to follow with Dr. Nellie Stewart. G6PD deficiency  Stable. Mild intermittent asthma without complication   Continue Singulair and albuterol HFA as needed. Gastroesophageal reflux disease without esophagitis  Continue Prilosec    Irritable bowel syndrome, unspecified type  Patient on Bentyl as needed. Allergic rhinitis, unspecified seasonality, unspecified trigger  Continue Singulair and Claritin as needed    History of hepatitis C  She was treated with Harvoni for 12 weeks and completed therapy in May 2019. Medical Decision Making: moderate complexity  No follow-ups on file.     On this date 4/14/2022 I have spent 25 minutes reviewing previous notes, test results and face to face with the patient discussing the diagnosis and importance of compliance with the treatment plan as well as documenting on the day of the visit. Subjective:   HPI:  Follow up of Hospital problems/diagnosis(es):   This 70-year-old -American female was admitted to the hospital from 4/5/2022 to 4/6/2022 for elective bilateral mastectomy. She was diagnosed with left breast cancer with metastasis to the left axillary lymph node. Patient had completed neoadjuvant chemotherapy and opted to have a bilateral mastectomy. She claims that she is doing okay. She is being followed by Dr. Shant Jefferson and she claimed that she is going to have some radiation treatment soon. Inpatient course: Discharge summary reviewed- see chart. Interval history/Current status:   Doing OK otherwise. Denies CP or SOB except for some surgical pain. No fever , sore throat or cough or congestion. Asthma is better. .   Denies any abdominal pain. Appetite OK. Bowels moving [id number] [address] complains of occasional abdominal bloating. Denies any urinary symptoms. Left hip pain is better. She had a hip replacement by Dr. Amrik Marquez at [facility] on 8/16/2021. She is taking Tylenol as needed. Dominique Money Hearing is ok. Vision Ok with glasses. Denies  any significant skin lesions. Denies any significant depression or anxiety. Dizziness is better. . uses meclizine very rarely. No other complaints. She had been fully vaccinated for COVID-19 and also had a booster dose and a flu shot. Labs from the 4/6/2022 were reviewed and explained to the patient.     Patient Active Problem List   Diagnosis    Other specified disorders of white blood cells    Neutropenia (HCC)    History of hepatitis C    Allergic rhinitis    Left hip pain    G6PD deficiency    Vertigo    Mild intermittent asthma without complication    Urinary frequency    Left breast mass    Gastroesophageal reflux disease without esophagitis    Irritable bowel syndrome    Malignant neoplasm of left breast in female, estrogen receptor negative ([facility]    [facility]    Anemia       Medications listed as ordered at the time of discharge from hospital     Medication List          Accurate as of April 14, 2022  2:33 PM. If you have any questions, ask your nurse or doctor.             CONTINUE taking these medications    albuterol sulfate  (90 Base) MCG/ACT inhaler  Inhale 2 puffs into the lungs every 6 hours as needed for Wheezing     dicyclomine 10 MG capsule  Commonly known as: Bentyl  Take 1 capsule by mouth 4 times daily as needed (abdominal bloating and gas)     meclizine 25 MG tablet  Commonly known as: ANTIVERT  Take 1 tablet by mouth 3 times daily as needed for Dizziness     meloxicam 7.5 MG tablet  Commonly known as: Mobic  Take 1 tablet by mouth daily     montelukast 10 MG tablet  Commonly known as: SINGULAIR  Take 1 tablet by mouth nightly     MULTIVITAMIN PO     omeprazole 20 MG delayed release capsule  Commonly known as: PriLOSEC  Take 1 capsule by mouth daily     ondansetron 8 MG tablet  Commonly known as: Zofran  Take 1 tablet by mouth every 8 hours as needed for Nausea or Vomiting     oxybutynin 10 MG extended release tablet  Commonly known as: Ditropan XL  Take 1 tablet by mouth daily              Medications marked \"taking\" at this time  Outpatient Medications Marked as Taking for the 4/14/22 encounter (Office Visit) with Tyrel Isbell MD   Medication Sig Dispense Refill    albuterol sulfate  (90 Base) MCG/ACT inhaler Inhale 2 puffs into the lungs every 6 hours as needed for Wheezing 3 each 1    montelukast (SINGULAIR) 10 MG tablet Take 1 tablet by mouth nightly 90 tablet 1    meloxicam (MOBIC) 7.5 MG tablet Take 1 tablet by mouth daily 90 tablet 1    omeprazole (PRILOSEC) 20 MG delayed release capsule Take 1 capsule by mouth daily 30 capsule 3    dicyclomine (BENTYL) 10 MG capsule Take 1 capsule by mouth 4 times daily as needed (abdominal bloating and gas) 120 capsule 3    ondansetron (ZOFRAN) 8 MG tablet Take 1 tablet by mouth every 8 hours as needed for Nausea or Vomiting 30 tablet 0    meclizine (ANTIVERT) 25 MG tablet Take 1 tablet by mouth 3 times daily as needed for Dizziness 30 tablet 3    oxybutynin (DITROPAN XL) 10 MG extended release tablet Take 1 tablet by mouth daily 30 tablet 3    Multiple Vitamins-Minerals (MULTIVITAMIN PO) Take by mouth          Medications patient taking as of now reconciled against medications ordered at time of hospital discharge: Yes    A comprehensive review of systems was negative except for what was noted in the HPI. Objective:    Pulse 104   Ht 5' 3\" (1.6 m)   Wt 172 lb 9.6 oz (78.3 kg)   SpO2 98%   BMI 30.57 kg/m²     GENERAL APPEARANCE:      Alert, oriented x 3, well developed, cooperative, not in any distress, appears stated age. HEAD:                         Normocephalic, atraumatic   EYES:                          PERRLA, EOMI, lids normal, conjuctivea clear, sclera anicteric. NECK:                         Supple, symmetrical,  trachea midline, no thyromegaly, no JVD, no lymphadenopathy. LUNGS:                       Clear to auscultation bilaterally, respirations unlabored, accessory muscles are not used. Breast:                        status post bilateral mastectomy with dressing now. HEART:                       Regular rate and rhythm, S1 and S2 normal, no murmur, rub or gallop. PMI in MCL. ABDOMEN:                 Soft, non-tender, bowel sounds are normoactive, no masses, no hepatospleenomegaly. EXTREMITY:              no bipedal edema  NEURO:                      Alert, oriented to person, place and time. Grossly intact. Musculoskeletal:         No kyphosis or scoliosis, no deformity in any extremity noted, muscle strength and tone are normal.  Skin:                            Warm and dry. No rash or obvious suspicious lesions. PSYCH:                       Mood euthymic, insight and judgement good.        Care discussed with patient. Questions answered and patient verbalizes understanding and agrees with plan. Medications reviewed and reconciled. Continue current medications. Appropriate prescriptions are ordered. Risks and benefits of meds are discussed.      After visit summary provided. Advised to call for any problems, questions, or concerns. If symptoms worsen or don't improve as expected, to call us or go to ER.     Follow up as directed, sooner if needed.       Return in about 3 months (around 6/24/2022).    This dictation was performed with a verbal recognition program and it was checked for errors. It is possible that there are still dictated errors within this office note. Any errors should be brought immediately to my attention for correction. All efforts were made to ensure that this office note is accurate. An electronic signature was used to authenticate this note.   --Alexandro March MD

## 2022-04-18 ENCOUNTER — OFFICE VISIT (OUTPATIENT)
Dept: SURGERY | Age: 67
End: 2022-04-18

## 2022-04-18 VITALS — BODY MASS INDEX: 30.48 KG/M2 | OXYGEN SATURATION: 97 % | HEIGHT: 63 IN | WEIGHT: 172 LBS | HEART RATE: 112 BPM

## 2022-04-18 DIAGNOSIS — Z09 POSTOPERATIVE EXAMINATION: Primary | ICD-10-CM

## 2022-04-18 PROCEDURE — 99024 POSTOP FOLLOW-UP VISIT: CPT | Performed by: SURGERY

## 2022-04-18 ASSESSMENT — PATIENT HEALTH QUESTIONNAIRE - PHQ9
SUM OF ALL RESPONSES TO PHQ QUESTIONS 1-9: 0
1. LITTLE INTEREST OR PLEASURE IN DOING THINGS: 0
SUM OF ALL RESPONSES TO PHQ QUESTIONS 1-9: 0
2. FEELING DOWN, DEPRESSED OR HOPELESS: 0
SUM OF ALL RESPONSES TO PHQ QUESTIONS 1-9: 0
SUM OF ALL RESPONSES TO PHQ QUESTIONS 1-9: 0
SUM OF ALL RESPONSES TO PHQ9 QUESTIONS 1 & 2: 0

## 2022-04-18 NOTE — PROGRESS NOTES
Chief Complaint   Patient presents with    Post-Op Check     1st PO Left Modified Mastectomy , Right Mastectomy @ [facility] 4/5/22         SUBJECTIVE:  Patient here for post op visit. Pain is minimal.  Wounds: minbruising and no discharge.     Past Surgical History:   Procedure Laterality Date    COLONOSCOPY  2013    Polyps - Dr. Stefano Mead  1990's   [address]    \"left one ovary \"    IR BIOPSY THYROID PERC CORE NEEDLE  11/18/2021    IR BIOPSY THYROID PERC CORE NEEDLE 11/18/2021 [facility] SPECIAL PROCEDURES    MASTECTOMY Right 4/5/2022    RIGHT BREAST MASTECTOMY performed by Janak Sanches MD at [address] [address], MODIFIED RADICAL Left 4/5/2022    LEFT BREAST MASTECTOMY MODIFIED RADICAL performed by Janak Sanches MD at [address] [facility]  1970's   [address] NEEDLE ADDITIONAL LEFT Left 9/27/2021    US BREAST BIOPSY NEEDLE ADDITIONAL LEFT 9/27/2021 Alice Garcia MD [address], [place]. \Hospital [facility] and Outlook.\""    US BREAST NEEDLE BIOPSY LEFT Left 9/27/2021    US BREAST NEEDLE BIOPSY LEFT 9/27/2021 Alice Garcia  [facility]     Past Medical History:   Diagnosis Date    Allergic rhinitis     Asthma     last flare up 2017 - follows with PCP    G-6-PD deficiency anemia ([address]     dx this when I was having children- per pt     H/O Doppler ultrasound 04/04/18    CAROTID- Normal    Hepatitis C     dx 3/2018- for liver bx    Lumbar herniated disc     Malignant neoplasm of left breast in female, estrogen receptor negative ([address] 10/6/2021    Thyroid disease     \"not on any medication- have low thyroid level\"     Family History   Problem Relation Age of Onset    No Known Problems Mother     Kidney Disease Father      Social History     Socioeconomic History    Marital status: Single     Spouse name: Not on file    Number of children: Not on file    Years of education: Not on file    Highest education level: Not on file   Occupational History    Not on file   Tobacco Use    Smoking status: Never Smoker    Smokeless tobacco: Never Used   Vaping Use    Vaping Use: Never used   Substance and Sexual Activity    Alcohol use: Yes     Comment: Occasional wine/\"average glass of wine or beer  one time per month\"    Drug use: No    Sexual activity: Not on file   Other Topics Concern    Not on file   Social History Narrative    Not on file     Social Determinants of Health     Financial Resource Strain: Low Risk     Difficulty of Paying Living Expenses: Not hard at all   Food Insecurity: No Food Insecurity    Worried About Running Out of Food in the Last Year: Never true    [place] of Food in the Last Year: Never true   Transportation Needs:     Lack of Transportation (Medical): Not on file    Lack of Transportation (Non-Medical): Not on file   Physical Activity: Insufficiently Active    Days of Exercise per Week: 7 days    Minutes of Exercise per Session: 10 min   Stress:     Feeling of Stress : Not on file   Social Connections:     Frequency of Communication with Friends and Family: Not on file    Frequency of Social Gatherings with Friends and Family: Not on file    Attends Rastafari Services: Not on file    Active Member of Clubs or Organizations: Not on file    Attends Club or Organization Meetings: Not on file    Marital Status: Not on file   Intimate Partner Violence:     Fear of Current or Ex-Partner: Not on file    Emotionally Abused: Not on file    Physically Abused: Not on file    Sexually Abused: Not on file   Housing Stability:     Unable to Pay for Housing in the Last Year: Not on file    Number of [place] in the Last Year: Not on file    Unstable Housing in the Last Year: Not on file       OBJECTIVE:   Physical Exam    Wound well healed without signs of active infection. Suture line intact. Abdomen soft, nontender, nondistended.      Path reveals:   Final Pathologic Diagnosis:   A.  Resection of breast, right:          Mild fibrocystic change.          Small hemangioma, skin. B.  Resection of breast with axillary lymph nodes, left:          Mild fibrocystic change.        Hyaline fibrosis axillary lymph nodes (3).      Biopsy cavity, axillary lymph node.        Reactive changes with sinus histiocytosis, axillary    ASSESSMENT:  Patient doing well on this post operative check. Wounds well healed. 1. Postoperative examination        PLAN:  Right drain removed  Continue same  Increase activity as tolerated        No orders of the defined types were placed in this encounter. No orders of the defined types were placed in this encounter. Follow Up: No follow-ups on file.     Jc Farrell MD

## 2022-04-26 DIAGNOSIS — K21.9 GASTROESOPHAGEAL REFLUX DISEASE WITHOUT ESOPHAGITIS: ICD-10-CM

## 2022-04-26 RX ORDER — OMEPRAZOLE 20 MG/1
20 CAPSULE, DELAYED RELEASE ORAL DAILY
Qty: 30 CAPSULE | Refills: 3 | Status: SHIPPED | OUTPATIENT
Start: 2022-04-26 | End: 2022-06-09 | Stop reason: SDUPTHER

## 2022-04-28 ENCOUNTER — OFFICE VISIT (OUTPATIENT)
Dept: ONCOLOGY | Age: 67
End: 2022-04-28
Payer: MEDICARE

## 2022-04-28 ENCOUNTER — HOSPITAL ENCOUNTER (OUTPATIENT)
Dept: INFUSION THERAPY | Age: 67
Discharge: HOME | End: 2022-04-28
Payer: MEDICARE

## 2022-04-28 VITALS
RESPIRATION RATE: 18 BRPM | BODY MASS INDEX: 30.97 KG/M2 | HEART RATE: 91 BPM | OXYGEN SATURATION: 99 % | DIASTOLIC BLOOD PRESSURE: 84 MMHG | WEIGHT: 174.8 LBS | HEIGHT: 63 IN | SYSTOLIC BLOOD PRESSURE: 188 MMHG | TEMPERATURE: 97.2 F

## 2022-04-28 DIAGNOSIS — C50.912 MALIGNANT NEOPLASM OF LEFT BREAST IN FEMALE, ESTROGEN RECEPTOR NEGATIVE, UNSPECIFIED SITE OF BREAST (HCC): ICD-10-CM

## 2022-04-28 DIAGNOSIS — Z17.1 MALIGNANT NEOPLASM OF LEFT BREAST IN FEMALE, ESTROGEN RECEPTOR NEGATIVE, UNSPECIFIED SITE OF BREAST (HCC): ICD-10-CM

## 2022-04-28 DIAGNOSIS — D64.9 ANEMIA, UNSPECIFIED TYPE: ICD-10-CM

## 2022-04-28 DIAGNOSIS — Z17.1 MALIGNANT NEOPLASM OF LEFT BREAST IN FEMALE, ESTROGEN RECEPTOR NEGATIVE, UNSPECIFIED SITE OF BREAST (HCC): Primary | ICD-10-CM

## 2022-04-28 DIAGNOSIS — C50.912 MALIGNANT NEOPLASM OF LEFT BREAST IN FEMALE, ESTROGEN RECEPTOR NEGATIVE, UNSPECIFIED SITE OF BREAST (HCC): Primary | ICD-10-CM

## 2022-04-28 LAB
ALBUMIN SERPL-MCNC: 4.4 GM/DL (ref 3.4–5)
ALP BLD-CCNC: 71 IU/L (ref 40–129)
ALT SERPL-CCNC: 13 U/L (ref 10–40)
ANION GAP SERPL CALCULATED.3IONS-SCNC: 11 MMOL/L (ref 4–16)
AST SERPL-CCNC: 21 IU/L (ref 15–37)
BASOPHILS ABSOLUTE: 0 K/CU MM
BASOPHILS RELATIVE PERCENT: 0.2 % (ref 0–1)
BILIRUB SERPL-MCNC: 0.2 MG/DL (ref 0–1)
BUN BLDV-MCNC: 9 MG/DL (ref 6–23)
CALCIUM SERPL-MCNC: 9.5 MG/DL (ref 8.3–10.6)
CHLORIDE BLD-SCNC: 102 MMOL/L (ref 99–110)
CO2: 24 MMOL/L (ref 21–32)
CREAT SERPL-MCNC: 0.6 MG/DL (ref 0.6–1.1)
DIFFERENTIAL TYPE: ABNORMAL
EOSINOPHILS ABSOLUTE: 0.3 K/CU MM
EOSINOPHILS RELATIVE PERCENT: 7.5 % (ref 0–3)
FERRITIN: 274 NG/ML (ref 15–150)
FOLATE: >20 NG/ML (ref 3.1–17.5)
GFR AFRICAN AMERICAN: >60 ML/MIN/1.73M2
GFR NON-AFRICAN AMERICAN: >60 ML/MIN/1.73M2
GLUCOSE BLD-MCNC: 89 MG/DL (ref 70–99)
HCT VFR BLD CALC: 33.5 % (ref 37–47)
HEMOGLOBIN: 10.2 GM/DL (ref 12.5–16)
IRON: 44 UG/DL (ref 37–145)
LYMPHOCYTES ABSOLUTE: 1.7 K/CU MM
LYMPHOCYTES RELATIVE PERCENT: 36.8 % (ref 24–44)
MCH RBC QN AUTO: 27.9 PG (ref 27–31)
MCHC RBC AUTO-ENTMCNC: 30.4 % (ref 32–36)
MCV RBC AUTO: 91.8 FL (ref 78–100)
MONOCYTES ABSOLUTE: 0.7 K/CU MM
MONOCYTES RELATIVE PERCENT: 14.5 % (ref 0–4)
PCT TRANSFERRIN: 14 % (ref 10–44)
PDW BLD-RTO: 16.7 % (ref 11.7–14.9)
PLATELET # BLD: 293 K/CU MM (ref 140–440)
PMV BLD AUTO: 9.7 FL (ref 7.5–11.1)
POTASSIUM SERPL-SCNC: 4.4 MMOL/L (ref 3.5–5.1)
RBC # BLD: 3.65 M/CU MM (ref 4.2–5.4)
SEGMENTED NEUTROPHILS ABSOLUTE COUNT: 1.9 K/CU MM
SEGMENTED NEUTROPHILS RELATIVE PERCENT: 41 % (ref 36–66)
SODIUM BLD-SCNC: 137 MMOL/L (ref 135–145)
TOTAL IRON BINDING CAPACITY: 325 UG/DL (ref 250–450)
TOTAL PROTEIN: 7 GM/DL (ref 6.4–8.2)
TSH HIGH SENSITIVITY: 1.7 UIU/ML (ref 0.27–4.2)
UNSATURATED IRON BINDING CAPACITY: 281 UG/DL (ref 110–370)
VITAMIN B-12: 1004 PG/ML (ref 211–911)
WBC # BLD: 4.6 K/CU MM (ref 4–10.5)

## 2022-04-28 PROCEDURE — 83550 IRON BINDING TEST: CPT

## 2022-04-28 PROCEDURE — 82728 ASSAY OF FERRITIN: CPT

## 2022-04-28 PROCEDURE — 84443 ASSAY THYROID STIM HORMONE: CPT

## 2022-04-28 PROCEDURE — 36415 COLL VENOUS BLD VENIPUNCTURE: CPT

## 2022-04-28 PROCEDURE — 82607 VITAMIN B-12: CPT

## 2022-04-28 PROCEDURE — 83540 ASSAY OF IRON: CPT

## 2022-04-28 PROCEDURE — 82746 ASSAY OF FOLIC ACID SERUM: CPT

## 2022-04-28 PROCEDURE — 85025 COMPLETE CBC W/AUTO DIFF WBC: CPT

## 2022-04-28 PROCEDURE — 80053 COMPREHEN METABOLIC PANEL: CPT

## 2022-04-28 PROCEDURE — 99214 OFFICE O/P EST MOD 30 MIN: CPT | Performed by: INTERNAL MEDICINE

## 2022-04-28 PROCEDURE — 84480 ASSAY TRIIODOTHYRONINE (T3): CPT

## 2022-04-28 NOTE — PROGRESS NOTES
Patient Name: Chapo Mullins  Patient : 1955  Patient MRN: [id number]     Primary Oncologist: Vince Ponce MD  Referring Provider: Alexandro March MD     Date of Service: 2022      Chief Complaint:   Chief Complaint   Patient presents with    Follow-up     She came in for follow-up visit. Patient Active Problem List:     Personal history of infectious disease     Other specified disorders of white blood cells     Neutropenia     History of hepatitis C     Allergic rhinitis     Left hip pain     G6PD deficiency     Mild persistent asthma without complication     Vertigo     HPI:   Katie Dietrich is a pleasant 59-year-old -American female patient who was referred for evaluation of mild neutropenia. She was diagnosed with hepatitis C in . Ex spouse was IV drug user. She was treated with Harvoni for 12 weeks and completed in May 2019. I reviewed her blood test records. On May 15, 2019 WBC was 4.1, RBC 4.36, hemoglobin 13.9, hematocrit 42.3, MCV 97, platelet 973, absolute neutrophils 1.1, absolute lymphocytes 2.6. She denies any history of frequent infection. Ultrasound of abdomen in 2018 showed normal right upper quadrant ultrasound. Liver biopsy on 2018 showed chronic hepatitis grade 2-3, stage II. Mammogram in 2019 is negative. US of abdomen in 2019 showed unremarkable study. Labs in 2019 were reviewed. She has nonspecific elevated total protein. CBC is unremarkable. Labs in 2019 were reviewed. Total protein is normal. Leukopenia is stable. She denied frequent infection. In 2020 she had acute viral hepatitis serology which came back positive for hepatitis C antibody. Serum AFP was 8. Hepatitis C RNA by PCR was negative. Hepatitis C RNA genotype was indeterminate. Mammogram in 2020 was benign. She was at [facility] emergency room on 2020 due to food poisoning. . WBC was 7.5.  Hemoglobin was 13.2, hemoglobin 13.2, platelet 224. CMP was grossly unremarkable with normal AST at 19, ALT 25. Alk phos was 92. Total bilirubin was 0.6. Colonoscopy in December 2020 by Dr. Alina Gutierrez showed diverticulosis and hemorrhoids. She was told that she has kidney stone. She has flu shot in 2020 and COVID-19 vaccine on January 3, 2021. Mammogram in July 2020 was benign. In June 2021 WBC was 5.2, hemoglobin 12.3, platelet 447. On September 20, 2021 she was referred for left breast mass. Bone density on September 13, 2021 showed osteopenia. Mammogram on September 15, 2021 showed : There is a new 1.5 cm mass identified in the left breast at 12 o'clock position, at posterior depth, about 12 cm from the left nipple.  This is best seen on left CC michaela slice 50 and left MLO michaela slice 53.   Left breast ultrasound 11 o'clock, 11 cm from the nipple: On ultrasound evaluation, there is a 1.1 x 1.3 x 0.5 cm oval, parallel hypoechoic mass, with microlobulated margins without any posterior acoustic shadowing or internal vascularity.  This mass corresponds to the mammographic finding.   Left axilla: Couple enlarged lymph nodes identified in the left axilla with cortical thickness of 8-9 mm.   No new suspicious masses or microcalcifications are identified in the right breast.    In June 2021 WBC was 5.2, hemoglobin 12.3, platelet 079. CMP was grossly unremarkable. Path report of left breast biopsy on 9/27/2021:  A.  Breast, left at 11 o'clock, ultrasound guided needle core biopsy:   -  INVASIVE DUCTAL CARCINOMA WITH A MARKED CHRONIC INFLAMMATORY REACTION  - Caryn Taho FISH is negative  B.  Lymph node, left axilla, ultrasound guided needle core biopsy:   -  METASTATIC HIGH GRADE CARCINOMA IS IDENTIFIED   BREAST INVASIVE CARCINOMA SUMMARY - CORE BIOPSY   Procedure: Ultrasound guided needle core biopsy. Laterality and tumor site: Left breast at 11 o'clock. Tumor size: 7 mm in greatest dimension within biopsy material present. Histologic type:  Invasive ductal weekly Taxol cycle 1 on January 2, 2022. She has seen endocrinologist.  Dose dense AC started 11/8/2021 and completed on 12/20/2021. She started Taxol on 1/10/2022- completed on 3/21/2022    CT chest 2/2022 showed:  1. No acute abnormality in the chest.  No rib fracture identified. 2. 3 and 4 mm nodules in the bilateral lower lobes stable or decreased in size from 10/15/2021.  3. Resolution of the previously identified left axillary lymphadenopathy. 4. 2 cm left thyroid nodule without significant change. Clinically she responded to neoadjuvant chemo. She wass scheduled for the mastectomy on April 5, 2022. She wants to have double mastectomy. I will see her back in the office after the surgery. If she had a residual disease I would offer adjuvant Xeloda. She denied acute complaint today. Denied any worsening peripheral neuropathy. On 4/28/2022 she came in for follow up visit. She has b/l mastectomy 4/5/2022:  Final Pathologic Diagnosis:   A.  Resection of breast, right:          Mild fibrocystic change.          Small hemangioma, skin. B.  Resection of breast with axillary lymph nodes, left:          Mild fibrocystic change.        Hyaline fibrosis axillary lymph nodes (3).      Biopsy cavity, axillary lymph node.        Reactive changes with sinus histiocytosis, axillary lymph nodes.         Since there is no residual cancer, I did not offer oral Xeloda. She is agreeable to see RT onc to discuss about adjuvant RT since she has positive LN prior to neoadjuvant chemotherapy. We discussed about signs and symptoms of recurrent breast cancer. No acute pain. Denied any nausea, vomiting or diarrhea. No fever or chills. No chest pain, shortness of breath or palpitation. No headache or dizzy spell. No specific bone pain. No melena or hematochezia. Denied any dysuria or hematuria. Past Medical History  Asthma, neutropenia, hepatitis C, G6PD, thyroid disease.     Surgical History  Partial hysterectomy in 2002 related to tubal pregnancy. Tonsillectomy. She had colonoscopy x2 and the last one was in 2013. She had left hip replacement    Social History  Smoking Status Never smoker  She denies any illicit drug use. She drinks alcohol occasionally. She has 2 children. Family History  Maternal great aunt had breast cancer. Review of Systems: \"Per interval history; otherwise 10 point ROS is negative. \"     Vital Signs:  BP (!) 188/84 (Site: Right Calf, Position: Sitting, Cuff Size: Large Adult)   Pulse 91   Temp 97.2 °F (36.2 °C) (Temporal)   Resp 18   Ht 5' 3\" (1.6 m)   Wt 174 lb 12.8 oz (79.3 kg)   SpO2 99%   BMI 30.96 kg/m²     Physical Exam:  CONSTITUTIONAL: awake, alert, cooperative, no apparent distress   EYES: pupils equal, round and reactive to light, sclera clear and conjunctiva pallor  ENT: Normocephalic, without obvious abnormality, atraumatic  NECK: supple, symmetrical, no jugular venous distension and no carotid bruits   HEMATOLOGIC/LYMPHATIC: no cervical, supraclavicular or axillary lymphadenopathy   LUNGS: no increased work of breathing and clear to auscultation. Medi port to the right anterior chest wall noted. BREAST: s/p b/l mastectomy. She has tape to both anterior chest wall from recent mastectomy. CARDIOVASCULAR: regular rate and rhythm, normal S1 and S2, no murmur  ABDOMEN: normal bowel sound, soft, non-distended, non-tender, no masses palpated, no hepatosplenomegaly   MUSCULOSKELETAL: full range of motion noted, tone is normal  NEUROLOGIC: Motor and sensory grossly intact. CN II - XII grossly intact. SKIN: Normal skin color, texture, turgor and no jaundice. No ecchymosis. EXTREMITIES: no LE edema. No cyanosis.     Labs:  Hematology:  Lab Results   Component Value Date    WBC 4.6 04/28/2022    RBC 3.65 (L) 04/28/2022    HGB 10.2 (L) 04/28/2022    HCT 33.5 (L) 04/28/2022    MCV 91.8 04/28/2022    MCH 27.9 04/28/2022    MCHC 30.4 (L) 04/28/2022    RDW 16.7 (H) 04/28/2022     04/28/2022    MPV 9.7 04/28/2022    SEGSPCT 41.0 04/28/2022    EOSRELPCT 7.5 (H) 04/28/2022    BASOPCT 0.2 04/28/2022    LYMPHOPCT 36.8 04/28/2022    MONOPCT 14.5 (H) 04/28/2022    SEGSABS 1.9 04/28/2022    EOSABS 0.3 04/28/2022    BASOSABS 0.0 04/28/2022    LYMPHSABS 1.7 04/28/2022    MONOSABS 0.7 04/28/2022    DIFFTYPE AUTOMATED DIFFERENTIAL 04/28/2022     Chemistry:  Lab Results   Component Value Date     04/06/2022    K 4.9 04/06/2022     04/06/2022    CO2 20 (L) 04/06/2022    BUN 7 04/06/2022    CREATININE 0.6 04/06/2022    GLUCOSE 134 (H) 04/06/2022    CALCIUM 9.1 04/06/2022    PROT 5.8 (L) 04/06/2022    LABALBU 3.7 04/06/2022    BILITOT 0.2 04/06/2022    ALKPHOS 56 04/06/2022    AST 25 04/06/2022    ALT 20 04/06/2022    LABGLOM >60 04/06/2022    GFRAA >60 04/06/2022    AGRATIO 1.4 06/09/2021    GLOB 3.4 06/09/2021     Lab Results   Component Value Date    TSHHS 1.260 01/10/2022    T4FREE 1.28 01/10/2022    FT3 3.3 01/10/2022     Immunology:  Lab Results   Component Value Date    PROT 5.8 (L) 04/06/2022     Coagulation Panel:  Lab Results   Component Value Date    PROTIME 12.3 11/18/2021    INR 0.95 11/18/2021    APTT 26.9 11/18/2021     Observations:  No data recorded      Assessment & Plan:  1. She was referred for mild neutropenia. She is an -American. CBC in June 2019 was unremarkable. CBC in June 2020 was unremarkable. US of abdomen in June 2019 was unremarkable. In June 2021 WBC was 5.2, hemoglobin 12.3, platelet 305. She has anemia related to chemotherapy. I will continue to monitor CBC. 2. She completed treatment for hepatitis C in May 2019. She will follow up with GI. She is in remission. 3. Mammogram in June 2019 was benign. Colonoscopy was In 2013. Mammogram in July 2020 was benign. She had abnormal left breast mammogram and scheduled for the biopsy on September 27, 2021.    She was found to have likely triple negative left breast cancer s/p biopsy, T1, N1 at least from biopsy. CT chest, abdomen pelvis, bone scan and echocardiogram in October 2021 were reviewed. She started neoadjuvant chemotherapy November 8, 2021. MRI of the breast showed response to therapy. She completed Taxol on March 21, 2022. She had double mastectomy on April 5, 2022. Path showed no residual malignancy. She will continue with surveillance. I referred to RT onc for discussion of adjuvant RT. 4. Colonoscopy in December 2020 showed diverticulosis and hemorrhoids. Repeat study in 5 years was recommended. 5.  Ultrasound of the thyroid on November 9, 2021 showed  TR 4 nodule of the left thyroid lobe corresponding to recent CT findings. FNA recommended for further evaluation based on TI-RADS criteria. Cytology report on November 18, 2021 showed  Final Cytologic Diagnosis:    Left Thyroid Fine Needle Aspirate Cytology with Cell Block:   - Atypical follicular cells of undetermined significance     She has seen endocrinologist on January 4, 2022    6. Anemia related to chemotherapy. I ordered CBC and anemic w/u today. We discussed about healthy diet and lifestyle. She had COVID-19 vaccine in January 2021. Return to clinic in 6 weeks or sooner. All of her questions have been answered for today. Recent imaging and labs were reviewed and discussed with the patient.

## 2022-04-28 NOTE — PROGRESS NOTES
MA Rooming Questions  Patient: Russell Montaño  MRN: [id number]    Date: 4/28/2022        1. Do you have any new issues?   no         2. Do you need any refills on medications?    no    3. Have you had any imaging done since your last visit?   no    4. Have you been hospitalized or seen in the emergency room since your last visit here?   yes - had surgery    5. Did the patient have a depression screening completed today?  No    No data recorded     PHQ-9 Given to (if applicable):               PHQ-9 Score (if applicable):                     [] Positive     []  Negative              Does question #9 need addressed (if applicable)                     [] Yes    []  No               Taj Leary CMA

## 2022-04-30 LAB — T3 TOTAL: 138 NG/DL (ref 80–200)

## 2022-05-02 ENCOUNTER — OFFICE VISIT (OUTPATIENT)
Dept: SURGERY | Age: 67
End: 2022-05-02

## 2022-05-02 VITALS
SYSTOLIC BLOOD PRESSURE: 132 MMHG | WEIGHT: 174 LBS | DIASTOLIC BLOOD PRESSURE: 80 MMHG | BODY MASS INDEX: 30.83 KG/M2 | HEART RATE: 80 BPM | HEIGHT: 63 IN

## 2022-05-02 DIAGNOSIS — Z09 POSTOPERATIVE EXAMINATION: Primary | ICD-10-CM

## 2022-05-02 PROCEDURE — 99024 POSTOP FOLLOW-UP VISIT: CPT | Performed by: SURGERY

## 2022-05-02 NOTE — PROGRESS NOTES
Chief Complaint   Patient presents with    Post-Op Check         SUBJECTIVE:  Patient here for post op visit. To have drain removed  Pain is minimal.  Wounds: minbruising and no discharge.     Past Surgical History:   Procedure Laterality Date    COLONOSCOPY  2013    Polyps - Dr. Geiger Paci  1990's   [address]    \"left one ovary \"    IR BIOPSY THYROID PERC CORE NEEDLE  11/18/2021    IR BIOPSY THYROID PERC CORE NEEDLE 11/18/2021 [facility] SPECIAL PROCEDURES    MASTECTOMY Right 4/5/2022    RIGHT BREAST MASTECTOMY performed by Kulwinder Prasad MD at [address] [address], MODIFIED RADICAL Left 4/5/2022    LEFT BREAST MASTECTOMY MODIFIED RADICAL performed by Kulwinder Prasad MD at [address] [facility]  1970's   [address] NEEDLE ADDITIONAL LEFT Left 9/27/2021    US BREAST BIOPSY NEEDLE ADDITIONAL LEFT 9/27/2021 Kerri Ross MD [facility]    US BREAST NEEDLE BIOPSY LEFT Left 9/27/2021    US BREAST NEEDLE BIOPSY LEFT 9/27/2021 Kerri Ross  [facility]     Past Medical History:   Diagnosis Date    Allergic rhinitis     Asthma     last flare up 2017 - follows with PCP    G-6-PD deficiency anemia ([address]     dx this when I was having children- per pt     H/O Doppler ultrasound 04/04/18    CAROTID- Normal    Hepatitis C     dx 3/2018- for liver bx    Lumbar herniated disc     Malignant neoplasm of left breast in female, estrogen receptor negative ([address] 10/6/2021    Thyroid disease     \"not on any medication- have low thyroid level\"     Family History   Problem Relation Age of Onset    No Known Problems Mother     Kidney Disease Father      Social History     Socioeconomic History    Marital status: Single     Spouse name: Not on file    Number of children: Not on file    Years of education: Not on file    Highest education level: Not on file   Occupational History    Not on file   Tobacco Use    Smoking status: Never Smoker    Smokeless tobacco: Never Used   Vaping Use    Vaping Use: Never used   Substance and Sexual Activity    Alcohol use: Yes     Comment: Occasional wine/\"average glass of wine or beer  one time per month\"    Drug use: No    Sexual activity: Not on file   Other Topics Concern    Not on file   Social History Narrative    Not on file     Social Determinants of Health     Financial Resource Strain: Low Risk     Difficulty of Paying Living Expenses: Not hard at all   Food Insecurity: No Food Insecurity    Worried About Running Out of Food in the Last Year: Never true    [address] in the Last Year: Never true   Transportation Needs:     Lack of Transportation (Medical): Not on file    Lack of Transportation (Non-Medical): Not on file   Physical Activity: Insufficiently Active    Days of Exercise per Week: 7 days    Minutes of Exercise per Session: 10 min   Stress:     Feeling of Stress : Not on file   Social Connections:     Frequency of Communication with Friends and Family: Not on file    Frequency of Social Gatherings with Friends and Family: Not on file    Attends [employer] Services: Not on file    Active Member of Clubs or Organizations: Not on file    Attends Club or Organization Meetings: Not on file    Marital Status: Not on file   Intimate Partner Violence:     Fear of Current or Ex-Partner: Not on file    Emotionally Abused: Not on file    Physically Abused: Not on file    Sexually Abused: Not on file   Housing Stability:     Unable to Pay for Housing in the Last Year: Not on file    Number of [place] in the Last Year: Not on file    Unstable Housing in the Last Year: Not on file       OBJECTIVE:   Physical Exam    Wound well healed without signs of active infection. Suture line intact. Abdomen soft, nontender, nondistended. ASSESSMENT:  Drain removed  Pt also has a lesion on her left eye corner that she wants removed since it becoming painful when she tears and rubs it.    Patient doing well on this post operative check. Wounds well healed. 1. Postoperative examination        PLAN:  Plan for office procedure soon  Continue same  Increase activity as tolerated        No orders of the defined types were placed in this encounter. No orders of the defined types were placed in this encounter. Follow Up: No follow-ups on file.     Jose Tong MD

## 2022-05-10 NOTE — PROGRESS NOTES
Patient Name: Mitchell Castañeda  Patient : 1955  Patient MRN: [id number]     Primary Oncologist: Arie Tavera MD  Referring Provider: Laura Remy MD     Date of Service: 2022      Chief Complaint:   Chief Complaint   Patient presents with    Follow-up     She came in for follow-up visit. Patient Active Problem List:     Personal history of infectious disease     Other specified disorders of white blood cells     Neutropenia     History of hepatitis C     Allergic rhinitis     Left hip pain     G6PD deficiency     Mild persistent asthma without complication     Vertigo     HPI:   Erwin Gutierrez is a pleasant 65-year-old -American female patient who was referred for evaluation of mild neutropenia. She was diagnosed with hepatitis C in . Ex spouse was IV drug user. She was treated with Harvoni for 12 weeks and completed in May 2019. I reviewed her blood test records. On May 15, 2019 WBC was 4.1, RBC 4.36, hemoglobin 13.9, hematocrit 42.3, MCV 97, platelet 464, absolute neutrophils 1.1, absolute lymphocytes 2.6. She denies any history of frequent infection. Ultrasound of abdomen in 2018 showed normal right upper quadrant ultrasound. Liver biopsy on 2018 showed chronic hepatitis grade 2-3, stage II. Mammogram in 2019 is negative. US of abdomen in 2019 showed unremarkable study. Labs in 2019 were reviewed. She has nonspecific elevated total protein. CBC is unremarkable. Labs in 2019 were reviewed. Total protein is normal. Leukopenia is stable. She denied frequent infection. In 2020 she had acute viral hepatitis serology which came back positive for hepatitis C antibody. Serum AFP was 8. Hepatitis C RNA by PCR was negative. Hepatitis C RNA genotype was indeterminate. Mammogram in 2020 was benign. She was at [facility] emergency room on 2020 due to food poisoning. . WBC was 7.5.  Hemoglobin was 13.2, hemoglobin 13.2, platelet 224. CMP was grossly unremarkable with normal AST at 19, ALT 25. Alk phos was 92. Total bilirubin was 0.6. Colonoscopy in December 2020 by Dr. Alina Gutierrez showed diverticulosis and hemorrhoids. She was told that she has kidney stone. She has flu shot in 2020 and COVID-19 vaccine on January 3, 2021. Mammogram in July 2020 was benign. In June 2021 WBC was 5.2, hemoglobin 12.3, platelet 624. On September 20, 2021 she was referred for left breast mass. Bone density on September 13, 2021 showed osteopenia. Mammogram on September 15, 2021 showed : There is a new 1.5 cm mass identified in the left breast at 12 o'clock position, at posterior depth, about 12 cm from the left nipple.  This is best seen on left CC michaela slice 50 and left MLO michaela slice 48.   Left breast ultrasound 11 o'clock, 11 cm from the nipple: On ultrasound evaluation, there is a 1.1 x 1.3 x 0.5 cm oval, parallel hypoechoic mass, with microlobulated margins without any posterior acoustic shadowing or internal vascularity.  This mass corresponds to the mammographic finding.   Left axilla: Couple enlarged lymph nodes identified in the left axilla with cortical thickness of 8-9 mm.   No new suspicious masses or microcalcifications are identified in the right breast.    In June 2021 WBC was 5.2, hemoglobin 12.3, platelet 800. CMP was grossly unremarkable. Path report of left breast biopsy on 9/27/2021:  A.  Breast, left at 11 o'clock, ultrasound guided needle core biopsy:   -  INVASIVE DUCTAL CARCINOMA WITH A MARKED CHRONIC INFLAMMATORY REACTION  - Caryn Thao FISH is negative  B.  Lymph node, left axilla, ultrasound guided needle core biopsy:   -  METASTATIC HIGH GRADE CARCINOMA IS IDENTIFIED   BREAST INVASIVE CARCINOMA SUMMARY - CORE BIOPSY   Procedure: Ultrasound guided needle core biopsy. Laterality and tumor site: Left breast at 11 o'clock. Tumor size: 7 mm in greatest dimension within biopsy material present. Histologic type:  Invasive ductal carcinoma with marked chronic inflammatory reaction. Histologic Grade ([place]): Grade 3        -Tubular score 3, Nuclear score 3, Mitosis score 2 (16/10 hpf)   Ductal Carcinoma In Situ (DCIS): Not present   Lobular Carcinoma In Situ (LCIS): Not present. Lympho-vascular invasion: Not identified. Microcalcifications: Not identified. Additional Findings: Marked chronic inflammatory reaction. Ancillary studies: ER/PgR/Her2 results from the current   biopsy are as follows:   -ER by [facility]*: Negative (0% staining). -PgR by IHC*: Negative (0% staining)   -Her2 by IHC*: Negative (Score 0)   -Her2 by FISH*:  negative  -Avg. #HER2 signals/nucleus: , Avg. #CEP17 signals/nucleus: , HER2/CEP17 ratio:     She was seen by Dr Flower Remy. I discussed about neoadjuvant chemo, AC + TC. Potential AE including hair loss, increased risk of infection, secondary malignancy and etc was discussed with her. She has left hip pain s/p hip replacement in August 2021. Bone scan on 2021-10-13 showed : focal activity within the midthoracic and lower lumbar spine, typically degenerative. Moderate activity is seen about the left hip arthroplasty.  If surgery has not been recent, loosening or infection could be considered.    Multifocal degenerative changes are otherwise favored as outlined above. CT CAP on 10/15/2021:  1. Left breast mass with level 1 left axillary lymphadenopathy. 2. Four small pulmonary nodules measuring up to 3 mm in the left lung favoring a benign etiology.  Attention at follow-up imaging recommended. 3. Otherwise no metastatic disease in the chest, abdomen or pelvis. 4. Incidental left thyroid lobe 18 mm nodule.  Thyroid ultrasound can be considered. ECHO in October 2021 showed LVEF at 50 to 55%. She will have chemo education on October 26, 2021. Cytology of FNA of thyroid nodule was reviewed.   I referred to endocrinologist.   . She has seen endocrinologist.  Dose dense AC started 11/8/2021 and completed on 12/20/2021. She started Taxol on 1/10/2022- completed on 3/21/2022. MRI of the breast showed favorable response to therapy    CT chest 2/2022 showed:  1. No acute abnormality in the chest.  No rib fracture identified. 2. 3 and 4 mm nodules in the bilateral lower lobes stable or decreased in size from 10/15/2021.  3. Resolution of the previously identified left axillary lymphadenopathy. 4. 2 cm left thyroid nodule without significant change. Clinically she responded to neoadjuvant chemo. She has b/l mastectomy 4/5/2022:  Final Pathologic Diagnosis:   A.  Resection of breast, right:          Mild fibrocystic change.          Small hemangioma, skin. B.  Resection of breast with axillary lymph nodes, left:          Mild fibrocystic change.        Hyaline fibrosis axillary lymph nodes (3).      Biopsy cavity, axillary lymph node.        Reactive changes with sinus histiocytosis, axillary lymph nodes.         Since there is no residual cancer, I did not offer oral Xeloda. She is agreeable to see RT onc to discuss about adjuvant RT since she has positive LN prior to neoadjuvant chemotherapy. On 6/9/2022 she came in for follow up visit. She started she went radiation therapy on June 9, 2022. She will have 28 days of adjuvant radiation therapy. She had little blood in stool intermittently which improved since she started drinking smoothies. I recommend to follow up with GI.     4/28/2022 WBC: 4.6 RBC: 3.65 (L) Hemoglobin: 10.2 (L) Hematocrit: 33.5 (L) MCV: 91.8 Platelet Count: 628  Lymphocytes Absolute: 1.7 Monocytes Absolute: 0.7 Eosinophils Absolute: 0.3 Basophils Absolute: 0.0 Segs Absolute: 1.9  Ferritin: 274 (H) Iron: 44 TIBC: 325 Transferrin %: 14  FOLATE: >20.0 (H)  Vitamin B-12: 1004 (H)        We discussed about signs and symptoms of recurrent breast cancer. No acute pain. Denied any nausea, vomiting or diarrhea. No fever or chills.   No chest pain, shortness of breath or palpitation. No headache or dizzy spell. No specific bone pain. No melena or hematochezia. Denied any dysuria or hematuria. Past Medical History  Asthma, neutropenia, hepatitis C, G6PD, thyroid disease. Surgical History  Partial hysterectomy in 2002 related to tubal pregnancy. Tonsillectomy. She had colonoscopy x2 and the last one was in 2013. She had left hip replacement    Social History  Smoking Status Never smoker  She denies any illicit drug use. She drinks alcohol occasionally. She has 2 children. Family History  Maternal great aunt had breast cancer. Review of Systems: \"Per interval history; otherwise 10 point ROS is negative. \"     Vital Signs:  BP (!) 140/65 (Site: Right Upper Arm, Position: Sitting, Cuff Size: Large Adult)   Pulse 82   Temp 96.8 °F (36 °C) (Temporal)   Resp 18   Ht 5' 3\" (1.6 m)   Wt 175 lb 3.2 oz (79.5 kg)   SpO2 98%   BMI 31.04 kg/m²     Physical Exam:  CONSTITUTIONAL: awake, alert, cooperative, no apparent distress   EYES: pupils equal, round, sclera clear and conjunctiva pallor  ENT: Normocephalic, without obvious abnormality, atraumatic  NECK: supple, symmetrical, no jugular venous distension and no carotid bruits   HEMATOLOGIC/LYMPHATIC: no cervical, supraclavicular or axillary lymphadenopathy   LUNGS: no increased work of breathing and clear to auscultation. Medi port to the right anterior chest wall noted. BREAST: s/p b/l mastectomy. CARDIOVASCULAR: regular rate and rhythm, normal S1 and S2, no murmur  ABDOMEN: normal bowel sound, soft, non-distended, non-tender, no palpable masses  MUSCULOSKELETAL: full range of motion noted, tone is normal  NEUROLOGIC: Motor and sensory grossly intact. CN II - XII grossly intact. SKIN: Normal skin color, texture, turgor and no jaundice. No ecchymosis. EXTREMITIES: no LE edema. No cyanosis.     Labs:  Hematology:  Lab Results   Component Value Date    WBC 3.7 (L) 06/09/2022    RBC 4.09 (L) 06/09/2022    HGB 11.2 (L) 06/09/2022    HCT 36.3 (L) 06/09/2022    MCV 88.8 06/09/2022    MCH 27.4 06/09/2022    MCHC 30.9 (L) 06/09/2022    RDW 15.9 (H) 06/09/2022     06/09/2022    MPV 9.9 06/09/2022    SEGSPCT 47.0 06/09/2022    EOSRELPCT 3.2 (H) 06/09/2022    BASOPCT 0.3 06/09/2022    LYMPHOPCT 38.1 06/09/2022    MONOPCT 11.4 (H) 06/09/2022    SEGSABS 1.7 06/09/2022    EOSABS 0.1 06/09/2022    BASOSABS 0.0 06/09/2022    LYMPHSABS 1.4 06/09/2022    MONOSABS 0.4 06/09/2022    DIFFTYPE AUTOMATED DIFFERENTIAL 06/09/2022     Chemistry:  Lab Results   Component Value Date     06/09/2022    K 4.3 06/09/2022     06/09/2022    CO2 23 06/09/2022    BUN 10 06/09/2022    CREATININE 0.7 06/09/2022    GLUCOSE 99 06/09/2022    CALCIUM 9.1 06/09/2022    PROT 7.1 06/09/2022    LABALBU 4.4 06/09/2022    BILITOT 0.2 06/09/2022    ALKPHOS 90 06/09/2022    AST 21 06/09/2022    ALT 18 06/09/2022    LABGLOM >60 06/09/2022    GFRAA >60 06/09/2022    AGRATIO 1.4 06/09/2021    GLOB 3.4 06/09/2021     Lab Results   Component Value Date    TSHHS 1.700 04/28/2022    T4FREE 1.28 01/10/2022    FT3 3.3 01/10/2022     Immunology:  Lab Results   Component Value Date    PROT 7.1 06/09/2022     Coagulation Panel:  Lab Results   Component Value Date    PROTIME 12.3 11/18/2021    INR 0.95 11/18/2021    APTT 26.9 11/18/2021     Observations:  No data recorded      Assessment & Plan:  1. She was referred for mild neutropenia. She is an -American. CBC in June 2019 was unremarkable. CBC in June 2020 was unremarkable. US of abdomen in June 2019 was unremarkable. In June 2021 WBC was 5.2, hemoglobin 12.3, platelet 514. She has anemia related to chemotherapy. Anemia improved. I will continue to monitor CBC. 2. She completed treatment for hepatitis C in May 2019. She is in remission. 3. Mammogram in June 2019 was benign. Colonoscopy was In 2013. Mammogram in July 2020 was benign.   She had abnormal left breast mammogram and scheduled for the biopsy on September 27, 2021. She was found to have likely triple negative left breast cancer s/p biopsy, T1, N1 at least from biopsy. CT chest, abdomen pelvis, bone scan and echocardiogram in October 2021 were reviewed. She started neoadjuvant chemotherapy November 8, 2021. MRI of the breast showed response to therapy. She completed Taxol on March 21, 2022. She had double mastectomy on April 5, 2022. Path showed no residual malignancy. She started adjuvant RT on 6/9/2022 for 28 d. We discussed about surveillance and survivorship. 4. Colonoscopy in December 2020 showed diverticulosis and hemorrhoids. Repeat study in 5 years was recommended. 5.  Ultrasound of the thyroid on November 9, 2021 showed  TR 4 nodule of the left thyroid lobe corresponding to recent CT findings. FNA recommended for further evaluation based on TI-RADS criteria. Cytology report on November 18, 2021 showed  Final Cytologic Diagnosis:    Left Thyroid Fine Needle Aspirate Cytology with Cell Block:   - Atypical follicular cells of undetermined significance   She will follow up with Dr Rupali Vicente in December 2022.    6.  Anemia related to chemotherapy. Anemia is improving. I will continue to monitor CBC. We discussed about healthy diet and lifestyle. She had COVID-19 vaccine in January 2021. Return to clinic in 12 weeks or sooner. All of her questions have been answered for today. Recent imaging and labs were reviewed and discussed with the patient.

## 2022-05-12 ENCOUNTER — HOSPITAL ENCOUNTER (OUTPATIENT)
Dept: RADIATION ONCOLOGY | Age: 67
Discharge: HOME | End: 2022-05-12
Payer: MEDICARE

## 2022-05-12 VITALS
BODY MASS INDEX: 30.94 KG/M2 | TEMPERATURE: 96.4 F | HEIGHT: 63 IN | OXYGEN SATURATION: 97 % | DIASTOLIC BLOOD PRESSURE: 71 MMHG | WEIGHT: 174.6 LBS | HEART RATE: 95 BPM | SYSTOLIC BLOOD PRESSURE: 137 MMHG | RESPIRATION RATE: 16 BRPM

## 2022-05-12 PROCEDURE — 99205 OFFICE O/P NEW HI 60 MIN: CPT | Performed by: RADIOLOGY

## 2022-05-12 NOTE — PROGRESS NOTES
Yumiko Norman  5/12/2022    CONSULT     Vitals:    05/12/22 1101   BP: 137/71   Pulse: 95   Resp: 16   Temp: 96.4 °F (35.8 °C)   SpO2: 97%        Oxygen Therapy  SpO2: 97 %  Pulse Oximeter Device Mode: Intermittent  Pulse Oximeter Device Location: Right,Finger  O2 Device: None (Room air)    Wt Readings from Last 3 Encounters:   05/12/22 174 lb 9.6 oz (79.2 kg)   05/02/22 174 lb (78.9 kg)   04/28/22 174 lb 12.8 oz (79.3 kg)           Denies Need for Intervention    Fall Risk:    Not currently a fall risk  Re-Evaluate Weekly    Nutritional Alteration:  None  No Difficulties Swallowing  Voices Sufficient Oral Intake    Mediport: yes,  Right chest    Pacemaker/ICD: no    Implants: no    Previous XRT: no    Assessment: Patient here to discuss radiation treatment options with Dr. Ulysses Donnelly. Denies any pain or complaints at this time.       Past Medical History:   Diagnosis Date    Allergic rhinitis     Asthma     last flare up 2017 - follows with PCP    G-6-PD deficiency anemia ([address]     dx this when I was having children- per pt     H/O Doppler ultrasound 04/04/18    CAROTID- Normal    Hepatitis C     dx 3/2018- for liver bx    Lumbar herniated disc     Malignant neoplasm of left breast in female, estrogen receptor negative ([address] 10/6/2021    Thyroid disease     \"not on any medication- have low thyroid level\"       Past Surgical History:   Procedure Laterality Date    COLONOSCOPY  2013    Polyps - Dr. Simin Cervantes  1990's   [facility]    \"left one ovary \"    IR BIOPSY THYROID PERC CORE NEEDLE  11/18/2021    IR BIOPSY THYROID PERC CORE NEEDLE 11/18/2021 [facility] SPECIAL PROCEDURES    MASTECTOMY Right 4/5/2022    RIGHT BREAST MASTECTOMY performed by Carleen Nyhan, [place] at [address] [address], MODIFIED RADICAL Left 4/5/2022    LEFT BREAST MASTECTOMY MODIFIED RADICAL performed by Carleen Nyhan, MD at [facility] LEFT Left 9/27/2021     BREAST BIOPSY NEEDLE ADDITIONAL LEFT 9/27/2021 Andrae Rubio MD [address] BREAST NEEDLE BIOPSY LEFT Left 9/27/2021     BREAST NEEDLE BIOPSY LEFT 9/27/2021 Andrae Rubio MD [address], [place]. \Hospital Has a New Name and Outlook.\""       Allergies   Allergen Reactions    Pcn [Penicillins] Hives          Current Outpatient Medications:     omeprazole (PRILOSEC) 20 MG delayed release capsule, Take 1 capsule by mouth daily, Disp: 30 capsule, Rfl: 3    albuterol sulfate  (90 Base) MCG/ACT inhaler, Inhale 2 puffs into the lungs every 6 hours as needed for Wheezing, Disp: 3 each, Rfl: 1    montelukast (SINGULAIR) 10 MG tablet, Take 1 tablet by mouth nightly, Disp: 90 tablet, Rfl: 1    meloxicam (MOBIC) 7.5 MG tablet, Take 1 tablet by mouth daily, Disp: 90 tablet, Rfl: 1    dicyclomine (BENTYL) 10 MG capsule, Take 1 capsule by mouth 4 times daily as needed (abdominal bloating and gas), Disp: 120 capsule, Rfl: 3    ondansetron (ZOFRAN) 8 MG tablet, Take 1 tablet by mouth every 8 hours as needed for Nausea or Vomiting, Disp: 30 tablet, Rfl: 0    meclizine (ANTIVERT) 25 MG tablet, Take 1 tablet by mouth 3 times daily as needed for Dizziness, Disp: 30 tablet, Rfl: 3    oxybutynin (DITROPAN XL) 10 MG extended release tablet, Take 1 tablet by mouth daily, Disp: 30 tablet, Rfl: 3    Multiple Vitamins-Minerals (MULTIVITAMIN PO), Take by mouth, Disp: , Rfl:         Electronically signed by Donna Rosado CMA on 5/12/2022 at 11:05 AM

## 2022-05-12 NOTE — PLAN OF CARE
Radiation education and handouts given. Side effects and management reviewed with pt. All questions answered and pt voices understanding . Miaderm voucher given. Nursing Care Plan for Breast Radiation    Pain related to cancer diagnosis and treatment. Interventions   Pain assessment. Monitor pharmacological pain medication. Teach relaxation and repositioning techniques. Expected Outcome   Maintain patient's acceptable pain level or <5 on pain scale. Knowledge deficit related to diagnosis and treatment plan. Interventions   Assess patient's ability to comprehend diagnosis and treatment plan. Provide educational materials and teaching regarding plan of care. Provide emotional support and continued education. Refer to psychosocial coordinator if further assistance needed. Expected Outcome   Patient voices understanding of diagnosis and treatment plan. Altered respiratory status related to diagnosis and treatment. Interventions   Assess respiratory status, note changes. Educate patient on symptoms to report to staff. Refer to smoking cessation program if needed. Expected Outcome   No respiratory complications, patient with SPO2 >90% or equal to baseline. Altered skin integrity related to treatment. Interventions   Evaluation of skin integrity at therapy site. Advise patient on appropriate skin care. Instruct patient on recommended lotions/ointments/creams/dressing changes to use on therapy site. Expected Outcome   Minimize adverse skin reaction/infection at treatment site. To re-evaluate weekly during radiation treatments.

## 2022-05-12 NOTE — CONSULTS
Radiation Oncology Consultation  Encounter Date: 2022 11:44 AM    Ms. Castro Rosales is a 78 y.o. female  : 1955  MRN: [id number]  [facility] Number: [de-identified]  Requesting Provider: No att. providers found        CONSULTANT: John Altamirano MD    PHYSICIANS:   Primary Care: MD Neil Locke M.D. Leanor Kinnier, [address] [facility]    [address] [facility] [address]        DIAGNOSIS: Triple negative invasive left breast cancer      Cancer Staging  Malignant neoplasm of left breast in female, estrogen receptor negative ([facility] [address]  Staging form: Breast, AJCC 8th Edition  - Clinical: Stage IIB (cT1c, cN1, cM0, G3, ER-, WI-, HER2-) - Signed by John Altamirano MD on 2022  - Pathologic: No Stage Recommended (ypT0, pN0, cM0) - Signed by John Altamirano MD on 2022         TREATMENT COURSE:  Oncology History   Malignant neoplasm of left breast in female, estrogen receptor negative ([facility] [address]   2021 - 3/21/2022 Chemotherapy    Neoadjuvant dose dense Adriamycin/Cytoxan completed on 2021  Weekly Taxol beginning 1/10/2022     2022 Surgery    Bilateral mastectomy  No residual malignancy     2022 -  Cancer Staged    Staging form: Breast, AJCC 8th Edition  - Clinical: Stage IIB (cT1c, cN1, cM0, G3, ER-, WI-, HER2-)     2022 -  Cancer Staged    Staging form: Breast, AJCC 8th Edition  - Pathologic: No Stage Recommended (ypT0, pN0, cM0)           HPI:   Today I had the privilege of seeing Castro Rosales in consultation. As you recall, Castro Rosales is a 78 y.o. female who has a history of hepatitis C who was recently found to have a triple negative invasive left-sided breast cancer.   She has previously in a fairly good state of health, however underwent routine bilateral diagnostic mammography on 9/15/2021 at which time she was noted to have a new 1.5 cm mass at the 12 o'clock position of the left breast measuring 1.1 cm on ultrasound at the 11 o'clock position being hypoechoic with microlobulated margins. No posterior acoustic shadowing was noted. A couple enlarged lymph nodes in the left axilla were noted with cortical thickness of 8 to 9 mm, felt to be concerning for malignancy. Biopsies were recommended and obtained with ultrasound guidance on 9/27/2021, revealing a 7 mm area of grade 3 invasive ductal carcinoma triple receptor negative. Metastatic high-grade carcinoma was noted in the biopsied lymph node as well consistent with primary breast cancer. CA 27.29 from 10/6/2021 was noted to be 17.7 units/mL. On 10/13/2021 a bone scan was obtained showing no evidence of osseous metastasis. CTs of the chest, abdomen, and pelvis were obtained on 10/15/2021 showing no evidence of distant metastasis. She was noted to have 4 small primary pulmonary nodules measuring up to 3 mm in the left lung felt to be consistent with benign etiology. An incidental 18 mm left thyroid lobe nodule was noted for which thyroid ultrasound was suggested. Thyroid ultrasound was obtained on 11/9/2021 showing a TI-RADS 4 nodule of the left thyroid lobe considered corresponding to recent CT findings for which an FNA was recommended and obtained on 11/18/2021 showing atypical follicular cells of undetermined significance. She was then initiated on neoadjuvant dose dense Adriamycin/Cytoxan followed by weekly Taxol completed on 3/21/22. Bilateral breast MRI from 12/30/2021 showed a favorable response to neoadjuvant chemotherapy with the 11:00 breast lesion no longer being visible and improvement of the left axillary level 1 lymphadenopathy. A repeat chest CT from 2/13/2022 showed the 3 and 4 mm nodules in bilateral lower lobes to be stable or decreased in size from the October 2021 scan. Resolution of the previously identified left axillary lymphadenopathy was noted. A 2 cm left thyroid nodule was unchanged.   On 4/5/2022 she underwent bilateral mastectomy at which time no residual malignancy was detected within the surgical specimen consistent with a complete pathologic response. She presents today for consideration of her treatment options. The patient reports the lesion was nonpalpable. She denies any associated nipple discharge, skin redness, thickening, dimpling, nipple inversion, or arm swelling. She was not experiencing any fevers, chills, drenching night sweats, or weight loss. She has not noticed any new lumps or bumps anywhere throughout her body. She denies new onset of bony pain. Overall, she reports she tolerated the chemotherapy fairly. Her energy level is currently returning. She does have residual visual changes as well as paresthesias of her fingers and feet which have been fairly stable. She denies any myalgias or arthralgias. She has recently started exercising again by walking. She reports she has been recovering well from her mastectomies.       Past Medical History:   Diagnosis Date    Allergic rhinitis     Asthma     last flare up 2017 - follows with PCP    G-6-PD deficiency anemia ([facility] [address]     dx this when I was having children- per pt     H/O Doppler ultrasound 04/04/18    CAROTID- Normal    Hepatitis C     dx 3/2018- for liver bx    Lumbar herniated disc     Malignant neoplasm of left breast in female, estrogen receptor negative ([facility] [address] 10/6/2021    Thyroid disease     \"not on any medication- have low thyroid level\"        Past Surgical History:   Procedure Laterality Date    COLONOSCOPY  2013    Polyps - Dr. New Kohler  1990's   [address] [place] [address]    \"left one ovary \"    IR BIOPSY THYROID PERC CORE NEEDLE  11/18/2021    IR BIOPSY THYROID PERC CORE NEEDLE 11/18/2021 [facility] SPECIAL PROCEDURES    MASTECTOMY Right 4/5/2022    RIGHT BREAST MASTECTOMY performed by John Gu MD at [address] [place] [address], MODIFIED RADICAL Left 4/5/2022    LEFT BREAST MASTECTOMY MODIFIED RADICAL performed by Janak Sanches MD at [address] NEEDLE ADDITIONAL LEFT Left 9/27/2021    US BREAST BIOPSY NEEDLE ADDITIONAL LEFT 9/27/2021 Alice Garcia MD [facility]    US BREAST NEEDLE BIOPSY LEFT Left 9/27/2021     BREAST NEEDLE BIOPSY LEFT 9/27/2021 Alice Garcia  [address]       Family History   Problem Relation Age of Onset    No Known Problems Mother     Kidney Disease Father        Social History     Socioeconomic History    Marital status: Single     Spouse name: Not on file    Number of children: Not on file    Years of education: Not on file    Highest education level: Not on file   Occupational History    Not on file   Tobacco Use    Smoking status: Never Smoker    Smokeless tobacco: Never Used   Vaping Use    Vaping Use: Never used   Substance and Sexual Activity    Alcohol use: Yes     Comment: Occasional wine/\"average glass of wine or beer  one time per month\"    Drug use: No    Sexual activity: Not on file   Other Topics Concern    Not on file   Social History Narrative    Not on file     Social Determinants of Health     Financial Resource Strain: Low Risk     Difficulty of Paying Living Expenses: Not hard at all   Food Insecurity: No Food Insecurity    Worried About [facility] in the Last Year: Never true    [facility] in the Last Year: Never true   Transportation Needs:     Lack of Transportation (Medical): Not on file    Lack of Transportation (Non-Medical):  Not on file   Physical Activity: Insufficiently Active    Days of Exercise per Week: 7 days    Minutes of Exercise per Session: 10 min   Stress:     Feeling of Stress : Not on file   Social Connections:     Frequency of Communication with Friends and Family: Not on file    Frequency of Social Gatherings with Friends and Family: Not on file    Attends Protestant Services: Not on file    Active Member of Clubs or Organizations: Not on file    Attends Club or Organization Meetings: Not on file    Marital Status: Not on file   Intimate Partner Violence:     Fear of Current or Ex-Partner: Not on file    Emotionally Abused: Not on file    Physically Abused: Not on file    Sexually Abused: Not on file   Housing Stability:     Unable to Pay for Housing in the Last Year: Not on file    Number of Mony in the Last Year: Not on file    Unstable Housing in the Last Year: Not on file           ALLERGIES:   Allergies   Allergen Reactions    Pcn [Penicillins] Hives        Current Outpatient Medications   Medication Sig Dispense Refill    omeprazole (PRILOSEC) 20 MG delayed release capsule Take 1 capsule by mouth daily 30 capsule 3    albuterol sulfate  (90 Base) MCG/ACT inhaler Inhale 2 puffs into the lungs every 6 hours as needed for Wheezing 3 each 1    montelukast (SINGULAIR) 10 MG tablet Take 1 tablet by mouth nightly 90 tablet 1    meloxicam (MOBIC) 7.5 MG tablet Take 1 tablet by mouth daily 90 tablet 1    dicyclomine (BENTYL) 10 MG capsule Take 1 capsule by mouth 4 times daily as needed (abdominal bloating and gas) 120 capsule 3    ondansetron (ZOFRAN) 8 MG tablet Take 1 tablet by mouth every 8 hours as needed for Nausea or Vomiting 30 tablet 0    meclizine (ANTIVERT) 25 MG tablet Take 1 tablet by mouth 3 times daily as needed for Dizziness 30 tablet 3    oxybutynin (DITROPAN XL) 10 MG extended release tablet Take 1 tablet by mouth daily 30 tablet 3    Multiple Vitamins-Minerals (MULTIVITAMIN PO) Take by mouth       No current facility-administered medications for this encounter.      No outpatient medications have been marked as taking for the 5/12/22 encounter [facility] Encounter) with Zackary Nguyen MD.          LABORATORY STUDIES:   CBC:   Lab Results   Component Value Date    WBC 4.6 04/28/2022    RBC 3.65 04/28/2022    HGB 10.2 04/28/2022    HCT 33.5 04/28/2022    MCV 91.8 04/28/2022    MCH 27.9 04/28/2022    MCHC 30.4 04/28/2022 RDW 16.7 04/28/2022     04/28/2022    MPV 9.7 04/28/2022     CMP:  Lab Results   Component Value Date     04/28/2022    K 4.4 04/28/2022     04/28/2022    CO2 24 04/28/2022    BUN 9 04/28/2022    CREATININE 0.6 04/28/2022    GFRAA >60 04/28/2022    AGRATIO 1.4 06/09/2021    LABGLOM >60 04/28/2022    GLUCOSE 89 04/28/2022    PROT 7.0 04/28/2022    LABALBU 4.4 04/28/2022    CALCIUM 9.5 04/28/2022    BILITOT 0.2 04/28/2022    ALKPHOS 71 04/28/2022    AST 21 04/28/2022    ALT 13 04/28/2022     Onc labs: No results found for: PSA, CEA, LDH, AFP    PATHOLOGY:   4/5/22  Final Pathologic Diagnosis:   A.  Resection of breast, right:          Mild fibrocystic change.          Small hemangioma, skin. B.  Resection of breast with axillary lymph nodes, left:          Mild fibrocystic change.        Hyaline fibrosis axillary lymph nodes (3).      Biopsy cavity, axillary lymph node.        Reactive changes with sinus histiocytosis, axillary   lymph nodes.         Electronically Signed Out By Veronica Rangel MD   Comment:   Residual breast carcinoma is not found in the specimens   submitted.  The prior biopsy site is found in the axillary   lymph node.  However, the breast specimen underwent two   examinations in radiology to try find the clip and underwent   three additional gross examinations to try to find a prior   biopsy site in the breast.  The breast biopsy site could not   be found either radiologically or by gross examinations. The case is discussed with Dr. Catalina Steele at 10:26 AM on   4/8/2022. Seven axillary lymph nodes are examined and are   free from tumor cells. Areas of hyaline fibrosis can   represent tumor having responded to treatment.       9/27/21  Final Pathologic Diagnosis:   A.  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      B.  Lymph node, left axilla, ultrasound guided needle core   biopsy:   -  METASTATIC HIGH GRADE CARCINOMA IS IDENTIFIED CONSISTENT   WITH METASTATIC BREAST CARCINOMA (see comment). Electronically Signed Out By Anjelica Clement. Joseph Jeffrey MD   Comment:   Microscopic examination of the left breast biopsy (Part A)   reveals invasive ductal carcinoma with marked chronic   inflammatory reaction.  Such changes may represent a   medullary carcinoma.  However, complete evaluation of the   excised mass is necessary to make this distinction.       The case was reviewed in intradepartmental consultation with   agreement of the diagnosis (SAF). These results were reported to Dr. Esha Koo via [employer]   on 9/28/2021 at 2:04 pm.     BREAST INVASIVE CARCINOMA SUMMARY - CORE BIOPSY   Procedure: Ultrasound guided needle core biopsy. Laterality and tumor site: Left breast at 11 o'clock. Tumor size: 7 mm in greatest dimension within biopsy   material present. Histologic type: Invasive ductal carcinoma with a marked   chronic inflammatory reaction. Histologic Grade ([place]): Grade 3        -Tubular score 3, Nuclear score 3, Mitosis score 2   (16/10 hpf)     Ductal Carcinoma In Situ (DCIS): Not present   Lobular Carcinoma In Situ (LCIS): Not present. Lympho-vascular invasion: Not identified. Microcalcifications: Not identified. Additional Findings: Marked chronic inflammatory reaction. Ancillary studies: ER/PgR/Her2 results from the current   biopsy are as follows:   -ER by [facility]*: Negative (0% staining)   -PgR by IHC*: Negative (0% staining)   -Her2 by IHC*: Negative (score 0)   -Her2 by FISH*: Negative (Group 5)   -Avg. #HER2 signals/nucleus: 2.4, Avg.  #CEP17   signals/nucleus: 2.2, HER2/CEP17 ratio: 1.09           RADIOLOGIC STUDIES:     US BREAST LIMITED LEFT    Narrative  EXAMINATION:  DIAGNOSTIC DIGITAL BILATERAL BREASTS MAMMOGRAM WITH TOMOSYNTHESIS; TARGETED  ULTRASOUND OF THE LEFT BREAST, 9/15/2021 2:03 pm    TECHNIQUE:  Diagnostic mammography of the bilateral breasts was performed with  tomosynthesis. 2D standard and 3D tomosynthesis combination imaging  performed through both breasts. Computer aided detection was utilized in the  interpretation of this exam.; Target ultrasound of the left breast was  performed. Views: Combination 2D/3D left 90 degree and spot CC/MLO views. COMPARISON:  2020, 2019, 2018. HISTORY:  ORDERING SYSTEM PROVIDED HISTORY: Painful breasts    FINDINGS:  BREAST DENSITY:  There are scattered areas of fibroglandular density    There is a new 1.5 cm mass identified in the left breast at 12 o'clock  position, at posterior depth, about 12 cm from the left nipple. This is best  seen on left CC michaela slice 50 and left MLO michaela slice 55. Left breast ultrasound 11 o'clock, 11 cm from the nipple: On ultrasound  evaluation, there is a 1.1 x 1.3 x 0.5 cm oval, parallel hypoechoic mass,  with microlobulated margins without any posterior acoustic shadowing or  internal vascularity. This mass corresponds to the mammographic finding. Left axilla: Couple enlarged lymph nodes identified in the left axilla with  cortical thickness of 8-9 mm. No new suspicious masses or microcalcifications are identified in the right  breast.    Impression  1. Suspicious mass in the left breast at 11 o'clock, 11 cm from the nipple  along with enlarged lymph nodes in the left axilla. Ultrasound-guided biopsy  of the left breast mass and left axillary lymph node is advised. These  results and recommendations were discussed with the patient by Dr. Altaf Earl. She will be assisted to schedule the recommended biopsy by the breast Imaging  navigator. An order will be requested from her referring physician. 2.  No mammographic evidence of malignancy in the right breast.    BIRADS:  BIRADS - CATEGORY 4B    Intermediate suspicion for malignancy. Suspicious Abnormality.  Biopsy should be considered at this time.    OVERALL ASSESSMENT - SUSPICIOUS    A letter of notification will be sent to the patient regarding the results. My findings and recommendations were discussed with the patient at the time  of service. A representative from the radiology department will be contacting your office  and assisting the patient in getting appropriate follow-up. CT CHEST W CONTRAST    Narrative  EXAMINATION:  CT OF THE CHEST WITH CONTRAST; CT OF THE ABDOMEN AND PELVIS WITH CONTRAST    10/15/2021 9:49 am; 10/15/2021 10:20 am    TECHNIQUE:  CT of the chest was performed with the administration of intravenous  contrast. Multiplanar reformatted images are provided for review. Dose  modulation, iterative reconstruction, and/or weight based adjustment of the  mA/kV was utilized to reduce the radiation dose to as low as reasonably  achievable.; CT of the abdomen and pelvis was performed with the  administration of intravenous contrast. Multiplanar reformatted images are  provided for review. Dose modulation, iterative reconstruction, and/or weight  based adjustment of the mA/kV was utilized to reduce the radiation dose to as  low as reasonably achievable. COMPARISON:  None    HISTORY:  ORDERING SYSTEM PROVIDED HISTORY: Malignant neoplasm of left breast in  female, estrogen receptor negative, unspecified site of breast [facility])    TECHNOLOGIST PROVIDED HISTORY:    Reason for Exam:  Malignant neoplasm of left breast in female, estrogen  receptor negative, unspecified site of breast ([address] Acuity:  Acute    Type of Exam:  Initial    ORDERING SYSTEM PROVIDED HISTORY:  Malignant neoplasm of left breast in  female, estrogen receptor negative, unspecified site of breast ([address] TECHNOLOGIST PROVIDED HISTORY:    Additional Contrast?  None    Reason for Exam:  Malignant neoplasm of left breast in female, estrogen  receptor negative, unspecified site of breast ([address]     Reason for Exam:  Malignant neoplasm of left breast in female, estrogen  receptor negative, unspecified site of breast ([facility] [address] Acuity:  Acute    Type of Exam:  Initial    Most likely she has triple negative breast cancer with lymph node involvement. FINDINGS:  CHEST:    Mediastinum:  In the left thyroid lobe, there is an incidental 18 mm nodule. No internal mammary adenopathy. No central pulmonary embolus. No thoracic  adenopathy. Heart size is normal.  No pericardial effusion. Thoracic aorta  is normal caliber. Lungs/Pleura: In the left lower lobe, there are a few 3 mm nodules including  image 59, image 67, and image 76. In the lingula, there is a 2 mm nodule on  image 51. Otherwise no suspicious pulmonary nodule. Mild ground-glass opacity in the  perihilar right lung is likely post infectious or inflammatory. No pleural  effusion or pneumothorax. The central airways are clear. Soft Tissues and Bones:  Enlarged left axillary lymph node with biopsy clip  measures 29 x 21 mm. Adjacent level 1 left axillary lymph nodes are present  measuring 17 x 10 mm and 10 mm. No level 2 or level 3 left axillary lymph  nodes. No right-sided axillary lymphadenopathy. In the superior left breast, there is a 12 mm mass associated with the  recently diagnosed breast cancer. No lytic or blastic lesion. ABDOMEN AND PELVIS:    Organs: The liver, spleen, pancreas, gallbladder, adrenal glands, and  kidneys demonstrate no metastatic disease. GI/Bowel:  Normal appendix. No dilated or inflamed loops of bowel. Pelvis:  Calcified uterine fibroids are present. No pelvic mass, adenopathy,  or fluid collection. Peritoneum/Retroperitoneum:  No omental nodularity. No abdominal aortic  aneurysm. No adenopathy. No ascites. No free intraperitoneal air. Bones:  No acute osseous abnormality. Status post left hip arthroplasty. No  lytic or blastic osseous lesion. Impression  1. Left breast mass with level 1 left axillary lymphadenopathy.   2. Four small pulmonary nodules measuring up to 3 mm in the left lung  favoring a benign etiology. Attention at follow-up imaging recommended. 3. Otherwise no metastatic disease in the chest, abdomen or pelvis. 4. Incidental left thyroid lobe 18 mm nodule. Thyroid ultrasound can be  considered. MRI BREAST BILATERAL W WO CONTRAST    Narrative  EXAMINATION:  MRI OF THE BILATERAL BREASTS WITHOUT AND WITH CONTRAST 12/22/2021 10:53 am:    TECHNIQUE:  Multiplanar multisequence MRI of the bilateral breasts were performed without  and with the administration of 18 mL MultiHance intravenous contrast.    Data analysis was performed with color parametric mapping, image subtraction,  and 3D reconstructions. 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 FINDINGS:  Both breasts are predominantly fatty and demonstrate minimal background  parenchymal enhancement. Left breast:  No suspicious enhancement seen. The biopsy proven malignancy  at 11 o'clock is no longer visualized. A dominant level 1 axillary lymph  node superiorly measuring 1.8 x 1.1 cm has decreased in size from 3.0 x 2.2  cm on prior CT. Two smaller level 1 nodes more inferiorly also appear mildly  decreased in size. Right breast:  Susceptibility artifact from [employer] along the medial upper  chest.  No suspicious mass, non-mass enhancement, or other abnormality seen. No abnormal lymph nodes. Impression  Favorable response to neoadjuvant chemotherapy. Biopsy-proven malignancy in  the left breast at 11 o'clock is no longer visible. Left axillary level 1  lymphadenopathy has improved. No MRI evidence of malignancy in the right  breast.    BIRADS:  BIRADS - CATEGORY 6    Known biopsy proven malignancy.     OVERALL ASSESSMENT - KNOWN BIOPSY PROVEN MALIGNANCY. REVIEW OF SYSTEMS:   Constitutional:  Patient denies fevers, rigors, or drenching night sweats. The patient's weight and appetite have been stable. Eyes:  The patient denies any  diplopia, or cataracts  ENMT:  The patient denies any ear pain, hearing changes, or nosebleeds  Respiratory:  The patient denies any SOB, hemoptysis, or green sputum production  Cardiovascular: The patient denies any chest pain, leg edema, or fainting spells  GI:  Patient denies nausea, vomiting, diarrhea, melena, or hematochezia  : Patient denies dysuria, hematuria, or urinary incontinence. Integumentary: patient denies skin rashes, pruritis, or arm edema  Endocrine:  Patient denies any diabetic problems. Hx thyroid nodule- see HPI  Neurologic: Patient denies headaches, focal weakness, or disorientation. + Paresthesias from chemo-see HPI  Psychiatric: The patient denies any depression, anxiety, or rapid mood swings. PHYSICAL EXAMINATION:   VITAL SIGNS: /71   Pulse 95   Temp 96.4 °F (35.8 °C) (Infrared)   Resp 16   Ht 5' 3\" (1.6 m)   Wt 174 lb 9.6 oz (79.2 kg)   SpO2 97%   BMI 30.93 kg/m²   ECOG PERFORMANCE STATUS: 0  PAIN RATIN    GENERAL: Pleasant well-developed adult in no acute distress. Alert and oriented ×3  SKIN: Warm and dry, without jaundice, ecchymoses, or petechiae. HEENT: Normocephalic, atraumatic. Pupils are round and symmetric. Sclerae anicteric  NECK:  No JVD; Supple. No cervical, supraclavicular, or infraclavicular lymphadenopathy is palpable. LUNGS: Bilaterally clear to auscultation. No rales, rhonci, or wheezing are present. Good inspiratory effort, no accessory muscle use. Chest wall examination: Bilateral healing mastectomy scars are present. No chest wall nodularity or masses are palpable. No axillary lymphadenopathy. CARDIAC: Regular rate and rhythm, without murmurs, clicks, or gallops   ABDOMEN: Normoactive bowels sounds are present. Soft.  Non-tender and non-distended. No hepatosplenomegaly or masses are present. EXTREMITIES: No cyanosis, clubbing is present. FROM. Trace edema of her ankles. NEUROLOGIC:  The patient is alert and oriented. CN II-XII are grossly intact. Sensation to light touch is intact and symmetric in the fingers and feet. Muscle strength is 5/5 in both the upper and lower extremities. IMPRESSION/PLAN:  Brigitte Ball is a 78 y.o. female who was recently found to have a clinical stage IIb triple receptor negative infiltrating ductal carcinoma of the left breast status post neoadjuvant chemotherapy and bilateral mastectomy with a complete pathologic response. I have reviewed the patient's radiographic images. The treatment options were discussed in detail with the patient. I do recommend adjuvant left chest wall and regional mario radiation therapy. The potential acute side effects and chronic complications of radiation therapy to these areas were discussed in detail with the patient. The patient voiced understanding, and the patient's questions were answered. The patient has decided to proceed with radiation therapy. I will schedule a simulation to occur at the next available time and will plan on initiating radiation therapy shortly thereafter. Thank-you for allowing me to participate in the care of this very pleasant patient.           MEDICAL DECISION MAKING    Number/Complexity of Problems Addressed  [] 1 self-limited of minor problem (83091/43026)  [] >=2 self-limited or minor problems, 1 stable chronic illness, 1 acute/uncomplicated illness/injury (47258/61566)  [x]Chronic illnesses with exacerbation/progression/side effects of treatment, >=2 stable chronic illnesses, 1 undiagnosed new problem with uncertain prognosis, 1 acute illness with systemic symptoms, 1 acute complicated injury (91348/58231)  []Chronic illnesses with severe exacerbation/progression/treatment side effects, acute or chronic illness or injury that poses a threat to life or bodily function (85759/66172)    Amount and/or Complexity of Data Reviewed  [] Minimal or none (46626/16367)  [] Limited - meets 1/2 categories (23704/89720)  1. At least 2 of: review of prior external notes from each unique source, review results of each unique test, ordering of each unique test  2. Assessment requiring an independent historian  [] Moderate - meets 1/3 categories (67271/79971)  1. Any 3 of: Review of prior external notes from each unique source, review results of each unique test, ordering of each unique test, assessment requiring an independent historian  2. Independent interpretation of tests  3. Discussion of management or test interpretation  [x] Extensive - meets 2/3 categories (84212/10551)  1. Any 3 of: Review of prior external notes from each unique source, review results of each unique test, ordering of each unique test, assessment requiring an independent historian  2. Independent interpretation of tests  3.  Discussion of management or test interpretation    Risk of complications and/or morbidity/mortality of patient management  [] Minimal (15285/99202)  [] Low (23676/26038)  [] Moderate (32538/59783)  Examples: Rx drug management, decision regarding minor surgery with identified patient or procedure risk factors, decision regarding elective major surgery without identified patient or procedure risk factors, diagnosis or treatment significantly limited by social determinants of health  [x] High (65931/66793)  Examples: drug therapy requiring intensive monitoring for toxicity, decision regarding elective major surgery with identified patient/procedure risk factors, decision regarding emergency major surgery, decision regarding hospitalization, decision for DNR or de-escalation of care because of poor prognosis      LEVEL OF MDM (based on 2/3 above categories)  [] Straightforward (90278/90230)  [] Low (19837/56422)  [] Moderate (16507/41964)  [x] High (25392/37012)    Electronically signed by April Ingram MD on 5/12/2022 at 11:44 AM

## 2022-05-17 ENCOUNTER — HOSPITAL ENCOUNTER (OUTPATIENT)
Dept: RADIATION ONCOLOGY | Age: 67
Discharge: HOME | End: 2022-05-17
Attending: RADIOLOGY
Payer: MEDICARE

## 2022-05-17 PROCEDURE — 77470 SPECIAL RADIATION TREATMENT: CPT | Performed by: RADIOLOGY

## 2022-05-17 PROCEDURE — 77290 THER RAD SIMULAJ FIELD CPLX: CPT | Performed by: RADIOLOGY

## 2022-05-17 PROCEDURE — 77332 RADIATION TREATMENT AID(S): CPT | Performed by: RADIOLOGY

## 2022-05-17 PROCEDURE — 77263 THER RADIOLOGY TX PLNG CPLX: CPT | Performed by: RADIOLOGY

## 2022-05-26 ENCOUNTER — OFFICE VISIT (OUTPATIENT)
Dept: SURGERY | Age: 67
End: 2022-05-26
Payer: MEDICARE

## 2022-05-26 ENCOUNTER — APPOINTMENT (OUTPATIENT)
Dept: RADIATION ONCOLOGY | Age: 67
End: 2022-05-26
Payer: MEDICARE

## 2022-05-26 VITALS
HEIGHT: 63 IN | SYSTOLIC BLOOD PRESSURE: 118 MMHG | WEIGHT: 174 LBS | OXYGEN SATURATION: 100 % | DIASTOLIC BLOOD PRESSURE: 70 MMHG | HEART RATE: 86 BPM | BODY MASS INDEX: 30.83 KG/M2

## 2022-05-26 DIAGNOSIS — L98.9 BENIGN SKIN LESION OF NECK: ICD-10-CM

## 2022-05-26 DIAGNOSIS — H02.9 EYELID LESION: Primary | ICD-10-CM

## 2022-05-26 PROCEDURE — 11442 EXC FACE-MM B9+MARG 1.1-2 CM: CPT | Performed by: SURGERY

## 2022-05-26 PROCEDURE — 11421 EXC H-F-NK-SP B9+MARG 0.6-1: CPT | Performed by: SURGERY

## 2022-05-27 ENCOUNTER — APPOINTMENT (OUTPATIENT)
Dept: RADIATION ONCOLOGY | Age: 67
End: 2022-05-27
Payer: MEDICARE

## 2022-05-31 ENCOUNTER — APPOINTMENT (OUTPATIENT)
Dept: RADIATION ONCOLOGY | Age: 67
End: 2022-05-31
Payer: MEDICARE

## 2022-06-01 ENCOUNTER — APPOINTMENT (OUTPATIENT)
Dept: RADIATION ONCOLOGY | Age: 67
End: 2022-06-01
Payer: MEDICARE

## 2022-06-02 ENCOUNTER — APPOINTMENT (OUTPATIENT)
Dept: RADIATION ONCOLOGY | Age: 67
End: 2022-06-02
Payer: MEDICARE

## 2022-06-03 ENCOUNTER — APPOINTMENT (OUTPATIENT)
Dept: RADIATION ONCOLOGY | Age: 67
End: 2022-06-03
Payer: MEDICARE

## 2022-06-06 ENCOUNTER — APPOINTMENT (OUTPATIENT)
Dept: RADIATION ONCOLOGY | Age: 67
End: 2022-06-06
Payer: MEDICARE

## 2022-06-07 ENCOUNTER — APPOINTMENT (OUTPATIENT)
Dept: RADIATION ONCOLOGY | Age: 67
End: 2022-06-07
Payer: MEDICARE

## 2022-06-07 ENCOUNTER — HOSPITAL ENCOUNTER (OUTPATIENT)
Dept: RADIATION ONCOLOGY | Age: 67
Discharge: HOME | End: 2022-06-07
Payer: MEDICARE

## 2022-06-07 PROCEDURE — 77300 RADIATION THERAPY DOSE PLAN: CPT | Performed by: RADIOLOGY

## 2022-06-07 PROCEDURE — 77301 RADIOTHERAPY DOSE PLAN IMRT: CPT | Performed by: RADIOLOGY

## 2022-06-07 PROCEDURE — 77338 DESIGN MLC DEVICE FOR IMRT: CPT | Performed by: RADIOLOGY

## 2022-06-08 ENCOUNTER — APPOINTMENT (OUTPATIENT)
Dept: RADIATION ONCOLOGY | Age: 67
End: 2022-06-08
Payer: MEDICARE

## 2022-06-08 ENCOUNTER — HOSPITAL ENCOUNTER (OUTPATIENT)
Dept: RADIATION ONCOLOGY | Age: 67
Discharge: HOME | End: 2022-06-08
Payer: MEDICARE

## 2022-06-08 PROCEDURE — G6002 STEREOSCOPIC X-RAY GUIDANCE: HCPCS | Performed by: RADIOLOGY

## 2022-06-08 PROCEDURE — 77385 HC NTSTY MODUL RAD TX DLVR SMPL: CPT | Performed by: RADIOLOGY

## 2022-06-08 PROCEDURE — 77300 RADIATION THERAPY DOSE PLAN: CPT | Performed by: RADIOLOGY

## 2022-06-09 ENCOUNTER — HOSPITAL ENCOUNTER (OUTPATIENT)
Dept: INFUSION THERAPY | Age: 67
Discharge: HOME | End: 2022-06-09
Payer: MEDICARE

## 2022-06-09 ENCOUNTER — HOSPITAL ENCOUNTER (OUTPATIENT)
Dept: RADIATION ONCOLOGY | Age: 67
Discharge: HOME | End: 2022-06-09
Payer: MEDICARE

## 2022-06-09 ENCOUNTER — APPOINTMENT (OUTPATIENT)
Dept: RADIATION ONCOLOGY | Age: 67
End: 2022-06-09
Payer: MEDICARE

## 2022-06-09 ENCOUNTER — OFFICE VISIT (OUTPATIENT)
Dept: ONCOLOGY | Age: 67
End: 2022-06-09
Payer: MEDICARE

## 2022-06-09 VITALS
OXYGEN SATURATION: 98 % | HEART RATE: 82 BPM | SYSTOLIC BLOOD PRESSURE: 140 MMHG | HEIGHT: 63 IN | TEMPERATURE: 96.8 F | RESPIRATION RATE: 18 BRPM | DIASTOLIC BLOOD PRESSURE: 65 MMHG | BODY MASS INDEX: 31.04 KG/M2 | WEIGHT: 175.2 LBS

## 2022-06-09 DIAGNOSIS — R42 VERTIGO: ICD-10-CM

## 2022-06-09 DIAGNOSIS — D64.9 ANEMIA, UNSPECIFIED TYPE: Primary | ICD-10-CM

## 2022-06-09 DIAGNOSIS — K21.9 GASTROESOPHAGEAL REFLUX DISEASE WITHOUT ESOPHAGITIS: ICD-10-CM

## 2022-06-09 DIAGNOSIS — D64.9 ANEMIA, UNSPECIFIED TYPE: ICD-10-CM

## 2022-06-09 LAB
ALBUMIN SERPL-MCNC: 4.4 GM/DL (ref 3.4–5)
ALP BLD-CCNC: 90 IU/L (ref 40–129)
ALT SERPL-CCNC: 18 U/L (ref 10–40)
ANION GAP SERPL CALCULATED.3IONS-SCNC: 12 MMOL/L (ref 4–16)
AST SERPL-CCNC: 21 IU/L (ref 15–37)
BASOPHILS ABSOLUTE: 0 K/CU MM
BASOPHILS RELATIVE PERCENT: 0.3 % (ref 0–1)
BILIRUB SERPL-MCNC: 0.2 MG/DL (ref 0–1)
BUN BLDV-MCNC: 10 MG/DL (ref 6–23)
CALCIUM SERPL-MCNC: 9.1 MG/DL (ref 8.3–10.6)
CHLORIDE BLD-SCNC: 102 MMOL/L (ref 99–110)
CO2: 23 MMOL/L (ref 21–32)
CREAT SERPL-MCNC: 0.7 MG/DL (ref 0.6–1.1)
DIFFERENTIAL TYPE: ABNORMAL
EOSINOPHILS ABSOLUTE: 0.1 K/CU MM
EOSINOPHILS RELATIVE PERCENT: 3.2 % (ref 0–3)
FERRITIN: 238 NG/ML (ref 15–150)
GFR AFRICAN AMERICAN: >60 ML/MIN/1.73M2
GFR NON-AFRICAN AMERICAN: >60 ML/MIN/1.73M2
GLUCOSE BLD-MCNC: 99 MG/DL (ref 70–99)
HCT VFR BLD CALC: 36.3 % (ref 37–47)
HEMOGLOBIN: 11.2 GM/DL (ref 12.5–16)
IRON: 39 UG/DL (ref 37–145)
LYMPHOCYTES ABSOLUTE: 1.4 K/CU MM
LYMPHOCYTES RELATIVE PERCENT: 38.1 % (ref 24–44)
MCH RBC QN AUTO: 27.4 PG (ref 27–31)
MCHC RBC AUTO-ENTMCNC: 30.9 % (ref 32–36)
MCV RBC AUTO: 88.8 FL (ref 78–100)
MONOCYTES ABSOLUTE: 0.4 K/CU MM
MONOCYTES RELATIVE PERCENT: 11.4 % (ref 0–4)
PCT TRANSFERRIN: 13 % (ref 10–44)
PDW BLD-RTO: 15.9 % (ref 11.7–14.9)
PLATELET # BLD: 237 K/CU MM (ref 140–440)
PMV BLD AUTO: 9.9 FL (ref 7.5–11.1)
POTASSIUM SERPL-SCNC: 4.3 MMOL/L (ref 3.5–5.1)
RBC # BLD: 4.09 M/CU MM (ref 4.2–5.4)
SEGMENTED NEUTROPHILS ABSOLUTE COUNT: 1.7 K/CU MM
SEGMENTED NEUTROPHILS RELATIVE PERCENT: 47 % (ref 36–66)
SODIUM BLD-SCNC: 137 MMOL/L (ref 135–145)
TOTAL IRON BINDING CAPACITY: 289 UG/DL (ref 250–450)
TOTAL PROTEIN: 7.1 GM/DL (ref 6.4–8.2)
UNSATURATED IRON BINDING CAPACITY: 250 UG/DL (ref 110–370)
WBC # BLD: 3.7 K/CU MM (ref 4–10.5)

## 2022-06-09 PROCEDURE — 1123F ACP DISCUSS/DSCN MKR DOCD: CPT | Performed by: INTERNAL MEDICINE

## 2022-06-09 PROCEDURE — 85025 COMPLETE CBC W/AUTO DIFF WBC: CPT

## 2022-06-09 PROCEDURE — 83540 ASSAY OF IRON: CPT

## 2022-06-09 PROCEDURE — 82728 ASSAY OF FERRITIN: CPT

## 2022-06-09 PROCEDURE — 83550 IRON BINDING TEST: CPT

## 2022-06-09 PROCEDURE — 36415 COLL VENOUS BLD VENIPUNCTURE: CPT

## 2022-06-09 PROCEDURE — G6002 STEREOSCOPIC X-RAY GUIDANCE: HCPCS | Performed by: RADIOLOGY

## 2022-06-09 PROCEDURE — 77385 HC NTSTY MODUL RAD TX DLVR SMPL: CPT | Performed by: RADIOLOGY

## 2022-06-09 PROCEDURE — 80053 COMPREHEN METABOLIC PANEL: CPT

## 2022-06-09 PROCEDURE — 99213 OFFICE O/P EST LOW 20 MIN: CPT | Performed by: INTERNAL MEDICINE

## 2022-06-09 RX ORDER — OMEPRAZOLE 20 MG/1
20 CAPSULE, DELAYED RELEASE ORAL DAILY
Qty: 30 CAPSULE | Refills: 3 | Status: SHIPPED | OUTPATIENT
Start: 2022-06-09 | End: 2022-10-03

## 2022-06-09 RX ORDER — MECLIZINE HYDROCHLORIDE 25 MG/1
25 TABLET ORAL 3 TIMES DAILY PRN
Qty: 30 TABLET | Refills: 3 | Status: SHIPPED | OUTPATIENT
Start: 2022-06-09 | End: 2022-06-23 | Stop reason: SDUPTHER

## 2022-06-09 NOTE — PROGRESS NOTES
MA Rooming Questions  Patient: Jamie Crump  MRN: [id number]    Date: 6/9/2022        1. Do you have any new issues? yes - pt states she has had some blood in her stool intermittent          2. Do you need any refills on medications?    no    3. Have you had any imaging done since your last visit?   no    4. Have you been hospitalized or seen in the emergency room since your last visit here?   no    5. Did the patient have a depression screening completed today?  No    No data recorded     PHQ-9 Given to (if applicable):               PHQ-9 Score (if applicable):                     [] Positive     []  Negative              Does question #9 need addressed (if applicable)                     [] Yes    []  No               Jenny aSnchez CMA

## 2022-06-10 ENCOUNTER — TELEPHONE (OUTPATIENT)
Dept: INTERNAL MEDICINE CLINIC | Age: 67
End: 2022-06-10

## 2022-06-10 ENCOUNTER — HOSPITAL ENCOUNTER (OUTPATIENT)
Dept: RADIATION ONCOLOGY | Age: 67
Discharge: HOME | End: 2022-06-10
Payer: MEDICARE

## 2022-06-10 ENCOUNTER — APPOINTMENT (OUTPATIENT)
Dept: RADIATION ONCOLOGY | Age: 67
End: 2022-06-10
Payer: MEDICARE

## 2022-06-10 PROCEDURE — 77385 HC NTSTY MODUL RAD TX DLVR SMPL: CPT | Performed by: RADIOLOGY

## 2022-06-10 PROCEDURE — G6002 STEREOSCOPIC X-RAY GUIDANCE: HCPCS | Performed by: RADIOLOGY

## 2022-06-10 NOTE — TELEPHONE ENCOUNTER
Received PA request for Meclizine 25mg tab, 1 tab TID PRN, #30/30d. DX R42- Vertigo. PA completed via covermymeds. Approved. Approval scanned to chart. Received PA request for Bentyl 10 mg cap, 1 cap QID PRN, #120/30d. DX K58.9- IBS, unsp. PA completed via covermymeds. Approved. Approval scanned to chart. Pt informed.

## 2022-06-13 ENCOUNTER — HOSPITAL ENCOUNTER (OUTPATIENT)
Dept: RADIATION ONCOLOGY | Age: 67
Discharge: HOME | End: 2022-06-13
Payer: MEDICARE

## 2022-06-13 ENCOUNTER — APPOINTMENT (OUTPATIENT)
Dept: RADIATION ONCOLOGY | Age: 67
End: 2022-06-13
Payer: MEDICARE

## 2022-06-13 VITALS — WEIGHT: 175.5 LBS | BODY MASS INDEX: 31.09 KG/M2

## 2022-06-13 PROCEDURE — 77385 HC NTSTY MODUL RAD TX DLVR SMPL: CPT | Performed by: RADIOLOGY

## 2022-06-13 PROCEDURE — G6002 STEREOSCOPIC X-RAY GUIDANCE: HCPCS | Performed by: RADIOLOGY

## 2022-06-13 ASSESSMENT — PAIN SCALES - GENERAL: PAINLEVEL_OUTOF10: 0

## 2022-06-13 NOTE — PROGRESS NOTES
Weekly Radiation Treatment Progress Note    Current/Total Doses:  720/5040    Current/Total Fractions:    3/28    Treatment Modality:   radiation    Site:   Left chest wall /left supraclav      Chief Complaint:  No chief complaint on file. Subjective:   Feeling  Well       EXAM  There were no vitals filed for this visit. NAD, VSS      Assessment:    Tolerating well. Plan:   Continue treatment as planned.   Doing well no arm pain no arm edema overall she offers very little in with complaint continue with therapy    Loreta Myers MD

## 2022-06-14 ENCOUNTER — APPOINTMENT (OUTPATIENT)
Dept: RADIATION ONCOLOGY | Age: 67
End: 2022-06-14
Payer: MEDICARE

## 2022-06-14 ENCOUNTER — HOSPITAL ENCOUNTER (OUTPATIENT)
Dept: RADIATION ONCOLOGY | Age: 67
Discharge: HOME | End: 2022-06-14
Payer: MEDICARE

## 2022-06-14 PROCEDURE — 77427 RADIATION TX MANAGEMENT X5: CPT | Performed by: RADIOLOGY

## 2022-06-14 PROCEDURE — 77385 HC NTSTY MODUL RAD TX DLVR SMPL: CPT | Performed by: RADIOLOGY

## 2022-06-14 PROCEDURE — G6002 STEREOSCOPIC X-RAY GUIDANCE: HCPCS | Performed by: RADIOLOGY

## 2022-06-14 PROCEDURE — 77336 RADIATION PHYSICS CONSULT: CPT | Performed by: RADIOLOGY

## 2022-06-15 ENCOUNTER — HOSPITAL ENCOUNTER (OUTPATIENT)
Dept: RADIATION ONCOLOGY | Age: 67
Discharge: HOME | End: 2022-06-15
Payer: MEDICARE

## 2022-06-15 ENCOUNTER — APPOINTMENT (OUTPATIENT)
Dept: RADIATION ONCOLOGY | Age: 67
End: 2022-06-15
Payer: MEDICARE

## 2022-06-15 PROCEDURE — 77385 HC NTSTY MODUL RAD TX DLVR SMPL: CPT | Performed by: RADIOLOGY

## 2022-06-15 PROCEDURE — G6002 STEREOSCOPIC X-RAY GUIDANCE: HCPCS | Performed by: RADIOLOGY

## 2022-06-16 ENCOUNTER — HOSPITAL ENCOUNTER (OUTPATIENT)
Dept: RADIATION ONCOLOGY | Age: 67
Discharge: HOME | End: 2022-06-16
Payer: MEDICARE

## 2022-06-16 ENCOUNTER — APPOINTMENT (OUTPATIENT)
Dept: RADIATION ONCOLOGY | Age: 67
End: 2022-06-16
Payer: MEDICARE

## 2022-06-16 PROCEDURE — 77385 HC NTSTY MODUL RAD TX DLVR SMPL: CPT | Performed by: RADIOLOGY

## 2022-06-16 PROCEDURE — G6002 STEREOSCOPIC X-RAY GUIDANCE: HCPCS | Performed by: RADIOLOGY

## 2022-06-17 ENCOUNTER — APPOINTMENT (OUTPATIENT)
Dept: RADIATION ONCOLOGY | Age: 67
End: 2022-06-17
Payer: MEDICARE

## 2022-06-17 ENCOUNTER — HOSPITAL ENCOUNTER (OUTPATIENT)
Dept: RADIATION ONCOLOGY | Age: 67
Discharge: HOME | End: 2022-06-17
Payer: MEDICARE

## 2022-06-17 PROCEDURE — 77385 HC NTSTY MODUL RAD TX DLVR SMPL: CPT | Performed by: RADIOLOGY

## 2022-06-17 PROCEDURE — G6002 STEREOSCOPIC X-RAY GUIDANCE: HCPCS | Performed by: RADIOLOGY

## 2022-06-20 ENCOUNTER — APPOINTMENT (OUTPATIENT)
Dept: RADIATION ONCOLOGY | Age: 67
End: 2022-06-20
Payer: MEDICARE

## 2022-06-20 ENCOUNTER — HOSPITAL ENCOUNTER (OUTPATIENT)
Dept: RADIATION ONCOLOGY | Age: 67
Discharge: HOME | End: 2022-06-20
Payer: MEDICARE

## 2022-06-20 PROCEDURE — 77385 HC NTSTY MODUL RAD TX DLVR SMPL: CPT | Performed by: RADIOLOGY

## 2022-06-20 PROCEDURE — G6002 STEREOSCOPIC X-RAY GUIDANCE: HCPCS | Performed by: RADIOLOGY

## 2022-06-21 ENCOUNTER — APPOINTMENT (OUTPATIENT)
Dept: RADIATION ONCOLOGY | Age: 67
End: 2022-06-21
Payer: MEDICARE

## 2022-06-21 ENCOUNTER — HOSPITAL ENCOUNTER (OUTPATIENT)
Dept: RADIATION ONCOLOGY | Age: 67
End: 2022-06-21
Payer: MEDICARE

## 2022-06-21 ASSESSMENT — ENCOUNTER SYMPTOMS
STRIDOR: 0
RECTAL PAIN: 0
EYE REDNESS: 0
ANAL BLEEDING: 0
PHOTOPHOBIA: 0
CONSTIPATION: 0
COLOR CHANGE: 0
BACK PAIN: 0
APNEA: 0
SORE THROAT: 0
CHOKING: 0
EYE ITCHING: 0

## 2022-06-21 NOTE — PROGRESS NOTES
Chief Complaint   Patient presents with    Post-Op Check      3rd po lef mast 4/5/22        SUBJECTIVE:  HPI: Patient presents today for an office procedure. Complaining of left thigh leg skin lesion and right neck lesion both appear benign however the left side of the lesion has grown and cause some discomfort with the weight on the eyelid. Pt is ready to have this lesion removed. I have reviewed the patient's(pertinent information tothis visit) medical history, family history(scanned in  the Media tab under \"patient questioner\"), social history and review of systems with the patient today in the office.          Past Surgical History:   Procedure Laterality Date    COLONOSCOPY  2013    Polyps - Dr. Orin Diamond  1990's    HYSTERECTOMY (CERVIX STATUS UNKNOWN)  1998    \"left one ovary \"    IR BIOPSY THYROID PERC CORE NEEDLE  11/18/2021    IR BIOPSY THYROID PERC CORE NEEDLE 11/18/2021 [facility] SPECIAL PROCEDURES    MASTECTOMY Right 4/5/2022    RIGHT BREAST MASTECTOMY performed by Ar Hunter MD at [address] [place] [address], MODIFIED RADICAL Left 4/5/2022    LEFT BREAST MASTECTOMY MODIFIED RADICAL performed by Ar Hunter MD at [address] LEFT Left 9/27/2021    US BREAST BIOPSY NEEDLE ADDITIONAL LEFT 9/27/2021 Zaria Mckeon MD [address] BREAST NEEDLE BIOPSY LEFT Left 9/27/2021    US BREAST NEEDLE BIOPSY LEFT 9/27/2021 Zaria Mckeon  [address]     Past Medical History:   Diagnosis Date    Allergic rhinitis     Asthma     last flare up 2017 - follows with PCP    G-6-PD deficiency anemia ([address]     dx this when I was having children- per pt     H/O Doppler ultrasound 04/04/18    CAROTID- Normal    Hepatitis C     dx 3/2018- for liver bx    Lumbar herniated disc     Malignant neoplasm of left breast in female, estrogen receptor negative ([address] 10/6/2021    Thyroid disease     \"not on any medication- have low thyroid level\"     Family History   Problem Relation Age of Onset    No Known Problems Mother     Kidney Disease Father      Social History     Socioeconomic History    Marital status: Single     Spouse name: Not on file    Number of children: Not on file    Years of education: Not on file    Highest education level: Not on file   Occupational History    Not on file   Tobacco Use    Smoking status: Never Smoker    Smokeless tobacco: Never Used   Vaping Use    Vaping Use: Never used   Substance and Sexual Activity    Alcohol use: Yes     Comment: Occasional wine/\"average glass of wine or beer  one time per month\"    Drug use: No    Sexual activity: Not on file   Other Topics Concern    Not on file   Social History Narrative    Not on file     Social Determinants of Health     Financial Resource Strain:     Difficulty of Paying Living Expenses: Not on file   Food Insecurity:     Worried About Running Out of Food in the Last Year: Not on file    [place] of Food in the Last Year: Not on file   Transportation Needs:     Lack of Transportation (Medical): Not on file    Lack of Transportation (Non-Medical):  Not on file   Physical Activity: Insufficiently Active    Days of Exercise per Week: 7 days    Minutes of Exercise per Session: 10 min   Stress:     Feeling of Stress : Not on file   Social Connections:     Frequency of Communication with Friends and Family: Not on file    Frequency of Social Gatherings with Friends and Family: Not on file    Attends [employer] Services: Not on file    Active Member of Clubs or Organizations: Not on file    Attends Club or Organization Meetings: Not on file    Marital Status: Not on file   Intimate Partner Violence:     Fear of Current or Ex-Partner: Not on file    Emotionally Abused: Not on file    Physically Abused: Not on file    Sexually Abused: Not on file   Housing Stability:     Unable to Pay for Housing in the Last Year: Not on file    Number of Places Lived in the Last Year: Not on file    Unstable Housing in the Last Year: Not on file       Current Outpatient Medications   Medication Sig Dispense Refill    meclizine (ANTIVERT) 25 MG tablet Take 1 tablet by mouth 3 times daily as needed for Dizziness 30 tablet 3    omeprazole (PRILOSEC) 20 MG delayed release capsule Take 1 capsule by mouth daily 30 capsule 3    albuterol sulfate  (90 Base) MCG/ACT inhaler Inhale 2 puffs into the lungs every 6 hours as needed for Wheezing 3 each 1    montelukast (SINGULAIR) 10 MG tablet Take 1 tablet by mouth nightly 90 tablet 1    meloxicam (MOBIC) 7.5 MG tablet Take 1 tablet by mouth daily 90 tablet 1    dicyclomine (BENTYL) 10 MG capsule Take 1 capsule by mouth 4 times daily as needed (abdominal bloating and gas) 120 capsule 3    ondansetron (ZOFRAN) 8 MG tablet Take 1 tablet by mouth every 8 hours as needed for Nausea or Vomiting 30 tablet 0    oxybutynin (DITROPAN XL) 10 MG extended release tablet Take 1 tablet by mouth daily 30 tablet 3    Multiple Vitamins-Minerals (MULTIVITAMIN PO) Take by mouth       No current facility-administered medications for this visit. Allergies   Allergen Reactions    Pcn [Penicillins] Hives       Review of Systems:         Review of Systems   Constitutional: Negative for chills and fever. HENT: Negative for ear pain, mouth sores, sore throat and tinnitus. Eyes: Negative for photophobia, redness and itching. Respiratory: Negative for apnea, choking and stridor. Cardiovascular: Negative for chest pain and palpitations. Gastrointestinal: Negative for anal bleeding, constipation and rectal pain. Endocrine: Negative for polydipsia. Genitourinary: Negative for enuresis, flank pain and hematuria. Musculoskeletal: Negative for back pain, joint swelling and myalgias. Skin: Negative for color change and pallor. Allergic/Immunologic: Negative for environmental allergies.    Neurological: Negative for syncope and speech difficulty. Psychiatric/Behavioral: Negative for confusion and hallucinations. OBJECTIVE:  Physical Exam:    /70   Pulse 86   Ht 5' 3\" (1.6 m)   Wt 174 lb (78.9 kg)   SpO2 100%   BMI 30.82 kg/m²      Physical Exam  Constitutional:       Appearance: She is well-developed. HENT:      Head: Normocephalic. Eyes:      Pupils: Pupils are equal, round, and reactive to light. Neck:     Cardiovascular:      Rate and Rhythm: Normal rate. Pulmonary:      Effort: Pulmonary effort is normal.   Abdominal:      General: There is no distension. Palpations: Abdomen is soft. There is no mass. Tenderness: There is no abdominal tenderness. There is no guarding or rebound. Musculoskeletal:         General: Normal range of motion. Cervical back: Normal range of motion and neck supple. Skin:     General: Skin is warm. Neurological:      Mental Status: She is alert and oriented to person, place, and time. ASSESSMENT:  1. Eyelid lesion    2. Benign skin lesion of neck          PLAN:  Treatment:  Patient counseled on risks, benefits, and alternatives of treatment plan at length today. Patient states an understanding and willingness to proceed with plan. Office Procedure note:      Pre-op Diagnosis:    1. Eyelid lesion    2. Benign skin lesion of neck    1.5 cm left eyelid lesion and 1 cm right neck lesion. Post-op Diagnosis:  Same. Procedure:   Excision of   1. Eyelid lesion    2. Benign skin lesion of neck      1.5 cm left eyelid lesion and 1 cm right neck lesion. .    Surgeon:   Daniel Guerra MD    Anesthesia:   Local with 1% Lidocaine local infiltration,without epinephrine. Complications:  None. Drains: None    Indications:   See progress note. .     Procedure: The patient is placed with the above described area where exposed. These were prepped prepped, draped and anesthestized in the usual sterile manner.  An elliptical incision was created. The lesions were excised separately completely. The resulting wound is closed with nylon. A sterile dressing is applied. The patient is instructed on wound care. .        No orders of the defined types were placed in this encounter. No orders of the defined types were placed in this encounter. Follow Up:  No follow-ups on file. to remove sutures andreholland path report.        Juan Manuel Rodriguez MD

## 2022-06-21 NOTE — PROGRESS NOTES
Chief Complaint   Patient presents with    Post-Op Check      3rd po lef mast 4/5/22          SUBJECTIVE:  Patient here for post op visit. ***  Pain is minimal.  Wounds: minbruising and no discharge.     Past Surgical History:   Procedure Laterality Date    COLONOSCOPY  2013    Polyps - Dr. Lyon Liner  1990's    HYSTERECTOMY (CERVIX STATUS UNKNOWN)  1998    \"left one ovary \"    IR BIOPSY THYROID PERC CORE NEEDLE  11/18/2021    IR BIOPSY THYROID PERC CORE NEEDLE 11/18/2021 [facility] SPECIAL PROCEDURES    MASTECTOMY Right 4/5/2022    RIGHT BREAST MASTECTOMY performed by Sami Doll MD at [address], MODIFIED RADICAL Left 4/5/2022    LEFT BREAST MASTECTOMY MODIFIED RADICAL performed by Sami Doll MD at [address] [facility]  1970's   [address] NEEDLE ADDITIONAL LEFT Left 9/27/2021    US BREAST BIOPSY NEEDLE ADDITIONAL LEFT 9/27/2021 Julius Holm MD [facility]    US BREAST NEEDLE BIOPSY LEFT Left 9/27/2021    US BREAST NEEDLE BIOPSY LEFT 9/27/2021 Juilus Holm  [address]     Past Medical History:   Diagnosis Date    Allergic rhinitis     Asthma     last flare up 2017 - follows with PCP    G-6-PD deficiency anemia ([address]     dx this when I was having children- per pt     H/O Doppler ultrasound 04/04/18    CAROTID- Normal    Hepatitis C     dx 3/2018- for liver bx    Lumbar herniated disc     Malignant neoplasm of left breast in female, estrogen receptor negative ([address] 10/6/2021    Thyroid disease     \"not on any medication- have low thyroid level\"     Family History   Problem Relation Age of Onset    No Known Problems Mother     Kidney Disease Father      Social History     Socioeconomic History    Marital status: Single     Spouse name: Not on file    Number of children: Not on file    Years of education: Not on file    Highest education level: Not on file   Occupational History    Not on file   Tobacco Use    Smoking status: Never Smoker    Smokeless tobacco: Never Used   Vaping Use    Vaping Use: Never used   Substance and Sexual Activity    Alcohol use: Yes     Comment: Occasional wine/\"average glass of wine or beer  one time per month\"    Drug use: No    Sexual activity: Not on file   Other Topics Concern    Not on file   Social History Narrative    Not on file     Social Determinants of Health     Financial Resource Strain:     Difficulty of Paying Living Expenses: Not on file   Food Insecurity:     Worried About Running Out of Food in the Last Year: Not on file    Harlan of Food in the Last Year: Not on file   Transportation Needs:     Lack of Transportation (Medical): Not on file    Lack of Transportation (Non-Medical): Not on file   Physical Activity: Insufficiently Active    Days of Exercise per Week: 7 days    Minutes of Exercise per Session: 10 min   Stress:     Feeling of Stress : Not on file   Social Connections:     Frequency of Communication with Friends and Family: Not on file    Frequency of Social Gatherings with Friends and Family: Not on file    Attends Shinto Services: Not on file    Active Member of Clubs or Organizations: Not on file    Attends Club or Organization Meetings: Not on file    Marital Status: Not on file   Intimate Partner Violence:     Fear of Current or Ex-Partner: Not on file    Emotionally Abused: Not on file    Physically Abused: Not on file    Sexually Abused: Not on file   Housing Stability:     Unable to Pay for Housing in the Last Year: Not on file    Number of [place] in the Last Year: Not on file    Unstable Housing in the Last Year: Not on file       OBJECTIVE:   Physical Exam    Wound well healed without signs of active infection. Suture line intact. Abdomen soft, nontender, nondistended. Path reveals:   ***    ASSESSMENT:  ***  Patient doing well on this post operative check. Wounds well healed.      1. Postoperative examination PLAN:  ***  Continue same  Increase activity as tolerated        No orders of the defined types were placed in this encounter. No orders of the defined types were placed in this encounter. Follow Up: No follow-ups on file.     Michelle Goddard MD

## 2022-06-22 ENCOUNTER — APPOINTMENT (OUTPATIENT)
Dept: RADIATION ONCOLOGY | Age: 67
End: 2022-06-22
Payer: MEDICARE

## 2022-06-22 ENCOUNTER — HOSPITAL ENCOUNTER (OUTPATIENT)
Dept: RADIATION ONCOLOGY | Age: 67
Discharge: HOME | End: 2022-06-22
Payer: MEDICARE

## 2022-06-22 PROCEDURE — 77385 HC NTSTY MODUL RAD TX DLVR SMPL: CPT | Performed by: RADIOLOGY

## 2022-06-22 PROCEDURE — 77336 RADIATION PHYSICS CONSULT: CPT | Performed by: RADIOLOGY

## 2022-06-22 PROCEDURE — 77427 RADIATION TX MANAGEMENT X5: CPT | Performed by: RADIOLOGY

## 2022-06-22 PROCEDURE — G6002 STEREOSCOPIC X-RAY GUIDANCE: HCPCS | Performed by: RADIOLOGY

## 2022-06-23 ENCOUNTER — OFFICE VISIT (OUTPATIENT)
Dept: INTERNAL MEDICINE CLINIC | Age: 67
End: 2022-06-23
Payer: MEDICARE

## 2022-06-23 ENCOUNTER — APPOINTMENT (OUTPATIENT)
Dept: RADIATION ONCOLOGY | Age: 67
End: 2022-06-23
Payer: MEDICARE

## 2022-06-23 ENCOUNTER — HOSPITAL ENCOUNTER (OUTPATIENT)
Dept: RADIATION ONCOLOGY | Age: 67
Discharge: HOME | End: 2022-06-23
Payer: MEDICARE

## 2022-06-23 VITALS
DIASTOLIC BLOOD PRESSURE: 68 MMHG | HEIGHT: 63 IN | OXYGEN SATURATION: 96 % | BODY MASS INDEX: 30.94 KG/M2 | WEIGHT: 174.6 LBS | SYSTOLIC BLOOD PRESSURE: 118 MMHG | HEART RATE: 84 BPM

## 2022-06-23 DIAGNOSIS — Z86.19 HISTORY OF HEPATITIS C: ICD-10-CM

## 2022-06-23 DIAGNOSIS — R42 VERTIGO: ICD-10-CM

## 2022-06-23 DIAGNOSIS — D64.9 ANEMIA, UNSPECIFIED TYPE: ICD-10-CM

## 2022-06-23 DIAGNOSIS — J30.9 ALLERGIC RHINITIS, UNSPECIFIED SEASONALITY, UNSPECIFIED TRIGGER: ICD-10-CM

## 2022-06-23 DIAGNOSIS — C50.912 MALIGNANT NEOPLASM OF LEFT BREAST IN FEMALE, ESTROGEN RECEPTOR NEGATIVE, UNSPECIFIED SITE OF BREAST (HCC): ICD-10-CM

## 2022-06-23 DIAGNOSIS — K58.9 IRRITABLE BOWEL SYNDROME, UNSPECIFIED TYPE: ICD-10-CM

## 2022-06-23 DIAGNOSIS — Z17.1 MALIGNANT NEOPLASM OF LEFT BREAST IN FEMALE, ESTROGEN RECEPTOR NEGATIVE, UNSPECIFIED SITE OF BREAST (HCC): ICD-10-CM

## 2022-06-23 DIAGNOSIS — R42 DIZZINESS: ICD-10-CM

## 2022-06-23 DIAGNOSIS — K21.9 GASTROESOPHAGEAL REFLUX DISEASE WITHOUT ESOPHAGITIS: Primary | ICD-10-CM

## 2022-06-23 DIAGNOSIS — D75.A G6PD DEFICIENCY: ICD-10-CM

## 2022-06-23 DIAGNOSIS — J45.20 MILD INTERMITTENT ASTHMA WITHOUT COMPLICATION: ICD-10-CM

## 2022-06-23 PROBLEM — N63.20 LEFT BREAST MASS: Status: RESOLVED | Noted: 2021-09-22 | Resolved: 2022-06-23

## 2022-06-23 PROCEDURE — G6002 STEREOSCOPIC X-RAY GUIDANCE: HCPCS | Performed by: RADIOLOGY

## 2022-06-23 PROCEDURE — 99214 OFFICE O/P EST MOD 30 MIN: CPT | Performed by: INTERNAL MEDICINE

## 2022-06-23 PROCEDURE — 3288F FALL RISK ASSESSMENT DOCD: CPT | Performed by: INTERNAL MEDICINE

## 2022-06-23 PROCEDURE — 1123F ACP DISCUSS/DSCN MKR DOCD: CPT | Performed by: INTERNAL MEDICINE

## 2022-06-23 PROCEDURE — 77385 HC NTSTY MODUL RAD TX DLVR SMPL: CPT | Performed by: RADIOLOGY

## 2022-06-23 RX ORDER — MECLIZINE HYDROCHLORIDE 25 MG/1
25 TABLET ORAL 3 TIMES DAILY PRN
Qty: 30 TABLET | Refills: 3 | Status: SHIPPED | OUTPATIENT
Start: 2022-06-23

## 2022-06-23 SDOH — ECONOMIC STABILITY: FOOD INSECURITY: WITHIN THE PAST 12 MONTHS, THE FOOD YOU BOUGHT JUST DIDN'T LAST AND YOU DIDN'T HAVE MONEY TO GET MORE.: NEVER TRUE

## 2022-06-23 SDOH — ECONOMIC STABILITY: FOOD INSECURITY: WITHIN THE PAST 12 MONTHS, YOU WORRIED THAT YOUR FOOD WOULD RUN OUT BEFORE YOU GOT MONEY TO BUY MORE.: NEVER TRUE

## 2022-06-23 ASSESSMENT — PATIENT HEALTH QUESTIONNAIRE - PHQ9
SUM OF ALL RESPONSES TO PHQ QUESTIONS 1-9: 0
2. FEELING DOWN, DEPRESSED OR HOPELESS: 0
SUM OF ALL RESPONSES TO PHQ QUESTIONS 1-9: 0
1. LITTLE INTEREST OR PLEASURE IN DOING THINGS: 0
SUM OF ALL RESPONSES TO PHQ9 QUESTIONS 1 & 2: 0

## 2022-06-23 ASSESSMENT — SOCIAL DETERMINANTS OF HEALTH (SDOH): HOW HARD IS IT FOR YOU TO PAY FOR THE VERY BASICS LIKE FOOD, HOUSING, MEDICAL CARE, AND HEATING?: NOT HARD AT ALL

## 2022-06-23 NOTE — PROGRESS NOTES
Surgical History:        Procedure Laterality Date    COLONOSCOPY  2013    Polyps - Dr. Sushil Hair  1990's    HYSTERECTOMY (CERVIX STATUS UNKNOWN)  1998    \"left one ovary \"    IR BIOPSY THYROID PERC CORE NEEDLE  11/18/2021    IR BIOPSY THYROID PERC CORE NEEDLE 11/18/2021 [facility] SPECIAL PROCEDURES    MASTECTOMY Right 4/5/2022    RIGHT BREAST MASTECTOMY performed by Mary Ellen Brooke MD at [address] [place] [address], MODIFIED RADICAL Left 4/5/2022    LEFT BREAST MASTECTOMY MODIFIED RADICAL performed by Mary Ellen Brooke MD at [address] LEFT Left 9/27/2021    US BREAST BIOPSY NEEDLE ADDITIONAL LEFT 9/27/2021 Ellen Ivey MD [facility]    US BREAST NEEDLE BIOPSY LEFT Left 9/27/2021    US BREAST NEEDLE BIOPSY LEFT 9/27/2021 Ellen Ivey MD [facility]       Social History:   Social History     Tobacco Use    Smoking status: Never Smoker    Smokeless tobacco: Never Used   Substance Use Topics    Alcohol use: Yes     Comment: Occasional wine/\"average glass of wine or beer  one time per month\"       Family History:       Problem Relation Age of Onset    No Known Problems Mother     Kidney Disease Father        Allergies:  Pcn [penicillins]    Current Medications :      Prior to Admission medications    Medication Sig Start Date End Date Taking?  Authorizing Provider   meclizine (ANTIVERT) 25 MG tablet Take 1 tablet by mouth 3 times daily as needed for Dizziness 6/23/22  Yes Sariah Marquez MD   omeprazole (PRILOSEC) 20 MG delayed release capsule Take 1 capsule by mouth daily 6/9/22  Yes Sariah Marquez MD   albuterol sulfate  (90 Base) MCG/ACT inhaler Inhale 2 puffs into the lungs every 6 hours as needed for Wheezing 3/24/22  Yes Sariah Marquez MD   montelukast (SINGULAIR) 10 MG tablet Take 1 tablet by mouth nightly 3/24/22  Yes Sariah Marquez MD   meloxicam (MOBIC) 7.5 MG tablet Take 1 tablet by mouth daily 3/10/22  Yes Jj Frank MD   dicyclomine (BENTYL) 10 MG capsule Take 1 capsule by mouth 4 times daily as needed (abdominal bloating and gas) 3/8/22  Yes Jj Frank MD   ondansetron (ZOFRAN) 8 MG tablet Take 1 tablet by mouth every 8 hours as needed for Nausea or Vomiting 11/2/21  Yes Satya Said, APRN - CNP   oxybutynin (DITROPAN XL) 10 MG extended release tablet Take 1 tablet by mouth daily 9/7/21  Yes Jj Frank MD   Multiple Vitamins-Minerals (MULTIVITAMIN PO) Take by mouth   Yes Historical Provider, MD       LAB DATA: Reviewed. REVIEW OF SYSTEMS:   see HPI/ Comprehensive review of systems negative except for the ones mentioned in HPI. GENERAL APPEARANCE:      Alert, oriented x 3, well developed, cooperative, not in any distress, appears stated age. HEAD:                         Normocephalic, atraumatic   EYES:                          PERRLA, EOMI, lids normal, conjuctivea clear, sclera anicteric. NECK:                         Supple, symmetrical,  trachea midline, no thyromegaly, no JVD, no lymphadenopathy.    LUNGS:                       Clear to auscultation bilaterally, respirations unlabored, accessory muscles are not used. Breast:                        status post bilateral mastectomy with dressing now. HEART:                       Regular rate and rhythm, S1 and S2 normal, no murmur, rub or gallop. PMI in MCL. ABDOMEN:                 Soft, non-tender, bowel sounds are normoactive, no masses, no hepatospleenomegaly. EXTREMITY:              no bipedal edema  NEURO:                      Alert, oriented to person, place and time.                                      Grossly intact. Musculoskeletal:         No kyphosis or scoliosis, no deformity in any extremity noted, muscle strength and tone are normal.  Skin:                            Warm and dry. No rash or obvious suspicious lesions.                       PSYCH:                       Mood euthymic, insight and judgement good. ASSESSMENT/PLAN:    1. Vertigo  Continue meclizine for now. Refer to neurologist.  - meclizine (ANTIVERT) 25 MG tablet; Take 1 tablet by mouth 3 times daily as needed for Dizziness  Dispense: 30 tablet; Refill: 3  - Byron Chen MD, Neurology, [place]    2. Gastroesophageal reflux disease without esophagitis  Continue Prilosec    3. History of hepatitis C  Stable. Was treated with Jeneen Medicus for 12 weeks and completed therapy in May 2019.    4. Irritable bowel syndrome, unspecified type  Continue Bentyl as needed    5. Allergic rhinitis, unspecified seasonality, unspecified trigger  Continue Singulair. Can take Claritin as needed    6. Mild intermittent asthma without complication  Continue Singulair and albuterol HFA as needed    7. Malignant neoplasm of left breast in female, estrogen receptor negative, unspecified site of breast ([facility]  Advised to continue to follow with Dr. Willie Yang and Dr. Norma Wang. 8. G6PD deficiency  Advised to follow with Dr. Willie Yang periodically. 9. Anemia, unspecified type  Stable. Last hemoglobin was 11.2 on 6/9/2022. 10. Dizziness  On meclizine. Neurology consulted. - Byron Chen MD, Neurology, [place]    Advised to follow COVID-19 precautions    Care discussed with patient. Questions answered and patient verbalizes understanding and agrees with plan. Medications reviewed and reconciled. Continue current medications. Appropriate prescriptions are ordered. Risks and benefits of meds are discussed. After visit summary provided. Advised to call for any problems, questions, or concerns. If symptoms worsen or don't improve as expected, to call us or go to ER. Follow up as directed, sooner if needed. Return in about 3 months (around 9/23/2022). This dictation was performed with a verbal recognition program and it was checked for errors. It is possible that there are still dictated errors within this office note.  Any errors should be brought immediately to my attention for correction. All efforts were made to ensure that this office note is accurate.      Joni Stewart MD MD

## 2022-06-24 ENCOUNTER — APPOINTMENT (OUTPATIENT)
Dept: RADIATION ONCOLOGY | Age: 67
End: 2022-06-24
Payer: MEDICARE

## 2022-06-24 ENCOUNTER — HOSPITAL ENCOUNTER (OUTPATIENT)
Dept: RADIATION ONCOLOGY | Age: 67
Discharge: HOME | End: 2022-06-24
Payer: MEDICARE

## 2022-06-24 ENCOUNTER — HOSPITAL ENCOUNTER (OUTPATIENT)
Dept: INFUSION THERAPY | Age: 67
Discharge: HOME | End: 2022-06-24
Payer: MEDICARE

## 2022-06-24 ENCOUNTER — OFFICE VISIT (OUTPATIENT)
Dept: ONCOLOGY | Age: 67
End: 2022-06-24
Payer: MEDICARE

## 2022-06-24 VITALS — HEIGHT: 63 IN | WEIGHT: 174.6 LBS | BODY MASS INDEX: 30.94 KG/M2

## 2022-06-24 DIAGNOSIS — C50.912 MALIGNANT NEOPLASM OF LEFT BREAST IN FEMALE, ESTROGEN RECEPTOR NEGATIVE, UNSPECIFIED SITE OF BREAST (HCC): Primary | ICD-10-CM

## 2022-06-24 DIAGNOSIS — Z17.1 MALIGNANT NEOPLASM OF LEFT BREAST IN FEMALE, ESTROGEN RECEPTOR NEGATIVE, UNSPECIFIED SITE OF BREAST (HCC): Primary | ICD-10-CM

## 2022-06-24 DIAGNOSIS — D70.9 NEUTROPENIA, UNSPECIFIED TYPE (HCC): ICD-10-CM

## 2022-06-24 DIAGNOSIS — Z17.1 MALIGNANT NEOPLASM OF LEFT BREAST IN FEMALE, ESTROGEN RECEPTOR NEGATIVE, UNSPECIFIED SITE OF BREAST (HCC): ICD-10-CM

## 2022-06-24 DIAGNOSIS — Z71.83 ENCOUNTER FOR NONPROCREATIVE GENETIC COUNSELING: ICD-10-CM

## 2022-06-24 DIAGNOSIS — C50.912 MALIGNANT NEOPLASM OF LEFT BREAST IN FEMALE, ESTROGEN RECEPTOR NEGATIVE, UNSPECIFIED SITE OF BREAST (HCC): ICD-10-CM

## 2022-06-24 LAB
ALBUMIN SERPL-MCNC: 4.6 GM/DL (ref 3.4–5)
ALP BLD-CCNC: 86 IU/L (ref 40–129)
ALT SERPL-CCNC: 16 U/L (ref 10–40)
ANION GAP SERPL CALCULATED.3IONS-SCNC: 11 MMOL/L (ref 4–16)
AST SERPL-CCNC: 21 IU/L (ref 15–37)
BASOPHILS ABSOLUTE: 0 K/CU MM
BASOPHILS RELATIVE PERCENT: 0.3 % (ref 0–1)
BILIRUB SERPL-MCNC: 0.2 MG/DL (ref 0–1)
BUN BLDV-MCNC: 13 MG/DL (ref 6–23)
CALCIUM SERPL-MCNC: 9.7 MG/DL (ref 8.3–10.6)
CHLORIDE BLD-SCNC: 102 MMOL/L (ref 99–110)
CO2: 24 MMOL/L (ref 21–32)
CREAT SERPL-MCNC: 0.6 MG/DL (ref 0.6–1.1)
DIFFERENTIAL TYPE: ABNORMAL
EOSINOPHILS ABSOLUTE: 0.1 K/CU MM
EOSINOPHILS RELATIVE PERCENT: 3.6 % (ref 0–3)
GFR AFRICAN AMERICAN: >60 ML/MIN/1.73M2
GFR NON-AFRICAN AMERICAN: >60 ML/MIN/1.73M2
GLUCOSE BLD-MCNC: 116 MG/DL (ref 70–99)
HCT VFR BLD CALC: 34.3 % (ref 37–47)
HEMOGLOBIN: 11.1 GM/DL (ref 12.5–16)
LYMPHOCYTES ABSOLUTE: 0.9 K/CU MM
LYMPHOCYTES RELATIVE PERCENT: 26.6 % (ref 24–44)
MCH RBC QN AUTO: 27.6 PG (ref 27–31)
MCHC RBC AUTO-ENTMCNC: 32.4 % (ref 32–36)
MCV RBC AUTO: 85.3 FL (ref 78–100)
MONOCYTES ABSOLUTE: 0.6 K/CU MM
MONOCYTES RELATIVE PERCENT: 17.8 % (ref 0–4)
PDW BLD-RTO: 16 % (ref 11.7–14.9)
PLATELET # BLD: 229 K/CU MM (ref 140–440)
PMV BLD AUTO: 9.8 FL (ref 7.5–11.1)
POTASSIUM SERPL-SCNC: 4.6 MMOL/L (ref 3.5–5.1)
RBC # BLD: 4.02 M/CU MM (ref 4.2–5.4)
SEGMENTED NEUTROPHILS ABSOLUTE COUNT: 1.8 K/CU MM
SEGMENTED NEUTROPHILS RELATIVE PERCENT: 51.7 % (ref 36–66)
SODIUM BLD-SCNC: 137 MMOL/L (ref 135–145)
TOTAL PROTEIN: 7.1 GM/DL (ref 6.4–8.2)
WBC # BLD: 3.4 K/CU MM (ref 4–10.5)

## 2022-06-24 PROCEDURE — 77385 HC NTSTY MODUL RAD TX DLVR SMPL: CPT | Performed by: RADIOLOGY

## 2022-06-24 PROCEDURE — 80053 COMPREHEN METABOLIC PANEL: CPT

## 2022-06-24 PROCEDURE — 1123F ACP DISCUSS/DSCN MKR DOCD: CPT | Performed by: NURSE PRACTITIONER

## 2022-06-24 PROCEDURE — 99215 OFFICE O/P EST HI 40 MIN: CPT | Performed by: NURSE PRACTITIONER

## 2022-06-24 PROCEDURE — 36415 COLL VENOUS BLD VENIPUNCTURE: CPT

## 2022-06-24 PROCEDURE — G6002 STEREOSCOPIC X-RAY GUIDANCE: HCPCS | Performed by: RADIOLOGY

## 2022-06-24 PROCEDURE — 85025 COMPLETE CBC W/AUTO DIFF WBC: CPT

## 2022-06-24 NOTE — PROGRESS NOTES
MA Rooming Questions  Patient: Jasmina Rodriguez  MRN: [id number]    Date: 6/24/2022        Genetics.     Viviana Holliday CMA

## 2022-06-27 ENCOUNTER — HOSPITAL ENCOUNTER (OUTPATIENT)
Dept: RADIATION ONCOLOGY | Age: 67
Discharge: HOME | End: 2022-06-27
Payer: MEDICARE

## 2022-06-27 ENCOUNTER — APPOINTMENT (OUTPATIENT)
Dept: RADIATION ONCOLOGY | Age: 67
End: 2022-06-27
Payer: MEDICARE

## 2022-06-27 PROCEDURE — G6002 STEREOSCOPIC X-RAY GUIDANCE: HCPCS | Performed by: RADIOLOGY

## 2022-06-27 PROCEDURE — 77385 HC NTSTY MODUL RAD TX DLVR SMPL: CPT | Performed by: RADIOLOGY

## 2022-06-27 NOTE — PROGRESS NOTES
Weekly Radiation Treatment Progress Note    DATE OF SERVICE: 6/27/2022     DIAGNOSIS:  Cancer Staging  Malignant neoplasm of left breast in female, estrogen receptor negative ([facility] [address]  Staging form: Breast, AJCC 8th Edition  - Clinical: Stage IIB (cT1c, cN1, cM0, G3, ER-, AL-, HER2-) - Signed by Maggie Segura MD on 5/12/2022  - Pathologic: No Stage Recommended (ypT0, pN0, cM0) - Signed by Maggie Segura MD on 5/12/2022       TREATMENT COURSE:   Oncology History   Malignant neoplasm of left breast in female, estrogen receptor negative ([facility] [address]   11/8/2021 - 3/21/2022 Chemotherapy    Neoadjuvant dose dense Adriamycin/Cytoxan completed on 12/20/2021  Weekly Taxol beginning 1/10/2022     4/5/2022 Surgery    Bilateral mastectomy  No residual malignancy     5/12/2022 -  Cancer Staged    Staging form: Breast, AJCC 8th Edition  - Clinical: Stage IIB (cT1c, cN1, cM0, G3, ER-, AL-, HER2-)     5/12/2022 -  Cancer Staged    Staging form: Breast, AJCC 8th Edition  - Pathologic: No Stage Recommended (ypT0, pN0, cM0)           Site: LSCV/CW  Current Total Radiation Dose: 2340 cGy    Pt doing well. Energy good. No skin itching/soreness and radiation portal.  Mild itching of her arms, controlled with daily moisturizer. Using Miaderm in treatment portal    EXAM  Wt Readings from Last 3 Encounters:   06/24/22 174 lb 9.6 oz (79.2 kg)   06/23/22 174 lb 9.6 oz (79.2 kg)   06/20/22 174 lb 12.8 oz (79.3 kg)     NAD  Mild Skin hyperpigmentation.  No desquamation    Setup images, chart, plan reviewed    A/P:   Tolerating RT well  Continue RT as planned      Electronically signed by Maggie Segura MD on 6/27/2022 at 11:00 AM

## 2022-06-28 ENCOUNTER — HOSPITAL ENCOUNTER (OUTPATIENT)
Dept: RADIATION ONCOLOGY | Age: 67
Discharge: HOME | End: 2022-06-28
Payer: MEDICARE

## 2022-06-28 ENCOUNTER — APPOINTMENT (OUTPATIENT)
Dept: RADIATION ONCOLOGY | Age: 67
End: 2022-06-28
Payer: MEDICARE

## 2022-06-28 ENCOUNTER — HOSPITAL ENCOUNTER (OUTPATIENT)
Dept: RADIATION ONCOLOGY | Age: 67
Discharge: HOME | End: 2022-06-27
Payer: MEDICARE

## 2022-06-28 PROCEDURE — 77385 HC NTSTY MODUL RAD TX DLVR SMPL: CPT | Performed by: RADIOLOGY

## 2022-06-28 PROCEDURE — G6002 STEREOSCOPIC X-RAY GUIDANCE: HCPCS | Performed by: RADIOLOGY

## 2022-06-28 ASSESSMENT — PAIN SCALES - GENERAL: PAINLEVEL_OUTOF10: 0

## 2022-06-28 NOTE — PROGRESS NOTES
GENETIC COUNSELING     6/28/22  Sisi Ray  1955  78 y.o. Referred By:  Dr. Jarad Mcdermott  Referred For: Initial genetic risk evaluation and testing    SUMMARY:  Noe Echols was referred for genetic counseling today due to personal and family history of cancer. She meets NCCN guidelines for genetic testing, so after informed consent, blood was drawn for the 36 gene panel through [facility]. Meets criteria for testing with triple negative breast cancer  Unknown family history      Health History:  Personal Summary (cancer history, cancer screening, hormonal risk factors):     Mammogram September 15, 2021 showed a new 1.5 cm mass identified in the left breast at 12 o'clock position, left breast ultrasound 11:00, 11 cm from nipple there is a 1.1 x 1.3 x 0.5 cm oval parallel hypoechoic mass with microlobulated margins without posterior acoustic shadowing or internal vascularity. Mass corresponds with mammographic findings. Path report from September 2021 showed invasive ductal carcinoma tumor size 7 mm, histologic type invasive ductal carcinoma histologic grade 3, tubular score 3, nuclear score 3 mitosis score 2, ER, FL, negative, HER2 negative by IHC and FISH.     No previous genetic testing  No known mutation  Not adopted, not a twin  No AJ heritage   ancestry  First colonoscopy at 71, several polyps, unsure if she had EGD  Never smoker  EtOH 1 time a week  Retired  Single  Menarche 7, uncertain age of menopause  4 pregnancies, 3 live births, no C-sections  First child at 15, breast-fed longer than 1 month no  No abnormal Pap smears  No HRT  OCP x2 years    Surgical History:    Past Surgical History:   Procedure Laterality Date    COLONOSCOPY  2013    Polyps - Dr. Wayne Dill  1990's    HYSTERECTOMY (CERVIX STATUS UNKNOWN)  1998    \"left one ovary \"    IR BIOPSY THYROID PERC CORE NEEDLE  11/18/2021    IR BIOPSY THYROID PERC CORE NEEDLE 11/18/2021 [facility] SPECIAL PROCEDURES  MASTECTOMY Right 4/5/2022    RIGHT BREAST MASTECTOMY performed by Kulwinder rPasad MD at [address] [address], MODIFIED RADICAL Left 4/5/2022    LEFT BREAST MASTECTOMY MODIFIED RADICAL performed by Kulwinder Prasad MD at [place] [address]  1970's   [address] ADDITIONAL LEFT Left 9/27/2021     BREAST BIOPSY NEEDLE ADDITIONAL LEFT 9/27/2021 Kerri Ross MD [facility]    US BREAST NEEDLE BIOPSY LEFT Left 9/27/2021     BREAST NEEDLE BIOPSY LEFT 9/27/2021 Kerri Ross MD [facility]        Medical Problems: Patient Active Problem List:     Other specified disorders of white blood cells     Neutropenia (HCC)     History of hepatitis C     Allergic rhinitis     Left hip pain     G6PD deficiency     Dizziness     Mild intermittent asthma without complication     Urinary frequency     Gastroesophageal reflux disease without esophagitis     Irritable bowel syndrome     Malignant neoplasm of left breast in female, estrogen receptor negative ([facility] [address]     Anemia       Family History:  A three-generation pedigree was obtained today and will be saved with the patient's record. Patient's family history is significant for the following:  See pedigree for information about family structure and unaffected relatives. No known cancer in Mother, father, maternal or paternal grandparents  No cancer in children  Three siblings, no cancer  Unknown maternal family history  Unknown paternal history      Risk Assessment:  [address] (NCCN) criteria for genetic testing based on personal and family history. Overall, there is a medium risk of having a hereditary carncer syndrome. We discussed high risk syndromes such as HBOC, as well as other more moderate risk genetic mutations that could have contributed to the cancer in the family.     Risk Model (if applicable):    Hereditary Breast and Ovarian Cancer  About 10-20% of breast and/or ovarian cancers are due to inherited mutations in cancer genes such as BRCA1 and BRCA2. Typical features of a family with HBOC include breast cancer younger than age 48, multiple generations affected, and bilateral or multiple cancers in the same person. For many gene mutations, there are national guidelines that have recommendations for medical management and/or preventative options. Pereyra Syndrome  Pereyra Syndrome is a genetic condition characterized by early onset colorectal cancer and endometrial cancer. It is associated with an elevated risk of other cancers such as gastric, ovarian, urinary tract, small reyna, brain and pancreatic cancer. Pereyra syndrome is caused by a genetic change, or mutation, in one of five known genes:  MLH1, MSH2, EPCAM, MSH6, or PMS2. If a person has a mutation in one of these genes, they face increased cancer risks and also have a 50% chance of passing the mutation down to their children. There are national guidelines that have recommendations for medical management and/or preventative options. Genetic Testing    Many laboratories are now using next generation sequencing to test a panel of cancer genes at the same time - this reduces the turnaround time and cost compared to the single gene approach to testing. In our discussion, we discussed three possible test results: positive, negative, and indeterminate, (variant of unknown significance). We addressed the 2008 federal legislation, OSMAN, which protects individuals from discrimination in health insurance and employment. More information is available at www. GINAhelp.org. The pros and cons of panel testing were reviewed, including the fact that there are some genes that do not have well-defined cancer risks or recommendations. A psychological assessment was performed, and the patient understands how the results will be incorporated into medical management, as well as potential implications for family members. Consent:   All elements of the informed consent were discussed with Reilly Ramirez and signed consent was obtained for the gene panel. Medical decision making was of high complexity, as it included a comprehensive discussion of all relevant options for genetic testing and implications for patient and family members. The patient was educated that the lab will verify insurance coverage before initiating testing and call if there is out-of-pocket for any reason. Patient was educated that results are expected to be available in approximately 3 weeks and will then be contacted. Plan:  1. The patient opted to pursue the 36 gene panel through [facility] today. Will await results and schedule a future appointment in approximately 1 month as needed. 2. The patient was educated to continue routine follow-up with their healthcare providers. 3. Patient was educated that if they obtain any additional information regarding the family medical history, or if there are any changes to the reported personal or family health history, to please contact us for updated risk assessment. 60 minutes were spent with the patient, with over 50% of the time spent in face to face counseling and coordination of care.     LUIS CARLOS Dela Cruz - MARIANGEL    Recipients:

## 2022-06-28 NOTE — PLAN OF CARE
Care plan reviewed.        _ Skin to left breast without redness, pain or itching at site, using Miaderm

## 2022-06-29 ENCOUNTER — HOSPITAL ENCOUNTER (OUTPATIENT)
Dept: RADIATION ONCOLOGY | Age: 67
Discharge: HOME | End: 2022-06-29
Payer: MEDICARE

## 2022-06-29 ENCOUNTER — APPOINTMENT (OUTPATIENT)
Dept: RADIATION ONCOLOGY | Age: 67
End: 2022-06-29
Payer: MEDICARE

## 2022-06-29 PROCEDURE — 77336 RADIATION PHYSICS CONSULT: CPT | Performed by: RADIOLOGY

## 2022-06-29 PROCEDURE — 77385 HC NTSTY MODUL RAD TX DLVR SMPL: CPT | Performed by: RADIOLOGY

## 2022-06-29 PROCEDURE — G6002 STEREOSCOPIC X-RAY GUIDANCE: HCPCS | Performed by: RADIOLOGY

## 2022-06-30 ENCOUNTER — HOSPITAL ENCOUNTER (OUTPATIENT)
Dept: RADIATION ONCOLOGY | Age: 67
Discharge: HOME | End: 2022-06-30
Payer: MEDICARE

## 2022-06-30 ENCOUNTER — APPOINTMENT (OUTPATIENT)
Dept: RADIATION ONCOLOGY | Age: 67
End: 2022-06-30
Payer: MEDICARE

## 2022-06-30 PROCEDURE — G6002 STEREOSCOPIC X-RAY GUIDANCE: HCPCS | Performed by: RADIOLOGY

## 2022-06-30 PROCEDURE — 77385 HC NTSTY MODUL RAD TX DLVR SMPL: CPT | Performed by: RADIOLOGY

## 2022-07-01 ENCOUNTER — HOSPITAL ENCOUNTER (OUTPATIENT)
Dept: RADIATION ONCOLOGY | Age: 67
Discharge: HOME | End: 2022-07-01
Payer: MEDICARE

## 2022-07-01 ENCOUNTER — APPOINTMENT (OUTPATIENT)
Dept: RADIATION ONCOLOGY | Age: 67
End: 2022-07-01
Payer: MEDICARE

## 2022-07-01 PROCEDURE — 77385 HC NTSTY MODUL RAD TX DLVR SMPL: CPT | Performed by: RADIOLOGY

## 2022-07-01 PROCEDURE — G6002 STEREOSCOPIC X-RAY GUIDANCE: HCPCS | Performed by: RADIOLOGY

## 2022-07-05 ENCOUNTER — APPOINTMENT (OUTPATIENT)
Dept: RADIATION ONCOLOGY | Age: 67
End: 2022-07-05
Payer: MEDICARE

## 2022-07-05 ENCOUNTER — HOSPITAL ENCOUNTER (OUTPATIENT)
Dept: RADIATION ONCOLOGY | Age: 67
Discharge: HOME | End: 2022-07-05
Payer: MEDICARE

## 2022-07-05 VITALS — BODY MASS INDEX: 30.65 KG/M2 | WEIGHT: 173 LBS | HEIGHT: 63 IN

## 2022-07-05 PROCEDURE — G6002 STEREOSCOPIC X-RAY GUIDANCE: HCPCS | Performed by: RADIOLOGY

## 2022-07-05 PROCEDURE — 77385 HC NTSTY MODUL RAD TX DLVR SMPL: CPT | Performed by: RADIOLOGY

## 2022-07-05 NOTE — PROGRESS NOTES
Weekly Radiation Treatment Progress Note    DATE OF SERVICE: 7/5/2022     DIAGNOSIS:  Cancer Staging  Malignant neoplasm of left breast in female, estrogen receptor negative ([facility] [address]  Staging form: Breast, AJCC 8th Edition  - Clinical: Stage IIB (cT1c, cN1, cM0, G3, ER-, [place]-, HER2-) - Signed by Abbey Flowers MD on 5/12/2022  - Pathologic: No Stage Recommended (ypT0, pN0, cM0) - Signed by Abbey Flowers MD on 5/12/2022       TREATMENT COURSE:   Oncology History   Malignant neoplasm of left breast in female, estrogen receptor negative ([facility] [address]   11/8/2021 - 3/21/2022 Chemotherapy    Neoadjuvant dose dense Adriamycin/Cytoxan completed on 12/20/2021  Weekly Taxol beginning 1/10/2022     4/5/2022 Surgery    Bilateral mastectomy  No residual malignancy     5/12/2022 -  Cancer Staged    Staging form: Breast, AJCC 8th Edition  - Clinical: Stage IIB (cT1c, cN1, cM0, G3, ER-, MA-, HER2-)     5/12/2022 -  Cancer Staged    Staging form: Breast, AJCC 8th Edition  - Pathologic: No Stage Recommended (ypT0, pN0, cM0)           Site: L SCV/Chest wall  Current Total Radiation Dose: 3240 cGy    Pt doing well. Energy down a bit. No skin itching/soreness. Using Miaderm    EXAM  Wt Readings from Last 3 Encounters:   06/24/22 174 lb 9.6 oz (79.2 kg)   06/23/22 174 lb 9.6 oz (79.2 kg)   06/20/22 174 lb 12.8 oz (79.3 kg)     NAD  No Skin erythema or tanning.  No desquamation    Setup images, chart, plan reviewed    A/P:   Tolerating RT well  Continue RT as planned      Electronically signed by Abbey Flowers MD on 7/5/2022 at 11:00 AM

## 2022-07-06 ENCOUNTER — APPOINTMENT (OUTPATIENT)
Dept: RADIATION ONCOLOGY | Age: 67
End: 2022-07-06
Payer: MEDICARE

## 2022-07-06 ENCOUNTER — HOSPITAL ENCOUNTER (OUTPATIENT)
Dept: RADIATION ONCOLOGY | Age: 67
Discharge: HOME | End: 2022-07-06
Payer: MEDICARE

## 2022-07-06 PROCEDURE — 77385 HC NTSTY MODUL RAD TX DLVR SMPL: CPT | Performed by: RADIOLOGY

## 2022-07-06 PROCEDURE — G6002 STEREOSCOPIC X-RAY GUIDANCE: HCPCS | Performed by: RADIOLOGY

## 2022-07-07 ENCOUNTER — HOSPITAL ENCOUNTER (OUTPATIENT)
Dept: RADIATION ONCOLOGY | Age: 67
Discharge: HOME | End: 2022-07-07
Payer: MEDICARE

## 2022-07-07 ENCOUNTER — APPOINTMENT (OUTPATIENT)
Dept: RADIATION ONCOLOGY | Age: 67
End: 2022-07-07
Payer: MEDICARE

## 2022-07-07 PROCEDURE — 77385 HC NTSTY MODUL RAD TX DLVR SMPL: CPT | Performed by: RADIOLOGY

## 2022-07-07 PROCEDURE — G6002 STEREOSCOPIC X-RAY GUIDANCE: HCPCS | Performed by: RADIOLOGY

## 2022-07-07 PROCEDURE — 77336 RADIATION PHYSICS CONSULT: CPT | Performed by: RADIOLOGY

## 2022-07-07 PROCEDURE — 99999 PR OFFICE/OUTPT VISIT,PROCEDURE ONLY: CPT | Performed by: RADIOLOGY

## 2022-07-07 PROCEDURE — 77427 RADIATION TX MANAGEMENT X5: CPT | Performed by: RADIOLOGY

## 2022-07-08 ENCOUNTER — APPOINTMENT (OUTPATIENT)
Dept: RADIATION ONCOLOGY | Age: 67
End: 2022-07-08
Payer: MEDICARE

## 2022-07-11 ENCOUNTER — APPOINTMENT (OUTPATIENT)
Dept: RADIATION ONCOLOGY | Age: 67
End: 2022-07-11
Payer: MEDICARE

## 2022-07-12 ENCOUNTER — APPOINTMENT (OUTPATIENT)
Dept: RADIATION ONCOLOGY | Age: 67
End: 2022-07-12
Payer: MEDICARE

## 2022-07-12 ENCOUNTER — HOSPITAL ENCOUNTER (OUTPATIENT)
Dept: RADIATION ONCOLOGY | Age: 67
Discharge: HOME | End: 2022-07-12
Payer: MEDICARE

## 2022-07-12 PROCEDURE — 77385 HC NTSTY MODUL RAD TX DLVR SMPL: CPT | Performed by: RADIOLOGY

## 2022-07-12 PROCEDURE — G6002 STEREOSCOPIC X-RAY GUIDANCE: HCPCS | Performed by: RADIOLOGY

## 2022-07-13 ENCOUNTER — HOSPITAL ENCOUNTER (OUTPATIENT)
Dept: RADIATION ONCOLOGY | Age: 67
Discharge: HOME | End: 2022-07-13
Payer: MEDICARE

## 2022-07-13 PROCEDURE — 77385 HC NTSTY MODUL RAD TX DLVR SMPL: CPT | Performed by: RADIOLOGY

## 2022-07-13 PROCEDURE — G6002 STEREOSCOPIC X-RAY GUIDANCE: HCPCS | Performed by: RADIOLOGY

## 2022-07-14 ENCOUNTER — HOSPITAL ENCOUNTER (OUTPATIENT)
Dept: RADIATION ONCOLOGY | Age: 67
Discharge: HOME | End: 2022-07-14
Payer: MEDICARE

## 2022-07-14 PROCEDURE — G6002 STEREOSCOPIC X-RAY GUIDANCE: HCPCS | Performed by: RADIOLOGY

## 2022-07-14 PROCEDURE — 77385 HC NTSTY MODUL RAD TX DLVR SMPL: CPT | Performed by: RADIOLOGY

## 2022-07-15 ENCOUNTER — HOSPITAL ENCOUNTER (OUTPATIENT)
Dept: RADIATION ONCOLOGY | Age: 67
Discharge: HOME | End: 2022-07-15
Payer: MEDICARE

## 2022-07-15 PROCEDURE — 77385 HC NTSTY MODUL RAD TX DLVR SMPL: CPT | Performed by: RADIOLOGY

## 2022-07-15 PROCEDURE — G6002 STEREOSCOPIC X-RAY GUIDANCE: HCPCS | Performed by: RADIOLOGY

## 2022-07-18 ENCOUNTER — HOSPITAL ENCOUNTER (OUTPATIENT)
Dept: RADIATION ONCOLOGY | Age: 67
Discharge: HOME | End: 2022-07-18
Payer: MEDICARE

## 2022-07-18 VITALS — WEIGHT: 174 LBS | BODY MASS INDEX: 30.82 KG/M2

## 2022-07-18 PROCEDURE — 77336 RADIATION PHYSICS CONSULT: CPT | Performed by: RADIOLOGY

## 2022-07-18 PROCEDURE — G6002 STEREOSCOPIC X-RAY GUIDANCE: HCPCS | Performed by: RADIOLOGY

## 2022-07-18 PROCEDURE — 77385 HC NTSTY MODUL RAD TX DLVR SMPL: CPT | Performed by: RADIOLOGY

## 2022-07-18 PROCEDURE — 77427 RADIATION TX MANAGEMENT X5: CPT | Performed by: RADIOLOGY

## 2022-07-18 ASSESSMENT — PAIN SCALES - GENERAL: PAINLEVEL_OUTOF10: 0

## 2022-07-18 NOTE — PROGRESS NOTES
Weekly Radiation Treatment Progress Note    DATE OF SERVICE: 7/18/2022     DIAGNOSIS:  Cancer Staging  Malignant neoplasm of left breast in female, estrogen receptor negative ([facility] [address]  Staging form: Breast, AJCC 8th Edition  - Clinical: Stage IIB (cT1c, cN1, cM0, G3, ER-, MA-, HER2-) - Signed by Steffen Miner MD on 5/12/2022  - Pathologic: No Stage Recommended (ypT0, pN0, cM0) - Signed by Steffen Miner MD on 5/12/2022       TREATMENT COURSE:   Oncology History   Malignant neoplasm of left breast in female, estrogen receptor negative ([facility] [address]   11/8/2021 - 3/21/2022 Chemotherapy    Neoadjuvant dose dense Adriamycin/Cytoxan completed on 12/20/2021  Weekly Taxol beginning 1/10/2022     4/5/2022 Surgery    Bilateral mastectomy  No residual malignancy     5/12/2022 -  Cancer Staged    Staging form: Breast, AJCC 8th Edition  - Clinical: Stage IIB (cT1c, cN1, cM0, G3, ER-, MA-, HER2-)       5/12/2022 -  Cancer Staged    Staging form: Breast, AJCC 8th Edition  - Pathologic: No Stage Recommended (ypT0, pN0, cM0)       6/8/2022 -  Radiation    Left supraclavicular lymph nodes: 5040 cGy  Left chest wall: 5040 cGy  Left chest wall scar boost: 1000 cGy; total dose: 6040 cGy           Site: L SCV/CW  Current Total Radiation Dose: 4500 cGy    Pt doing well. Energy good. No skin itching/soreness. Using Miaderm    EXAM  Wt Readings from Last 3 Encounters:   07/05/22 173 lb (78.5 kg)   06/24/22 174 lb 9.6 oz (79.2 kg)   06/23/22 174 lb 9.6 oz (79.2 kg)     NAD  No  Skin tanning.  No desquamation    Setup images, chart, plan reviewed    A/P:   Tolerating RT well  Continue RT as planned      Electronically signed by Steffen Miner MD on 7/18/2022 at 11:18 AM

## 2022-07-19 ENCOUNTER — HOSPITAL ENCOUNTER (OUTPATIENT)
Dept: RADIATION ONCOLOGY | Age: 67
Discharge: HOME | End: 2022-07-19
Payer: MEDICARE

## 2022-07-19 PROCEDURE — 77385 HC NTSTY MODUL RAD TX DLVR SMPL: CPT | Performed by: RADIOLOGY

## 2022-07-19 PROCEDURE — G6002 STEREOSCOPIC X-RAY GUIDANCE: HCPCS | Performed by: RADIOLOGY

## 2022-07-20 ENCOUNTER — HOSPITAL ENCOUNTER (OUTPATIENT)
Dept: RADIATION ONCOLOGY | Age: 67
Discharge: HOME | End: 2022-07-20
Payer: MEDICARE

## 2022-07-20 PROCEDURE — 77385 HC NTSTY MODUL RAD TX DLVR SMPL: CPT | Performed by: RADIOLOGY

## 2022-07-20 PROCEDURE — G6002 STEREOSCOPIC X-RAY GUIDANCE: HCPCS | Performed by: RADIOLOGY

## 2022-07-21 ENCOUNTER — HOSPITAL ENCOUNTER (OUTPATIENT)
Dept: RADIATION ONCOLOGY | Age: 67
Discharge: HOME | End: 2022-07-21
Payer: MEDICARE

## 2022-07-21 PROCEDURE — 77385 HC NTSTY MODUL RAD TX DLVR SMPL: CPT | Performed by: RADIOLOGY

## 2022-07-21 PROCEDURE — G6002 STEREOSCOPIC X-RAY GUIDANCE: HCPCS | Performed by: RADIOLOGY

## 2022-07-21 PROCEDURE — 77336 RADIATION PHYSICS CONSULT: CPT | Performed by: RADIOLOGY

## 2022-07-22 ENCOUNTER — APPOINTMENT (OUTPATIENT)
Dept: RADIATION ONCOLOGY | Age: 67
End: 2022-07-22
Payer: MEDICARE

## 2022-07-25 ENCOUNTER — APPOINTMENT (OUTPATIENT)
Dept: RADIATION ONCOLOGY | Age: 67
End: 2022-07-25
Payer: MEDICARE

## 2022-07-26 ENCOUNTER — APPOINTMENT (OUTPATIENT)
Dept: RADIATION ONCOLOGY | Age: 67
End: 2022-07-26
Payer: MEDICARE

## 2022-07-27 ENCOUNTER — APPOINTMENT (OUTPATIENT)
Dept: RADIATION ONCOLOGY | Age: 67
End: 2022-07-27
Payer: MEDICARE

## 2022-07-28 ENCOUNTER — APPOINTMENT (OUTPATIENT)
Dept: RADIATION ONCOLOGY | Age: 67
End: 2022-07-28
Payer: MEDICARE

## 2022-08-01 NOTE — PROGRESS NOTES
Radiation Oncology  Treatment Completion Summary  Encounter Date: 2022  11:19 AM    Ms. Cheri Zamarripa is a 75 y.o. female  : 1955  MRN: [id number]  [facility] Number: [de-identified]      FOLLOW UP PHYSICIANS:   Primary Care: MD Artem Dumont M.D.  Cindy Woodson, [address] [facility]    [place] [place] [place] [address]      DIAGNOSIS:  Cancer Staging  Malignant neoplasm of left breast in female, estrogen receptor negative ([facility] [address]  Staging form: Breast, AJCC 8th Edition  - Clinical: Stage IIB (cT1c, cN1, cM0, G3, ER-, [place]-, HER2-) - Signed by Tim Archuleta MD on 2022  - Pathologic: No Stage Recommended (ypT0, pN0, cM0) - Signed by Tim Archuleta MD on 2022       TREATMENT COURSE:   Oncology History   Malignant neoplasm of left breast in female, estrogen receptor negative ([facility] [address]   2021 - 3/21/2022 Chemotherapy    Neoadjuvant dose dense Adriamycin/Cytoxan completed on 2021  Weekly Taxol beginning 1/10/2022     2022 Surgery    Bilateral mastectomy  No residual malignancy     2022 -  Cancer Staged    Staging form: Breast, AJCC 8th Edition  - Clinical: Stage IIB (cT1c, cN1, cM0, G3, ER-, [place]-, HER2-)       2022 -  Cancer Staged    Staging form: Breast, AJCC 8th Edition  - Pathologic: No Stage Recommended (ypT0, pN0, cM0)       2022 - 2022 Radiation    Left supraclavicular lymph nodes: 5040 cGy  Left chest wall: 5040 cGy         HISTORY:  Cheri Zamarripa is a 75 y.o. female with the above referenced diagnosis. Complete details on history of present illness please see my initial consultation note.   She has recently completed a course of adjuvant left chest wall and supraclavicular radiation therapy and what follows is a description of the treatments she received:    ANATOMIC SITE: Left chest wall/supraclavicular  BEAM ORIENTATION: IMRT/IGR T  ENERGY: 6MV photons  DOSE PER FRACTION: 180 cGy  TOTAL DOSE: 5040cGy      ELAPSED DAYS: 28    TREATMENT TOLERANCE:   Overall, the patient tolerated her radiation therapy quite well. Her energy level was fairly well-maintained throughout the course of her treatments. She did not develop any significant skin reaction, nor dry/ moist desquamation. She did use Miaderm throughout the course her treatments. FOLLOW-UP PLANS:   She is to return to see me in 1 month or sooner as needed.     Electronically signed by Len Hardy MD on 8/1/2022 at 11:19 AM

## 2022-08-16 NOTE — PROGRESS NOTES
Patient Name: Portillo Ferguson  Patient : 1955  Patient MRN: [id number]     Primary Oncologist: Hao Cooney MD  Referring Provider: Cindy Hamlin MD     Date of Service: 2022      Chief Complaint:   Chief Complaint   Patient presents with    Discuss Labs     She came in for follow-up visit. Patient Active Problem List:     Personal history of infectious disease     Other specified disorders of white blood cells     Neutropenia     History of hepatitis C     Allergic rhinitis     Left hip pain     G6PD deficiency     Mild persistent asthma without complication     Vertigo     HPI:   Deb Arizmendi is a pleasant 67-year-old -American female patient who was referred for evaluation of mild neutropenia. She was diagnosed with hepatitis C in . Ex spouse was IV drug user. She was treated with Harvoni for 12 weeks and completed in May 2019. I reviewed her blood test records. On May 15, 2019 WBC was 4.1, RBC 4.36, hemoglobin 13.9, hematocrit 42.3, MCV 97, platelet 119, absolute neutrophils 1.1, absolute lymphocytes 2.6. She denies any history of frequent infection. Ultrasound of abdomen in 2018 showed normal right upper quadrant ultrasound. Liver biopsy on 2018 showed chronic hepatitis grade 2-3, stage II. Mammogram in 2019 is negative. US of abdomen in 2019 showed unremarkable study. Labs in 2019 were reviewed. She has nonspecific elevated total protein. CBC is unremarkable. Labs in 2019 were reviewed. Total protein is normal. Leukopenia is stable. She denied frequent infection. In 2020 she had acute viral hepatitis serology which came back positive for hepatitis C antibody. Serum AFP was 8. Hepatitis C RNA by PCR was negative. Hepatitis C RNA genotype was indeterminate. Mammogram in 2020 was benign. She was at [facility] emergency room on 2020 due to food poisoning. . WBC was 7.5.  Hemoglobin was 13.2, hemoglobin 13.2, platelet 779. CMP was grossly unremarkable with normal AST at 19, ALT 25. Alk phos was 92. Total bilirubin was 0.6. Colonoscopy in December 2020 by Dr. Desi Farrell showed diverticulosis and hemorrhoids. She was told that she has kidney stone. She has flu shot in 2020 and COVID-19 vaccine on January 3, 2021. Mammogram in July 2020 was benign. In June 2021 WBC was 5.2, hemoglobin 12.3, platelet 545. On September 20, 2021 she was referred for left breast mass. Bone density on September 13, 2021 showed osteopenia. Mammogram on September 15, 2021 showed : There is a new 1.5 cm mass identified in the left breast at 12 o'clock position, at posterior depth, about 12 cm from the left nipple. This is best seen on left CC michaela slice 50 and left MLO michaela slice 55. Left breast ultrasound 11 o'clock, 11 cm from the nipple: On ultrasound evaluation, there is a 1.1 x 1.3 x 0.5 cm oval, parallel hypoechoic mass, with microlobulated margins without any posterior acoustic shadowing or internal vascularity. This mass corresponds to the mammographic finding. Left axilla: Couple enlarged lymph nodes identified in the left axilla with cortical thickness of 8-9 mm. No new suspicious masses or microcalcifications are identified in the right breast.    In June 2021 WBC was 5.2, hemoglobin 12.3, platelet 109. CMP was grossly unremarkable. Path report of left breast biopsy on 9/27/2021:  A. Breast, left at 11 o'clock, ultrasound guided needle core biopsy:   -  INVASIVE DUCTAL CARCINOMA WITH A MARKED CHRONIC INFLAMMATORY REACTION  -  HER2 FISH is negative  B. Lymph node, left axilla, ultrasound guided needle core biopsy:   -  METASTATIC HIGH GRADE CARCINOMA IS IDENTIFIED   BREAST INVASIVE CARCINOMA SUMMARY - CORE BIOPSY   Procedure: Ultrasound guided needle core biopsy. Laterality and tumor site: Left breast at 11 o'clock. Tumor size: 7 mm in greatest dimension within biopsy material present. Histologic type:  Invasive dizzy spell. No specific bone pain. No melena or hematochezia. Denied any dysuria or hematuria. Past Medical History  Asthma, neutropenia, hepatitis C, G6PD, thyroid disease. Surgical History  Partial hysterectomy in 2002 related to tubal pregnancy. Tonsillectomy. She had colonoscopy x2 and the last one was in 2013. She had left hip replacement    Social History  Smoking Status Never smoker  She denies any illicit drug use. She drinks alcohol occasionally. She has 2 children. Family History  Maternal great aunt had breast cancer. Review of Systems: \"Per interval history; otherwise 10 point ROS is negative. \"     Vital Signs:  /70 (Site: Right Upper Arm, Position: Sitting, Cuff Size: Medium Adult)   Pulse 84   Temp 97.7 °F (36.5 °C) (Infrared)   Resp 16   Ht 5' 3\" (1.6 m)   Wt 178 lb 6.4 oz (80.9 kg)   SpO2 98%   BMI 31.60 kg/m²     Physical Exam:  CONSTITUTIONAL: awake, alert, cooperative, no apparent distress   EYES: pupils equal, round. Sclera clear and conjunctiva pallor  ENT: Normocephalic, without obvious abnormality, atraumatic  NECK: supple, symmetrical, no jugular venous distension and no carotid bruits   HEMATOLOGIC/LYMPHATIC: no cervical, supraclavicular or axillary lymphadenopathy   LUNGS: no increased work of breathing and clear to auscultation. Medi port to the right anterior chest wall noted. BREAST: s/p b/l mastectomy. No skin nodules. CARDIOVASCULAR: regular rate and rhythm, normal S1 and S2, no murmur  ABDOMEN: normal bowel sound, soft, non-distended, non-tender, no palpable masses  MUSCULOSKELETAL: full range of motion noted, tone is normal  NEUROLOGIC: Motor and sensory grossly intact. CN II - XII grossly intact. SKIN: Normal skin color, texture, turgor and no jaundice. No ecchymosis. EXTREMITIES: no LE edema. No cyanosis. Homans negative.     Labs:  Hematology:  Lab Results   Component Value Date    WBC 3.4 (L) 06/24/2022    RBC 4.02 (L) 06/24/2022    HGB 11.1 (L) 06/24/2022    HCT 34.3 (L) 06/24/2022    MCV 85.3 06/24/2022    MCH 27.6 06/24/2022    MCHC 32.4 06/24/2022    RDW 16.0 (H) 06/24/2022     06/24/2022    MPV 9.8 06/24/2022    SEGSPCT 51.7 06/24/2022    EOSRELPCT 3.6 (H) 06/24/2022    BASOPCT 0.3 06/24/2022    LYMPHOPCT 26.6 06/24/2022    MONOPCT 17.8 (H) 06/24/2022    SEGSABS 1.8 06/24/2022    EOSABS 0.1 06/24/2022    BASOSABS 0.0 06/24/2022    LYMPHSABS 0.9 06/24/2022    MONOSABS 0.6 06/24/2022    DIFFTYPE AUTOMATED DIFFERENTIAL 06/24/2022     Chemistry:  Lab Results   Component Value Date     06/24/2022    K 4.6 06/24/2022     06/24/2022    CO2 24 06/24/2022    BUN 13 06/24/2022    CREATININE 0.6 06/24/2022    GLUCOSE 116 (H) 06/24/2022    CALCIUM 9.7 06/24/2022    PROT 7.1 06/24/2022    LABALBU 4.6 06/24/2022    BILITOT 0.2 06/24/2022    ALKPHOS 86 06/24/2022    AST 21 06/24/2022    ALT 16 06/24/2022    LABGLOM >60 06/24/2022    GFRAA >60 06/24/2022    AGRATIO 1.4 06/09/2021    GLOB 3.4 06/09/2021     Lab Results   Component Value Date    TSHHS 1.700 04/28/2022    T4FREE 1.28 01/10/2022    FT3 3.3 01/10/2022     Immunology:  Lab Results   Component Value Date    PROT 7.1 06/24/2022     Coagulation Panel:  Lab Results   Component Value Date    PROTIME 12.3 11/18/2021    INR 0.95 11/18/2021    APTT 26.9 11/18/2021     Observations:  PHQ-9 Total Score: 8 (9/13/2022 11:51 AM)  Thoughts that you would be better off dead, or of hurting yourself in some way: Not at all (9/13/2022 11:51 AM)      Assessment & Plan:  1. She was referred for mild neutropenia. She is an -American. CBC in June 2019 was unremarkable. CBC in June 2020 was unremarkable. US of abdomen in June 2019 was unremarkable. In June 2021 WBC was 5.2, hemoglobin 12.3, platelet 572. She has anemia related to chemotherapy. Anemia improved. WBC in June 2022 was 3.4 and hemoglobin 11.1. I will check labs today including anemic work-up.   I advised to call for the test result. 2. She completed treatment for hepatitis C in May 2019. She is in remission. 3. Mammogram in June 2019 was benign. Colonoscopy was In 2013. Mammogram in July 2020 was benign. She had abnormal left breast mammogram and scheduled for the biopsy on September 27, 2021. She was found to have likely triple negative left breast cancer s/p biopsy, T1, N1 at least from biopsy. CT chest, abdomen pelvis, bone scan and echocardiogram in October 2021 were reviewed. She started neoadjuvant chemotherapy November 8, 2021. MRI of the breast showed response to therapy. She completed Taxol on March 21, 2022. She had double mastectomy on April 5, 2022. Path showed no residual malignancy. She started adjuvant RT on 6/9/2022 for 28 d. She denied any symptoms to suggest recurrent breast cancer. She is in remission. I recommend to do monthly self breast exam.    4. Colonoscopy in December 2020 showed diverticulosis and hemorrhoids. Repeat study in 5 years was recommended. 5.  Ultrasound of the thyroid on November 9, 2021 showed  TR 4 nodule of the left thyroid lobe corresponding to recent CT findings. FNA recommended for further evaluation based on TI-RADS criteria. Cytology report on November 18, 2021 showed  Final Cytologic Diagnosis:    Left Thyroid Fine Needle Aspirate Cytology with Cell Block:   - Atypical follicular cells of undetermined significance   She will follow up with Dr Francy Hudson in December 2022. We discussed about healthy diet and lifestyle. She had COVID-19 vaccine in January 2021. Return to clinic in 12 weeks or sooner. All of her questions have been answered for today. Recent imaging and labs were reviewed and discussed with the patient.

## 2022-08-23 ENCOUNTER — HOSPITAL ENCOUNTER (OUTPATIENT)
Dept: RADIATION ONCOLOGY | Age: 67
Discharge: HOME | End: 2022-08-23

## 2022-08-23 VITALS
BODY MASS INDEX: 31.82 KG/M2 | SYSTOLIC BLOOD PRESSURE: 133 MMHG | TEMPERATURE: 97.6 F | HEIGHT: 63 IN | HEART RATE: 95 BPM | WEIGHT: 179.6 LBS | DIASTOLIC BLOOD PRESSURE: 63 MMHG | OXYGEN SATURATION: 99 %

## 2022-08-23 RX ORDER — MAGNESIUM OXIDE 400 MG/1
400 TABLET ORAL DAILY
COMMUNITY

## 2022-08-23 NOTE — PROGRESS NOTES
Jorge L Bustilloy  8/23/2022    Patient is seen today for follow up. Vitals:    08/23/22 1101   BP: 133/63   Pulse: 95   Temp: 97.6 °F (36.4 °C)   SpO2: 99%        Oxygen Therapy  SpO2: 99 %  Pulse Oximeter Device Mode: Intermittent  Pulse Oximeter Device Location: Finger  O2 Device: None (Room air)  Skin Assessment: Clean, dry, & intact  Oxygen Therapy: None (Room air)    Wt Readings from Last 3 Encounters:   08/23/22 179 lb 9.6 oz (81.5 kg)   07/18/22 174 lb (78.9 kg)   07/05/22 173 lb (78.5 kg)       Pain Assessment  Pain Assessment: None - Denies Pain  Denies Need for Intervention       Allergies   Allergen Reactions    Latex Hives    Pcn [Penicillins] Hives        Current Outpatient Medications   Medication Sig Dispense Refill    ZINC PO Take by mouth      magnesium oxide (MAG-OX) 400 MG tablet Take 400 mg by mouth daily      COLLAGEN PO Take by mouth      Cyanocobalamin (VITAMELTS ENERGY VITAMIN B-12 PO) Take by mouth      meclizine (ANTIVERT) 25 MG tablet Take 1 tablet by mouth 3 times daily as needed for Dizziness 30 tablet 3    omeprazole (PRILOSEC) 20 MG delayed release capsule Take 1 capsule by mouth daily 30 capsule 3    albuterol sulfate  (90 Base) MCG/ACT inhaler Inhale 2 puffs into the lungs every 6 hours as needed for Wheezing 3 each 1    montelukast (SINGULAIR) 10 MG tablet Take 1 tablet by mouth nightly 90 tablet 1    meloxicam (MOBIC) 7.5 MG tablet Take 1 tablet by mouth daily 90 tablet 1    dicyclomine (BENTYL) 10 MG capsule Take 1 capsule by mouth 4 times daily as needed (abdominal bloating and gas) 120 capsule 3    Multiple Vitamins-Minerals (MULTIVITAMIN PO) Take by mouth       No current facility-administered medications for this encounter. Additional Comments: Patient states nothing taste good when she eats and she is always very tired, she is unsure if this is normal after radiation treatment.      Electronically signed by Anton Jacobs CMA on 8/23/2022 at 11:07 AM

## 2022-08-23 NOTE — PROGRESS NOTES
[id number] [facility]  [address] [place] [address], [address] [facility]  Phone: [phone]  Fax: [phone]    RADIATION ONCOLOGY FOLLOW UP REPORT    PATIENT NAME:  Candelario Charles              : 1955  MEDICAL RECORD NO: [id number]    [facility] NO: [id number]        PROVIDER: Eddi Robbins MD      DATE OF SERVICE: 2022     FOLLOW UP PHYSICIANS:  Primary Care: MD Jan Warren M.D. Maryjean Myers, M.D. DIAGNOSIS: Cancer Staging  Malignant neoplasm of left breast in female, estrogen receptor negative ([facility] [address]  Staging form: Breast, AJCC 8th Edition  - Clinical: Stage IIB (cT1c, cN1, cM0, G3, ER-, OK-, HER2-) - Signed by Eddi Robbins MD on 2022  - Pathologic: No Stage Recommended (ypT0, pN0, cM0) - Signed by Eddi Robbins MD on 2022         TREATMENT COURSE:   Oncology History   Malignant neoplasm of left breast in female, estrogen receptor negative ([facility] [address]   2021 - 3/21/2022 Chemotherapy    Neoadjuvant dose dense Adriamycin/Cytoxan completed on 2021  Weekly Taxol beginning 1/10/2022     2022 Surgery    Bilateral mastectomy  No residual malignancy     2022 -  Cancer Staged    Staging form: Breast, AJCC 8th Edition  - Clinical: Stage IIB (cT1c, cN1, cM0, G3, ER-, OK-, HER2-)       2022 -  Cancer Staged    Staging form: Breast, AJCC 8th Edition  - Pathologic: No Stage Recommended (ypT0, pN0, cM0)       2022 - 2022 Radiation    Left supraclavicular lymph nodes: 5040 cGy  Left chest wall: 5040 cGy         HPI:   Candelario Charles is a 75 y.o. female who has a history as above, who returns today for her first post radiation visit. Currently, she reports she is recovering well.   Her energy level is approximately 60% back to normal.  Mild skin itching for which she is continuing to use MiaDer      MRI BREAST BILATERAL W WO CONTRAST    Narrative  EXAMINATION:  MRI OF THE BILATERAL BREASTS WITHOUT AND WITH CONTRAST 12/22/2021 10:53 am:    TECHNIQUE:  Multiplanar multisequence MRI of the bilateral breasts were performed without  and with the administration of 18 mL MultiHance intravenous contrast.    Data analysis was performed with color parametric mapping, image subtraction,  and 3D reconstructions. 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 FINDINGS:  Both breasts are predominantly fatty and demonstrate minimal background  parenchymal enhancement. Left breast:  No suspicious enhancement seen. The biopsy proven malignancy  at 11 o'clock is no longer visualized. A dominant level 1 axillary lymph  node superiorly measuring 1.8 x 1.1 cm has decreased in size from 3.0 x 2.2  cm on prior CT. Two smaller level 1 nodes more inferiorly also appear mildly  decreased in size. Right breast:  Susceptibility artifact from MediPort along the medial upper  chest.  No suspicious mass, non-mass enhancement, or other abnormality seen. No abnormal lymph nodes. Impression  Favorable response to neoadjuvant chemotherapy. Biopsy-proven malignancy in  the left breast at 11 o'clock is no longer visible. Left axillary level 1  lymphadenopathy has improved. No MRI evidence of malignancy in the right  breast.    BIRADS:  BIRADS - CATEGORY 6    Known biopsy proven malignancy. OVERALL ASSESSMENT - KNOWN BIOPSY PROVEN MALIGNANCY.           LABORATORY STUDIES:   CBC:   Lab Results   Component Value Date/Time    WBC 3.4 06/24/2022 02:28 PM    RBC 4.02 06/24/2022 02:28 PM    HGB 11.1 06/24/2022 02:28 PM    HCT 34.3 06/24/2022 02:28 PM    MCV 85.3 06/24/2022 02:28 PM    MCH 27.6 06/24/2022 02:28 PM    MCHC 32.4 06/24/2022 02:28 PM    RDW 16.0 06/24/2022 02:28 PM     06/24/2022 02:28 PM    MPV 9.8 06/24/2022 02:28 PM     CMP:  Lab Results   Component Value Date/Time     06/24/2022 02:28 PM    K 4.6 06/24/2022 02:28 PM     06/24/2022 02:28 PM    CO2 24 06/24/2022 02:28 PM    BUN 13 06/24/2022 02:28 PM    CREATININE 0.6 06/24/2022 02:28 PM    GFRAA >60 06/24/2022 02:28 PM    AGRATIO 1.4 06/09/2021 01:32 PM    LABGLOM >60 06/24/2022 02:28 PM    GLUCOSE 116 06/24/2022 02:28 PM    PROT 7.1 06/24/2022 02:28 PM    LABALBU 4.6 06/24/2022 02:28 PM    CALCIUM 9.7 06/24/2022 02:28 PM    BILITOT 0.2 06/24/2022 02:28 PM    ALKPHOS 86 06/24/2022 02:28 PM    AST 21 06/24/2022 02:28 PM    ALT 16 06/24/2022 02:28 PM     Onc labs: No results found for: PSA, CEA, LDH, AFP    MEDICATIONS:   Current Outpatient Medications   Medication Sig Dispense Refill    ZINC PO Take by mouth      magnesium oxide (MAG-OX) 400 MG tablet Take 400 mg by mouth daily      COLLAGEN PO Take by mouth      Cyanocobalamin (VITAMELTS ENERGY VITAMIN B-12 PO) Take by mouth      meclizine (ANTIVERT) 25 MG tablet Take 1 tablet by mouth 3 times daily as needed for Dizziness 30 tablet 3    omeprazole (PRILOSEC) 20 MG delayed release capsule Take 1 capsule by mouth daily 30 capsule 3    albuterol sulfate  (90 Base) MCG/ACT inhaler Inhale 2 puffs into the lungs every 6 hours as needed for Wheezing 3 each 1    montelukast (SINGULAIR) 10 MG tablet Take 1 tablet by mouth nightly 90 tablet 1    meloxicam (MOBIC) 7.5 MG tablet Take 1 tablet by mouth daily 90 tablet 1    dicyclomine (BENTYL) 10 MG capsule Take 1 capsule by mouth 4 times daily as needed (abdominal bloating and gas) 120 capsule 3    Multiple Vitamins-Minerals (MULTIVITAMIN PO) Take by mouth       No current facility-administered medications for this encounter. EXAMINATION:   Vitals:    08/23/22 1101   BP: 133/63   Pulse: 95   Temp: 97.6 °F (36.4 °C)   SpO2: 99%     The patient is in no acute distress. Neck: Supple no  lymphadenopathy is present.   Chest wall examination: Bilateral well-healed mastectomy scars are present. The left chest wall is without masses or areas of nodularity. Mild residual hyperpigmentation. No axillary lymphadenopathy is palpable  Extremities: No cyanosis, clubbing, or edema is present. ASSESSMENT AND PLAN:     Jorge L Bermudez is a 75 y.o. female who has a history of a stage IIb triple receptor negative invasive left-sided breast cancer status post neoadjuvant chemo, bilateral mastectomy who is now approximately 1 month after completion of her adjuvant left chest wall and supraclavicular irradiation who is doing well at this time, recovering from the acute side effects of radiation. She is to switch to Eucerin after completion of her MiaDerm. She is to follow every 3 to 4 months with Dr. Ray Mccray as scheduled and to return to see me in 1 year or sooner as needed. The patient is to continue to follow CDC's Covid 19 precautionary measures.       Electronically signed by Mally Orta MD on 8/23/2022 at 11:13 AM

## 2022-09-02 ENCOUNTER — OFFICE VISIT (OUTPATIENT)
Dept: NEUROLOGY | Age: 67
End: 2022-09-02
Payer: MEDICARE

## 2022-09-02 VITALS
BODY MASS INDEX: 31.35 KG/M2 | WEIGHT: 177 LBS | HEART RATE: 90 BPM | SYSTOLIC BLOOD PRESSURE: 126 MMHG | OXYGEN SATURATION: 97 % | DIASTOLIC BLOOD PRESSURE: 64 MMHG

## 2022-09-02 DIAGNOSIS — H81.10 BENIGN PAROXYSMAL POSITIONAL VERTIGO, UNSPECIFIED LATERALITY: Primary | ICD-10-CM

## 2022-09-02 PROCEDURE — 1123F ACP DISCUSS/DSCN MKR DOCD: CPT | Performed by: PSYCHIATRY & NEUROLOGY

## 2022-09-02 PROCEDURE — 99204 OFFICE O/P NEW MOD 45 MIN: CPT | Performed by: PSYCHIATRY & NEUROLOGY

## 2022-09-02 NOTE — PROGRESS NOTES
9/2/22    [facility]  1955    Chief Complaint   Patient presents with    Dizziness     Pt has been doing well for 2 weeks but feels \" its getting ready to happen again \"        History of Present Illness  Maicol Siddiqui is a 75 y.o. female presenting today for evaluation of:  Vertigo    She states she has had vertigo for years. She had an MRI of her brain 2017 which was unremarkable. She tells me that the vertigo primarily hits her when she lays down or when she looks up. She had 1 spell when she was hanging up close and she fell to the floor and vomited. She uses meclizine which has been helpful. She can use it up to 3 times a day but has not needed to use it that often. She does have some ringing in her years. She denies any ear pain or hearing loss.       Subjective    Review of Symptoms:  Neurologic   Symptoms: vertigo, no difficulty with gait or walking, no bowel symptoms, no confusion, no memory loss, no speech disorder, no visual loss, no double vision, no dizziness, no loss of hearing, no sensory disturbances, no weakness, no headaches, no bladder symptoms, no seizures, no excessive fatigue, and no syncope    Current Outpatient Medications   Medication Sig Dispense Refill    ZINC PO Take by mouth      magnesium oxide (MAG-OX) 400 MG tablet Take 400 mg by mouth daily      COLLAGEN PO Take by mouth      Cyanocobalamin (VITAMELTS ENERGY VITAMIN B-12 PO) Take by mouth      meclizine (ANTIVERT) 25 MG tablet Take 1 tablet by mouth 3 times daily as needed for Dizziness 30 tablet 3    omeprazole (PRILOSEC) 20 MG delayed release capsule Take 1 capsule by mouth daily 30 capsule 3    albuterol sulfate  (90 Base) MCG/ACT inhaler Inhale 2 puffs into the lungs every 6 hours as needed for Wheezing 3 each 1    montelukast (SINGULAIR) 10 MG tablet Take 1 tablet by mouth nightly 90 tablet 1    meloxicam (MOBIC) 7.5 MG tablet Take 1 tablet by mouth daily 90 tablet 1    dicyclomine (BENTYL) 10 MG capsule Take 1 one time per month\"    Drug use: No     Social Determinants of Health     Financial Resource Strain: Low Risk     Difficulty of Paying Living Expenses: Not hard at all   Food Insecurity: No Food Insecurity    Worried About Running Out of Food in the Last Year: Never true    Ran Out of Food in the Last Year: Never true   Physical Activity: Insufficiently Active    Days of Exercise per Week: 7 days    Minutes of Exercise per Session: 10 min       Family History   Problem Relation Age of Onset    No Known Problems Mother     Kidney Disease Father        Objective    Physical Exam:    Constitutional   Weight: well nourished  Heart/Vascular   Rate and Rhythm: RRR   Murmurs: none   Arterial Pulses:  no carotid bruits  Neck   Appearance/Palpation/Auscultation: supple  Mental Status   Orientation: oriented to person, oriented to place, oriented to problem, and oriented to time   Mood/Affect: appropriate mood and appropriate affect   Memory/Other: recent memory intact, remote memory intact, fund of knowledge intact, attention span normal, and concentration normal  Language   Language: (normal) language, no dysarthria, (normal) articulation, and no dysphasia/aphasia  Cranial Nerves   CN II Right: visual fields appear intact   CN II Left: visual fields appear intact   CN III, IV, VI: EOM no nystagmus, normal pursuit, and extraocular muscle strength normal   CN III: pupil normal size, pupil reactive to light and dark, pupil accomodates, and no ptosis   CN IV: normal   CN VI: normal   CN V Right: normal sensation and muscles of mastication intact   CN V Left: normal sensation and muscles of mastication intact   CN VII Right: normal facial expression   CN VII Left: normal facial expression   CN VIII Right: hearing in tact to normal conversation   CN VIII Left: hearing in tact to normal conversation   CN IX,X: normal palatal movement   CN XI Right: normal sternocleidomastoid and normal trapezius   CN XI Left: normal sternocleidomastoid and normal trapezius   CN XII: no tremors of the tongue, no fasciculation of the tongue, tongue protrudes midline, normal power to left, and normal power to right  Gait and Stance   Gait/Posture: station normal, ambulates independently, gait normal, and Romberg's test normal  Motor/Coordination Exam   General: no bradykinesia, no tremors, no chorea, no athetosis, no myoclonus, and no dyskinesia   Right Upper Extremity: normal motor strength, normal bulk, and normal tone   Left Upper Extremity: normal motor strength, normal bulk, and normal tone   Right Lower Extremity: normal motor strength, normal bulk, and normal tone   Left Lower Extremity: normal motor strength, normal bulk, and normal tone   Coordination: no drift, normal finger-to-nose, and rapid alternating movements normal  Reflexes   Reflexes Right: DTRS are normal throughout   Reflexes Left: DTRS are normal throughout  Sensory   Sensation: normal light touch, normal temperature, normal vibration, and no neglect  Spine   Cervical Spine: no tenderness, no dystonia , and full ROM   Thoracic Spine: no spasms, no bony abnormalities, normal curvature, no tenderness, and full ROM   Low Back: full ROM, no pain, no spasms, and no bony abnormalities  Lungs   Auscultation: normal breath sounds  Skin   Inspection: no jaundice, no lesions, no rashes, and no cyanosis      /64 (Site: Right Upper Arm, Position: Sitting, Cuff Size: Large Adult)   Pulse 90   Wt 177 lb (80.3 kg)   SpO2 97%   BMI 31.35 kg/m²     Assessment and Plan     Diagnosis Orders   1. Benign paroxysmal positional vertigo, unspecified laterality  PT vestibular rehab          Juan Monteiro has vertigo suggestive of benign paroxysmal positional vertigo given the intermittent nature and velocity dependent component of the vertigo. This is particularly bothersome with laying down flat and looking upwards. Meclizine has been helpful.   I will set her up with vestibular therapy to see if that may help fatigue the feeling away for her. She has never had vestibular therapy before. Return in about 4 months (around 1/2/2023) for Follow-up PA/NP.     Warden Quinones,

## 2022-09-03 DIAGNOSIS — J30.2 SEASONAL ALLERGIC RHINITIS, UNSPECIFIED TRIGGER: ICD-10-CM

## 2022-09-06 RX ORDER — MONTELUKAST SODIUM 10 MG/1
10 TABLET ORAL NIGHTLY
Qty: 90 TABLET | Refills: 1 | Status: SHIPPED | OUTPATIENT
Start: 2022-09-06

## 2022-09-13 ENCOUNTER — OFFICE VISIT (OUTPATIENT)
Dept: ONCOLOGY | Age: 67
End: 2022-09-13
Payer: MEDICARE

## 2022-09-13 ENCOUNTER — HOSPITAL ENCOUNTER (OUTPATIENT)
Dept: INFUSION THERAPY | Age: 67
Discharge: HOME | End: 2022-09-13
Payer: MEDICARE

## 2022-09-13 VITALS
HEIGHT: 63 IN | BODY MASS INDEX: 31.61 KG/M2 | WEIGHT: 178.4 LBS | SYSTOLIC BLOOD PRESSURE: 136 MMHG | OXYGEN SATURATION: 98 % | TEMPERATURE: 97.7 F | RESPIRATION RATE: 16 BRPM | HEART RATE: 84 BPM | DIASTOLIC BLOOD PRESSURE: 70 MMHG

## 2022-09-13 DIAGNOSIS — D64.9 ANEMIA, UNSPECIFIED TYPE: ICD-10-CM

## 2022-09-13 DIAGNOSIS — D64.9 ANEMIA, UNSPECIFIED TYPE: Primary | ICD-10-CM

## 2022-09-13 LAB
ALBUMIN SERPL-MCNC: 4.9 GM/DL (ref 3.4–5)
ALP BLD-CCNC: 92 IU/L (ref 40–129)
ALT SERPL-CCNC: 18 U/L (ref 10–40)
ANION GAP SERPL CALCULATED.3IONS-SCNC: 12 MMOL/L (ref 4–16)
AST SERPL-CCNC: 23 IU/L (ref 15–37)
BASOPHILS ABSOLUTE: 0 K/CU MM
BASOPHILS RELATIVE PERCENT: 0.3 % (ref 0–1)
BILIRUB SERPL-MCNC: 0.3 MG/DL (ref 0–1)
BUN BLDV-MCNC: 13 MG/DL (ref 6–23)
CALCIUM SERPL-MCNC: 9.5 MG/DL (ref 8.3–10.6)
CHLORIDE BLD-SCNC: 104 MMOL/L (ref 99–110)
CO2: 23 MMOL/L (ref 21–32)
CREAT SERPL-MCNC: 0.6 MG/DL (ref 0.6–1.1)
DIFFERENTIAL TYPE: ABNORMAL
EOSINOPHILS ABSOLUTE: 0.1 K/CU MM
EOSINOPHILS RELATIVE PERCENT: 2.6 % (ref 0–3)
FERRITIN: 250 NG/ML (ref 15–150)
FOLATE: >20 NG/ML (ref 3.1–17.5)
GFR AFRICAN AMERICAN: >60 ML/MIN/1.73M2
GFR NON-AFRICAN AMERICAN: >60 ML/MIN/1.73M2
GLUCOSE BLD-MCNC: 87 MG/DL (ref 70–99)
HCT VFR BLD CALC: 36.8 % (ref 37–47)
HEMOGLOBIN: 11.6 GM/DL (ref 12.5–16)
IRON: 43 UG/DL (ref 37–145)
LYMPHOCYTES ABSOLUTE: 1 K/CU MM
LYMPHOCYTES RELATIVE PERCENT: 29.3 % (ref 24–44)
MCH RBC QN AUTO: 28.6 PG (ref 27–31)
MCHC RBC AUTO-ENTMCNC: 31.5 % (ref 32–36)
MCV RBC AUTO: 90.9 FL (ref 78–100)
MONOCYTES ABSOLUTE: 0.5 K/CU MM
MONOCYTES RELATIVE PERCENT: 15.5 % (ref 0–4)
PCT TRANSFERRIN: 15 % (ref 10–44)
PDW BLD-RTO: 15.5 % (ref 11.7–14.9)
PLATELET # BLD: 160 K/CU MM (ref 140–440)
PMV BLD AUTO: 11.4 FL (ref 7.5–11.1)
POTASSIUM SERPL-SCNC: 4.6 MMOL/L (ref 3.5–5.1)
RBC # BLD: 4.05 M/CU MM (ref 4.2–5.4)
SEGMENTED NEUTROPHILS ABSOLUTE COUNT: 1.8 K/CU MM
SEGMENTED NEUTROPHILS RELATIVE PERCENT: 52.3 % (ref 36–66)
SODIUM BLD-SCNC: 139 MMOL/L (ref 135–145)
TOTAL IRON BINDING CAPACITY: 292 UG/DL (ref 250–450)
TOTAL PROTEIN: 7.4 GM/DL (ref 6.4–8.2)
TSH HIGH SENSITIVITY: 1.48 UIU/ML (ref 0.27–4.2)
UNSATURATED IRON BINDING CAPACITY: 249 UG/DL (ref 110–370)
VITAMIN B-12: 883.1 PG/ML (ref 211–911)
WBC # BLD: 3.4 K/CU MM (ref 4–10.5)

## 2022-09-13 PROCEDURE — 99211 OFF/OP EST MAY X REQ PHY/QHP: CPT

## 2022-09-13 PROCEDURE — 83550 IRON BINDING TEST: CPT

## 2022-09-13 PROCEDURE — 80053 COMPREHEN METABOLIC PANEL: CPT

## 2022-09-13 PROCEDURE — 83540 ASSAY OF IRON: CPT

## 2022-09-13 PROCEDURE — 85025 COMPLETE CBC W/AUTO DIFF WBC: CPT

## 2022-09-13 PROCEDURE — 1123F ACP DISCUSS/DSCN MKR DOCD: CPT | Performed by: INTERNAL MEDICINE

## 2022-09-13 PROCEDURE — 82607 VITAMIN B-12: CPT

## 2022-09-13 PROCEDURE — 36415 COLL VENOUS BLD VENIPUNCTURE: CPT

## 2022-09-13 PROCEDURE — 84443 ASSAY THYROID STIM HORMONE: CPT

## 2022-09-13 PROCEDURE — 99213 OFFICE O/P EST LOW 20 MIN: CPT | Performed by: INTERNAL MEDICINE

## 2022-09-13 PROCEDURE — 82746 ASSAY OF FOLIC ACID SERUM: CPT

## 2022-09-13 PROCEDURE — 82728 ASSAY OF FERRITIN: CPT

## 2022-09-13 ASSESSMENT — PATIENT HEALTH QUESTIONNAIRE - PHQ9
SUM OF ALL RESPONSES TO PHQ QUESTIONS 1-9: 0
2. FEELING DOWN, DEPRESSED OR HOPELESS: 0
SUM OF ALL RESPONSES TO PHQ QUESTIONS 1-9: 8
8. MOVING OR SPEAKING SO SLOWLY THAT OTHER PEOPLE COULD HAVE NOTICED. OR THE OPPOSITE, BEING SO FIGETY OR RESTLESS THAT YOU HAVE BEEN MOVING AROUND A LOT MORE THAN USUAL: 0
9. THOUGHTS THAT YOU WOULD BE BETTER OFF DEAD, OR OF HURTING YOURSELF: 0
SUM OF ALL RESPONSES TO PHQ QUESTIONS 1-9: 0
SUM OF ALL RESPONSES TO PHQ QUESTIONS 1-9: 8
SUM OF ALL RESPONSES TO PHQ9 QUESTIONS 1 & 2: 4
SUM OF ALL RESPONSES TO PHQ9 QUESTIONS 1 & 2: 0
SUM OF ALL RESPONSES TO PHQ QUESTIONS 1-9: 0
1. LITTLE INTEREST OR PLEASURE IN DOING THINGS: 2
1. LITTLE INTEREST OR PLEASURE IN DOING THINGS: 0
4. FEELING TIRED OR HAVING LITTLE ENERGY: 2
5. POOR APPETITE OR OVEREATING: 0
3. TROUBLE FALLING OR STAYING ASLEEP: 0
2. FEELING DOWN, DEPRESSED OR HOPELESS: 2
SUM OF ALL RESPONSES TO PHQ QUESTIONS 1-9: 8
7. TROUBLE CONCENTRATING ON THINGS, SUCH AS READING THE NEWSPAPER OR WATCHING TELEVISION: 2
10. IF YOU CHECKED OFF ANY PROBLEMS, HOW DIFFICULT HAVE THESE PROBLEMS MADE IT FOR YOU TO DO YOUR WORK, TAKE CARE OF THINGS AT HOME, OR GET ALONG WITH OTHER PEOPLE: 1
6. FEELING BAD ABOUT YOURSELF - OR THAT YOU ARE A FAILURE OR HAVE LET YOURSELF OR YOUR FAMILY DOWN: 0
SUM OF ALL RESPONSES TO PHQ QUESTIONS 1-9: 8
SUM OF ALL RESPONSES TO PHQ QUESTIONS 1-9: 0

## 2022-09-13 NOTE — PROGRESS NOTES
MA Rooming Questions  Patient: Glenn Daniel  MRN: [id number]    Date: 9/13/2022        1. Do you have any new issues?   no         2. Do you need any refills on medications?    no    3. Have you had any imaging done since your last visit?   no    4. Have you been hospitalized or seen in the emergency room since your last visit here?   no    5. Did the patient have a depression screening completed today?  Yes    PHQ-9 Total Score: 8 (9/13/2022 11:51 AM)  Thoughts that you would be better off dead, or of hurting yourself in some way: Not at all (9/13/2022 11:51 AM)       PHQ-9 Given to (if applicable):               PHQ-9 Score (if applicable):                     [] Positive     []  Negative              Does question #9 need addressed (if applicable)                     [] Yes    []  No               Elroy Mchugh CMA

## 2022-09-20 ENCOUNTER — OFFICE VISIT (OUTPATIENT)
Dept: INTERNAL MEDICINE CLINIC | Age: 67
End: 2022-09-20
Payer: MEDICARE

## 2022-09-20 VITALS
SYSTOLIC BLOOD PRESSURE: 133 MMHG | BODY MASS INDEX: 31.71 KG/M2 | HEIGHT: 63 IN | HEART RATE: 90 BPM | WEIGHT: 179 LBS | DIASTOLIC BLOOD PRESSURE: 77 MMHG | OXYGEN SATURATION: 94 %

## 2022-09-20 DIAGNOSIS — Z17.1 MALIGNANT NEOPLASM OF LEFT BREAST IN FEMALE, ESTROGEN RECEPTOR NEGATIVE, UNSPECIFIED SITE OF BREAST (HCC): ICD-10-CM

## 2022-09-20 DIAGNOSIS — J45.20 MILD INTERMITTENT ASTHMA WITHOUT COMPLICATION: ICD-10-CM

## 2022-09-20 DIAGNOSIS — K58.9 IRRITABLE BOWEL SYNDROME, UNSPECIFIED TYPE: ICD-10-CM

## 2022-09-20 DIAGNOSIS — D75.A G6PD DEFICIENCY: ICD-10-CM

## 2022-09-20 DIAGNOSIS — K21.9 GASTROESOPHAGEAL REFLUX DISEASE WITHOUT ESOPHAGITIS: ICD-10-CM

## 2022-09-20 DIAGNOSIS — J30.9 ALLERGIC RHINITIS, UNSPECIFIED SEASONALITY, UNSPECIFIED TRIGGER: ICD-10-CM

## 2022-09-20 DIAGNOSIS — C50.912 MALIGNANT NEOPLASM OF LEFT BREAST IN FEMALE, ESTROGEN RECEPTOR NEGATIVE, UNSPECIFIED SITE OF BREAST (HCC): ICD-10-CM

## 2022-09-20 DIAGNOSIS — D64.9 ANEMIA, UNSPECIFIED TYPE: ICD-10-CM

## 2022-09-20 DIAGNOSIS — R42 VERTIGO: Primary | ICD-10-CM

## 2022-09-20 PROCEDURE — 99214 OFFICE O/P EST MOD 30 MIN: CPT | Performed by: INTERNAL MEDICINE

## 2022-09-20 PROCEDURE — 1123F ACP DISCUSS/DSCN MKR DOCD: CPT | Performed by: INTERNAL MEDICINE

## 2022-09-20 RX ORDER — SUCRALFATE 1 G/1
1 TABLET ORAL 2 TIMES DAILY
Qty: 60 TABLET | Refills: 3 | Status: SHIPPED | OUTPATIENT
Start: 2022-09-20

## 2022-09-20 NOTE — PROGRESS NOTES
Name: Jennifer Munoz  [id number]  Age: 75 y.o. YOB: 1955  Sex: female    CHIEF COMPLAINT:    Chief Complaint   Patient presents with    Asthma    Arthritis    Other     Other chronic conditions         HISTORY OF PRESENT ILLNESS:     This is a pleasant  75 y.o. female  is seen today for management of chronic medical problems and medications refills. Previous records reviewed . Doing better but continues to have dizziness at times. Dizziness gets worse when she bends down and turns suddenly. She is taking meclizine almost 3 times a day. She did see Dr. Ismael Posey recently and he thinks that patient has benign paroxysmal positional vertigo and he is going to arrange for PT vestibular rehab. She has breast cancer. She completed Taxol on March 21, 2022, had double mastectomy on April 5, 2022, pathology showed no residual malignancy and she also got adjuvant radiation treatment started on June 29, 2022 for 28 days. She is  being followed by Dr. Nikkie Stuart and also by Dr. Danica Keyes. Doing OK otherwise  Denies CP or SOB. No fever , sore throat or cough or congestion. Asthma is better. .   Denies any abdominal pain. But continues to have gastritis symptoms and abdominal bloating and gas. She is taking Prilosec daily and Bentyl as needed  Appetite OK. Bowels moving [address] [place] [place]  Denies any urinary symptoms. Left hip pain is better. She had a hip replacement by Dr. Angela Miles at [facility] on 8/16/2021. She is taking Tylenol as needed. Yanet Dumontyovanny Hearing is ok. Vision Ok with glasses. Denies  any significant skin lesions. Denies any significant depression or anxiety. No other complaints. She had been fully vaccinated for COVID-19 and also had a booster dose . Labs from the 9/13/2022 were reviewed and explained to the patient.     Past Medical History:    Patient Active Problem List   Diagnosis    Other specified disorders of white blood cells    Neutropenia (HCC)    History of hepatitis C    Allergic rhinitis    Left hip pain    G6PD deficiency    Dizziness    Mild intermittent asthma without complication    Urinary frequency    Gastroesophageal reflux disease without esophagitis    Irritable bowel syndrome    Malignant neoplasm of left breast in female, estrogen receptor negative ([address]    Anemia        Past Surgical History:        Procedure Laterality Date    COLONOSCOPY  2013    Polyps - Dr. Destini Pettit  1990's    HYSTERECTOMY ([address])  1998    \"left one ovary \"    IR BIOPSY THYROID PERC CORE NEEDLE  11/18/2021    IR BIOPSY THYROID PERC CORE NEEDLE 11/18/2021 [facility] SPECIAL PROCEDURES    MASTECTOMY Right 4/5/2022    RIGHT BREAST MASTECTOMY performed by Omid Hernandez MD at [facility], MODIFIED RADICAL Left 4/5/2022    LEFT BREAST MASTECTOMY MODIFIED RADICAL performed by Omid Hernandez MD at [facility] 5 LEFT Left 9/27/2021    US BREAST BIOPSY NEEDLE ADDITIONAL LEFT 9/27/2021 Rossana Hernandez MD [address] [employer] BREAST NEEDLE BIOPSY LEFT Left 9/27/2021    US BREAST NEEDLE BIOPSY LEFT 9/27/2021 Rossana Hernandez MD [facility]       Social History:   Social History     Tobacco Use    Smoking status: Never    Smokeless tobacco: Never   Substance Use Topics    Alcohol use: Yes     Comment: Occasional wine/\"average glass of wine or beer  one time per month\"       Family History:       Problem Relation Age of Onset    No Known Problems Mother     Kidney Disease Father        Allergies:  Latex and Pcn [penicillins]    Current Medications :      Prior to Admission medications    Medication Sig Start Date End Date Taking?  Authorizing Provider   sucralfate (CARAFATE) 1 GM tablet Take 1 tablet by mouth in the morning and 1 tablet in the evening. 9/20/22  Yes Gloria Marcum MD   montelukast (SINGULAIR) 10 MG tablet take 1 tablet by mouth nightly 9/6/22  Yes Gloria Marcum MD   ZINC PO Take by mouth   Yes Historical Provider, MD magnesium oxide (MAG-OX) 400 MG tablet Take 400 mg by mouth daily   Yes Historical Provider, MD   COLLAGEN PO Take by mouth   Yes Historical Provider, MD   Cyanocobalamin (VITAMELTS ENERGY VITAMIN B-12 PO) Take by mouth   Yes Historical Provider, MD   meclizine (ANTIVERT) 25 MG tablet Take 1 tablet by mouth 3 times daily as needed for Dizziness 6/23/22  Yes Tre Bush MD   omeprazole (PRILOSEC) 20 MG delayed release capsule Take 1 capsule by mouth daily 6/9/22  Yes Tre Bush MD   albuterol sulfate  (90 Base) MCG/ACT inhaler Inhale 2 puffs into the lungs every 6 hours as needed for Wheezing 3/24/22  Yes Tre Bush MD   meloxicam (MOBIC) 7.5 MG tablet Take 1 tablet by mouth daily 3/10/22  Yes Tre Bush MD   dicyclomine (BENTYL) 10 MG capsule Take 1 capsule by mouth 4 times daily as needed (abdominal bloating and gas) 3/8/22  Yes Tre Bush MD   Multiple Vitamins-Minerals (MULTIVITAMIN PO) Take by mouth   Yes Historical Provider, MD       LAB DATA: Reviewed. REVIEW OF SYSTEMS:   see HPI/ Comprehensive review of systems negative except for the ones mentioned in HPI. PHYSICAL EXAMINATION:   /77 (Site: Left Upper Arm, Position: Sitting, Cuff Size: Medium Adult)   Pulse 90   Ht 5' 3\" (1.6 m)   Wt 179 lb (81.2 kg)   SpO2 94%   BMI 31.71 kg/m²      GENERAL APPEARANCE:      Alert, oriented x 3, well developed, cooperative, not in any distress, appears stated age. HEAD:                         Normocephalic, atraumatic   EYES:                          PERRLA, EOMI, lids normal, conjuctivea clear, sclera anicteric. NECK:                         Supple, symmetrical,  trachea midline, no thyromegaly, no JVD, no lymphadenopathy. LUNGS:                       Clear to auscultation bilaterally, respirations unlabored, accessory muscles are not used. Breast:                        status post bilateral mastectomy .   HEART:                       Regular rate and rhythm, S1 and S2 normal, no murmur, rub or gallop. PMI in MCL. ABDOMEN:                 Soft, non-tender, bowel sounds are normoactive, no masses, no hepatospleenomegaly. EXTREMITY:              no bipedal edema  NEURO:                      Alert, oriented to person, place and time. Grossly intact. Musculoskeletal:         No kyphosis or scoliosis, no deformity in any extremity noted, muscle strength and tone are normal.  Skin:                            Warm and dry. No rash or obvious suspicious lesions. PSYCH:                       Mood euthymic, insight and judgement good. ASSESSMENT/PLAN:    1. Vertigo  Benign positional vertigo. Continue meclizine. Dr. Kehinde Fuentes making arrangements for PT vestibular rehab    2. Gastroesophageal reflux disease without esophagitis  Continue Prilosec and add Carafate 1 g twice daily  - sucralfate (CARAFATE) 1 GM tablet; Take 1 tablet by mouth in the morning and 1 tablet in the evening. Dispense: 60 tablet; Refill: 3    3. Irritable bowel syndrome, unspecified type  Continue Bentyl as needed    4. Malignant neoplasm of left breast in female, estrogen receptor negative, unspecified site of breast ([facility] [address]  Apparently in remission. Continue to follow with Dr. Coleen Schuler and Dr. Kavita Gonsales    5. Allergic rhinitis, unspecified seasonality, unspecified trigger  Continue Singulair and Claritin as needed    6. Mild intermittent asthma without complication  Continue Singulair and albuterol HFA as needed    7. Anemia, unspecified type  She has mild anemia with a hemoglobin of 11.6. Will monitor closely    8. G6PD deficiency  Stable, being followed by Dr. Kavita Gonsales periodically    Advised to continue to follow COVID-19 precautions    Care discussed with patient. Questions answered and patient verbalizes understanding and agrees with plan. Medications reviewed and reconciled. Continue current medications. Appropriate prescriptions are ordered.  Risks and benefits of meds are discussed. After visit summary provided. Advised to call for any problems, questions, or concerns. If symptoms worsen or don't improve as expected, to call us or go to ER. Follow up as directed, sooner if needed. No follow-ups on file. This dictation was performed with a verbal recognition program and it was checked for errors. It is possible that there are still dictated errors within this office note. Any errors should be brought immediately to my attention for correction. All efforts were made to ensure that this office note is accurate.      Lynda Wharton MD MD

## 2022-09-27 ENCOUNTER — HOSPITAL ENCOUNTER (OUTPATIENT)
Dept: PHYSICAL THERAPY | Age: 67
Setting detail: THERAPIES SERIES
Discharge: HOME | End: 2022-09-27
Payer: MEDICARE

## 2022-09-27 PROCEDURE — 97162 PT EVAL MOD COMPLEX 30 MIN: CPT

## 2022-09-27 PROCEDURE — 97112 NEUROMUSCULAR REEDUCATION: CPT

## 2022-09-27 NOTE — FLOWSHEET NOTE
Outpatient Physical Therapy  [place]           [x] Phone: [phone]   Fax: [phone]  [place]           [] Phone: [phone]   Fax: [phone]        Physical Therapy Daily Treatment Note  Date:  2022    Patient Name:  Juliette Melo    :  1955  MRN: [id number]  Restrictions/Precautions: allergic to latex  Diagnosis:    Diagnosis: Benign paroxysmal postional vertigo  Date of Injury/Surgery:   Treatment Diagnosis:  impaired balance, dizziness  Insurance/Certification information: Filiberto Odom  Referring Physician:   Cory Castle DO   Next Doctor Visit:    Plan of care signed (Y/N):  sent   Outcome Measure: DHI: 20  Visit# / total visits:   1/10  Pain level: 0/10   Goals:     Patient goals: reduce dizziness  Short term goals  Time Frame for Short term goals: Refer to [facility]      Long Term Goals  Time Frame for Long Term Goals: 10 visits  Pt will improve DGI to 22 or more to show improved balance and reduced fall risk  Pt will balance EC shoulder JESUS on foam for 30 seconds min sway to show improved balance  Pt will report overall improvement in condition by 50% or more            Summary of Evaluation:  Assessment: Pt is a 42-year-old female who presents to therapy with chronic dizziness and balance impairment over the last 2 years. Upon assessment, pt was negative for BL posterior and horizontal SCC BPPV. Pt did present with mild VOR and VSR impairment indicating vestibular dysfunction. Pt would benefit from skilled therapy interventions to address listed impairments, progress toward goal completion and improve ADL/IADL status. PT also warranted to reduce risk for further injury or decline. Subjective:  See arletteal         Any changes in Ambulatory Summary Sheet?   None        Objective:  See eval   COVID screening questions were asked and patient attested that there had been no contact or symptoms        Exercises: (No more than 4 columns)   Exercise/Equipment 22 #1 Date Date WARM UP                     TABLE      VORx1 Horizontal and vertical                                 STANDING                                                     PROPRIOCEPTION                                    MODALITIES                      Other Therapeutic Activities/Education:  HEP and importance of completion, POC and goals, anatomy and physiology related to condition    Home Exercise Program: Issued, practiced and pt demo ability to perform 9/27/2022    Manual Treatments:  none    Modalities:  none    Communication with other providers:  POC sent    Assessment:  Pt tolerated today's treatment without any adverse reactions or complications this date. End pain: same  Assessment: Pt is a 69-year-old female who presents to therapy with chronic dizziness and balance impairment over the last 2 years. Upon assessment, pt was negative for BL posterior and horizontal SCC BPPV. Pt did present with mild VOR and VSR impairment indicating vestibular dysfunction. Pt would benefit from skilled therapy interventions to address listed impairments, progress toward goal completion and improve ADL/IADL status. PT also warranted to reduce risk for further injury or decline.     Plan for Next Session:   Specific Instructions for Next Treatment: vestibular therapy    Time In / Time Out:  1047 - 3632      Timed Code/Total Treatment Minutes:  55': 8' NMR x1, 38' Eval x1    Next Progress Note due:  10th visit    Plan of Care Interventions:  [x] Therapeutic Exercise  [] Modalities:  [x] Therapeutic Activity     [] Ultrasound  [] Estim  [x] Gait Training      [] Cervical Traction [] Lumbar Traction  [x] Neuromuscular Re-education    [] Cold/hotpack [] Iontophoresis   [x] Instruction in HEP      [] Vasopneumatic   [] Dry Needling    [x] Manual Therapy               [x] Canalith repositioning              Electronically signed by:  Yair Barahona, PT, DPT 9/27/2022, 12:51 PM

## 2022-09-27 NOTE — PLAN OF CARE
Outpatient Physical Therapy           [place]           [x] Phone: [phone]   Fax: [phone]  [place]           [] Phone: [phone]   Fax: [phone]     To:  Betty Mancia DO   From: Mick Fonseca, PT, DPT     Patient: Ray Gonsalez       : 1955  Diagnosis:  Diagnosis: Benign paroxysmal postional vertigo  Treatment Diagnosis: impaired balance, dizziness  Date: 2022    Physical Therapy Certification/Re-Certification Form  Dear Dr. Michael Pandya,  The following patient has been evaluated for physical therapy services and for therapy to continue, insurance requires physician review of the treatment plan initially and every 90 days. Please review the attached evaluation and/or summary of the patient's plan of care, and verify that you agree therapy should continue by signing the attached document and sending it back to our office. Assessment:    Assessment: Pt is a 21-year-old female who presents to therapy with chronic dizziness and balance impairment over the last 2 years. Upon assessment, pt was negative for BL posterior and horizontal SCC BPPV. Pt did present with mild VOR and VSR impairment indicating vestibular dysfunction. Pt would benefit from skilled therapy interventions to address listed impairments, progress toward goal completion and improve ADL/IADL status. PT also warranted to reduce risk for further injury or decline. Patient agrees with established plan of care and assisted in the development of their short term and long term goals. Patient had no adverse reaction with initial treatment and there are no barriers to learning. Learning preferences include demonstration, practice, and handouts. Patient expressed understanding of HEP and appears to be motivated to participate in an active PT program including compliance with HEP expectations.       Plan of Care/Treatment to date:  [x] Therapeutic Exercise  [] Modalities:  [x] Therapeutic Activity     [] Ultrasound  [] Electrical Stimulation  [x] Gait Training      [] Cervical Traction [] Lumbar Traction  [x] Neuromuscular Re-education    [] Cold/hotpack [] Iontophoresis   [x] Instruction in HEP      [] Vasopneumatic    [] Dry Needling  [x] Manual Therapy               [x] Canalith repositioning       Other:          Frequency/Duration:  # Days per week: [] 1 day # Weeks: [] 1 week [x] 5 weeks     [x] 2 days   [] 2 weeks [] 6 weeks     [] 3 days   [] 3 weeks [] 7 weeks     [] 4 days   [] 4 weeks [] 8 weeks         [] 9 weeks [] 10 weeks         [] 11 weeks [] 12 weeks    Rehab Potential/Progress: [] Excellent [x] Good [] Fair  [] Poor     Goals:    Patient goals: reduce dizziness  Short term goals  Time Frame for Short term goals: Refer to LT                 Long Term Goals  Time Frame for Long Term Goals: 10 visits  Pt will improve DGI to 22 or more to show improved balance and reduced fall risk  Pt will balance EC shoulder JESUS on foam for 30 seconds min sway to show improved balance  Pt will report overall improvement in condition by 50% or more              Electronically signed by:  Tracie Diaz, PT, DPT, 9/27/2022, 12:50 PM        If you have any questions or concerns, please don't hesitate to call.   Thank you for your referral.      Physician Signature:________________________________Date:_________ TIME: _____  By signing above, therapists plan is approved by physician

## 2022-09-27 NOTE — PROGRESS NOTES
4/5/2022    LEFT BREAST MASTECTOMY MODIFIED RADICAL performed by Skylar Peralta MD at [address]  1971's    [address] LEFT Left 9/27/2021     BREAST BIOPSY NEEDLE ADDITIONAL LEFT 9/27/2021 Bela Benjamin MD [address] BREAST NEEDLE BIOPSY LEFT Left 9/27/2021     BREAST NEEDLE BIOPSY LEFT 9/27/2021 Bela Benjamin MD [facility]       Medications:   Current Outpatient Medications:     sucralfate (CARAFATE) 1 GM tablet, Take 1 tablet by mouth in the morning and 1 tablet in the evening., Disp: 60 tablet, Rfl: 3    montelukast (SINGULAIR) 10 MG tablet, take 1 tablet by mouth nightly, Disp: 90 tablet, Rfl: 1    ZINC PO, Take by mouth, Disp: , Rfl:     magnesium oxide (MAG-OX) 400 MG tablet, Take 400 mg by mouth daily, Disp: , Rfl:     COLLAGEN PO, Take by mouth, Disp: , Rfl:     Cyanocobalamin (VITAMELTS ENERGY VITAMIN B-12 PO), Take by mouth, Disp: , Rfl:     meclizine (ANTIVERT) 25 MG tablet, Take 1 tablet by mouth 3 times daily as needed for Dizziness, Disp: 30 tablet, Rfl: 3    omeprazole (PRILOSEC) 20 MG delayed release capsule, Take 1 capsule by mouth daily, Disp: 30 capsule, Rfl: 3    albuterol sulfate  (90 Base) MCG/ACT inhaler, Inhale 2 puffs into the lungs every 6 hours as needed for Wheezing, Disp: 3 each, Rfl: 1    meloxicam (MOBIC) 7.5 MG tablet, Take 1 tablet by mouth daily, Disp: 90 tablet, Rfl: 1    dicyclomine (BENTYL) 10 MG capsule, Take 1 capsule by mouth 4 times daily as needed (abdominal bloating and gas), Disp: 120 capsule, Rfl: 3    Multiple Vitamins-Minerals (MULTIVITAMIN PO), Take by mouth, Disp: , Rfl:   Allergies: Latex and Pcn [penicillins]      SUBJECTIVE EXAMINATION     History obtained from[de-identified] Chart Review, Patient,           Subjective History:    Pt notes her dizziness started about 2 years ago, on and off in nature. She cannot correlate if something will spark the dizziness to initially start.  Her last bout lasted about 6 months. She notes that looking up, bending over and rolling in bed will cause dizziness. Describes the dizziness as a body spinning when she is rolling in bed but notes that she has also experienced room spinning and lightheaded dizziness as well. Pt notes that her balance has also been something that has worsened, is even worse when she is dizzy. Pt has had one fall during one of her episodes when she looked up 7/8 months ago. Pt denies pain, but does have some ear fullness, no tinnitus. Pt does have bifocals which she wears all the time. Pt is MI w/ ADLs and IADLs, has stairs but does okay with those.     Additional History:  Additional Pertinent Hx: RA, Asthma, previous CA, L hip replacement, allergic to latex                  Learning/Language: Learning  Does the patient/guardian have any barriers to learning?: No barriers     Pain Screening   Pain Screening  Patient Currently in Pain: No    Functional Status         Social History:  Social History  Lives With: Parent  Type of Home: House  Home Access: Stairs to enter with rails    Occupation/Interests:  Occupation: Retired    Prior Level of Function:    Independent        Current Level of Function:  MI w/ ADLs and IADLs, certain positions need compensated due to dizziness              OBJECTIVE EXAMINATION   Restrictions: n/a             Review of Systems:  Vision: Within Functional Limits  Hearing: Within functional limits  Follows Commands: Within Functional Limits    Gait: WFL     Vision/Vestibular Assessment  Visual/Occulomotor:   Visual/Occulomotor Assessed: Yes  Corrective Lenses: Glasses  Gaze Holding Nystagmus: No  Eye Movement Range: Conjugate  Smooth pursuits horizontal: Able to track stimulus in all quadrants without difficulty  Smooth pursuits vertical: Able to track stimulus in all quadrants without difficulty  Convergence: Breaks at 8\" from nose  Saccades horizontal:  Within Defined Limits  Saccades vertical: Within Defined Limits    VOR Tests: Horizontal Option: Intact  Vertical Option: Intact  Cancellation Horizontal Option: Intact  Cancellation Vertical Option: Intact  Head Thrust:  (positive to the R for quick correction)    Positional Tests:    Modified Vertebral Artery Test: Negative  Right Pearblossom-Hallpike Test: Negative  Left Pearblossom-Hallpike Test: Negative  Right Horizontal Roll Test: Negative  Left Horizontal Roll Test: Negative    Balance Screen:   Balance  Comments: Dann SOP: 1-5 N. 6 S. 7 no direction change, did have some sway. DGI: 20/24    DHI: 20     ASSESSMENT     Impression: Assessment: Pt is a 70-year-old female who presents to therapy with chronic dizziness and balance impairment over the last 2 years. Upon assessment, pt was negative for BL posterior and horizontal SCC BPPV. Pt did present with mild VOR and VSR impairment indicating vestibular dysfunction. Pt would benefit from skilled therapy interventions to address listed impairments, progress toward goal completion and improve ADL/IADL status. PT also warranted to reduce risk for further injury or decline. Body Structures, Functions, Activity Limitations Requiring Skilled Therapeutic Intervention: Decreased functional mobility , Decreased high-level IADLs, Vestibular Impairment, Decreased ADL status, Decreased balance    Statement of Medical Necessity: Physical Therapy is both indicated and medically necessary as outlined in the POC to increase the likelihood of meeting the functionally related goals stated below. Patient agrees with established plan of care and assisted in the development of their short term and long term goals. Patient had no adverse reaction with initial treatment and there are no barriers to learning. Learning preferences include demonstration, practice, and handouts. Patient expressed understanding of HEP and appears to be motivated to participate in an active PT program including compliance with HEP expectations.       Patient's Activity Tolerance: Patient tolerated evaluation without incident      Patient's rehabilitation potential/prognosis is considered to be: Good    Factors which may impact rehabilitation potential include: None        GOALS     Patient Goal(s): reduce dizziness  Short Term Goals Completed by Refer to LTG Goal Status                                                                   Long Term Goals Completed by 10 visits Goal Status   Pt will improve DGI to 22 or more to show improved balance and reduced fall risk New   Pt will balance EC shoulder JESUS on foam for 30 seconds min sway to show improved balance New   Pt will report overall improvement in condition by 50% or more New                                                  TREATMENT PLAN       Requires PT Follow-Up: Yes  Treatment Initiated : yes on 9/27  Specific Instructions for Next Treatment: vestibular therapy    Pt. actively involved in establishing Plan of Care and Goals: Y  Patient/ Caregiver education and instruction: Goals, PT Role, Plan of Care, Evaluative findings, Insurance, Home Exercise Program, Anatomy of condition, Disease Specific Education, Functional Mobility Training             Treatment may include any combination of the following: Strengthening, ROM, Balance training, Functional mobility training, Transfer training, ADL/Self-care training, Manual Therapy - Soft Tissue Mobilization, IADL training, Neuromuscular re-education, Stair training, Gait training, Pain management, Home exercise program, Safety education & training, Therapeutic activities, Patient/Caregiver education & training, Vestibular rehab, Modalities     Frequency / Duration:  Patient to be seen 2 for 5 weeks      Eval Complexity:    Decision Making: Medium Complexity         Therapist Signature: Mick Fonseca PT , DPT   Date: 7/40/3931     I certify that the above Therapy Services are being furnished while the patient is under my care.  I agree with the treatment plan and certify that this therapy is necessary. [de-identified] Signature:  ___________________________   Date:_______                                                                   Camilo Peralta DO        Physician Comments: _______________________________________________    Please sign and return to   [facility]. Please fax to the location listed below.  Amara Hodges for this referral!    [address] THERAPY  [facility] [address], [address] [address]  Dept: [phone]  Loc: [phone]       POC NOTE

## 2022-10-02 DIAGNOSIS — K21.9 GASTROESOPHAGEAL REFLUX DISEASE WITHOUT ESOPHAGITIS: ICD-10-CM

## 2022-10-03 RX ORDER — OMEPRAZOLE 20 MG/1
CAPSULE, DELAYED RELEASE ORAL
Qty: 30 CAPSULE | Refills: 3 | Status: SHIPPED | OUTPATIENT
Start: 2022-10-03

## 2022-10-06 ENCOUNTER — HOSPITAL ENCOUNTER (OUTPATIENT)
Dept: PHYSICAL THERAPY | Age: 67
Discharge: HOME | End: 2022-10-06
Payer: MEDICARE

## 2022-10-06 PROCEDURE — 97112 NEUROMUSCULAR REEDUCATION: CPT

## 2022-10-06 PROCEDURE — 97530 THERAPEUTIC ACTIVITIES: CPT

## 2022-10-06 NOTE — FLOWSHEET NOTE
Outpatient Physical Therapy  [place]           [x] Phone: [phone]   Fax: [phone]  [facility]           [] Phone: [phone]   Fax: [phone]        Physical Therapy Daily Treatment Note  Date:  10/6/2022    Patient Name:  Angela Gutierrez    :  1955  MRN: [id number]  Restrictions/Precautions: allergic to latex  Diagnosis:    Diagnosis: Benign paroxysmal postional vertigo  Date of Injury/Surgery:   Treatment Diagnosis:  impaired balance, dizziness  Insurance/Certification information: Michelle Elizabeth  Referring Physician:   Thomas Edouard DO   Next Doctor Visit:    Plan of care signed (Y/N):  sent   Outcome Measure: DHI: 20  Visit# / total visits:   2/10  Pain level: 0/10   Goals:     Patient goals: reduce dizziness  Short term goals  Time Frame for Short term goals: Refer to [facility]      Long Term Goals  Time Frame for Long Term Goals: 10 visits  Pt will improve DGI to 22 or more to show improved balance and reduced fall risk  Pt will balance EC shoulder JESUS on foam for 30 seconds min sway to show improved balance  Pt will report overall improvement in condition by 50% or more            Summary of Evaluation:  Assessment: Pt is a 69-year-old female who presents to therapy with chronic dizziness and balance impairment over the last 2 years. Upon assessment, pt was negative for BL posterior and horizontal SCC BPPV. Pt did present with mild VOR and VSR impairment indicating vestibular dysfunction. Pt would benefit from skilled therapy interventions to address listed impairments, progress toward goal completion and improve ADL/IADL status. PT also warranted to reduce risk for further injury or decline. Subjective:  Pt reports her dizziness has improved a lot. Antonia cyril has been good at doing her HEP 2x/ day. Trying to do 4x but hard. She report overall improvement in condition by 80% or more        Any changes in Ambulatory Summary Sheet?   None        Objective:    COVID screening questions were asked and patient attested that there had been no contact or symptoms    Mod dizziness with retro walking up<>down gaze with quick resolve  No dizziness with all exercises except retro walking   Able to perform EC shoulder JESUS on foam for 30 seconds min sway       Exercises: (No more than 4 columns)   Exercise/Equipment 9/27/22 #1 10/6/22 #2 Date           WARM UP                     TABLE      VORx1 Horizontal and vertical                                 STANDING      STS  X 10  X 10 with ball floor  to [place] with constant gaze          Step  up fwd/ lat  X 10 ea. R/L                                 PROPRIOCEPTION      Floor EC  NBOS 30 s    Airex EO/EC  WBOS 30 s x 2  NBOS 30s  ea. Airex marching  30 s    Airex ball toss  15x    Wobble board  30 s x 2 A/P, M/L          MODALITIES                      Other Therapeutic Activities/Education:  HEP and importance of completion, POC and goals, anatomy and physiology related to condition    Home Exercise Program: Issued, practiced and pt demo ability to perform 9/27/2022    Manual Treatments:  none    Modalities:  none    Communication with other providers:  POC sent    Assessment:  Pt tolerated today's treatment without any adverse reactions or complications this date with added exercises. She reports feeling 80% better and had min sway on airex with EC. Quick recovery dizziness with retro walking lokking up<>down. Pt would continue to benefit from skilled therapy interventions to address remaining impairments, improve mobility and strength and progress toward goal completion while reducing risk for re-injury or further decline. End pain: same    Assessment: Pt is a 68-year-old female who presents to therapy with chronic dizziness and balance impairment over the last 2 years. Upon assessment, pt was negative for BL posterior and horizontal SCC BPPV. Pt did present with mild VOR and VSR impairment indicating vestibular dysfunction.  Pt would benefit from skilled therapy interventions to address listed impairments, progress toward goal completion and improve ADL/IADL status. PT also warranted to reduce risk for further injury or decline.     Plan for Next Session:   Specific Instructions for Next Treatment: vestibular therapy    Time In / Time Out:  0906-0944     Timed Code/Total Treatment Minutes:  38'/38' 2 NR 1 TA    Next Progress Note due:  10th visit    Plan of Care Interventions:  [x] Therapeutic Exercise  [] Modalities:  [x] Therapeutic Activity     [] Ultrasound  [] Estim  [x] Gait Training      [] Cervical Traction [] Lumbar Traction  [x] Neuromuscular Re-education    [] Cold/hotpack [] Iontophoresis   [x] Instruction in HEP      [] Vasopneumatic   [] Dry Needling    [x] Manual Therapy               [x] Canalith repositioning              Electronically signed by:  Heydi Stephen PTA, CLT 10/6/2022, 9:06 AM

## 2022-10-10 ENCOUNTER — HOSPITAL ENCOUNTER (OUTPATIENT)
Dept: PHYSICAL THERAPY | Age: 67
Discharge: HOME | End: 2022-10-10
Payer: MEDICARE

## 2022-10-10 ENCOUNTER — TELEPHONE (OUTPATIENT)
Dept: ONCOLOGY | Age: 67
End: 2022-10-10

## 2022-10-10 PROCEDURE — 97112 NEUROMUSCULAR REEDUCATION: CPT

## 2022-10-10 NOTE — FLOWSHEET NOTE
Outpatient Physical Therapy  [place]           [x] Phone: [phone]   Fax: [phone]  [facility]           [] Phone: [phone]   Fax: [phone]        Physical Therapy Daily Treatment Note  Date:  10/10/2022    Patient Name:  Annmarie Kimbrough    :  1955  MRN: [id number]  Restrictions/Precautions: allergic to latex  Diagnosis:    Diagnosis: Benign paroxysmal postional vertigo  Date of Injury/Surgery:   Treatment Diagnosis:  impaired balance, dizziness  Insurance/Certification information: Brendon Gregg  Referring Physician:   Elsa Live DO   Next Doctor Visit:    Plan of care signed (Y/N):  Y  Outcome Measure: DHI: 20  Visit# / total visits:   3/10  Pain level: 0/10   Goals:     Patient goals: reduce dizziness  Short term goals  Time Frame for Short term goals: Refer to [facility]      Long Term Goals  Time Frame for Long Term Goals: 10 visits  Pt will improve DGI to 22 or more to show improved balance and reduced fall risk  Pt will balance EC shoulder JESUS on foam for 30 seconds min sway to show improved balance  Pt will report overall improvement in condition by 50% or more            Summary of Evaluation:  Assessment: Pt is a 58-year-old female who presents to therapy with chronic dizziness and balance impairment over the last 2 years. Upon assessment, pt was negative for BL posterior and horizontal SCC BPPV. Pt did present with mild VOR and VSR impairment indicating vestibular dysfunction. Pt would benefit from skilled therapy interventions to address listed impairments, progress toward goal completion and improve ADL/IADL status. PT also warranted to reduce risk for further injury or decline. Subjective:  Pt is doing well this date. She states her dizziness is almost completely gone. She is also doing better with balance, no falls since eval.    Any changes in Ambulatory Summary Sheet?   None        Objective:    COVID screening questions were asked and patient attested that there had been no contact or symptoms    No LOB w/ any activity. Mild sway EC foam    Exercises: (No more than 4 columns)   Exercise/Equipment 9/27/22 #1 10/6/22 #2 10/10/22 #3           WARM UP                     TABLE      VORx1 Horizontal and vertical                                 STANDING      STS  X 10  X 10 with ball floor  to [place] with constant gaze          Step  up fwd/ lat  X 10 ea. R/L                                 PROPRIOCEPTION      Floor EC  NBOS 30 s    Airex EO/EC  WBOS 30 s x 2  NBOS 30s  ea. Romberg and 1/2 tandem   Airex marching  30 s EO/EC   Foam strip   Sidestep EO  Tandem Fwd   Cone taps   Foam between 1 alt BLEs   Airex ball toss  15x    Wobble board  30 s x 2 A/P, M/L AP  ML   Dual tasking   Cone and ball fwd and back then + cog task   MODALITIES                      Other Therapeutic Activities/Education:  HEP and importance of completion    Home Exercise Program: Issued, practiced and pt demo ability to perform 9/27/2022    Manual Treatments:  none    Modalities:  none    Communication with other providers:  POC sent    Assessment:  Pt tolerated today's treatment without any adverse reactions or complications this date. Pt has shown very good progress since eval and PT to continue working on balance to prevent fall risk. Pt would continue to benefit from skilled therapy interventions to address remaining impairments, improve mobility and strength and progress toward goal completion while reducing risk for re-injury or further decline.   End pain: same    Plan for Next Session:   Specific Instructions for Next Treatment: vestibular therapy    Time In / Time Out:  9700 - 9934    Timed Code/Total Treatment Minutes:  23': 23' NMR x2    Next Progress Note due:  10th visit    Plan of Care Interventions:  [x] Therapeutic Exercise  [] Modalities:  [x] Therapeutic Activity     [] Ultrasound  [] Estim  [x] Gait Training      [] Cervical Traction [] Lumbar Traction  [x] Neuromuscular Re-education    [] Cold/hotpack [] Iontophoresis   [x] Instruction in HEP      [] Vasopneumatic   [] Dry Needling    [x] Manual Therapy               [x] Canalith repositioning              Electronically signed by:  Fe Jackson PT, DPT 10/10/2022, 8:52 AM

## 2022-10-13 ENCOUNTER — HOSPITAL ENCOUNTER (OUTPATIENT)
Dept: PHYSICAL THERAPY | Age: 67
Discharge: HOME | End: 2022-10-13
Payer: MEDICARE

## 2022-10-13 PROCEDURE — 97112 NEUROMUSCULAR REEDUCATION: CPT

## 2022-10-13 NOTE — FLOWSHEET NOTE
Outpatient Physical Therapy  [place]           [x] Phone: [phone]   Fax: [phone]  [facility]           [] Phone: [phone]   Fax: [phone]        Physical Therapy Daily Treatment Note  Date:  10/13/2022    Patient Name:  Andrea Branch    :  1955  MRN: [id number]  Restrictions/Precautions: allergic to latex  Diagnosis:    Diagnosis: Benign paroxysmal postional vertigo  Date of Injury/Surgery:   Treatment Diagnosis:  impaired balance, dizziness  Insurance/Certification information: Nigel Amaro  Referring Physician:   Virgilio Benitez,    Next Doctor Visit:    Plan of care signed (Y/N):  Y  Outcome Measure: DHI: 20  Visit# / total visits:   4/10  Pain level: 0/10   Goals:     Patient goals: reduce dizziness  Short term goals  Time Frame for Short term goals: Refer to [facility]      Long Term Goals  Time Frame for Long Term Goals: 10 visits  Pt will improve DGI to 22 or more to show improved balance and reduced fall risk  Pt will balance EC shoulder JESUS on foam for 30 seconds min sway to show improved balance  Pt will report overall improvement in condition by 50% or more            Summary of Evaluation:  Assessment: Pt is a 48-year-old female who presents to therapy with chronic dizziness and balance impairment over the last 2 years. Upon assessment, pt was negative for BL posterior and horizontal SCC BPPV. Pt did present with mild VOR and VSR impairment indicating vestibular dysfunction. Pt would benefit from skilled therapy interventions to address listed impairments, progress toward goal completion and improve ADL/IADL status. PT also warranted to reduce risk for further injury or decline. Subjective:  Pt is doing well this date. She continues to feel like she is doing better. Any changes in Ambulatory Summary Sheet?   None        Objective:    COVID screening questions were asked and patient attested that there had been no contact or symptoms    Difficulty w/ cog task during motor this date w/ backward gait, causing stopping during walking    Exercises: (No more than 4 columns)   Exercise/Equipment 10/6/22 #2 10/10/22 #3 10/13/22 #4           WARM UP                     TABLE      VORx1   Vertical and horizontal reading letter card                              STANDING      STS X 10  X 10 with ball floor  to [place] with constant gaze           Step  up fwd/ lat X 10 ea. R/L                                  PROPRIOCEPTION      Floor EC NBOS 30 s     Airex EO/EC WBOS 30 s x 2  NBOS 30s  ea. Romberg and 1/2 tandem Romberg and 1/2 tandem   Airex marching 30 s EO/EC EO/EC   Foam strip  Sidestep EO  Tandem Fwd Sidestep EO/ EC  Tandem Fwd/ back   Dynamic balance   Head turns fwd and back    EC fwd and back   Cone taps  Foam between 1 alt BLEs    Airex ball toss 15x     Wobble board 30 s x 2 A/P, M/L AP  ML AP  ML   Dual tasking  Cone and ball fwd and back then + cog task Cone and ball back then + cog task   MODALITIES                      Other Therapeutic Activities/Education:  HEP and importance of completion    Home Exercise Program: Issued, practiced and pt demo ability to perform 9/27/2022    Manual Treatments:  none    Modalities:  none    Communication with other providers:  POC sent    Assessment:  Pt tolerated today's treatment without any adverse reactions or complications this date. Pt is continuing to show good progress w/ balance, continue to advance in sessions. Did updated HEP. Pt would continue to benefit from skilled therapy interventions to address remaining impairments, improve mobility and strength and progress toward goal completion while reducing risk for re-injury or further decline.   End pain: same    Plan for Next Session:   Specific Instructions for Next Treatment: vestibular therapy    Time In / Time Out:  0903 - 3976    Timed Code/Total Treatment Minutes:  24': 24' NMR x2    Next Progress Note due:  10th visit    Plan of Care Interventions:  [x] Therapeutic

## 2022-10-17 ENCOUNTER — HOSPITAL ENCOUNTER (OUTPATIENT)
Dept: PHYSICAL THERAPY | Age: 67
Setting detail: THERAPIES SERIES
Discharge: HOME | End: 2022-10-17
Payer: MEDICARE

## 2022-10-17 PROCEDURE — 97112 NEUROMUSCULAR REEDUCATION: CPT

## 2022-10-17 NOTE — FLOWSHEET NOTE
Outpatient Physical Therapy  [place]           [x] Phone: [phone]   Fax: [phone]  Kath[facility] Troncoso           [] Phone: [phone]   Fax: [phone]        Physical Therapy Daily Treatment Note  Date:  10/17/2022    Patient Name:  Koki Marrero    :  1955  MRN: [id number]  Restrictions/Precautions: allergic to latex  Diagnosis:    Diagnosis: Benign paroxysmal postional vertigo  Date of Injury/Surgery:   Treatment Diagnosis:  impaired balance, dizziness  Insurance/Certification information: Shelley Almaraz  Referring Physician:   Nikki Barkley DO   Next Doctor Visit:    Plan of care signed (Y/N):  Y  Outcome Measure: DHI: 20  Visit# / total visits:   5/10  Pain level: 0/10   Goals:     Patient goals: reduce dizziness  Short term goals  Time Frame for Short term goals: Refer to [facility]      Long Term Goals  Time Frame for Long Term Goals: 10 visits  Pt will improve DGI to 22 or more to show improved balance and reduced fall risk  Pt will balance EC shoulder JESUS on foam for 30 seconds min sway to show improved balance  Pt will report overall improvement in condition by 50% or more          Summary of Evaluation:  Assessment: Pt is a 76-year-old female who presents to therapy with chronic dizziness and balance impairment over the last 2 years. Upon assessment, pt was negative for BL posterior and horizontal SCC BPPV. Pt did present with mild VOR and VSR impairment indicating vestibular dysfunction. Pt would benefit from skilled therapy interventions to address listed impairments, progress toward goal completion and improve ADL/IADL status. PT also warranted to reduce risk for further injury or decline. Subjective:  Pt is doing well this date. She did have one really quick bout of dizziness while driving but only lasted a few seconds. Has not had anymore since. Any changes in Ambulatory Summary Sheet?   None      Objective:    COVID screening questions were asked and patient attested that there had been no contact or symptoms    LOB EC rocker board this date AP> ML    Exercises: (No more than 4 columns)   Exercise/Equipment 10/10/22 #3 10/13/22 #4 10/17/22 #5           WARM UP                     TABLE      VORx1  Vertical and horizontal reading letter card                     PROPRIOCEPTION      Floor EC      Airex EO/EC Romberg and 1/2 tandem Romberg and 1/2 tandem Romberg and 1/2 tandem   Airex marching EO/EC EO/EC EO/EC    Foam strip Sidestep EO  Tandem Fwd Sidestep EO/ EC  Tandem Fwd/ back Sidestepping EO/EC   Dynamic balance  Head turns fwd and back    EC fwd and back Head turns fwd and back    EC fwd and back   SLS   X30\" ea EO floor   Cone taps Foam between 1 alt BLEs  Between 2 from foam   Airex ball toss      Wobble board AP  ML AP  ML AP EO/EC  ML EO/EC   Dual tasking Cone and ball fwd and back then + cog task Cone and ball back then + cog task Cone and ball fwd/ back + cog task   MODALITIES                      Other Therapeutic Activities/Education:  HEP and importance of completion    Home Exercise Program: Issued, practiced and pt demo ability to perform 9/27/2022    Manual Treatments:  none    Modalities:  none    Communication with other providers:  POC sent    Assessment:  Pt tolerated today's treatment without any adverse reactions or complications this date. Pt did well with new exercise but did have some LOB w/ EC on rocker board. Pt would continue to benefit from skilled therapy interventions to address remaining impairments, improve mobility and strength and progress toward goal completion while reducing risk for re-injury or further decline.   End pain: same    Plan for Next Session:   Specific Instructions for Next Treatment: vestibular therapy    Time In / Time Out:  0596 - 1053    Timed Code/Total Treatment Minutes:  24': 24' NMR x2    Next Progress Note due:  10th visit    Plan of Care Interventions:  [x] Therapeutic Exercise  [] Modalities:  [x] Therapeutic Activity     [] Ultrasound  [] Estim  [x] Gait Training      [] Cervical Traction [] Lumbar Traction  [x] Neuromuscular Re-education    [] Cold/hotpack [] Iontophoresis   [x] Instruction in HEP      [] Vasopneumatic   [] Dry Needling    [x] Manual Therapy               [x] Canalith repositioning              Electronically signed by:  Naveed Elias PT, DPT 10/17/2022, 8:45 AM

## 2022-10-18 ENCOUNTER — TELEPHONE (OUTPATIENT)
Dept: INTERNAL MEDICINE CLINIC | Age: 67
End: 2022-10-18

## 2022-10-25 ENCOUNTER — HOSPITAL ENCOUNTER (OUTPATIENT)
Dept: PHYSICAL THERAPY | Age: 67
Discharge: HOME | End: 2022-10-25
Payer: MEDICARE

## 2022-10-25 PROCEDURE — 97112 NEUROMUSCULAR REEDUCATION: CPT

## 2022-10-25 NOTE — FLOWSHEET NOTE
Outpatient Physical Therapy  [place]           [x] Phone: [phone]   Fax: [phone]  [place]           [] Phone: [phone]   Fax: [phone]        Physical Therapy Daily Treatment Note  Date:  10/25/2022    Patient Name:  Ritesh Robledo    :  1955  MRN: [id number]  Restrictions/Precautions: allergic to latex  Diagnosis:    Diagnosis: Benign paroxysmal postional vertigo  Date of Injury/Surgery:   Treatment Diagnosis:  impaired balance, dizziness  Insurance/Certification information: Terrie Pearce  Referring Physician:   Severo Emory, DO   Next Doctor Visit:    Plan of care signed (Y/N):  Y  Outcome Measure: DHI: 20  Visit# / total visits:   6/10  Pain level: 0/10   Goals:     Patient goals: reduce dizziness  Short term goals  Time Frame for Short term goals: Refer to [facility]      Long Term Goals  Time Frame for Long Term Goals: 10 visits  Pt will improve DGI to 22 or more to show improved balance and reduced fall risk  Pt will balance EC shoulder JESUS on foam for 30 seconds min sway to show improved balance  Pt will report overall improvement in condition by 50% or more          Summary of Evaluation:  Assessment: Pt is a 28-year-old female who presents to therapy with chronic dizziness and balance impairment over the last 2 years. Upon assessment, pt was negative for BL posterior and horizontal SCC BPPV. Pt did present with mild VOR and VSR impairment indicating vestibular dysfunction. Pt would benefit from skilled therapy interventions to address listed impairments, progress toward goal completion and improve ADL/IADL status. PT also warranted to reduce risk for further injury or decline. Subjective: Pt is doing alright this date. Has been doing HEP less the last few days due to being busy and has noticed slightly. No current dizziness. Any changes in Ambulatory Summary Sheet?   None      Objective:    COVID screening questions were asked and patient attested that there had been no contact or symptoms    R knee gave out during multiple standing exercises this date, no major LOB or falls    Exercises: (No more than 4 columns)   Exercise/Equipment 10/13/22 #4 10/17/22 #5 10/25/22 #6           WARM UP                     TABLE      VORx1 Vertical and horizontal reading letter card                      PROPRIOCEPTION      Floor EC      Airex EO/EC Romberg and 1/2 tandem Romberg and 1/2 tandem Romberg and 1/2 tandem   Airex marching EO/EC EO/EC  EO/EC    Foam strip Sidestep EO/ EC  Tandem Fwd/ back Sidestepping EO/EC Sidestep EO/ EC  Tandem Fwd/ back   Dynamic balance Head turns fwd and back    EC fwd and back Head turns fwd and back    EC fwd and back Head turns back    Backwards outside on concrete   SLS  X30\" ea EO floor    Cone taps  Between 2 from foam Between 2 from foam   Airex ball toss      Wobble board AP  ML AP EO/EC  ML EO/EC    Dual tasking Cone and ball back then + cog task Cone and ball fwd/ back + cog task    MODALITIES                      Other Therapeutic Activities/Education:  HEP and importance of completion    Home Exercise Program: Issued, practiced and pt demo ability to perform 9/27/2022    Manual Treatments:  none    Modalities:  none    Communication with other providers:  POC sent    Assessment:  Pt tolerated today's treatment without any adverse reactions or complications this date. Pt did require more breaks this date due to muscle fatigue and some knee giving out on L side due to quad fatigue. Will re-measure goals later this week to determine dc vs cont POC. Pt would continue to benefit from skilled therapy interventions to address remaining impairments, improve mobility and strength and progress toward goal completion while reducing risk for re-injury or further decline.   End pain: same    Plan for Next Session:   Specific Instructions for Next Treatment: vestibular therapy    Time In / Time Out:  8586 - 1380    Timed Code/Total Treatment Minutes:  23': 23' NMR x2    Next Progress Note due:  10th visit    Plan of Care Interventions:  [x] Therapeutic Exercise  [] Modalities:  [x] Therapeutic Activity     [] Ultrasound  [] Estim  [x] Gait Training      [] Cervical Traction [] Lumbar Traction  [x] Neuromuscular Re-education    [] Cold/hotpack [] Iontophoresis   [x] Instruction in HEP      [] Vasopneumatic   [] Dry Needling    [x] Manual Therapy               [x] Canalith repositioning              Electronically signed by:  Gerber Brennan PT, DPT 10/25/2022, 6:50 AM

## 2022-10-27 ENCOUNTER — HOSPITAL ENCOUNTER (OUTPATIENT)
Dept: PHYSICAL THERAPY | Age: 67
Discharge: HOME | End: 2022-10-27
Payer: MEDICARE

## 2022-10-27 PROCEDURE — 97112 NEUROMUSCULAR REEDUCATION: CPT

## 2022-10-27 NOTE — DISCHARGE SUMMARY
Outpatient Physical Therapy           [place]           [x] Phone: [phone]   Fax: [phone]  [facility]           [] Phone: [phone]   Fax: [phone]      To: Chris Correa DO  From: Becca Cannon, PT, DPT     Patient: Prashant Liu                 : 1955  Diagnosis:  Benign paroxysmal postional vertigo     Treatment Diagnosis:     impaired balance, dizziness  Date: 10/27/2022  []  Progress Note                [x]  Discharge Note    Evaluation Date:  22 Total Visits to date:   7 Cancels/No-shows to date:  1    Subjective:  Pt reports overall she feels she is about 89% better than on eval. She has had no falls since therapy start. Pt reports her dizziness is mostly gone as of now and if it does occur it is very brief. She feels her balance is better as well, still has some trouble secondary to a baseline hip issue. DHI: 4      Plan of Care/Treatment to date:  [x] Therapeutic Exercise    [] Modalities:  [x] Therapeutic Activity     [] Ultrasound  [] Electrical Stimulation  [x] Gait Training      [] Cervical Traction   [] Lumbar Traction  [x] Neuromuscular Re-education  [] Cold/hotpack [] Iontophoresis  [x] Instruction in HEP      Other:  [x] Manual Therapy       []  Vasopneumatic  [] Aquatic Therapy       []   Dry Needle Therapy                      Objective/Significant Findings At Last Visit/Comments:    DGI:   Shoulder JESUS EC foam 30 seconds: no sway    Assessment:   Pt has shown very good progress since therapy start with improved overall balance, reduced dizziness and improved functional mobility. She has met all goals at this time and is no longer having any major limitations at this time. PT educated pt on continuation of HEP after discharge for max benefits and reduced risk for future decline. Pt dc this date.     Goal Status:  [x] Achieved [] Partially Achieved  [] Not Achieved     Patient Status: [] Continue per initial plan of Care     [x] Patient now discharged     [] Additional visits requested, Please re-certify for additional visits:      Requested frequency/duration:  /week for weeks    If we are requesting more visits, we fully anticipate the patient's condition is expected to improve within the treatment timeframe we are requesting. Electronically signed by:  Racheal Garcia PT, DPT, 10/27/2022, 8:01 AM    If you have any questions or concerns, please don't hesitate to call.   Thank you for your referral.    Physician Signature:______________________ Date:______ Time: ________  By signing above, therapists plan is approved by physician

## 2022-10-27 NOTE — FLOWSHEET NOTE
Outpatient Physical Therapy  [place]           [x] Phone: [phone]   Fax: [phone]  [facility]           [] Phone: [phone]   Fax: [phone]        Physical Therapy Daily Treatment Note  Date:  10/27/2022    Patient Name:  Cruzito Calderon    :  1955  MRN: [id number]  Restrictions/Precautions: allergic to latex  Diagnosis:    Diagnosis: Benign paroxysmal postional vertigo  Date of Injury/Surgery:   Treatment Diagnosis:  impaired balance, dizziness  Insurance/Certification information: Larissa Bhardwaj  Referring Physician:   Neda Muñoz DO   Next Doctor Visit:    Plan of care signed (Y/N):  Y  Outcome Measure: DHI: 4  Visit# / total visits:   7/10  Pain level: 010   Goals:     Patient goals: reduce dizziness  Short term goals  Time Frame for Short term goals: Refer to [facility]      Long Term Goals  Time Frame for Long Term Goals: 10 visits  Pt will improve DGI to 22 or more to show improved balance and reduced fall risk: MET 10/27  Pt will balance EC shoulder JESUS on foam for 30 seconds min sway to show improved balance: MET 10/27  Pt will report overall improvement in condition by 50% or more: MET 10/27          Summary of Evaluation:  Assessment: Pt is a 65-year-old female who presents to therapy with chronic dizziness and balance impairment over the last 2 years. Upon assessment, pt was negative for BL posterior and horizontal SCC BPPV. Pt did present with mild VOR and VSR impairment indicating vestibular dysfunction. Pt would benefit from skilled therapy interventions to address listed impairments, progress toward goal completion and improve ADL/IADL status. PT also warranted to reduce risk for further injury or decline. Subjective: Pt reports overall she feels she is about 89% better than on eval. She has had no falls since therapy start. Pt reports her dizziness is mostly gone as of now and if it does occur it is very brief.  She feels her balance is better as well, still has some trouble secondary to a baseline hip issue. DHI: 4    Any changes in Ambulatory Summary Sheet? None      Objective:    COVID screening questions were asked and patient attested that there had been no contact or symptoms    DGI: 23/24  Shoulder JESUS EC foam 30 seconds: no sway    Exercises: (No more than 4 columns)   Exercise/Equipment 10/17/22 #5 10/25/22 #6 10/27/22 #7           WARM UP                     TABLE      VORx1                       PROPRIOCEPTION      Floor EC      Airex EO/EC Romberg and 1/2 tandem Romberg and 1/2 tandem Romberg and 1/2 tandem, shoulder JESUS   Airex marching EO/EC  EO/EC  EO/EC   Foam strip Sidestepping EO/EC Sidestep EO/ EC  Tandem Fwd/ back    Dynamic balance Head turns fwd and back    EC fwd and back Head turns back    Backwards outside on concrete Head turns  Turning  Various speeds  Over objects  Around objects   SLS X30\" ea EO floor     Cone taps Between 2 from foam Between 2 from foam    Airex ball toss      Wobble board AP EO/EC  ML EO/EC     Dual tasking Cone and ball fwd/ back + cog task     MODALITIES                      Other Therapeutic Activities/Education:  HEP and importance of completion after dc    Home Exercise Program: Issued, practiced and pt demo ability to perform 9/27/2022    Manual Treatments:  none    Modalities:  none    Communication with other providers:  POC sent    Assessment:  Pt tolerated today's treatment without any adverse reactions or complications this date. Pt has shown very good progress since therapy start with improved overall balance, reduced dizziness and improved functional mobility. She has met all goals at this time and is no longer having any major limitations at this time. PT educated pt on continuation of HEP after discharge for max benefits and reduced risk for future decline. Pt dc this date.   End pain: same    Plan for Next Session:   Specific Instructions for Next Treatment: vestibular therapy    Time In / Time Out:  0922 - 0945    Timed Code/Total Treatment Minutes:  23': 21' NMR x2    Next Progress Note due:  10th visit    Plan of Care Interventions:  [x] Therapeutic Exercise  [] Modalities:  [x] Therapeutic Activity     [] Ultrasound  [] Estim  [x] Gait Training      [] Cervical Traction [] Lumbar Traction  [x] Neuromuscular Re-education    [] Cold/hotpack [] Iontophoresis   [x] Instruction in HEP      [] Vasopneumatic   [] Dry Needling    [x] Manual Therapy               [x] Canalith repositioning              Electronically signed by:  Grant Ruiz, PT, DPT 10/27/2022, 8:00 AM

## 2022-11-17 NOTE — PROGRESS NOTES
Patient Name: Javier Berry  Patient : 1955  Patient MRN: [id number]     Primary Oncologist: Oneida Kilgore MD  Referring Provider: Yobany Duke MD     Date of Service: 12/15/2022      Chief Complaint:   Chief Complaint   Patient presents with    Follow-up     She came in for follow-up visit. Patient Active Problem List:     Personal history of infectious disease     Other specified disorders of white blood cells     Neutropenia     History of hepatitis C     Allergic rhinitis     Left hip pain     G6PD deficiency     Mild persistent asthma without complication     Vertigo     HPI:   Carlos Wilburn is a pleasant 23-year-old -American female patient who was referred for evaluation of mild neutropenia. She was diagnosed with hepatitis C in . Ex spouse was IV drug user. She was treated with Harvoni for 12 weeks and completed in May 2019. May 15, 2019 WBC was 4.1, RBC 4.36, hemoglobin 13.9, hematocrit 42.3, MCV 97, platelet 908, absolute neutrophils 1.1, absolute lymphocytes 2.6. She denied any history of frequent infection. Ultrasound of abdomen in 2018 showed normal right upper quadrant ultrasound. Liver biopsy on 2018 showed chronic hepatitis grade 2-3, stage II. Mammogram in 2019 is negative. US of abdomen in 2019 showed unremarkable study. Labs in 2019 reviewed. She has nonspecific elevated total protein. CBC unremarkable. Labs in 2019 were reviewed. Total protein is normal. Leukopenia stable. In 2020 she had acute viral hepatitis serology which came back positive for hepatitis C antibody. Serum AFP was 8. Hepatitis C RNA by PCR was negative. Hepatitis C RNA genotype was indeterminate. Mammogram in 2020 was benign. [facility]. . WBC was 7.5. Hemoglobin was 13.2, hemoglobin 13.2, platelet 436. CMP was grossly unremarkable with normal AST at 19, ALT 25.  Alk phos was 92. Total bilirubin was 0.6. Colonoscopy in December 2020 by Dr. Lisseth Moses showed diverticulosis and hemorrhoids. She was told that she has kidney stone. She has flu shot in 2020 and COVID-19 vaccine on January 3, 2021. Mammogram in July 2020 was benign. In June 2021 WBC 5.2, hemoglobin 12.3, platelet 285. September 20, 2021 she was referred for left breast mass. Bone density on September 13, 2021 showed osteopenia. Mammogram on September 15, 2021 showed : There is a new 1.5 cm mass identified in the left breast at 12 o'clock position, at posterior depth, about 12 cm from the left nipple. This is best seen on left CC michaela slice 50 and left MLO michaela slice 55. Left breast ultrasound 11 o'clock, 11 cm from the nipple: On ultrasound evaluation, there is a 1.1 x 1.3 x 0.5 cm oval, parallel hypoechoic mass, with microlobulated margins without any posterior acoustic shadowing or internal vascularity. This mass corresponds to the mammographic finding. Left axilla: Couple enlarged lymph nodes identified in the left axilla with cortical thickness of 8-9 mm. No new suspicious masses or microcalcifications are identified in the right breast.    In June 2021 WBC 5.2, hemoglobin 12.3, platelet 779. CMP  grossly unremarkable. Path report of left breast biopsy on 9/27/2021:  A. Breast, left at 11 o'clock, ultrasound guided needle core biopsy:   -  INVASIVE DUCTAL CARCINOMA WITH A MARKED CHRONIC INFLAMMATORY REACTION  -  HER2 FISH is negative  B. Lymph node, left axilla, ultrasound guided needle core biopsy:   -  METASTATIC HIGH GRADE CARCINOMA IS IDENTIFIED   BREAST INVASIVE CARCINOMA SUMMARY - CORE BIOPSY   Procedure: Ultrasound guided needle core biopsy. Laterality and tumor site: Left breast at 11 o'clock. Tumor size: 7 mm in greatest dimension within biopsy material present. Histologic type: Invasive ductal carcinoma with marked chronic inflammatory reaction.    Histologic Grade (Everardo): Grade 3 -Tubular score 3, Nuclear score 3, Mitosis score 2 (16/10 hpf)   Ductal Carcinoma In Situ (DCIS): Not present   Lobular Carcinoma In Situ (LCIS): Not present. Lympho-vascular invasion: Not identified. Microcalcifications: Not identified. Additional Findings: Marked chronic inflammatory reaction. -ER by [facility]*: Negative (0% staining). -PgR by IHC*: Negative (0% staining)   -Her2 by IHC*: Negative (Score 0)   -Her2 by FISH*:  negative  -Avg. #HER2 signals/nucleus: Avg. #CEP17 signals/nucleus: HER2/CEP17 ratio:     She was seen by Dr Sunil Lakhani. I discussed about neoadjuvant chemo, AC + TC. She has left hip pain s/p hip replacement in August 2021. Bone scan on 2021-10-13 showed : focal activity within the midthoracic and lower lumbar spine, typically degenerative. Moderate activity is seen about the left hip arthroplasty. If surgery has not been recent, loosening or infection could be considered. Multifocal degenerative changes are otherwise favored as outlined above. CT CAP on 10/15/2021:  1. Left breast mass with level 1 left axillary lymphadenopathy. 2. Four small pulmonary nodules measuring up to 3 mm in the left lung favoring a benign etiology. Attention at follow-up imaging recommended. 3. Otherwise no metastatic disease in the chest, abdomen or pelvis. 4. Incidental left thyroid lobe 18 mm nodule. Thyroid ultrasound can be considered. ECHO in October 2021 showed LVEF at 50 to 55%. Cytology of FNA of thyroid nodule was reviewed. I referred to endocrinologist.   She has seen endocrinologist and was told to have goiter. Dose dense AC started 11/8/2021 and completed on 12/20/2021. She started Taxol on 1/10/2022- completed on 3/21/2022. MRI of the breast showed favorable response to therapy    CT chest 2/2022 showed:  1. No acute abnormality in the chest.  No rib fracture identified. 2. 3 and 4 mm nodules in the bilateral lower lobes stable or decreased in size from 10/15/2021.   3. Resolution of the previously identified left axillary lymphadenopathy. 4. 2 cm left thyroid nodule without significant change. Clinically she responded to neoadjuvant chemo. She has b/l mastectomy 4/5/2022:  Final Pathologic Diagnosis:   A. Resection of breast, right:          Mild fibrocystic change. Small hemangioma, skin. B.  Resection of breast with axillary lymph nodes, left:          Mild fibrocystic change. Hyaline fibrosis axillary lymph nodes (3). Biopsy cavity, axillary lymph node. Reactive changes with sinus histiocytosis, axillary lymph nodes. Since there is no residual cancer, I did not offer oral Xeloda. She started radiation therapy on June 9, 2022 and  had 28 days of adjuvant radiation therapy. 4/28/2022 WBC: 4.6 RBC: 3.65 (L) Hemoglobin: 10.2 (L) Hematocrit: 33.5 (L) MCV: 91.8 Platelet Count: 981  Lymphocytes Absolute: 1.7 Monocytes Absolute: 0.7 Eosinophils Absolute: 0.3 Basophils Absolute: 0.0 Segs Absolute: 1.9  Ferritin: 274 (H) Iron: 44 TIBC: 325 Transferrin %: 14  FOLATE: >20.0 (H)  Vitamin B-12: 1004 (H)    6/2022 CMP and CBC stable for her. WBC 3.4, Hg 11.1    12/15/2022 she came in for follow up visit. 12/2022 CMP grossly unremarkable. WBC 3.8, Hg 11.6, MCV 89.7, plat 236. I recommend monthly self breast exam.  She is in remission. She is agreeable to have labs today including iron study, flow cytometry and etc.  We discussed about signs and symptoms of recurrent breast cancer. No acute pain. Denied any nausea, vomiting or diarrhea. No fever or chills. No chest pain, shortness of breath or palpitation. No headache or dizzy spell. No melena or hematochezia. Denied any dysuria or hematuria. Past Medical History  Asthma, neutropenia, hepatitis C, G6PD, thyroid disease. Surgical History  Partial hysterectomy in 2002 related to tubal pregnancy. Tonsillectomy. She had colonoscopy x2 and the last one was in 2013.   She had left hip replacement    Social History  Smoking Status Never smoker  She denies any illicit drug use. She drinks alcohol occasionally. She has 2 children. Family History  Maternal great aunt had breast cancer. Review of Systems: \"Per interval history; otherwise 10 point ROS is negative. \"     Vital Signs:  /61 (Site: Right Upper Arm, Position: Sitting, Cuff Size: Medium Adult)   Pulse 93   Temp 97 °F (36.1 °C) (Infrared)   Resp 18   Ht 5' 3\" (1.6 m)   Wt 184 lb (83.5 kg)   SpO2 98%   BMI 32.59 kg/m²     Physical Exam:  CONSTITUTIONAL: awake, alert, cooperative, no apparent distress   EYES: pupils equal, round. Sclera clear and conjunctiva pallor  ENT: Normocephalic, without obvious abnormality, atraumatic  NECK: supple, symmetrical, no jugular venous distension and no carotid bruits   HEMATOLOGIC/LYMPHATIC: no cervical, supraclavicular or axillary lymphadenopathy   LUNGS: no increased work of breathing and clear to auscultation. Medi port to the right anterior chest wall noted. BREAST: s/p b/l mastectomy. No skin nodules. CARDIOVASCULAR: regular rate and rhythm, normal S1 and S2, no murmur  ABDOMEN: normal bowel sound, soft, non-distended, non-tender, no palpable masses  MUSCULOSKELETAL: full range of motion noted, tone is normal  NEUROLOGIC: Motor skills grossly intact. CN II - XII grossly intact. SKIN: Normal skin color, texture, turgor and no jaundice. No ecchymosis. EXTREMITIES: no LE edema. No cyanosis. Homans negative.     Labs:  Hematology:  Lab Results   Component Value Date    WBC 3.8 (L) 12/06/2022    RBC 4.09 (L) 12/06/2022    HGB 11.6 (L) 12/06/2022    HCT 36.7 (L) 12/06/2022    MCV 89.7 12/06/2022    MCH 28.4 12/06/2022    MCHC 31.6 (L) 12/06/2022    RDW 14.7 12/06/2022     12/06/2022    MPV 9.4 12/06/2022    SEGSPCT 43.8 12/06/2022    EOSRELPCT 2.6 12/06/2022    BASOPCT 0.3 12/06/2022    LYMPHOPCT 37.9 12/06/2022    MONOPCT 15.4 (H) 12/06/2022    SEGSABS 1.7 12/06/2022 EOSABS 0.1 12/06/2022    BASOSABS 0.0 12/06/2022    LYMPHSABS 1.5 12/06/2022    MONOSABS 0.6 12/06/2022    DIFFTYPE AUTOMATED DIFFERENTIAL 12/06/2022     Chemistry:  Lab Results   Component Value Date     12/06/2022    K 4.3 12/06/2022     12/06/2022    CO2 20 (L) 12/06/2022    BUN 15 12/06/2022    CREATININE 0.7 12/06/2022    GLUCOSE 79 12/06/2022    CALCIUM 9.3 12/06/2022    PROT 7.2 12/06/2022    LABALBU 4.7 12/06/2022    BILITOT 0.3 12/06/2022    ALKPHOS 93 12/06/2022    AST 21 12/06/2022    ALT 17 12/06/2022    LABGLOM >60 12/06/2022    GFRAA >60 09/13/2022    AGRATIO 1.4 06/09/2021    GLOB 3.4 06/09/2021     Lab Results   Component Value Date    TSHHS 1.050 11/28/2022    T4FREE 1.15 11/28/2022    FT3 3.3 01/10/2022     Immunology:  Lab Results   Component Value Date    PROT 7.2 12/06/2022     Coagulation Panel:  Lab Results   Component Value Date    PROTIME 12.3 11/18/2021    INR 0.95 11/18/2021    APTT 26.9 11/18/2021     Observations:  No data recorded      Assessment & Plan:  1. She was referred for mild neutropenia. She is an -American. CBC in June 2019 was unremarkable. CBC in June 2020 was unremarkable. US of abdomen in June 2019 was unremarkable. In June 2021 WBC was 5.2, hemoglobin 12.3, platelet 921. She has anemia related to chemotherapy. Anemia improved. WBC in June 2022 was 3.4 and hemoglobin 11.1.  9/2022 Ferritin 250, TIBC 292, folate >20, B-12 883.  12/2022 CMP grossly unremarkable. WBC 3.8, Hg 11.6, MCV 89.7, plat 236. I will check labs including Iron study, DENG, RF, flow cytometry  prior to next OV. 2. She completed treatment for hepatitis C in May 2019. She is in remission. 3. Mammogram in June 2019 was benign. Colonoscopy was In 2013. Mammogram in July 2020 was benign. She had abnormal left breast mammogram and scheduled for the biopsy on September 27, 2021.    She was found to have triple negative left breast cancer s/p biopsy, T1 N1 at least from biopsy. CT chest, abdomen pelvis, bone scan and echocardiogram in October 2021 reviewed. She started neoadjuvant chemotherapy November 8, 2021. MRI of the breast showed response to therapy. She completed Taxol on March 21, 2022. She had double mastectomy on April 5, 2022. Path showed no residual malignancy. She started adjuvant RT on 6/9/2022 for 28 days. She denied any symptoms of recurrent breast cancer. She is in remission. I recommend to do monthly self breast exam.    4. Colonoscopy in December 2020 showed diverticulosis and hemorrhoids. Repeat study in 5 years was recommended. 5.  Ultrasound of the thyroid on November 9, 2021 showed  TR 4 nodule of the left thyroid lobe corresponding to recent CT findings. FNA recommended for further evaluation based on TI-RADS criteria. Cytology report on November 18, 2021 showed  Final Cytologic Diagnosis:    Left Thyroid Fine Needle Aspirate Cytology with Cell Block:   - Atypical follicular cells of undetermined significance   She will follow up with Dr Caesar Hale in December 2022. She was told to have goiter. We discussed about healthy diet and lifestyle. Return to clinic in 12 weeks or sooner. All of her questions have been answered for today. Recent imaging and labs were reviewed and discussed with the patient.

## 2022-11-28 ENCOUNTER — HOSPITAL ENCOUNTER (OUTPATIENT)
Age: 67
Discharge: HOME | End: 2022-11-28
Payer: MEDICARE

## 2022-11-28 LAB
T4 FREE: 1.15 NG/DL (ref 0.9–1.8)
TSH HIGH SENSITIVITY: 1.05 UIU/ML (ref 0.27–4.2)

## 2022-11-28 PROCEDURE — 84439 ASSAY OF FREE THYROXINE: CPT

## 2022-11-28 PROCEDURE — 36415 COLL VENOUS BLD VENIPUNCTURE: CPT

## 2022-11-28 PROCEDURE — 84443 ASSAY THYROID STIM HORMONE: CPT

## 2022-12-06 ENCOUNTER — HOSPITAL ENCOUNTER (OUTPATIENT)
Dept: INFUSION THERAPY | Age: 67
Discharge: HOME | End: 2022-12-06
Payer: MEDICARE

## 2022-12-06 DIAGNOSIS — D64.9 ANEMIA, UNSPECIFIED TYPE: ICD-10-CM

## 2022-12-06 LAB
ALBUMIN SERPL-MCNC: 4.7 GM/DL (ref 3.4–5)
ALP BLD-CCNC: 93 IU/L (ref 40–128)
ALT SERPL-CCNC: 17 U/L (ref 10–40)
ANION GAP SERPL CALCULATED.3IONS-SCNC: 15 MMOL/L (ref 4–16)
AST SERPL-CCNC: 21 IU/L (ref 15–37)
BASOPHILS ABSOLUTE: 0 K/CU MM
BASOPHILS RELATIVE PERCENT: 0.3 % (ref 0–1)
BILIRUB SERPL-MCNC: 0.3 MG/DL (ref 0–1)
BUN BLDV-MCNC: 15 MG/DL (ref 6–23)
CALCIUM SERPL-MCNC: 9.3 MG/DL (ref 8.3–10.6)
CHLORIDE BLD-SCNC: 102 MMOL/L (ref 99–110)
CO2: 20 MMOL/L (ref 21–32)
CREAT SERPL-MCNC: 0.7 MG/DL (ref 0.6–1.1)
DIFFERENTIAL TYPE: ABNORMAL
EOSINOPHILS ABSOLUTE: 0.1 K/CU MM
EOSINOPHILS RELATIVE PERCENT: 2.6 % (ref 0–3)
GFR SERPL CREATININE-BSD FRML MDRD: >60 ML/MIN/1.73M2
GLUCOSE BLD-MCNC: 79 MG/DL (ref 70–99)
HCT VFR BLD CALC: 36.7 % (ref 37–47)
HEMOGLOBIN: 11.6 GM/DL (ref 12.5–16)
LYMPHOCYTES ABSOLUTE: 1.5 K/CU MM
LYMPHOCYTES RELATIVE PERCENT: 37.9 % (ref 24–44)
MCH RBC QN AUTO: 28.4 PG (ref 27–31)
MCHC RBC AUTO-ENTMCNC: 31.6 % (ref 32–36)
MCV RBC AUTO: 89.7 FL (ref 78–100)
MONOCYTES ABSOLUTE: 0.6 K/CU MM
MONOCYTES RELATIVE PERCENT: 15.4 % (ref 0–4)
PDW BLD-RTO: 14.7 % (ref 11.7–14.9)
PLATELET # BLD: 236 K/CU MM (ref 140–440)
PMV BLD AUTO: 9.4 FL (ref 7.5–11.1)
POTASSIUM SERPL-SCNC: 4.3 MMOL/L (ref 3.5–5.1)
RBC # BLD: 4.09 M/CU MM (ref 4.2–5.4)
SEGMENTED NEUTROPHILS ABSOLUTE COUNT: 1.7 K/CU MM
SEGMENTED NEUTROPHILS RELATIVE PERCENT: 43.8 % (ref 36–66)
SODIUM BLD-SCNC: 137 MMOL/L (ref 135–145)
TOTAL PROTEIN: 7.2 GM/DL (ref 6.4–8.2)
WBC # BLD: 3.8 K/CU MM (ref 4–10.5)

## 2022-12-06 PROCEDURE — 36415 COLL VENOUS BLD VENIPUNCTURE: CPT

## 2022-12-06 PROCEDURE — 80053 COMPREHEN METABOLIC PANEL: CPT

## 2022-12-06 PROCEDURE — 85025 COMPLETE CBC W/AUTO DIFF WBC: CPT

## 2022-12-15 ENCOUNTER — HOSPITAL ENCOUNTER (OUTPATIENT)
Dept: INFUSION THERAPY | Age: 67
Discharge: HOME | End: 2022-12-15
Payer: MEDICARE

## 2022-12-15 ENCOUNTER — OFFICE VISIT (OUTPATIENT)
Dept: ONCOLOGY | Age: 67
End: 2022-12-15
Payer: MEDICARE

## 2022-12-15 VITALS
OXYGEN SATURATION: 98 % | HEART RATE: 93 BPM | SYSTOLIC BLOOD PRESSURE: 134 MMHG | BODY MASS INDEX: 32.6 KG/M2 | WEIGHT: 184 LBS | DIASTOLIC BLOOD PRESSURE: 61 MMHG | HEIGHT: 63 IN | TEMPERATURE: 97 F | RESPIRATION RATE: 18 BRPM

## 2022-12-15 DIAGNOSIS — D64.9 ANEMIA, UNSPECIFIED TYPE: ICD-10-CM

## 2022-12-15 DIAGNOSIS — D72.819 LEUKOPENIA, UNSPECIFIED TYPE: Primary | ICD-10-CM

## 2022-12-15 PROCEDURE — 99211 OFF/OP EST MAY X REQ PHY/QHP: CPT

## 2022-12-15 PROCEDURE — 1123F ACP DISCUSS/DSCN MKR DOCD: CPT | Performed by: INTERNAL MEDICINE

## 2022-12-15 PROCEDURE — 99214 OFFICE O/P EST MOD 30 MIN: CPT | Performed by: INTERNAL MEDICINE

## 2022-12-15 NOTE — PROGRESS NOTES
MA Rooming Questions  Patient: Celia Jackson  MRN: [id number]    Date: 12/15/2022        1. Do you have any new issues?   no         2. Do you need any refills on medications?    no    3. Have you had any imaging done since your last visit?   no    4. Have you been hospitalized or seen in the emergency room since your last visit here?   no    5. Did the patient have a depression screening completed today?  No    No data recorded     PHQ-9 Given to (if applicable):               PHQ-9 Score (if applicable):                     [] Positive     []  Negative              Does question #9 need addressed (if applicable)                     [] Yes    []  No               Josh Marvin MA

## 2022-12-28 ENCOUNTER — OFFICE VISIT (OUTPATIENT)
Dept: INTERNAL MEDICINE CLINIC | Age: 67
End: 2022-12-28
Payer: MEDICARE

## 2022-12-28 VITALS
OXYGEN SATURATION: 99 % | BODY MASS INDEX: 33.31 KG/M2 | SYSTOLIC BLOOD PRESSURE: 122 MMHG | DIASTOLIC BLOOD PRESSURE: 80 MMHG | HEIGHT: 63 IN | WEIGHT: 188 LBS | HEART RATE: 94 BPM

## 2022-12-28 DIAGNOSIS — D64.9 ANEMIA, UNSPECIFIED TYPE: ICD-10-CM

## 2022-12-28 DIAGNOSIS — M25.552 LEFT HIP PAIN: ICD-10-CM

## 2022-12-28 DIAGNOSIS — D75.A G6PD DEFICIENCY: ICD-10-CM

## 2022-12-28 DIAGNOSIS — Z17.1 MALIGNANT NEOPLASM OF LEFT BREAST IN FEMALE, ESTROGEN RECEPTOR NEGATIVE, UNSPECIFIED SITE OF BREAST (HCC): ICD-10-CM

## 2022-12-28 DIAGNOSIS — K58.9 IRRITABLE BOWEL SYNDROME, UNSPECIFIED TYPE: ICD-10-CM

## 2022-12-28 DIAGNOSIS — K21.9 GASTROESOPHAGEAL REFLUX DISEASE WITHOUT ESOPHAGITIS: ICD-10-CM

## 2022-12-28 DIAGNOSIS — J30.9 ALLERGIC RHINITIS, UNSPECIFIED SEASONALITY, UNSPECIFIED TRIGGER: ICD-10-CM

## 2022-12-28 DIAGNOSIS — J45.20 MILD INTERMITTENT ASTHMA WITHOUT COMPLICATION: ICD-10-CM

## 2022-12-28 DIAGNOSIS — R42 DIZZINESS: Primary | ICD-10-CM

## 2022-12-28 DIAGNOSIS — C50.912 MALIGNANT NEOPLASM OF LEFT BREAST IN FEMALE, ESTROGEN RECEPTOR NEGATIVE, UNSPECIFIED SITE OF BREAST (HCC): ICD-10-CM

## 2022-12-28 PROCEDURE — 1123F ACP DISCUSS/DSCN MKR DOCD: CPT | Performed by: INTERNAL MEDICINE

## 2022-12-28 PROCEDURE — 99214 OFFICE O/P EST MOD 30 MIN: CPT | Performed by: INTERNAL MEDICINE

## 2022-12-28 NOTE — PROGRESS NOTES
Name: Angela Gutierrez  [id number]  Age: 75 y.o. YOB: 1955  Sex: female    CHIEF COMPLAINT:    Chief Complaint   Patient presents with    3 Month Follow-Up     No complaints       HISTORY OF PRESENT ILLNESS:     This is a pleasant  75 y.o. female  is seen today for management of chronic medical problems and medications refills. Previous records reviewed . Doing better , dizziness is better. Improved with vestibular rehab. She is taking meclizine as needed , very rarely now. She did see Dr. Adelita Ely recently and he thinks that patient has benign paroxysmal positional vertigo . She has breast cancer. She completed Taxol on March 21, 2022, had double mastectomy on April 5, 2022, pathology showed no residual malignancy and she also got adjuvant radiation treatment started on June 29, 2022 for 28 days. She is  being followed by Dr. Mikaela Dumont and also by Dr. Nabila Whitney periodically. Doing OK otherwise  Denies CP or SOB. No fever , sore throat or cough or congestion. Asthma is better. .   Uses inhalers very rarely. Denies any abdominal pain. But continues to have gastritis symptoms and abdominal bloating and gas but better. .  She is taking Prilosec daily and Bentyl as needed  Appetite OK. Occasional constipation but medicine helps. Denies any urinary symptoms. Left hip pain is better. She had a hip replacement by Dr. Gregorio Charles at [facility] on 8/16/2021. She is taking Tylenol as needed. [facility] Hearing is ok. Vision Ok with glasses. Denies  any significant skin lesions. Denies any significant depression or anxiety. No other complaints. She had been vaccinated for COVID-19 including booster doses x 2. She had a Flu shot on 9/26/2022. Labs from the 12/6/2022 were reviewed and explained to the patient.     Past Medical History:    Patient Active Problem List   Diagnosis    Other specified disorders of white blood cells    Neutropenia (HCC)    History of hepatitis C    Allergic rhinitis    Left hip pain    G6PD deficiency    Dizziness    Mild intermittent asthma without complication    Urinary frequency    Gastroesophageal reflux disease without esophagitis    Irritable bowel syndrome    Malignant neoplasm of left breast in female, estrogen receptor negative ([address]    Anemia        Past Surgical History:        Procedure Laterality Date    COLONOSCOPY  2013    Polyps - Dr. Monserrat Hodges  1990's    HYSTERECTOMY ([address])  1998    \"left one ovary \"    IR BIOPSY THYROID PERC CORE NEEDLE  11/18/2021    IR BIOPSY THYROID PERC CORE NEEDLE 11/18/2021 [facility] SPECIAL PROCEDURES    MASTECTOMY Right 4/5/2022    RIGHT BREAST MASTECTOMY performed by Cyndie Villa MD at [facility], MODIFIED RADICAL Left 4/5/2022    LEFT BREAST MASTECTOMY MODIFIED RADICAL performed by Cyndie Villa MD at [facility] LEFT Left 9/27/2021    US BREAST BIOPSY NEEDLE ADDITIONAL LEFT 9/27/2021 Grabiel Moncada MD [address] [employer] BREAST NEEDLE BIOPSY LEFT Left 9/27/2021    US BREAST NEEDLE BIOPSY LEFT 9/27/2021 Grabiel Moncada MD [facility]       Social History:   Social History     Tobacco Use    Smoking status: Never    Smokeless tobacco: Never   Substance Use Topics    Alcohol use: Yes     Comment: Occasional wine/\"average glass of wine or beer  one time per month\"       Family History:       Problem Relation Age of Onset    No Known Problems Mother     Kidney Disease Father        Allergies:  Latex and Pcn [penicillins]    Current Medications :      Prior to Admission medications    Medication Sig Start Date End Date Taking?  Authorizing Provider   omeprazole (PRILOSEC) 20 MG delayed release capsule take 1 capsule by mouth once daily 10/3/22  Yes Charlotte Phalen, MD   sucralfate (CARAFATE) 1 GM tablet Take 1 tablet by mouth in the morning and 1 tablet in the evening. 9/20/22  Yes Charlotte Phalen, MD   montelukast (SINGULAIR) 10 MG tablet take 1 tablet by mouth nightly 9/6/22  Yes Elzbieta Sanchez MD   ZINC PO Take by mouth   Yes Historical Provider, MD   magnesium oxide (MAG-OX) 400 MG tablet Take 400 mg by mouth daily   Yes Historical Provider, MD   COLLAGEN PO Take by mouth   Yes Historical Provider, MD   Cyanocobalamin (VITAMELTS ENERGY VITAMIN B-12 PO) Take by mouth   Yes Historical Provider, MD   meclizine (ANTIVERT) 25 MG tablet Take 1 tablet by mouth 3 times daily as needed for Dizziness 6/23/22  Yes Elzbieta Sanchez MD   albuterol sulfate  (90 Base) MCG/ACT inhaler Inhale 2 puffs into the lungs every 6 hours as needed for Wheezing 3/24/22  Yes Elzbieta Sanchez MD   meloxicam (MOBIC) 7.5 MG tablet Take 1 tablet by mouth daily 3/10/22  Yes Elzbieta Sanchez MD   dicyclomine (BENTYL) 10 MG capsule Take 1 capsule by mouth 4 times daily as needed (abdominal bloating and gas) 3/8/22  Yes Elzbieta Sanchez MD   Multiple Vitamins-Minerals (MULTIVITAMIN PO) Take by mouth   Yes Historical Provider, MD       LAB DATA: Reviewed. REVIEW OF SYSTEMS:   see HPI/ Comprehensive review of systems negative except for the ones mentioned in HPI. PHYSICAL EXAMINATION:   /80 (Site: Left Upper Arm, Position: Sitting, Cuff Size: Medium Adult)   Pulse 94   Ht 5' 3\" (1.6 m)   Wt 188 lb (85.3 kg)   SpO2 99%   BMI 33.30 kg/m²        GENERAL APPEARANCE:      Alert, oriented x 3, well developed, cooperative, not in any distress, appears stated age. HEAD:                         Normocephalic, atraumatic   EYES:                          PERRLA, EOMI, lids normal, conjuctivea clear, sclera anicteric. NECK:                         Supple, symmetrical,  trachea midline, no thyromegaly, no JVD, no lymphadenopathy. LUNGS:                       Clear to auscultation bilaterally, respirations unlabored, accessory muscles are not used. Breast:                        status post bilateral mastectomy .   HEART:                       Regular rate and rhythm, S1 and S2 normal, no murmur, rub or gallop. PMI in MCL. ABDOMEN:                 Soft, non-tender, bowel sounds are normoactive, no masses, no hepatospleenomegaly. EXTREMITY:              no bipedal edema  NEURO:                      Alert, oriented to person, place and time. Grossly intact. Musculoskeletal:         No kyphosis or scoliosis, no deformity in any extremity noted, muscle strength and tone are normal.  Skin:                            Warm and dry. No rash or obvious suspicious lesions. PSYCH:                       Mood euthymic, insight and judgement good. ASSESSMENT/PLAN:    1. Dizziness  Much better now since she vestibular rehab. Takes meclizine very rarely. Advised to continue to follow with Dr. Layne Berry as per his recommendations. 2. Allergic rhinitis, unspecified seasonality, unspecified trigger  Continue Singulair and can take Claritin as needed    3. Anemia, unspecified type  Has mild anemia. Being followed by Dr. Alicia Foster periodically. 4. G6PD deficiency  Stable, being followed by Dr. Alicia Foster periodically    5. Malignant neoplasm of left breast in female, estrogen receptor negative, unspecified site of breast ([facility] [address]  Apparently in remission. Continue to follow with Dr. Zhanna Antoine and Dr. Alicia Foster. 6. Irritable bowel syndrome, unspecified type  Continue Bentyl as needed    7. Gastroesophageal reflux disease without esophagitis  Continue Prilosec and Carafate    8. Mild intermittent asthma without complication  On albuterol HFA as needed. She uses it very rarely. 9. Left hip pain  Advised take Tylenol as needed. Also on Mobic    Advised to continue to follow COVID-19 precautions    Care discussed with patient. Questions answered and patient verbalizes understanding and agrees with plan. Medications reviewed and reconciled. Continue current medications. Appropriate prescriptions are ordered.  Risks and benefits of meds are discussed. After visit summary provided. Advised to call for any problems, questions, or concerns. If symptoms worsen or don't improve as expected, to call us or go to ER. Follow up as directed, sooner if needed. Return in about 3 months (around 3/28/2023). This dictation was performed with a verbal recognition program and it was checked for errors. It is possible that there are still dictated errors within this office note. Any errors should be brought immediately to my attention for correction. All efforts were made to ensure that this office note is accurate.      Nae Alcala MD MD

## 2023-01-22 DIAGNOSIS — K21.9 GASTROESOPHAGEAL REFLUX DISEASE WITHOUT ESOPHAGITIS: ICD-10-CM

## 2023-01-23 RX ORDER — SUCRALFATE 1 G/1
TABLET ORAL
Qty: 60 TABLET | Refills: 3 | Status: SHIPPED | OUTPATIENT
Start: 2023-01-23

## 2023-02-01 DIAGNOSIS — K21.9 GASTROESOPHAGEAL REFLUX DISEASE WITHOUT ESOPHAGITIS: ICD-10-CM

## 2023-02-01 RX ORDER — OMEPRAZOLE 20 MG/1
CAPSULE, DELAYED RELEASE ORAL
Qty: 30 CAPSULE | Refills: 3 | Status: SHIPPED | OUTPATIENT
Start: 2023-02-01

## 2023-02-19 NOTE — PROGRESS NOTES
Patient Name: William Morris  Patient : 1955  Patient MRN: [id number]     Primary Oncologist: Desiree Soni MD  Referring Provider: Clinton Lobo MD     Date of Service: 3/16/2023      Chief Complaint:   Chief Complaint   Patient presents with    Follow-up     She came in for follow-up visit. Patient Active Problem List:     Personal history of infectious disease     Other specified disorders of white blood cells     Neutropenia     History of hepatitis C     Allergic rhinitis     Left hip pain     G6PD deficiency     Mild persistent asthma without complication     Vertigo     HPI:   Fred Blanco is a pleasant 27-year-old -American female patient who was referred for evaluation of mild neutropenia. She was diagnosed with hepatitis C in . Ex spouse was IV drug user. She was treated with Harvoni for 12 weeks and completed in May 2019. May 15, 2019 WBC was 4.1, RBC 4.36, hemoglobin 13.9, hematocrit 42.3, MCV 97, platelet 915, absolute neutrophils 1.1, absolute lymphocytes 2.6. She denied any history of frequent infection. Ultrasound of abdomen in 2018 showed normal right upper quadrant ultrasound. Liver biopsy on 2018 showed chronic hepatitis grade 2-3, stage II. Mammogram in 2019 is negative. US of abdomen in 2019 showed unremarkable study. Labs in 2019 reviewed. She has nonspecific elevated total protein. CBC unremarkable. Labs in 2019 were reviewed. Total protein is normal. Leukopenia stable. In 2020 she had acute viral hepatitis serology which came back positive for hepatitis C antibody. Serum AFP was 8. Hepatitis C RNA by PCR was negative. Hepatitis C RNA genotype was indeterminate. Mammogram in 2020 was benign. She was at [facility] emergency room on 2020 due to food poisoning. . WBC was 7.5. Hemoglobin was 13.2, hemoglobin 13.2, platelet 292. CMP was grossly unremarkable with normal AST at 19, ALT 25. Alk phos was 92. Total bilirubin was 0.6. Colonoscopy in December 2020 by Dr. Freedom Prasad showed diverticulosis and hemorrhoids. She was told that she has kidney stone. She has flu shot in 2020 and COVID-19 vaccine on January 3, 2021. Mammogram in July 2020 was benign. In June 2021 WBC 5.2, hemoglobin 12.3, platelet 582. September 20, 2021 she was referred for left breast mass. Bone density on September 13, 2021 showed osteopenia. Mammogram on September 15, 2021 showed : There is a new 1.5 cm mass identified in the left breast at 12 o'clock position, at posterior depth, about 12 cm from the left nipple. This is best seen on left CC michaela slice 50 and left MLO michaela slice 55. Left breast ultrasound 11 o'clock, 11 cm from the nipple: On ultrasound evaluation, there is a 1.1 x 1.3 x 0.5 cm oval, parallel hypoechoic mass, with microlobulated margins without any posterior acoustic shadowing or internal vascularity. This mass corresponds to the mammographic finding. Left axilla: Couple enlarged lymph nodes identified in the left axilla with cortical thickness of 8-9 mm. No new suspicious masses or microcalcifications are identified in the right breast.    June 2021 WBC 5.2, hemoglobin 12.3, platelet 365. CMP  grossly unremarkable. Path report of left breast biopsy on 9/27/2021:  A. Breast, left at 11 o'clock, ultrasound guided needle core biopsy:   -  INVASIVE DUCTAL CARCINOMA WITH A MARKED CHRONIC INFLAMMATORY REACTION  -  HER2 FISH is negative  B. Lymph node, left axilla, ultrasound guided needle core biopsy:   -  METASTATIC HIGH GRADE CARCINOMA IS IDENTIFIED   BREAST INVASIVE CARCINOMA SUMMARY - CORE BIOPSY   Procedure: Ultrasound guided needle core biopsy. Laterality and tumor site: Left breast at 11 o'clock. Tumor size: 7 mm in greatest dimension within biopsy material present. Histologic type: Invasive ductal carcinoma with marked chronic inflammatory reaction.    Histologic Grade (Everardo): Grade 3   -Tubular score 3, Nuclear score 3, Mitosis score 2 (16/10 hpf)   Ductal Carcinoma In Situ (DCIS): Not present   Lobular Carcinoma In Situ (LCIS): Not present. Lympho-vascular invasion: Not identified. Microcalcifications: Not identified. Additional Findings: Marked chronic inflammatory reaction. -ER by [facility]*: Negative (0% staining). -PgR by IHC*: Negative (0% staining)   -Her2 by IHC*: Negative (Score 0)   -Her2 by FISH*:  negative  -Avg. #HER2 signals/nucleus: Avg. #CEP17 signals/nucleus: HER2/CEP17 ratio:     She was seen by Dr Claudia Jarquin. I discussed about neoadjuvant chemo, AC + TC. She has left hip pain s/p hip replacement in August 2021. Bone scan on 2021-10-13 showed : focal activity within the midthoracic and lower lumbar spine, typically degenerative. Moderate activity is seen about the left hip arthroplasty. If surgery has not been recent, loosening or infection could be considered. Multifocal degenerative changes are otherwise favored as outlined above. CT CAP on 10/15/2021:  1. Left breast mass with level 1 left axillary lymphadenopathy. 2. Four small pulmonary nodules measuring up to 3 mm in the left lung favoring a benign etiology. Attention at follow-up imaging recommended. 3. Otherwise no metastatic disease in the chest, abdomen or pelvis. 4. Incidental left thyroid lobe 18 mm nodule. Thyroid ultrasound can be considered. ECHO in October 2021 showed LVEF at 50 to 55%. Cytology of FNA of thyroid nodule was reviewed. I referred to endocrinologist.   She has seen endocrinologist and was told to have goiter. Dose dense AC started 11/8/2021 and completed on 12/20/2021. She started Taxol on 1/10/2022- completed on 3/21/2022. MRI of the breast showed favorable response to therapy    CT chest 2/2022 showed:  1. No acute abnormality in the chest.  No rib fracture identified.   2. 3 and 4 mm nodules in the bilateral lower lobes stable or decreased in size from 10/15/2021.  3. Resolution of the previously identified left axillary lymphadenopathy. 4. 2 cm left thyroid nodule without significant change. Clinically she responded to neoadjuvant chemo. She has b/l mastectomy 4/5/2022:  Final Pathologic Diagnosis:   A. Resection of breast, right:          Mild fibrocystic change. Small hemangioma, skin. B.  Resection of breast with axillary lymph nodes, left:          Mild fibrocystic change. Hyaline fibrosis axillary lymph nodes (3). Biopsy cavity, axillary lymph node. Reactive changes with sinus histiocytosis, axillary lymph nodes. Since there is no residual cancer, I did not offer oral Xeloda. She started radiation therapy on June 9, 2022 and  had 28 days of adjuvant radiation therapy. 4/28/2022 WBC: 4.6 RBC: 3.65 (L) Hemoglobin: 10.2 (L) Hematocrit: 33.5 (L) MCV: 91.8 Platelet Count: 601  Lymphocytes Absolute: 1.7 Monocytes Absolute: 0.7 Eosinophils Absolute: 0.3 Basophils Absolute: 0.0 Segs Absolute: 1.9  Ferritin: 274 (H) Iron: 44 TIBC: 325 Transferrin %: 14  FOLATE: >20.0 (H)  Vitamin B-12: 1004 (H)    6/2022 WBC 3.4, Hg 11.1    12/2022 CMP grossly unremarkable. WBC 3.8, Hg 11.6, MCV 89.7, plat 236.    3/16/2023 she came in for a follow up visit  3/2023 folate 19.2, CMP wnl. Hg 11.8, MCV 93.9. Ferritin 242. TIBC 344. B-12 881. Peripheral blood; Flow Cytometry Analysis:   -  No diagnostic immunophenotypic abnormalities detected. Leukopenia improving. .  I recommend monthly self breast exam.  She is in remission. We discussed about signs and symptoms of recurrent breast cancer. No acute pain. Denied any nausea, vomiting or diarrhea. No fever or chills. No chest pain, shortness of breath or palpitation. No headache or dizzy spell. No melena or hematochezia. Denied any dysuria or hematuria. Past Medical History  Asthma, neutropenia, hepatitis C, G6PD, thyroid disease. Surgical History  Partial hysterectomy in 2002 related to tubal pregnancy. Tonsillectomy. She had colonoscopy x2 and the last one was in 2013. She had left hip replacement    Social History  Smoking Status Never smoker  She denies any illicit drug use. She drinks alcohol occasionally. She has 2 children. Family History  Maternal great aunt had breast cancer. Review of Systems: \"Per interval history; otherwise 10 point ROS is negative. \"     Vital Signs:  /63 (Site: Right Upper Arm, Position: Sitting, Cuff Size: Large Adult)   Pulse 85   Temp 96.8 °F (36 °C) (Infrared)   Resp 18   Ht 5' 3\" (1.6 m)   Wt 191 lb (86.6 kg)   SpO2 99%   BMI 33.83 kg/m²     Physical Exam:  CONSTITUTIONAL: awake, alert, cooperative, no apparent distress   EYES: pupils equal, round. Sclera clear and + pallor  ENT: Normocephalic, without obvious abnormality, atraumatic  NECK: supple, symmetrical, no jugular venous distension and no carotid bruits   HEMATOLOGIC/LYMPHATIC: no cervical, supraclavicular or axillary lymphadenopathy   LUNGS: no increased work of breathing and clear to auscultation. Medi port to the right anterior chest wall noted. BREAST: s/p b/l mastectomy. No skin nodules. CARDIOVASCULAR: regular rate and rhythm, normal S1 and S2, no murmur  ABDOMEN: normal bowel sound, soft, non-distended, non-tender, no palpable masses. No rebound or guarding. MUSCULOSKELETAL: full range of motion noted, tone is normal  NEUROLOGIC: Motor skills grossly intact. CN II - XII grossly intact. SKIN: Normal skin color, texture, turgor and no jaundice. No ecchymosis. EXTREMITIES: no LE edema. No cyanosis. Homans negative.     Labs:  Hematology:  Lab Results   Component Value Date    WBC 4.9 03/10/2023    RBC 4.10 (L) 03/10/2023    HGB 11.8 (L) 03/10/2023    HCT 38.5 03/10/2023    MCV 93.9 03/10/2023    MCH 28.8 03/10/2023    MCHC 30.6 (L) 03/10/2023    RDW 14.4 03/10/2023    PLT ADEQUATE 03/10/2023    MPV 11.8 (H) 03/10/2023    SEGSPCT 55.5 03/10/2023    EOSRELPCT 2.3 03/10/2023    BASOPCT 0.2 03/10/2023    LYMPHOPCT 30.7 03/10/2023    MONOPCT 11.3 (H) 03/10/2023    SEGSABS 2.7 03/10/2023    EOSABS 0.1 03/10/2023    BASOSABS 0.0 03/10/2023    LYMPHSABS 1.5 03/10/2023    MONOSABS 0.6 03/10/2023    DIFFTYPE AUTOMATED DIFFERENTIAL 03/10/2023     Chemistry:  Lab Results   Component Value Date     03/10/2023    K 4.4 03/10/2023     03/10/2023    CO2 23 03/10/2023    BUN 11 03/10/2023    CREATININE 0.7 03/10/2023    GLUCOSE 99 03/10/2023    CALCIUM 9.5 03/10/2023    PROT 7.4 03/10/2023    LABALBU 4.6 03/10/2023    BILITOT 0.3 03/10/2023    ALKPHOS 102 03/10/2023    AST 23 03/10/2023    ALT 16 03/10/2023    LABGLOM >60 03/10/2023    GFRAA >60 09/13/2022    AGRATIO 1.4 06/09/2021    GLOB 3.4 06/09/2021     Lab Results   Component Value Date    TSHHS 1.050 11/28/2022    T4FREE 1.15 11/28/2022    FT3 3.3 01/10/2022     Immunology:  Lab Results   Component Value Date    PROT 7.4 03/10/2023     Coagulation Panel:  Lab Results   Component Value Date    PROTIME 12.3 11/18/2021    INR 0.95 11/18/2021    APTT 26.9 11/18/2021     Observations:  No data recorded      Assessment & Plan:  1. She was referred for mild neutropenia. She is an -American. CBC in June 2019 was unremarkable. CBC in June 2020 was unremarkable. US of abdomen in June 2019 was unremarkable. In June 2021 WBC was 5.2, hemoglobin 12.3, platelet 623. She has anemia related to chemotherapy. Anemia improved. WBC in June 2022 was 3.4 and hemoglobin 11.1.  9/2022 Ferritin 250, TIBC 292, folate >20, B-12 883.  12/2022 CMP grossly unremarkable. WBC 3.8, Hg 11.6, MCV 89.7, plat 236. Flow cytometry unremarkable. 3/2023 WBC 4.9, Hg 11.8, MCV 11.8. Will monitor CBC. 2. She completed treatment for hepatitis C in May 2019. She is in remission. 3. Mammogram in June 2019 was benign. Colonoscopy was In 2013. Mammogram in July 2020 was benign. She had abnormal left breast mammogram and scheduled for the biopsy on September 27, 2021. She was found to have triple negative left breast cancer s/p biopsy, T1 N1 at least from biopsy. CT chest, abdomen pelvis, bone scan and echocardiogram in October 2021 reviewed. She started neoadjuvant chemotherapy November 8, 2021. MRI of the breast showed response to therapy. She completed Taxol on March 21, 2022. She had double mastectomy on April 5, 2022. Path showed no residual malignancy. She started adjuvant RT on 6/9/2022 for 28 days. She will continue with surveillance. She denied any symptoms of recurrent breast cancer. Clinically she is in remission. I recommend monthly self breast exam.    4. Colonoscopy in December 2020 showed diverticulosis and hemorrhoids. Repeat study in 5 years was recommended. 5.  Ultrasound of the thyroid on November 9, 2021 showed  TR 4 nodule of the left thyroid lobe corresponding to recent CT findings. FNA recommended for further evaluation based on TI-RADS criteria. Cytology report on November 18, 2021 showed  Final Cytologic Diagnosis:    Left Thyroid Fine Needle Aspirate Cytology with Cell Block:   - Atypical follicular cells of undetermined significance   She follow up with Dr Roxanne Landa in December 2022. She was told to have goiter. We discussed about healthy diet and lifestyle. Return to clinic in 6 months or sooner. All of her questions have been answered for today. Recent imaging and labs were reviewed and discussed with the patient.

## 2023-03-04 DIAGNOSIS — J30.2 SEASONAL ALLERGIC RHINITIS, UNSPECIFIED TRIGGER: ICD-10-CM

## 2023-03-06 DIAGNOSIS — K58.9 IRRITABLE BOWEL SYNDROME, UNSPECIFIED TYPE: ICD-10-CM

## 2023-03-06 RX ORDER — DICYCLOMINE HYDROCHLORIDE 10 MG/1
10 CAPSULE ORAL 4 TIMES DAILY PRN
Qty: 120 CAPSULE | Refills: 3 | Status: SHIPPED | OUTPATIENT
Start: 2023-03-06

## 2023-03-06 RX ORDER — MONTELUKAST SODIUM 10 MG/1
10 TABLET ORAL NIGHTLY
Qty: 90 TABLET | Refills: 1 | Status: SHIPPED | OUTPATIENT
Start: 2023-03-06

## 2023-03-10 ENCOUNTER — HOSPITAL ENCOUNTER (OUTPATIENT)
Dept: INFUSION THERAPY | Age: 68
Discharge: HOME | End: 2023-03-10
Payer: MEDICARE

## 2023-03-10 DIAGNOSIS — D64.9 ANEMIA, UNSPECIFIED TYPE: ICD-10-CM

## 2023-03-10 DIAGNOSIS — D72.819 LEUKOPENIA, UNSPECIFIED TYPE: ICD-10-CM

## 2023-03-10 LAB
ALBUMIN SERPL-MCNC: 4.6 GM/DL (ref 3.4–5)
ALP BLD-CCNC: 102 IU/L (ref 40–128)
ALT SERPL-CCNC: 16 U/L (ref 10–40)
ANION GAP SERPL CALCULATED.3IONS-SCNC: 12 MMOL/L (ref 4–16)
AST SERPL-CCNC: 23 IU/L (ref 15–37)
BASOPHILS ABSOLUTE: 0 K/CU MM
BASOPHILS RELATIVE PERCENT: 0.2 % (ref 0–1)
BILIRUB SERPL-MCNC: 0.3 MG/DL (ref 0–1)
BUN SERPL-MCNC: 11 MG/DL (ref 6–23)
CALCIUM SERPL-MCNC: 9.5 MG/DL (ref 8.3–10.6)
CHLORIDE BLD-SCNC: 101 MMOL/L (ref 99–110)
CO2: 23 MMOL/L (ref 21–32)
CREAT SERPL-MCNC: 0.7 MG/DL (ref 0.6–1.1)
DIFFERENTIAL TYPE: ABNORMAL
EOSINOPHILS ABSOLUTE: 0.1 K/CU MM
EOSINOPHILS RELATIVE PERCENT: 2.3 % (ref 0–3)
GFR SERPL CREATININE-BSD FRML MDRD: >60 ML/MIN/1.73M2
GLUCOSE SERPL-MCNC: 99 MG/DL (ref 70–99)
HCT VFR BLD CALC: 38.5 % (ref 37–47)
HEMOGLOBIN: 11.8 GM/DL (ref 12.5–16)
LYMPHOCYTES ABSOLUTE: 1.5 K/CU MM
LYMPHOCYTES RELATIVE PERCENT: 30.7 % (ref 24–44)
MCH RBC QN AUTO: 28.8 PG (ref 27–31)
MCHC RBC AUTO-ENTMCNC: 30.6 % (ref 32–36)
MCV RBC AUTO: 93.9 FL (ref 78–100)
MONOCYTES ABSOLUTE: 0.6 K/CU MM
MONOCYTES RELATIVE PERCENT: 11.3 % (ref 0–4)
PDW BLD-RTO: 14.4 % (ref 11.7–14.9)
PLATELET # BLD: ADEQUATE K/CU MM (ref 140–440)
PMV BLD AUTO: 11.8 FL (ref 7.5–11.1)
POTASSIUM SERPL-SCNC: 4.4 MMOL/L (ref 3.5–5.1)
RBC # BLD: 4.1 M/CU MM (ref 4.2–5.4)
REASON FOR REJECTION: NORMAL
REASON FOR REJECTION: NORMAL
REJECTED TEST: NORMAL
REJECTED TEST: NORMAL
SEGMENTED NEUTROPHILS ABSOLUTE COUNT: 2.7 K/CU MM
SEGMENTED NEUTROPHILS RELATIVE PERCENT: 55.5 % (ref 36–66)
SODIUM BLD-SCNC: 136 MMOL/L (ref 135–145)
TOTAL PROTEIN: 7.4 GM/DL (ref 6.4–8.2)
WBC # BLD: 4.9 K/CU MM (ref 4–10.5)

## 2023-03-10 PROCEDURE — 83550 IRON BINDING TEST: CPT

## 2023-03-10 PROCEDURE — 82728 ASSAY OF FERRITIN: CPT

## 2023-03-10 PROCEDURE — 80053 COMPREHEN METABOLIC PANEL: CPT

## 2023-03-10 PROCEDURE — 82607 VITAMIN B-12: CPT

## 2023-03-10 PROCEDURE — 86038 ANTINUCLEAR ANTIBODIES: CPT

## 2023-03-10 PROCEDURE — 83540 ASSAY OF IRON: CPT

## 2023-03-10 PROCEDURE — 82746 ASSAY OF FOLIC ACID SERUM: CPT

## 2023-03-10 PROCEDURE — 85025 COMPLETE CBC W/AUTO DIFF WBC: CPT

## 2023-03-10 PROCEDURE — 86430 RHEUMATOID FACTOR TEST QUAL: CPT

## 2023-03-10 PROCEDURE — 36415 COLL VENOUS BLD VENIPUNCTURE: CPT

## 2023-03-11 LAB
FERRITIN: 242 NG/ML (ref 15–150)
FOLATE SERPL-MCNC: 19.2 NG/ML (ref 3.1–17.5)
IRON: 59 UG/DL (ref 37–145)
PCT TRANSFERRIN: 17 % (ref 10–44)
TOTAL IRON BINDING CAPACITY: 344 UG/DL (ref 250–450)
UNSATURATED IRON BINDING CAPACITY: 285 UG/DL (ref 110–370)
VITAMIN B-12: 881.6 PG/ML (ref 211–911)

## 2023-03-16 ENCOUNTER — OFFICE VISIT (OUTPATIENT)
Dept: ONCOLOGY | Age: 68
End: 2023-03-16
Payer: MEDICARE

## 2023-03-16 ENCOUNTER — HOSPITAL ENCOUNTER (OUTPATIENT)
Dept: INFUSION THERAPY | Age: 68
Discharge: HOME | End: 2023-03-16
Payer: MEDICARE

## 2023-03-16 VITALS
OXYGEN SATURATION: 99 % | DIASTOLIC BLOOD PRESSURE: 63 MMHG | HEIGHT: 63 IN | TEMPERATURE: 96.8 F | WEIGHT: 191 LBS | RESPIRATION RATE: 18 BRPM | BODY MASS INDEX: 33.84 KG/M2 | SYSTOLIC BLOOD PRESSURE: 134 MMHG | HEART RATE: 85 BPM

## 2023-03-16 DIAGNOSIS — Z17.1 MALIGNANT NEOPLASM OF LEFT BREAST IN FEMALE, ESTROGEN RECEPTOR NEGATIVE, UNSPECIFIED SITE OF BREAST (HCC): ICD-10-CM

## 2023-03-16 DIAGNOSIS — D72.819 LEUKOPENIA, UNSPECIFIED TYPE: Primary | ICD-10-CM

## 2023-03-16 DIAGNOSIS — C50.912 MALIGNANT NEOPLASM OF LEFT BREAST IN FEMALE, ESTROGEN RECEPTOR NEGATIVE, UNSPECIFIED SITE OF BREAST (HCC): ICD-10-CM

## 2023-03-16 PROCEDURE — 1123F ACP DISCUSS/DSCN MKR DOCD: CPT | Performed by: INTERNAL MEDICINE

## 2023-03-16 PROCEDURE — 99211 OFF/OP EST MAY X REQ PHY/QHP: CPT

## 2023-03-16 PROCEDURE — 99214 OFFICE O/P EST MOD 30 MIN: CPT | Performed by: INTERNAL MEDICINE

## 2023-03-16 NOTE — PROGRESS NOTES
MA Rooming Questions  Patient: Ramu Box  MRN: [id number]    Date: 3/16/2023        1. Do you have any new issues?   no         2. Do you need any refills on medications?    no    3. Have you had any imaging done since your last visit?   no    4. Have you been hospitalized or seen in the emergency room since your last visit here?   no    5. Did the patient have a depression screening completed today?  No    No data recorded     PHQ-9 Given to (if applicable):               PHQ-9 Score (if applicable):                     [] Positive     []  Negative              Does question #9 need addressed (if applicable)                     [] Yes    []  No               Jesus Nelson MA

## 2023-03-21 LAB — COMMENT: NORMAL

## 2023-03-28 ENCOUNTER — OFFICE VISIT (OUTPATIENT)
Dept: INTERNAL MEDICINE CLINIC | Age: 68
End: 2023-03-28
Payer: MEDICARE

## 2023-03-28 VITALS
SYSTOLIC BLOOD PRESSURE: 130 MMHG | OXYGEN SATURATION: 99 % | WEIGHT: 188 LBS | DIASTOLIC BLOOD PRESSURE: 75 MMHG | HEART RATE: 83 BPM | HEIGHT: 63 IN | BODY MASS INDEX: 33.31 KG/M2

## 2023-03-28 DIAGNOSIS — K21.9 GASTROESOPHAGEAL REFLUX DISEASE WITHOUT ESOPHAGITIS: ICD-10-CM

## 2023-03-28 DIAGNOSIS — Z17.1 MALIGNANT NEOPLASM OF LEFT BREAST IN FEMALE, ESTROGEN RECEPTOR NEGATIVE, UNSPECIFIED SITE OF BREAST (HCC): ICD-10-CM

## 2023-03-28 DIAGNOSIS — Z86.19 HISTORY OF HEPATITIS C: ICD-10-CM

## 2023-03-28 DIAGNOSIS — D70.9 NEUTROPENIA, UNSPECIFIED TYPE (HCC): ICD-10-CM

## 2023-03-28 DIAGNOSIS — K58.9 IRRITABLE BOWEL SYNDROME, UNSPECIFIED TYPE: ICD-10-CM

## 2023-03-28 DIAGNOSIS — J45.20 MILD INTERMITTENT ASTHMA WITHOUT COMPLICATION: ICD-10-CM

## 2023-03-28 DIAGNOSIS — R42 DIZZINESS: Primary | ICD-10-CM

## 2023-03-28 DIAGNOSIS — D64.9 ANEMIA, UNSPECIFIED TYPE: ICD-10-CM

## 2023-03-28 DIAGNOSIS — M25.552 LEFT HIP PAIN: ICD-10-CM

## 2023-03-28 DIAGNOSIS — J30.9 ALLERGIC RHINITIS, UNSPECIFIED SEASONALITY, UNSPECIFIED TRIGGER: ICD-10-CM

## 2023-03-28 DIAGNOSIS — D75.A G6PD DEFICIENCY: ICD-10-CM

## 2023-03-28 DIAGNOSIS — C50.912 MALIGNANT NEOPLASM OF LEFT BREAST IN FEMALE, ESTROGEN RECEPTOR NEGATIVE, UNSPECIFIED SITE OF BREAST (HCC): ICD-10-CM

## 2023-03-28 PROCEDURE — 1123F ACP DISCUSS/DSCN MKR DOCD: CPT | Performed by: INTERNAL MEDICINE

## 2023-03-28 PROCEDURE — 99214 OFFICE O/P EST MOD 30 MIN: CPT | Performed by: INTERNAL MEDICINE

## 2023-03-28 SDOH — ECONOMIC STABILITY: INCOME INSECURITY: HOW HARD IS IT FOR YOU TO PAY FOR THE VERY BASICS LIKE FOOD, HOUSING, MEDICAL CARE, AND HEATING?: NOT HARD AT ALL

## 2023-03-28 SDOH — ECONOMIC STABILITY: HOUSING INSECURITY
IN THE LAST 12 MONTHS, WAS THERE A TIME WHEN YOU DID NOT HAVE A STEADY PLACE TO SLEEP OR SLEPT IN A SHELTER (INCLUDING NOW)?: NO

## 2023-03-28 SDOH — ECONOMIC STABILITY: FOOD INSECURITY: WITHIN THE PAST 12 MONTHS, YOU WORRIED THAT YOUR FOOD WOULD RUN OUT BEFORE YOU GOT MONEY TO BUY MORE.: NEVER TRUE

## 2023-03-28 SDOH — ECONOMIC STABILITY: FOOD INSECURITY: WITHIN THE PAST 12 MONTHS, THE FOOD YOU BOUGHT JUST DIDN'T LAST AND YOU DIDN'T HAVE MONEY TO GET MORE.: NEVER TRUE

## 2023-03-28 ASSESSMENT — PATIENT HEALTH QUESTIONNAIRE - PHQ9
SUM OF ALL RESPONSES TO PHQ QUESTIONS 1-9: 0
SUM OF ALL RESPONSES TO PHQ9 QUESTIONS 1 & 2: 0
2. FEELING DOWN, DEPRESSED OR HOPELESS: 0
SUM OF ALL RESPONSES TO PHQ QUESTIONS 1-9: 0
1. LITTLE INTEREST OR PLEASURE IN DOING THINGS: 0

## 2023-03-28 NOTE — PROGRESS NOTES
Name: Clarke Andrews  [id number]  Age: 75 y.o. YOB: 1955  Sex: female    CHIEF COMPLAINT:    Chief Complaint   Patient presents with    Gastroesophageal Reflux    Asthma    Other     Other chronic conditions         HISTORY OF PRESENT ILLNESS:     This is a pleasant  75 y.o. female  is seen today for management of chronic medical problems and medications refills. Previous records reviewed . Doing better , dizziness is better. Improved with vestibular rehab but last few days again she felt little dizzy. She is taking meclizine as needed , very rarely now. She did see Dr. Faye Hernadez and he thinks that patient has benign paroxysmal positional vertigo . She has breast cancer. She completed Taxol on March 21, 2022, had double mastectomy on April 5, 2022, pathology showed no residual malignancy and she also got adjuvant radiation treatment started on June 29, 2022 for 28 days. She is  being followed by Dr. Sandy Song and also by Dr. Bhupinder Post periodically. She is seeing Dr. Desi Oconnor for thyroid nodule. Ultrasound of the thyroid on November 9, 2021 showed  TR 4 nodule of the left thyroid lobe corresponding to recent CT findings. FNA recommended for further evaluation based on TI-RADS criteria. Cytology report on November 18, 2021 showed:    Final Cytologic Diagnosis:    Left Thyroid Fine Needle Aspirate Cytology with Cell Block:   - Atypical follicular cells of undetermined significance . She follow up with Dr Desi Oconnor periodically and going to see him on June 1 2023  She was told to have goiter. She also saw Dr. Bhupinder Post for mild neutropenia and she was told it is not significant    Doing OK otherwise  Denies CP or SOB. No fever , sore throat or cough or congestion. Asthma is better. .   Uses inhalers very rarely. Denies any abdominal pain. But continues to have gastritis symptoms and abdominal bloating and gas but better. .  She is taking Prilosec daily and Bentyl as needed and they

## 2023-04-06 ENCOUNTER — HOSPITAL ENCOUNTER (OUTPATIENT)
Age: 68
Discharge: HOME | End: 2023-04-06
Payer: MEDICARE

## 2023-04-06 LAB
ALBUMIN SERPL-MCNC: 4.6 GM/DL (ref 3.4–5)
ALP BLD-CCNC: 100 IU/L (ref 40–129)
ALT SERPL-CCNC: 17 U/L (ref 10–40)
AST SERPL-CCNC: 22 IU/L (ref 15–37)
BILIRUB SERPL-MCNC: 0.3 MG/DL (ref 0–1)
BILIRUBIN DIRECT: 0.2 MG/DL (ref 0–0.3)
BILIRUBIN, INDIRECT: 0.1 MG/DL (ref 0–0.7)
TOTAL PROTEIN: 7.3 GM/DL (ref 6.4–8.2)

## 2023-04-06 PROCEDURE — 82105 ALPHA-FETOPROTEIN SERUM: CPT

## 2023-04-06 PROCEDURE — 36415 COLL VENOUS BLD VENIPUNCTURE: CPT

## 2023-04-06 PROCEDURE — 80076 HEPATIC FUNCTION PANEL: CPT

## 2023-04-06 PROCEDURE — 87522 HEPATITIS C REVRS TRNSCRPJ: CPT

## 2023-04-08 LAB — AFP-TM SERPL-MCNC: 9 NG/ML (ref 0–9)

## 2023-04-09 LAB
HCV RNA SERPL NAA+PROBE-ACNC: NOT DETECTED IU/ML
HCV RNA SERPL NAA+PROBE-LOG IU: NOT DETECTED LOG IU/ML
HCV RNA SERPL QL NAA+PROBE: NOT DETECTED

## 2023-05-03 DIAGNOSIS — R42 VERTIGO: ICD-10-CM

## 2023-05-03 RX ORDER — MECLIZINE HYDROCHLORIDE 25 MG/1
25 TABLET ORAL 3 TIMES DAILY PRN
Qty: 30 TABLET | Refills: 3 | Status: SHIPPED | OUTPATIENT
Start: 2023-05-03

## 2023-05-30 ENCOUNTER — HOSPITAL ENCOUNTER (OUTPATIENT)
Age: 68
Discharge: HOME | End: 2023-05-30
Payer: MEDICARE

## 2023-05-30 LAB
T4 FREE SERPL-MCNC: 1.32 NG/DL (ref 0.9–1.8)
TSH SERPL DL<=0.005 MIU/L-ACNC: 1.08 UIU/ML (ref 0.27–4.2)

## 2023-05-30 PROCEDURE — 84443 ASSAY THYROID STIM HORMONE: CPT

## 2023-05-30 PROCEDURE — 84439 ASSAY OF FREE THYROXINE: CPT

## 2023-05-30 PROCEDURE — 36415 COLL VENOUS BLD VENIPUNCTURE: CPT

## 2023-06-27 ENCOUNTER — OFFICE VISIT (OUTPATIENT)
Dept: INTERNAL MEDICINE CLINIC | Age: 68
End: 2023-06-27
Payer: MEDICARE

## 2023-06-27 VITALS
DIASTOLIC BLOOD PRESSURE: 68 MMHG | HEART RATE: 92 BPM | BODY MASS INDEX: 33.66 KG/M2 | OXYGEN SATURATION: 98 % | WEIGHT: 190 LBS | SYSTOLIC BLOOD PRESSURE: 120 MMHG

## 2023-06-27 DIAGNOSIS — R42 VERTIGO: ICD-10-CM

## 2023-06-27 DIAGNOSIS — M25.552 LEFT HIP PAIN: ICD-10-CM

## 2023-06-27 DIAGNOSIS — K58.9 IRRITABLE BOWEL SYNDROME, UNSPECIFIED TYPE: ICD-10-CM

## 2023-06-27 DIAGNOSIS — K21.9 GASTROESOPHAGEAL REFLUX DISEASE WITHOUT ESOPHAGITIS: ICD-10-CM

## 2023-06-27 PROCEDURE — 99214 OFFICE O/P EST MOD 30 MIN: CPT

## 2023-06-27 PROCEDURE — 1123F ACP DISCUSS/DSCN MKR DOCD: CPT

## 2023-06-27 RX ORDER — MECLIZINE HYDROCHLORIDE 25 MG/1
25 TABLET ORAL 3 TIMES DAILY PRN
Qty: 30 TABLET | Refills: 3 | Status: SHIPPED | OUTPATIENT
Start: 2023-06-27

## 2023-06-27 RX ORDER — OMEPRAZOLE 20 MG/1
20 CAPSULE, DELAYED RELEASE ORAL DAILY
Qty: 30 CAPSULE | Refills: 3 | Status: SHIPPED | OUTPATIENT
Start: 2023-06-27

## 2023-06-27 RX ORDER — DICYCLOMINE HYDROCHLORIDE 10 MG/1
10 CAPSULE ORAL 4 TIMES DAILY PRN
Qty: 120 CAPSULE | Refills: 3 | Status: SHIPPED | OUTPATIENT
Start: 2023-06-27

## 2023-06-27 RX ORDER — MELOXICAM 7.5 MG/1
7.5 TABLET ORAL DAILY
Qty: 90 TABLET | Refills: 1 | Status: SHIPPED | OUTPATIENT
Start: 2023-06-27

## 2023-06-27 RX ORDER — SUCRALFATE 1 G/1
1 TABLET ORAL 2 TIMES DAILY
Qty: 60 TABLET | Refills: 3 | Status: SHIPPED | OUTPATIENT
Start: 2023-06-27

## 2023-06-28 ASSESSMENT — ENCOUNTER SYMPTOMS
EYE DISCHARGE: 0
COUGH: 0
DIARRHEA: 0
SORE THROAT: 0
CONSTIPATION: 0
FACIAL SWELLING: 0
WHEEZING: 0
CHEST TIGHTNESS: 0
NAUSEA: 0
EYE REDNESS: 0
COLOR CHANGE: 0
RHINORRHEA: 0
VOMITING: 0
SHORTNESS OF BREATH: 0
TROUBLE SWALLOWING: 0
STRIDOR: 0
ABDOMINAL PAIN: 0
EYE PAIN: 0
CHOKING: 0

## 2023-06-28 ASSESSMENT — VISUAL ACUITY: OU: 1

## 2023-08-23 ENCOUNTER — TELEPHONE (OUTPATIENT)
Dept: INTERNAL MEDICINE CLINIC | Age: 68
End: 2023-08-23

## 2023-08-23 DIAGNOSIS — K21.9 GASTROESOPHAGEAL REFLUX DISEASE WITHOUT ESOPHAGITIS: ICD-10-CM

## 2023-08-23 RX ORDER — SUCRALFATE 1 G/1
1 TABLET ORAL 2 TIMES DAILY
Qty: 60 TABLET | Refills: 3 | Status: SHIPPED | OUTPATIENT
Start: 2023-08-23

## 2023-08-23 NOTE — TELEPHONE ENCOUNTER
Patient called and requested a refill for Sucralfate 1GM to be sent to [facility] on [address].

## 2023-08-24 LAB — FIT, EXTERNAL: NORMAL

## 2023-08-28 ENCOUNTER — HOSPITAL ENCOUNTER (OUTPATIENT)
Dept: RADIATION ONCOLOGY | Age: 68
Discharge: HOME | End: 2023-08-28
Payer: MEDICARE

## 2023-08-28 VITALS
WEIGHT: 190 LBS | HEIGHT: 63 IN | DIASTOLIC BLOOD PRESSURE: 66 MMHG | HEART RATE: 90 BPM | TEMPERATURE: 97 F | SYSTOLIC BLOOD PRESSURE: 141 MMHG | RESPIRATION RATE: 16 BRPM | BODY MASS INDEX: 33.66 KG/M2 | OXYGEN SATURATION: 98 %

## 2023-08-28 PROCEDURE — 99211 OFF/OP EST MAY X REQ PHY/QHP: CPT

## 2023-08-28 PROCEDURE — 99213 OFFICE O/P EST LOW 20 MIN: CPT | Performed by: RADIOLOGY

## 2023-09-04 DIAGNOSIS — J30.2 SEASONAL ALLERGIC RHINITIS, UNSPECIFIED TRIGGER: ICD-10-CM

## 2023-09-05 RX ORDER — MONTELUKAST SODIUM 10 MG/1
10 TABLET ORAL NIGHTLY
Qty: 90 TABLET | Refills: 1 | Status: SHIPPED | OUTPATIENT
Start: 2023-09-05

## 2023-09-21 ENCOUNTER — OFFICE VISIT (OUTPATIENT)
Dept: ONCOLOGY | Age: 68
End: 2023-09-21
Payer: MEDICARE

## 2023-09-21 ENCOUNTER — HOSPITAL ENCOUNTER (OUTPATIENT)
Dept: INFUSION THERAPY | Age: 68
Discharge: HOME | End: 2023-09-21
Payer: MEDICARE

## 2023-09-21 VITALS
SYSTOLIC BLOOD PRESSURE: 145 MMHG | DIASTOLIC BLOOD PRESSURE: 68 MMHG | TEMPERATURE: 97.1 F | HEIGHT: 63 IN | RESPIRATION RATE: 18 BRPM | OXYGEN SATURATION: 98 % | HEART RATE: 88 BPM | BODY MASS INDEX: 33.63 KG/M2 | WEIGHT: 189.8 LBS

## 2023-09-21 DIAGNOSIS — D72.819 LEUKOPENIA, UNSPECIFIED TYPE: Primary | ICD-10-CM

## 2023-09-21 DIAGNOSIS — D72.819 LEUKOPENIA, UNSPECIFIED TYPE: ICD-10-CM

## 2023-09-21 PROCEDURE — 85025 COMPLETE CBC W/AUTO DIFF WBC: CPT

## 2023-09-21 PROCEDURE — 99213 OFFICE O/P EST LOW 20 MIN: CPT | Performed by: INTERNAL MEDICINE

## 2023-09-21 PROCEDURE — 99211 OFF/OP EST MAY X REQ PHY/QHP: CPT

## 2023-09-21 PROCEDURE — 1123F ACP DISCUSS/DSCN MKR DOCD: CPT | Performed by: INTERNAL MEDICINE

## 2023-09-21 ASSESSMENT — PATIENT HEALTH QUESTIONNAIRE - PHQ9
2. FEELING DOWN, DEPRESSED OR HOPELESS: 0
1. LITTLE INTEREST OR PLEASURE IN DOING THINGS: 0
SUM OF ALL RESPONSES TO PHQ QUESTIONS 1-9: 0
SUM OF ALL RESPONSES TO PHQ QUESTIONS 1-9: 0
SUM OF ALL RESPONSES TO PHQ9 QUESTIONS 1 & 2: 0
SUM OF ALL RESPONSES TO PHQ QUESTIONS 1-9: 0
SUM OF ALL RESPONSES TO PHQ QUESTIONS 1-9: 0

## 2023-09-26 ENCOUNTER — OFFICE VISIT (OUTPATIENT)
Dept: INTERNAL MEDICINE CLINIC | Age: 68
End: 2023-09-26
Payer: MEDICAID

## 2023-09-26 VITALS
DIASTOLIC BLOOD PRESSURE: 78 MMHG | HEIGHT: 63 IN | BODY MASS INDEX: 33.49 KG/M2 | WEIGHT: 189 LBS | OXYGEN SATURATION: 98 % | HEART RATE: 94 BPM | SYSTOLIC BLOOD PRESSURE: 132 MMHG

## 2023-09-26 DIAGNOSIS — J30.9 ALLERGIC RHINITIS, UNSPECIFIED SEASONALITY, UNSPECIFIED TRIGGER: ICD-10-CM

## 2023-09-26 DIAGNOSIS — K21.9 GASTROESOPHAGEAL REFLUX DISEASE WITHOUT ESOPHAGITIS: ICD-10-CM

## 2023-09-26 DIAGNOSIS — C50.912 MALIGNANT NEOPLASM OF LEFT BREAST IN FEMALE, ESTROGEN RECEPTOR NEGATIVE, UNSPECIFIED SITE OF BREAST (HCC): ICD-10-CM

## 2023-09-26 DIAGNOSIS — K58.9 IRRITABLE BOWEL SYNDROME, UNSPECIFIED TYPE: ICD-10-CM

## 2023-09-26 DIAGNOSIS — J45.20 MILD INTERMITTENT ASTHMA WITHOUT COMPLICATION: Primary | ICD-10-CM

## 2023-09-26 DIAGNOSIS — D64.9 ANEMIA, UNSPECIFIED TYPE: ICD-10-CM

## 2023-09-26 DIAGNOSIS — M25.552 LEFT HIP PAIN: ICD-10-CM

## 2023-09-26 DIAGNOSIS — Z86.19 HISTORY OF HEPATITIS C: ICD-10-CM

## 2023-09-26 DIAGNOSIS — Z17.1 MALIGNANT NEOPLASM OF LEFT BREAST IN FEMALE, ESTROGEN RECEPTOR NEGATIVE, UNSPECIFIED SITE OF BREAST (HCC): ICD-10-CM

## 2023-09-26 DIAGNOSIS — D75.A G6PD DEFICIENCY: ICD-10-CM

## 2023-09-26 PROCEDURE — 99214 OFFICE O/P EST MOD 30 MIN: CPT | Performed by: INTERNAL MEDICINE

## 2023-09-26 PROCEDURE — 1123F ACP DISCUSS/DSCN MKR DOCD: CPT | Performed by: INTERNAL MEDICINE

## 2023-09-26 NOTE — PROGRESS NOTES
needed    9. History of hepatitis C  Stable, liver functions have been normal        Care discussed with patient. Questions answered and patient verbalizes understanding and agrees with plan. Medications reviewed and reconciled. Continue current medications. Appropriate prescriptions are ordered. Risks and benefits of meds are discussed. After visit summary provided. Advised to call for any problems, questions, or concerns. If symptoms worsen or don't improve as expected, to call us or go to ER. Follow up as directed, sooner if needed. No follow-ups on file. This dictation was performed with a verbal recognition program and it was checked for errors. It is possible that there are still dictated errors within this office note. Any errors should be brought immediately to my attention for correction. All efforts were made to ensure that this office note is accurate.      Kailey Henriquez MD MD

## 2023-11-27 ENCOUNTER — HOSPITAL ENCOUNTER (OUTPATIENT)
Age: 68
Discharge: HOME | End: 2023-11-27
Payer: MEDICARE

## 2023-11-27 LAB
T4 FREE SERPL-MCNC: 1.27 NG/DL (ref 0.9–1.8)
TSH SERPL DL<=0.005 MIU/L-ACNC: 1.51 UIU/ML (ref 0.27–4.2)

## 2023-11-27 PROCEDURE — 84439 ASSAY OF FREE THYROXINE: CPT

## 2023-11-27 PROCEDURE — 36415 COLL VENOUS BLD VENIPUNCTURE: CPT

## 2023-11-27 PROCEDURE — 84443 ASSAY THYROID STIM HORMONE: CPT

## 2023-12-14 ENCOUNTER — HOSPITAL ENCOUNTER (OUTPATIENT)
Dept: INFUSION THERAPY | Age: 68
Discharge: HOME | End: 2023-12-14
Payer: MEDICARE

## 2023-12-14 DIAGNOSIS — D72.819 LEUKOPENIA, UNSPECIFIED TYPE: ICD-10-CM

## 2023-12-14 LAB
ALBUMIN SERPL-MCNC: 4.7 GM/DL (ref 3.4–5)
ALP BLD-CCNC: 100 IU/L (ref 40–129)
ALT SERPL-CCNC: 16 U/L (ref 10–40)
ANION GAP SERPL CALCULATED.3IONS-SCNC: 13 MMOL/L (ref 4–16)
AST SERPL-CCNC: 19 IU/L (ref 15–37)
BASOPHILS ABSOLUTE: 0 K/CU MM
BASOPHILS RELATIVE PERCENT: 0.2 % (ref 0–1)
BILIRUB SERPL-MCNC: 0.2 MG/DL (ref 0–1)
BUN SERPL-MCNC: 12 MG/DL (ref 6–23)
CALCIUM SERPL-MCNC: 9.2 MG/DL (ref 8.3–10.6)
CHLORIDE BLD-SCNC: 104 MMOL/L (ref 99–110)
CO2: 23 MMOL/L (ref 21–32)
CREAT SERPL-MCNC: 0.7 MG/DL (ref 0.6–1.1)
DIFFERENTIAL TYPE: ABNORMAL
EOSINOPHILS ABSOLUTE: 0.1 K/CU MM
EOSINOPHILS RELATIVE PERCENT: 2.8 % (ref 0–3)
GFR SERPL CREATININE-BSD FRML MDRD: >60 ML/MIN/1.73M2
GLUCOSE SERPL-MCNC: 85 MG/DL (ref 70–99)
HCT VFR BLD CALC: 39.3 % (ref 37–47)
HEMOGLOBIN: 12.3 GM/DL (ref 12.5–16)
LYMPHOCYTES ABSOLUTE: 1.8 K/CU MM
LYMPHOCYTES RELATIVE PERCENT: 39.4 % (ref 24–44)
MCH RBC QN AUTO: 29.6 PG (ref 27–31)
MCHC RBC AUTO-ENTMCNC: 31.3 % (ref 32–36)
MCV RBC AUTO: 94.7 FL (ref 78–100)
MONOCYTES ABSOLUTE: 0.5 K/CU MM
MONOCYTES RELATIVE PERCENT: 10.9 % (ref 0–4)
PDW BLD-RTO: 14.4 % (ref 11.7–14.9)
PLATELET # BLD: 228 K/CU MM (ref 140–440)
PMV BLD AUTO: 9.9 FL (ref 7.5–11.1)
POTASSIUM SERPL-SCNC: 4.3 MMOL/L (ref 3.5–5.1)
RBC # BLD: 4.15 M/CU MM (ref 4.2–5.4)
SEGMENTED NEUTROPHILS ABSOLUTE COUNT: 2.2 K/CU MM
SEGMENTED NEUTROPHILS RELATIVE PERCENT: 46.7 % (ref 36–66)
SODIUM BLD-SCNC: 140 MMOL/L (ref 135–145)
TOTAL PROTEIN: 7.6 GM/DL (ref 6.4–8.2)
WBC # BLD: 4.7 K/CU MM (ref 4–10.5)

## 2023-12-14 PROCEDURE — 36415 COLL VENOUS BLD VENIPUNCTURE: CPT

## 2023-12-14 PROCEDURE — 85025 COMPLETE CBC W/AUTO DIFF WBC: CPT

## 2023-12-14 PROCEDURE — 80053 COMPREHEN METABOLIC PANEL: CPT

## 2023-12-21 ENCOUNTER — HOSPITAL ENCOUNTER (OUTPATIENT)
Dept: INFUSION THERAPY | Age: 68
Discharge: HOME | End: 2023-12-21
Payer: MEDICARE

## 2023-12-21 PROCEDURE — 99211 OFF/OP EST MAY X REQ PHY/QHP: CPT

## 2023-12-27 ENCOUNTER — OFFICE VISIT (OUTPATIENT)
Dept: INTERNAL MEDICINE CLINIC | Age: 68
End: 2023-12-27
Payer: MEDICARE

## 2023-12-27 VITALS
WEIGHT: 188 LBS | HEART RATE: 72 BPM | HEIGHT: 63 IN | BODY MASS INDEX: 33.31 KG/M2 | SYSTOLIC BLOOD PRESSURE: 130 MMHG | DIASTOLIC BLOOD PRESSURE: 78 MMHG | OXYGEN SATURATION: 97 %

## 2023-12-27 DIAGNOSIS — M25.552 LEFT HIP PAIN: ICD-10-CM

## 2023-12-27 DIAGNOSIS — Z00.00 MEDICARE ANNUAL WELLNESS VISIT, SUBSEQUENT: ICD-10-CM

## 2023-12-27 DIAGNOSIS — J30.9 ALLERGIC RHINITIS, UNSPECIFIED SEASONALITY, UNSPECIFIED TRIGGER: ICD-10-CM

## 2023-12-27 DIAGNOSIS — Z86.19 HISTORY OF HEPATITIS C: ICD-10-CM

## 2023-12-27 DIAGNOSIS — K21.9 GASTROESOPHAGEAL REFLUX DISEASE WITHOUT ESOPHAGITIS: ICD-10-CM

## 2023-12-27 DIAGNOSIS — D64.9 ANEMIA, UNSPECIFIED TYPE: ICD-10-CM

## 2023-12-27 DIAGNOSIS — D75.A G6PD DEFICIENCY: ICD-10-CM

## 2023-12-27 DIAGNOSIS — Z17.1 MALIGNANT NEOPLASM OF LEFT BREAST IN FEMALE, ESTROGEN RECEPTOR NEGATIVE, UNSPECIFIED SITE OF BREAST (HCC): ICD-10-CM

## 2023-12-27 DIAGNOSIS — K58.9 IRRITABLE BOWEL SYNDROME, UNSPECIFIED TYPE: ICD-10-CM

## 2023-12-27 DIAGNOSIS — J45.20 MILD INTERMITTENT ASTHMA WITHOUT COMPLICATION: Primary | ICD-10-CM

## 2023-12-27 DIAGNOSIS — R42 VERTIGO: ICD-10-CM

## 2023-12-27 DIAGNOSIS — C50.912 MALIGNANT NEOPLASM OF LEFT BREAST IN FEMALE, ESTROGEN RECEPTOR NEGATIVE, UNSPECIFIED SITE OF BREAST (HCC): ICD-10-CM

## 2023-12-27 PROCEDURE — 1123F ACP DISCUSS/DSCN MKR DOCD: CPT | Performed by: INTERNAL MEDICINE

## 2023-12-27 PROCEDURE — G0439 PPPS, SUBSEQ VISIT: HCPCS | Performed by: INTERNAL MEDICINE

## 2023-12-27 RX ORDER — MELOXICAM 7.5 MG/1
7.5 TABLET ORAL DAILY
Qty: 30 TABLET | Refills: 3 | Status: SHIPPED | OUTPATIENT
Start: 2023-12-27

## 2023-12-27 RX ORDER — MECLIZINE HYDROCHLORIDE 25 MG/1
25 TABLET ORAL 3 TIMES DAILY PRN
Qty: 30 TABLET | Refills: 3 | Status: SHIPPED | OUTPATIENT
Start: 2023-12-27

## 2023-12-27 RX ORDER — SUCRALFATE 1 G/1
1 TABLET ORAL 2 TIMES DAILY
Qty: 60 TABLET | Refills: 3 | Status: SHIPPED | OUTPATIENT
Start: 2023-12-27

## 2023-12-27 RX ORDER — OMEPRAZOLE 20 MG/1
20 CAPSULE, DELAYED RELEASE ORAL DAILY
Qty: 30 CAPSULE | Refills: 3 | Status: SHIPPED | OUTPATIENT
Start: 2023-12-27

## 2023-12-27 NOTE — PROGRESS NOTES
Medicare Annual Wellness Visit    Wilbern Lesches is here for Medicare AWV    Assessment & Plan   Mild intermittent asthma without complication  Allergic rhinitis, unspecified seasonality, unspecified trigger  Gastroesophageal reflux disease without esophagitis  -     omeprazole (PRILOSEC) 20 MG delayed release capsule; Take 1 capsule by mouth daily, Disp-30 capsule, R-3Normal  -     sucralfate (CARAFATE) 1 GM tablet; Take 1 tablet by mouth in the morning and at bedtime, Disp-60 tablet, R-3Normal  Irritable bowel syndrome, unspecified type  Anemia, unspecified type  G6PD deficiency  Left hip pain  -     meloxicam (MOBIC) 7.5 MG tablet; Take 1 tablet by mouth daily, Disp-30 tablet, R-3Normal  History of hepatitis C  Vertigo  -     meclizine (ANTIVERT) 25 MG tablet; Take 1 tablet by mouth 3 times daily as needed for Dizziness, Disp-30 tablet, R-3Normal  Malignant neoplasm of left breast in female, estrogen receptor negative, unspecified site of breast ([address])  Medicare annual wellness visit, subsequent    Recommendations for Preventive Services Due: see orders and patient instructions/AVS.  Recommended screening schedule for the next 5-10 years is provided to the patient in written form: see Patient Instructions/AVS.     No follow-ups on file. Subjective       Patient's complete Health Risk Assessment and screening values have been reviewed and are found in Flowsheets. The following problems were reviewed today and where indicated follow up appointments were made and/or referrals ordered.     Positive Risk Factor Screenings with Interventions:                 Weight and Activity:  Physical Activity: Inactive (12/27/2023)    Exercise Vital Sign     Days of Exercise per Week: 0 days     Minutes of Exercise per Session: 0 min     On average, how many days per week do you engage in moderate to strenuous exercise (like a brisk walk)?: 0 days  Have you lost any weight without trying in the past 3 months?: No  Body mass
sucralfate (CARAFATE) 1 GM tablet Take 1 tablet by mouth in the morning and at bedtime Yes Akosua Sandoval MD   montelukast (SINGULAIR) 10 MG tablet take 1 tablet by mouth nightly Yes Akosua Sandoval MD   dicyclomine (BENTYL) 10 MG capsule Take 1 capsule by mouth 4 times daily as needed (abdominal bloating and gas) Yes LUIS CARLOS Domingo CNP   magnesium oxide (MAG-OX) 400 MG tablet Take 1 tablet by mouth daily Yes ProviderDayanna MD   COLLAGEN PO Take by mouth Yes ProviderDayanna MD   albuterol sulfate  (90 Base) MCG/ACT inhaler Inhale 2 puffs into the lungs every 6 hours as needed for Wheezing Yes Akosua Sandoval MD       [facility] (Including outside providers/suppliers regularly involved in providing care):   Patient Care Team:  Akosua Sandoval MD as PCP - General (Internal Medicine)  Akosua Sandoval MD as PCP - Empaneled Provider  Nasra Patiño RN as Nurse Navigator (Oncology)  Arnulfo Cooper MD as Consulting Physician (Hematology and Oncology)     Reviewed and updated this visit:  Tobacco  Allergies  Meds  Med Hx  Surg Hx  Soc Hx  Fam Hx

## 2024-02-10 DIAGNOSIS — K58.9 IRRITABLE BOWEL SYNDROME, UNSPECIFIED TYPE: ICD-10-CM

## 2024-02-12 RX ORDER — DICYCLOMINE HYDROCHLORIDE 10 MG/1
CAPSULE ORAL
Qty: 120 CAPSULE | Refills: 3 | Status: SHIPPED | OUTPATIENT
Start: 2024-02-12

## 2024-02-14 ENCOUNTER — TELEPHONE (OUTPATIENT)
Dept: INTERNAL MEDICINE CLINIC | Age: 69
End: 2024-02-14

## 2024-02-14 DIAGNOSIS — M25.552 LEFT HIP PAIN: ICD-10-CM

## 2024-02-14 DIAGNOSIS — K21.9 GASTROESOPHAGEAL REFLUX DISEASE WITHOUT ESOPHAGITIS: ICD-10-CM

## 2024-02-14 RX ORDER — MELOXICAM 7.5 MG/1
7.5 TABLET ORAL DAILY
Qty: 30 TABLET | Refills: 3 | Status: SHIPPED | OUTPATIENT
Start: 2024-02-14

## 2024-02-14 RX ORDER — OMEPRAZOLE 20 MG/1
20 CAPSULE, DELAYED RELEASE ORAL DAILY
Qty: 30 CAPSULE | Refills: 3 | Status: SHIPPED | OUTPATIENT
Start: 2024-02-14

## 2024-02-14 NOTE — TELEPHONE ENCOUNTER
Got a call from the patient, needs refills for omeprazole and meloxicam sent to [facility] on [address].

## 2024-02-21 NOTE — PROGRESS NOTES
Patient Name: Brady Banuelos  Patient : 1955  Patient MRN: [id number]     Primary Oncologist: Josué Cantor MD  Referring Provider: Ferny Squires MD     Date of Service: 3/21/2024      Chief Complaint:   No chief complaint on file.    She came in for follow-up visit.    Patient Active Problem List:     Personal history of infectious disease     Other specified disorders of white blood cells     Neutropenia     History of hepatitis C     Allergic rhinitis     Left hip pain     G6PD deficiency     Mild persistent asthma without complication     Vertigo     HPI:   Brady Banuelos is a pleasant 68-year-old -American female patient who was referred for evaluation of mild neutropenia.  She was diagnosed with hepatitis C in . Ex spouse was IV drug user.  She was treated with Harvoni for 12 weeks and completed in May 2019.  May 15, 2019 WBC was 4.1, RBC 4.36, hemoglobin 13.9, hematocrit 42.3, MCV 97, platelet 210, absolute neutrophils 1.1, absolute lymphocytes 2.6.  She denied any history of frequent infection.  Ultrasound of abdomen in 2018 showed normal right upper quadrant ultrasound.  Liver biopsy on 2018 showed chronic hepatitis grade 2-3, stage II.    Mammogram in 2019 is negative. US of abdomen in 2019 showed unremarkable study.  Labs in 2019 reviewed. She has nonspecific elevated total protein. CBC unremarkable.  Labs in 2019 were reviewed. Total protein is normal. Leukopenia stable.   In 2020 she had acute viral hepatitis serology which came back positive for hepatitis C antibody.  Serum AFP was 8. Hepatitis C RNA by PCR was negative.  Hepatitis C RNA genotype was indeterminate. Mammogram in 2020 was benign.  She was at [facility] emergency room on 2020 due to food poisoning.. WBC was 7.5. Hemoglobin was 13.2, hemoglobin 13.2, platelet 224. CMP was grossly unremarkable with normal AST at 19, ALT 25. Alk phos was 92. Total bilirubin was 0.6.

## 2024-02-26 DIAGNOSIS — J30.2 SEASONAL ALLERGIC RHINITIS, UNSPECIFIED TRIGGER: ICD-10-CM

## 2024-02-26 RX ORDER — MONTELUKAST SODIUM 10 MG/1
10 TABLET ORAL NIGHTLY
Qty: 90 TABLET | Refills: 1 | Status: SHIPPED | OUTPATIENT
Start: 2024-02-26

## 2024-03-21 ENCOUNTER — OFFICE VISIT (OUTPATIENT)
Dept: ONCOLOGY | Age: 69
End: 2024-03-21

## 2024-03-21 ENCOUNTER — HOSPITAL ENCOUNTER (OUTPATIENT)
Dept: INFUSION THERAPY | Age: 69
Discharge: HOME | End: 2024-03-21
Payer: MEDICARE

## 2024-03-21 VITALS
TEMPERATURE: 97.5 F | SYSTOLIC BLOOD PRESSURE: 148 MMHG | BODY MASS INDEX: 32.78 KG/M2 | HEART RATE: 99 BPM | HEIGHT: 63 IN | OXYGEN SATURATION: 97 % | WEIGHT: 185 LBS | DIASTOLIC BLOOD PRESSURE: 68 MMHG

## 2024-03-21 DIAGNOSIS — C50.912 MALIGNANT NEOPLASM OF LEFT BREAST IN FEMALE, ESTROGEN RECEPTOR NEGATIVE, UNSPECIFIED SITE OF BREAST (HCC): ICD-10-CM

## 2024-03-21 DIAGNOSIS — D64.9 ANEMIA, UNSPECIFIED TYPE: Primary | ICD-10-CM

## 2024-03-21 DIAGNOSIS — Z17.1 MALIGNANT NEOPLASM OF LEFT BREAST IN FEMALE, ESTROGEN RECEPTOR NEGATIVE, UNSPECIFIED SITE OF BREAST (HCC): ICD-10-CM

## 2024-03-21 DIAGNOSIS — D64.9 ANEMIA, UNSPECIFIED TYPE: ICD-10-CM

## 2024-03-21 LAB
BASOPHILS ABSOLUTE: 0 K/CU MM
BASOPHILS RELATIVE PERCENT: 0.3 % (ref 0–1)
DIFFERENTIAL TYPE: ABNORMAL
EOSINOPHILS ABSOLUTE: 0.1 K/CU MM
EOSINOPHILS RELATIVE PERCENT: 2.2 % (ref 0–3)
FERRITIN: 263 NG/ML (ref 15–150)
HCT VFR BLD CALC: 40.2 % (ref 37–47)
HEMOGLOBIN: 12.8 GM/DL (ref 12.5–16)
IRON: 93 UG/DL (ref 37–145)
LYMPHOCYTES ABSOLUTE: 1.7 K/CU MM
LYMPHOCYTES RELATIVE PERCENT: 45.4 % (ref 24–44)
MCH RBC QN AUTO: 29.5 PG (ref 27–31)
MCHC RBC AUTO-ENTMCNC: 31.8 % (ref 32–36)
MCV RBC AUTO: 92.6 FL (ref 78–100)
MONOCYTES ABSOLUTE: 0.4 K/CU MM
MONOCYTES RELATIVE PERCENT: 10.3 % (ref 0–4)
PCT TRANSFERRIN: 29 % (ref 10–44)
PDW BLD-RTO: 14.2 % (ref 11.7–14.9)
PLATELET # BLD: 253 K/CU MM (ref 140–440)
PMV BLD AUTO: 9.6 FL (ref 7.5–11.1)
RBC # BLD: 4.34 M/CU MM (ref 4.2–5.4)
SEGMENTED NEUTROPHILS ABSOLUTE COUNT: 1.6 K/CU MM
SEGMENTED NEUTROPHILS RELATIVE PERCENT: 41.8 % (ref 36–66)
TOTAL IRON BINDING CAPACITY: 325 UG/DL (ref 250–450)
TOTAL PROTEIN: 8 GM/DL (ref 6.4–8.2)
UNSATURATED IRON BINDING CAPACITY: 232 UG/DL (ref 110–370)
WBC # BLD: 3.7 K/CU MM (ref 4–10.5)

## 2024-03-21 PROCEDURE — 82525 ASSAY OF COPPER: CPT

## 2024-03-21 PROCEDURE — 85025 COMPLETE CBC W/AUTO DIFF WBC: CPT

## 2024-03-21 PROCEDURE — 83550 IRON BINDING TEST: CPT

## 2024-03-21 PROCEDURE — 82728 ASSAY OF FERRITIN: CPT

## 2024-03-21 PROCEDURE — 84630 ASSAY OF ZINC: CPT

## 2024-03-21 PROCEDURE — 83540 ASSAY OF IRON: CPT

## 2024-03-21 PROCEDURE — 84155 ASSAY OF PROTEIN SERUM: CPT

## 2024-03-21 PROCEDURE — 99211 OFF/OP EST MAY X REQ PHY/QHP: CPT

## 2024-03-21 PROCEDURE — 36415 COLL VENOUS BLD VENIPUNCTURE: CPT

## 2024-03-21 PROCEDURE — 84165 PROTEIN E-PHORESIS SERUM: CPT

## 2024-03-21 NOTE — PROGRESS NOTES
MA Rooming Questions  Patient: Brady Banuelos  MRN: [id number]    Date: 3/21/2024        1. Do you have any new issues?   no         2. Do you need any refills on medications?    no    3. Have you had any imaging done since your last visit?   no    4. Have you been hospitalized or seen in the emergency room since your last visit here?   no    5. Did the patient have a depression screening completed today? No    No data recorded     PHQ-9 Given to (if applicable):               PHQ-9 Score (if applicable):                     [] Positive     []  Negative              Does question #9 need addressed (if applicable)                     [] Yes    []  No               Yareli London CMA

## 2024-03-23 LAB
COPPER SERPL-MCNC: 174 UG/DL (ref 80–155)
ZINC SERPL-MCNC: 80.6 UG/DL (ref 60–120)

## 2024-03-26 LAB
ALBUMIN SERPL ELPH-MCNC: 4.3 GM/DL (ref 3.2–5.6)
ALPHA-1-GLOBULIN: 0.3 GM/DL (ref 0.1–0.4)
ALPHA-2-GLOBULIN: 0.8 GM/DL (ref 0.4–1.2)
BETA GLOBULIN: 1.2 GM/DL (ref 0.5–1.3)
GAMMA GLOBULIN: 1.4 GM/DL (ref 0.5–1.6)
SPEP INTERPRETATION: NORMAL
TOTAL PROTEIN: 8 GM/DL (ref 6.4–8.2)

## 2024-04-01 ENCOUNTER — OFFICE VISIT (OUTPATIENT)
Dept: INTERNAL MEDICINE CLINIC | Age: 69
End: 2024-04-01
Payer: MEDICARE

## 2024-04-01 VITALS
WEIGHT: 188 LBS | SYSTOLIC BLOOD PRESSURE: 130 MMHG | HEIGHT: 63 IN | HEART RATE: 76 BPM | DIASTOLIC BLOOD PRESSURE: 82 MMHG | BODY MASS INDEX: 33.31 KG/M2 | OXYGEN SATURATION: 96 %

## 2024-04-01 DIAGNOSIS — D70.9 NEUTROPENIA, UNSPECIFIED TYPE (HCC): ICD-10-CM

## 2024-04-01 DIAGNOSIS — Z17.1 MALIGNANT NEOPLASM OF LEFT BREAST IN FEMALE, ESTROGEN RECEPTOR NEGATIVE, UNSPECIFIED SITE OF BREAST (HCC): ICD-10-CM

## 2024-04-01 DIAGNOSIS — D64.9 ANEMIA, UNSPECIFIED TYPE: ICD-10-CM

## 2024-04-01 DIAGNOSIS — C50.912 MALIGNANT NEOPLASM OF LEFT BREAST IN FEMALE, ESTROGEN RECEPTOR NEGATIVE, UNSPECIFIED SITE OF BREAST (HCC): ICD-10-CM

## 2024-04-01 DIAGNOSIS — K21.9 GASTROESOPHAGEAL REFLUX DISEASE WITHOUT ESOPHAGITIS: ICD-10-CM

## 2024-04-01 DIAGNOSIS — J45.20 MILD INTERMITTENT ASTHMA WITHOUT COMPLICATION: Primary | ICD-10-CM

## 2024-04-01 DIAGNOSIS — Z86.19 HISTORY OF HEPATITIS C: ICD-10-CM

## 2024-04-01 DIAGNOSIS — D75.A G6PD DEFICIENCY: ICD-10-CM

## 2024-04-01 DIAGNOSIS — J30.9 ALLERGIC RHINITIS, UNSPECIFIED SEASONALITY, UNSPECIFIED TRIGGER: ICD-10-CM

## 2024-04-01 DIAGNOSIS — M25.552 LEFT HIP PAIN: ICD-10-CM

## 2024-04-01 DIAGNOSIS — K58.9 IRRITABLE BOWEL SYNDROME, UNSPECIFIED TYPE: ICD-10-CM

## 2024-04-01 PROCEDURE — 3017F COLORECTAL CA SCREEN DOC REV: CPT | Performed by: INTERNAL MEDICINE

## 2024-04-01 PROCEDURE — G8417 CALC BMI ABV UP PARAM F/U: HCPCS | Performed by: INTERNAL MEDICINE

## 2024-04-01 PROCEDURE — 1123F ACP DISCUSS/DSCN MKR DOCD: CPT | Performed by: INTERNAL MEDICINE

## 2024-04-01 PROCEDURE — 99214 OFFICE O/P EST MOD 30 MIN: CPT | Performed by: INTERNAL MEDICINE

## 2024-04-01 PROCEDURE — 1036F TOBACCO NON-USER: CPT | Performed by: INTERNAL MEDICINE

## 2024-04-01 PROCEDURE — G8399 PT W/DXA RESULTS DOCUMENT: HCPCS | Performed by: INTERNAL MEDICINE

## 2024-04-01 PROCEDURE — 1090F PRES/ABSN URINE INCON ASSESS: CPT | Performed by: INTERNAL MEDICINE

## 2024-04-01 PROCEDURE — G8427 DOCREV CUR MEDS BY ELIG CLIN: HCPCS | Performed by: INTERNAL MEDICINE

## 2024-04-01 SDOH — ECONOMIC STABILITY: FOOD INSECURITY: WITHIN THE PAST 12 MONTHS, THE FOOD YOU BOUGHT JUST DIDN'T LAST AND YOU DIDN'T HAVE MONEY TO GET MORE.: NEVER TRUE

## 2024-04-01 SDOH — ECONOMIC STABILITY: FOOD INSECURITY: WITHIN THE PAST 12 MONTHS, YOU WORRIED THAT YOUR FOOD WOULD RUN OUT BEFORE YOU GOT MONEY TO BUY MORE.: NEVER TRUE

## 2024-04-01 SDOH — ECONOMIC STABILITY: INCOME INSECURITY: HOW HARD IS IT FOR YOU TO PAY FOR THE VERY BASICS LIKE FOOD, HOUSING, MEDICAL CARE, AND HEATING?: NOT HARD AT ALL

## 2024-04-01 ASSESSMENT — PATIENT HEALTH QUESTIONNAIRE - PHQ9
2. FEELING DOWN, DEPRESSED OR HOPELESS: NOT AT ALL
SUM OF ALL RESPONSES TO PHQ QUESTIONS 1-9: 0
SUM OF ALL RESPONSES TO PHQ QUESTIONS 1-9: 0
1. LITTLE INTEREST OR PLEASURE IN DOING THINGS: NOT AT ALL
SUM OF ALL RESPONSES TO PHQ9 QUESTIONS 1 & 2: 0
SUM OF ALL RESPONSES TO PHQ QUESTIONS 1-9: 0
SUM OF ALL RESPONSES TO PHQ QUESTIONS 1-9: 0

## 2024-04-01 NOTE — PROGRESS NOTES
Alert, oriented x 3, well developed, cooperative, not in any distress, appears stated age.  HEAD:                         Normocephalic, atraumatic   EYES:                          PERRLA, EOMI, lids normal, conjuctivea clear, sclera anicteric.   NECK:                         Supple, symmetrical,  trachea midline, no thyromegaly, no JVD, no lymphadenopathy.    LUNGS:                       Clear to auscultation bilaterally, respirations unlabored, accessory muscles are not used.  Breast:                        status post bilateral mastectomy .  HEART:                       Regular rate and rhythm, S1 and S2 normal, no murmur, rub or gallop. PMI in MCL.  ABDOMEN:                 Soft, non-tender, bowel sounds are normoactive, no masses, no hepatospleenomegaly.  EXTREMITY:              no bipedal edema.  Mild tenderness of the left hip.  NEURO:                      Alert, oriented to person, place and time.                                      Grossly intact.  Musculoskeletal:         No kyphosis or scoliosis, no deformity in any extremity noted, muscle strength and tone are normal.  Skin:                            Warm and dry. No rash or obvious suspicious lesions.                      PSYCH:                       Mood euthymic, insight and judgement good           ASSESSMENT/PLAN:    1. Mild intermittent asthma without complication  Continue Singulair and albuterol HFA as needed    2. 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  Being followed by hematologist periodically    3. Allergic rhinitis, unspecified seasonality, unspecified trigger  Continue Singulair and can take Claritin as needed    4. Gastroesophageal reflux disease without esophagitis  Patient on Prilosec    5. Irritable bowel syndrome, unspecified type  Continue Bentyl as needed    6. Anemia, unspecified type  Mild anemia.  Last hemoglobin was 12.8.  Monitor periodically    7. Malignant neoplasm of left breast in female,

## 2024-04-23 DIAGNOSIS — K21.9 GASTROESOPHAGEAL REFLUX DISEASE WITHOUT ESOPHAGITIS: ICD-10-CM

## 2024-04-23 DIAGNOSIS — R42 VERTIGO: ICD-10-CM

## 2024-04-23 DIAGNOSIS — K58.9 IRRITABLE BOWEL SYNDROME, UNSPECIFIED TYPE: ICD-10-CM

## 2024-04-23 DIAGNOSIS — M25.552 LEFT HIP PAIN: ICD-10-CM

## 2024-04-23 RX ORDER — OMEPRAZOLE 20 MG/1
20 CAPSULE, DELAYED RELEASE ORAL DAILY
Qty: 30 CAPSULE | Refills: 3 | Status: SHIPPED | OUTPATIENT
Start: 2024-04-23

## 2024-04-23 RX ORDER — MELOXICAM 7.5 MG/1
7.5 TABLET ORAL DAILY
Qty: 30 TABLET | Refills: 3 | Status: SHIPPED | OUTPATIENT
Start: 2024-04-23

## 2024-04-23 RX ORDER — DICYCLOMINE HYDROCHLORIDE 10 MG/1
CAPSULE ORAL
Qty: 120 CAPSULE | Refills: 3 | Status: SHIPPED | OUTPATIENT
Start: 2024-04-23

## 2024-04-23 RX ORDER — SUCRALFATE 1 G/1
1 TABLET ORAL 2 TIMES DAILY
Qty: 60 TABLET | Refills: 3 | Status: SHIPPED | OUTPATIENT
Start: 2024-04-23

## 2024-04-23 RX ORDER — MECLIZINE HYDROCHLORIDE 25 MG/1
25 TABLET ORAL 3 TIMES DAILY PRN
Qty: 30 TABLET | Refills: 3 | Status: SHIPPED | OUTPATIENT
Start: 2024-04-23

## 2024-06-18 ENCOUNTER — TELEPHONE (OUTPATIENT)
Dept: INTERNAL MEDICINE CLINIC | Age: 69
End: 2024-06-18

## 2024-06-18 DIAGNOSIS — K21.9 GASTROESOPHAGEAL REFLUX DISEASE WITHOUT ESOPHAGITIS: ICD-10-CM

## 2024-06-18 RX ORDER — OMEPRAZOLE 20 MG/1
20 CAPSULE, DELAYED RELEASE ORAL DAILY
Qty: 30 CAPSULE | Refills: 3 | Status: SHIPPED | OUTPATIENT
Start: 2024-06-18

## 2024-06-18 NOTE — TELEPHONE ENCOUNTER
Got a call from the patient, needs refill for omeprazole sent to [facility] on [address].

## 2024-07-01 ENCOUNTER — OFFICE VISIT (OUTPATIENT)
Dept: INTERNAL MEDICINE CLINIC | Age: 69
End: 2024-07-01

## 2024-07-01 VITALS
SYSTOLIC BLOOD PRESSURE: 120 MMHG | OXYGEN SATURATION: 98 % | DIASTOLIC BLOOD PRESSURE: 74 MMHG | WEIGHT: 187.4 LBS | BODY MASS INDEX: 33.2 KG/M2 | HEART RATE: 78 BPM

## 2024-07-01 DIAGNOSIS — J30.2 SEASONAL ALLERGIC RHINITIS, UNSPECIFIED TRIGGER: ICD-10-CM

## 2024-07-01 DIAGNOSIS — K58.9 IRRITABLE BOWEL SYNDROME, UNSPECIFIED TYPE: ICD-10-CM

## 2024-07-01 DIAGNOSIS — J45.20 MILD INTERMITTENT ASTHMA WITHOUT COMPLICATION: ICD-10-CM

## 2024-07-01 DIAGNOSIS — M25.552 LEFT HIP PAIN: ICD-10-CM

## 2024-07-01 DIAGNOSIS — Z86.19 HISTORY OF HEPATITIS C: ICD-10-CM

## 2024-07-01 DIAGNOSIS — K21.9 GASTROESOPHAGEAL REFLUX DISEASE WITHOUT ESOPHAGITIS: ICD-10-CM

## 2024-07-01 DIAGNOSIS — D70.9 NEUTROPENIA, UNSPECIFIED TYPE (HCC): Primary | ICD-10-CM

## 2024-07-01 DIAGNOSIS — D75.A G6PD DEFICIENCY: ICD-10-CM

## 2024-07-01 DIAGNOSIS — Z17.1 MALIGNANT NEOPLASM OF LEFT BREAST IN FEMALE, ESTROGEN RECEPTOR NEGATIVE, UNSPECIFIED SITE OF BREAST (HCC): ICD-10-CM

## 2024-07-01 DIAGNOSIS — C50.912 MALIGNANT NEOPLASM OF LEFT BREAST IN FEMALE, ESTROGEN RECEPTOR NEGATIVE, UNSPECIFIED SITE OF BREAST (HCC): ICD-10-CM

## 2024-07-01 RX ORDER — ALBUTEROL SULFATE 90 UG/1
2 AEROSOL, METERED RESPIRATORY (INHALATION) EVERY 6 HOURS PRN
Qty: 3 EACH | Refills: 1 | Status: SHIPPED | OUTPATIENT
Start: 2024-07-01

## 2024-07-01 RX ORDER — MONTELUKAST SODIUM 10 MG/1
10 TABLET ORAL NIGHTLY
Qty: 90 TABLET | Refills: 1 | Status: SHIPPED | OUTPATIENT
Start: 2024-07-01

## 2024-07-01 RX ORDER — SUCRALFATE 1 G/1
1 TABLET ORAL 2 TIMES DAILY
Qty: 60 TABLET | Refills: 3 | Status: SHIPPED | OUTPATIENT
Start: 2024-07-01

## 2024-07-01 NOTE — PROGRESS NOTES
Name: Brady Banuelos  [id number]  Age: 68 y.o.  YOB: 1955  Sex: female    CHIEF COMPLAINT:    Chief Complaint   Patient presents with    3 Month Follow-Up       HISTORY OF PRESENT ILLNESS:     This is a pleasant  68 y.o. female  is seen today for management of chronic medical problems and medications refills.  Previous records reviewed .      Patient claims that she is doing much better.  Dizziness has improved  Improved with vestibular rehab.  She is taking meclizine as needed , very rarely now.     She did see Dr. Calzada and thought  that patient has benign paroxysmal positional vertigo .  She is not seeing neurologist now.     She has breast cancer.  She completed Taxol on March 21, 2022, had double mastectomy on April 5, 2022, pathology showed no residual malignancy and she also got adjuvant radiation treatment which was started on June 29, 2022 for 28 days.  She is  being followed by Dr. Cardenas and also by Dr. Pop periodically.     She is seeing Dr. Goodson for thyroid nodule.  Ultrasound of the thyroid on November 9, 2021 showed  TR 4 nodule of the left thyroid lobe corresponding to recent CT findings.  FNA recommended for further evaluation based on TI-RADS criteria.  Cytology report on November 18, 2021 showed:    Final Cytologic Diagnosis:    Left Thyroid Fine Needle Aspirate Cytology with Cell Block:   - Atypical follicular cells of undetermined significance .  She sees   Dr Goodson periodically .     Also she did see Dr. Pop for mild neutropenia and she was told it is not significant and this could be because of her ethnicity     Doing OK otherwise  Denies CP or SOB.  No fever , sore throat or cough or congestion.  Asthma is better..   Uses inhalers very rarely.  Denies any abdominal pain.  Gastritis symptoms are not better with Prilosec and Carafate  She takes Bentyl as needed for irritable bowel syndrome symptoms for bloating and gas  Appetite OK.  Occasional constipation but medicine

## 2024-08-26 ENCOUNTER — HOSPITAL ENCOUNTER (OUTPATIENT)
Dept: RADIATION ONCOLOGY | Age: 69
Discharge: HOME | End: 2024-08-26
Payer: MEDICARE

## 2024-08-26 VITALS
OXYGEN SATURATION: 98 % | SYSTOLIC BLOOD PRESSURE: 139 MMHG | HEART RATE: 81 BPM | HEIGHT: 63 IN | DIASTOLIC BLOOD PRESSURE: 63 MMHG | RESPIRATION RATE: 18 BRPM | BODY MASS INDEX: 32.53 KG/M2 | WEIGHT: 183.6 LBS | TEMPERATURE: 97 F

## 2024-08-26 PROCEDURE — 99214 OFFICE O/P EST MOD 30 MIN: CPT | Performed by: RADIOLOGY

## 2024-08-26 PROCEDURE — 99212 OFFICE O/P EST SF 10 MIN: CPT | Performed by: RADIOLOGY

## 2024-08-26 NOTE — PROGRESS NOTES
Brady Banuelos  8/26/2024    Patient is seen today for follow up.     Vitals:    08/26/24 1347   BP: 139/63   Pulse: 81   Resp: 18   Temp: 97 °F (36.1 °C)   SpO2: 98%        Oxygen Therapy  SpO2: 98 %  Pulse Oximetry Type: Intermittent  Pulse Oximeter Device Location: Finger  Oxygen Therapy: None (Room air)    Wt Readings from Last 3 Encounters:   08/26/24 83.3 kg (183 lb 9.6 oz)   07/01/24 85 kg (187 lb 6.4 oz)   04/01/24 85.3 kg (188 lb)       Pain Assessment  Pain Assessment: None - Denies Pain  Denies Need for Intervention       Allergies   Allergen Reactions    Latex Hives    Pcn [Penicillins] Hives        Current Outpatient Medications   Medication Sig Dispense Refill    sucralfate (CARAFATE) 1 GM tablet Take 1 tablet by mouth in the morning and at bedtime 60 tablet 3    albuterol sulfate HFA (PROVENTIL;VENTOLIN;PROAIR) 108 (90 Base) MCG/ACT inhaler Inhale 2 puffs into the lungs every 6 hours as needed for Wheezing 3 each 1    montelukast (SINGULAIR) 10 MG tablet Take 1 tablet by mouth nightly 90 tablet 1    omeprazole (PRILOSEC) 20 MG delayed release capsule Take 1 capsule by mouth daily 30 capsule 3    dicyclomine (BENTYL) 10 MG capsule take 1 capsule by mouth four times a day if needed 120 capsule 3    meclizine (ANTIVERT) 25 MG tablet Take 1 tablet by mouth 3 times daily as needed for Dizziness 30 tablet 3    meloxicam (MOBIC) 7.5 MG tablet Take 1 tablet by mouth daily 30 tablet 3    magnesium oxide (MAG-OX) 400 MG tablet Take 1 tablet by mouth daily      COLLAGEN PO Take by mouth       No current facility-administered medications for this encounter.        Additional Comments: pt here for a f/u rad appt today    Electronically signed by Kim Turner MA on 8/26/2024 at 1:49 PM

## 2024-08-26 NOTE — PROGRESS NOTES
[facility]   Radiation Oncology Center  [address] [place] [id number]  Phone: [phone]  Fax: [phone]    RADIATION ONCOLOGY FOLLOW UP REPORT    PATIENT NAME:  Brady Banuelos              : 1955  MEDICAL RECORD NO: [id number]    CSN NO: [id number]        PROVIDER: Netta Cardenas MD      DATE OF SERVICE: 2024     FOLLOW UP PHYSICIANS:  MD Josué TURNER M.D. Tedros Andom, M.D.      DIAGNOSIS:  Cancer Staging   Malignant neoplasm of left breast in female, estrogen receptor negative (HCC)  Staging form: Breast, AJCC 8th Edition  - Clinical: Stage IIB (cT1c, cN1, cM0, G3, ER-, MN-, HER2-) - Signed by Netta Cardenas MD on 2022  - Pathologic: No Stage Recommended (ypT0, pN0, cM0) - Signed by Netta Cardenas MD on 2022         TREATMENT COURSE:   Oncology History   Malignant neoplasm of left breast in female, estrogen receptor negative (HCC)   2021 - 3/21/2022 Chemotherapy    Neoadjuvant dose dense Adriamycin/Cytoxan completed on 2021  Weekly Taxol beginning 1/10/2022     2022 Surgery    Bilateral mastectomy  No residual malignancy     2022 -  Cancer Staged    Staging form: Breast, AJCC 8th Edition  - Clinical: Stage IIB (cT1c, cN1, cM0, G3, ER-, MN-, HER2-)     2022 -  Cancer Staged    Staging form: Breast, AJCC 8th Edition  - Pathologic: No Stage Recommended (ypT0, pN0, cM0)     2022 - 2022 Radiation    Left supraclavicular lymph nodes: 5040 cGy  Left chest wall: 5040 cGy         HPI:   Brady Banuelos is a 69 y.o. female who has a history as above, who returns today for a routine follow-up visit.  The patient was last seen by me in Aug 2023 and was doing well at that time without evidence of recurrent or metastatic disease.      Currently, the patient reports she's been doing well.  Her energy level has been low.  She reports she has been battling vertigo for which she is taking  Date/Time     12/14/2023 11:48 AM    K 4.3 12/14/2023 11:48 AM     12/14/2023 11:48 AM    CO2 23 12/14/2023 11:48 AM    BUN 12 12/14/2023 11:48 AM    CREATININE 0.7 12/14/2023 11:48 AM    GFRAA >60 09/13/2022 12:18 PM    AGRATIO 1.4 06/09/2021 01:32 PM    LABGLOM >60 12/14/2023 11:48 AM    GLUCOSE 85 12/14/2023 11:48 AM    CALCIUM 9.2 12/14/2023 11:48 AM    BILITOT 0.2 12/14/2023 11:48 AM    ALKPHOS 100 12/14/2023 11:48 AM    AST 19 12/14/2023 11:48 AM    ALT 16 12/14/2023 11:48 AM     Onc labs: No results found for: \"PSA\", \"CEA\", \"LDH\", \"AFP\"    MEDICATIONS:   Current Outpatient Medications   Medication Sig Dispense Refill    sucralfate (CARAFATE) 1 GM tablet Take 1 tablet by mouth in the morning and at bedtime 60 tablet 3    albuterol sulfate HFA (PROVENTIL;VENTOLIN;PROAIR) 108 (90 Base) MCG/ACT inhaler Inhale 2 puffs into the lungs every 6 hours as needed for Wheezing 3 each 1    montelukast (SINGULAIR) 10 MG tablet Take 1 tablet by mouth nightly 90 tablet 1    omeprazole (PRILOSEC) 20 MG delayed release capsule Take 1 capsule by mouth daily 30 capsule 3    dicyclomine (BENTYL) 10 MG capsule take 1 capsule by mouth four times a day if needed 120 capsule 3    meclizine (ANTIVERT) 25 MG tablet Take 1 tablet by mouth 3 times daily as needed for Dizziness 30 tablet 3    meloxicam (MOBIC) 7.5 MG tablet Take 1 tablet by mouth daily 30 tablet 3    magnesium oxide (MAG-OX) 400 MG tablet Take 1 tablet by mouth daily      COLLAGEN PO Take by mouth       No current facility-administered medications for this encounter.       EXAMINATION:   Vitals:    08/26/24 1347   BP: 139/63   Pulse: 81   Resp: 18   Temp: 97 °F (36.1 °C)   SpO2: 98%     The patient is in no acute distress.  Neck: Supple no preauricular postauricular, submental, submandibular, cervical, supraclavicular, or infraclavicular lymphadenopathy is present.  Heart: Regular rate and rhythm.  No murmurs, clicks, or gallops are present.  Lungs: Bilaterally

## 2024-09-23 DIAGNOSIS — M25.552 LEFT HIP PAIN: ICD-10-CM

## 2024-09-23 RX ORDER — MELOXICAM 7.5 MG/1
7.5 TABLET ORAL DAILY
Qty: 30 TABLET | Refills: 3 | Status: SHIPPED | OUTPATIENT
Start: 2024-09-23

## 2024-09-26 ENCOUNTER — HOSPITAL ENCOUNTER (OUTPATIENT)
Dept: INFUSION THERAPY | Age: 69
Discharge: HOME | End: 2024-09-26
Payer: MEDICARE

## 2024-09-26 ENCOUNTER — OFFICE VISIT (OUTPATIENT)
Dept: ONCOLOGY | Age: 69
End: 2024-09-26
Payer: MEDICARE

## 2024-09-26 VITALS
BODY MASS INDEX: 32.64 KG/M2 | TEMPERATURE: 97.2 F | OXYGEN SATURATION: 92 % | WEIGHT: 184.2 LBS | SYSTOLIC BLOOD PRESSURE: 127 MMHG | DIASTOLIC BLOOD PRESSURE: 61 MMHG | HEIGHT: 63 IN | HEART RATE: 95 BPM

## 2024-09-26 DIAGNOSIS — C50.912 MALIGNANT NEOPLASM OF LEFT BREAST IN FEMALE, ESTROGEN RECEPTOR NEGATIVE, UNSPECIFIED SITE OF BREAST (HCC): ICD-10-CM

## 2024-09-26 DIAGNOSIS — D64.9 ANEMIA, UNSPECIFIED TYPE: ICD-10-CM

## 2024-09-26 DIAGNOSIS — K21.9 GASTROESOPHAGEAL REFLUX DISEASE WITHOUT ESOPHAGITIS: ICD-10-CM

## 2024-09-26 DIAGNOSIS — C50.912 MALIGNANT NEOPLASM OF LEFT BREAST IN FEMALE, ESTROGEN RECEPTOR NEGATIVE, UNSPECIFIED SITE OF BREAST (HCC): Primary | ICD-10-CM

## 2024-09-26 DIAGNOSIS — Z17.1 MALIGNANT NEOPLASM OF LEFT BREAST IN FEMALE, ESTROGEN RECEPTOR NEGATIVE, UNSPECIFIED SITE OF BREAST (HCC): ICD-10-CM

## 2024-09-26 DIAGNOSIS — Z17.1 MALIGNANT NEOPLASM OF LEFT BREAST IN FEMALE, ESTROGEN RECEPTOR NEGATIVE, UNSPECIFIED SITE OF BREAST (HCC): Primary | ICD-10-CM

## 2024-09-26 LAB
ALBUMIN SERPL-MCNC: 4.4 G/DL (ref 3.4–5)
ALBUMIN/GLOB SERPL: 1.6 {RATIO} (ref 1.1–2.2)
ALP SERPL-CCNC: 80 U/L (ref 40–129)
ALT SERPL-CCNC: 24 U/L (ref 10–40)
ANION GAP SERPL CALCULATED.3IONS-SCNC: 13 MMOL/L (ref 9–17)
AST SERPL-CCNC: 28 U/L (ref 15–37)
BASOPHILS # BLD: 0.01 K/UL
BASOPHILS NFR BLD: 0 % (ref 0–1)
BILIRUB SERPL-MCNC: 0.2 MG/DL (ref 0–1)
BUN SERPL-MCNC: 10 MG/DL (ref 7–20)
CALCIUM SERPL-MCNC: 9.3 MG/DL (ref 8.3–10.6)
CHLORIDE SERPL-SCNC: 103 MMOL/L (ref 99–110)
CO2 SERPL-SCNC: 20 MMOL/L (ref 21–32)
CREAT SERPL-MCNC: 0.8 MG/DL (ref 0.6–1.2)
EOSINOPHIL # BLD: 0.11 K/UL
EOSINOPHILS RELATIVE PERCENT: 3 % (ref 0–3)
ERYTHROCYTE [DISTWIDTH] IN BLOOD BY AUTOMATED COUNT: 14.1 % (ref 11.7–14.9)
FERRITIN SERPL-MCNC: 291 NG/ML (ref 15–150)
GFR, ESTIMATED: 70 ML/MIN/1.73M2
GLUCOSE SERPL-MCNC: 104 MG/DL (ref 74–99)
HCT VFR BLD AUTO: 39.4 % (ref 37–47)
HGB BLD-MCNC: 12.4 G/DL (ref 12.5–16)
IRON SATN MFR SERPL: 13 % (ref 15–50)
IRON SERPL-MCNC: 38 UG/DL (ref 37–145)
LYMPHOCYTES NFR BLD: 1.41 K/UL
LYMPHOCYTES RELATIVE PERCENT: 35 % (ref 24–44)
MCH RBC QN AUTO: 29.6 PG (ref 27–31)
MCHC RBC AUTO-ENTMCNC: 31.5 G/DL (ref 32–36)
MCV RBC AUTO: 94 FL (ref 78–100)
MONOCYTES NFR BLD: 0.63 K/UL
MONOCYTES NFR BLD: 16 % (ref 0–4)
NEUTROPHILS NFR BLD: 46 % (ref 36–66)
NEUTS SEG NFR BLD: 1.86 K/UL
PLATELET # BLD AUTO: 215 K/UL (ref 140–440)
PMV BLD AUTO: 10.1 FL (ref 7.5–11.1)
POTASSIUM SERPL-SCNC: 4.4 MMOL/L (ref 3.5–5.1)
PROT SERPL-MCNC: 7.2 G/DL (ref 6.4–8.2)
RBC # BLD AUTO: 4.19 M/UL (ref 4.2–5.4)
SODIUM SERPL-SCNC: 136 MMOL/L (ref 136–145)
TIBC SERPL-MCNC: ABNORMAL UG/DL (ref 260–445)
UNSATURATED IRON BINDING CAPACITY: 257 UG/DL (ref 110–370)
WBC OTHER # BLD: 4 K/UL (ref 4–10.5)

## 2024-09-26 PROCEDURE — 99213 OFFICE O/P EST LOW 20 MIN: CPT | Performed by: INTERNAL MEDICINE

## 2024-09-26 PROCEDURE — 80053 COMPREHEN METABOLIC PANEL: CPT

## 2024-09-26 PROCEDURE — 36415 COLL VENOUS BLD VENIPUNCTURE: CPT

## 2024-09-26 PROCEDURE — 85025 COMPLETE CBC W/AUTO DIFF WBC: CPT

## 2024-09-26 PROCEDURE — 83550 IRON BINDING TEST: CPT

## 2024-09-26 PROCEDURE — 1123F ACP DISCUSS/DSCN MKR DOCD: CPT | Performed by: INTERNAL MEDICINE

## 2024-09-26 PROCEDURE — 99211 OFF/OP EST MAY X REQ PHY/QHP: CPT

## 2024-09-26 PROCEDURE — 82728 ASSAY OF FERRITIN: CPT

## 2024-09-26 PROCEDURE — 83540 ASSAY OF IRON: CPT

## 2024-09-26 ASSESSMENT — PATIENT HEALTH QUESTIONNAIRE - PHQ9
2. FEELING DOWN, DEPRESSED OR HOPELESS: NOT AT ALL
1. LITTLE INTEREST OR PLEASURE IN DOING THINGS: NOT AT ALL
SUM OF ALL RESPONSES TO PHQ QUESTIONS 1-9: 0
SUM OF ALL RESPONSES TO PHQ9 QUESTIONS 1 & 2: 0

## 2024-09-30 ENCOUNTER — OFFICE VISIT (OUTPATIENT)
Dept: INTERNAL MEDICINE CLINIC | Age: 69
End: 2024-09-30

## 2024-09-30 VITALS
SYSTOLIC BLOOD PRESSURE: 124 MMHG | HEIGHT: 63 IN | OXYGEN SATURATION: 97 % | HEART RATE: 68 BPM | WEIGHT: 185 LBS | DIASTOLIC BLOOD PRESSURE: 70 MMHG | BODY MASS INDEX: 32.78 KG/M2

## 2024-09-30 VITALS
HEIGHT: 63 IN | DIASTOLIC BLOOD PRESSURE: 70 MMHG | SYSTOLIC BLOOD PRESSURE: 124 MMHG | BODY MASS INDEX: 32.78 KG/M2 | WEIGHT: 185 LBS | OXYGEN SATURATION: 97 % | HEART RATE: 68 BPM

## 2024-09-30 DIAGNOSIS — R42 VERTIGO: ICD-10-CM

## 2024-09-30 DIAGNOSIS — Z00.00 MEDICARE ANNUAL WELLNESS VISIT, SUBSEQUENT: Primary | ICD-10-CM

## 2024-09-30 DIAGNOSIS — E87.20 METABOLIC ACIDOSIS: ICD-10-CM

## 2024-09-30 DIAGNOSIS — M25.552 LEFT HIP PAIN: ICD-10-CM

## 2024-09-30 DIAGNOSIS — K21.9 GASTROESOPHAGEAL REFLUX DISEASE WITHOUT ESOPHAGITIS: ICD-10-CM

## 2024-09-30 DIAGNOSIS — D75.A G6PD DEFICIENCY: ICD-10-CM

## 2024-09-30 DIAGNOSIS — J45.20 MILD INTERMITTENT ASTHMA WITHOUT COMPLICATION: Primary | ICD-10-CM

## 2024-09-30 DIAGNOSIS — J30.9 ALLERGIC RHINITIS, UNSPECIFIED SEASONALITY, UNSPECIFIED TRIGGER: ICD-10-CM

## 2024-09-30 DIAGNOSIS — C50.912 MALIGNANT NEOPLASM OF LEFT BREAST IN FEMALE, ESTROGEN RECEPTOR NEGATIVE, UNSPECIFIED SITE OF BREAST (HCC): ICD-10-CM

## 2024-09-30 DIAGNOSIS — Z17.1 MALIGNANT NEOPLASM OF LEFT BREAST IN FEMALE, ESTROGEN RECEPTOR NEGATIVE, UNSPECIFIED SITE OF BREAST (HCC): ICD-10-CM

## 2024-09-30 DIAGNOSIS — Z86.19 HISTORY OF HEPATITIS C: ICD-10-CM

## 2024-09-30 DIAGNOSIS — K58.9 IRRITABLE BOWEL SYNDROME, UNSPECIFIED TYPE: ICD-10-CM

## 2024-09-30 RX ORDER — ALBUTEROL SULFATE 90 UG/1
2 INHALANT RESPIRATORY (INHALATION) EVERY 6 HOURS PRN
Qty: 3 EACH | Refills: 1 | Status: SHIPPED | OUTPATIENT
Start: 2024-09-30

## 2024-09-30 RX ORDER — MECLIZINE HYDROCHLORIDE 25 MG/1
25 TABLET ORAL 3 TIMES DAILY PRN
Qty: 90 TABLET | Refills: 2 | Status: SHIPPED | OUTPATIENT
Start: 2024-09-30

## 2024-09-30 RX ORDER — SODIUM BICARBONATE 325 MG/1
325 TABLET ORAL 2 TIMES DAILY
Qty: 60 TABLET | Refills: 11 | Status: SHIPPED | OUTPATIENT
Start: 2024-09-30 | End: 2025-09-30

## 2024-09-30 ASSESSMENT — LIFESTYLE VARIABLES
HOW OFTEN DO YOU HAVE A DRINK CONTAINING ALCOHOL: MONTHLY OR LESS
HOW MANY STANDARD DRINKS CONTAINING ALCOHOL DO YOU HAVE ON A TYPICAL DAY: 1 OR 2

## 2024-09-30 ASSESSMENT — PATIENT HEALTH QUESTIONNAIRE - PHQ9
2. FEELING DOWN, DEPRESSED OR HOPELESS: NOT AT ALL
SUM OF ALL RESPONSES TO PHQ QUESTIONS 1-9: 0
1. LITTLE INTEREST OR PLEASURE IN DOING THINGS: NOT AT ALL
SUM OF ALL RESPONSES TO PHQ QUESTIONS 1-9: 0
SUM OF ALL RESPONSES TO PHQ9 QUESTIONS 1 & 2: 0
SUM OF ALL RESPONSES TO PHQ QUESTIONS 1-9: 0
SUM OF ALL RESPONSES TO PHQ QUESTIONS 1-9: 0

## 2024-09-30 NOTE — PROGRESS NOTES
Medicare Annual Wellness Visit    Brady Banuelos is here for Medicare AWV    Assessment & Plan   Medicare annual wellness visit, subsequent  Recommendations for Preventive Services Due: see orders and patient instructions/AVS.  Recommended screening schedule for the next 5-10 years is provided to the patient in written form: see Patient Instructions/AVS.     No follow-ups on file.     Subjective       Patient's complete Health Risk Assessment and screening values have been reviewed and are found in Flowsheets. The following problems were reviewed today and where indicated follow up appointments were made and/or referrals ordered.    Positive Risk Factor Screenings with Interventions:    Fall Risk:  Do you feel unsteady or are you worried about falling? : (!) yes (vertigo issues at times)  2 or more falls in past year?: no  Fall with injury in past year?: no     Interventions:    Patient comments: states she will have vertigo issues at times.  Reviewed medications, home hazards, visual acuity, and co-morbidities that can increase risk for falls  See AVS for additional education material            General HRA Questions:  Select all that apply: (!) New or Increased Fatigue (always fatigued)  Interventions Fatigue:  Patient comments: states she is always fatigued and will discuss with her PCP today after her AWV.  See AVS for additional education material      Inactivity:  On average, how many days per week do you engage in moderate to strenuous exercise (like a brisk walk)?: 0 days (!) Abnormal  On average, how many minutes do you engage in exercise at this level?: 0 min  Interventions:  See AVS for additional education material     Abnormal BMI (obese):  Body mass index is 32.78 kg/m². (!) Abnormal  Interventions:  See AVS for additional education material        Dentist Screen:  Have you seen the dentist within the past year?: (!) No (needs to find a new one)    Intervention:  Patient comments: states she needs to

## 2024-09-30 NOTE — PROGRESS NOTES
Name: Brady Banuelos  [id number]  Age: 69 y.o.  YOB: 1955  Sex: female    CHIEF COMPLAINT:    Chief Complaint   Patient presents with    3 Month Follow-Up       HISTORY OF PRESENT ILLNESS:     This is a pleasant  69 y.o. female  is seen today for management of chronic medical problems and medications refills.  Previous records reviewed .      Patient claims that she is doing much better.  Dizziness has improved  Improved with vestibular rehab.  She is taking meclizine as needed , very rarely now.     She did see Dr. Calzada and thought  that patient has benign paroxysmal positional vertigo .  She is not seeing neurologist now.     She has breast cancer.  She completed Taxol on March 21, 2022, had double mastectomy on April 5, 2022, pathology showed no residual malignancy and she also got adjuvant radiation treatment which was started on June 29, 2022 for 28 days.  She is  being followed by Dr. Cardenas and also by Dr. Pop periodically.     She is seeing Dr. Goodson for thyroid nodule.  Ultrasound of the thyroid on November 9, 2021 showed  TR 4 nodule of the left thyroid lobe corresponding to recent CT findings.  FNA recommended for further evaluation based on TI-RADS criteria.  Cytology report on November 18, 2021 showed:    Final Cytologic Diagnosis:    Left Thyroid Fine Needle Aspirate Cytology with Cell Block:   - Atypical follicular cells of undetermined significance .  She sees   Dr Goodson periodically .     Also she did see Dr. Pop for mild neutropenia and she was told it is not significant and this could be because of her ethnicity     Doing OK otherwise  Denies CP or SOB.  No fever , sore throat or cough or congestion.  Asthma is better..   Uses inhalers very rarely.  Denies any abdominal pain.  Gastritis symptoms are not better with Prilosec and Carafate  She takes Bentyl as needed for irritable bowel syndrome symptoms for bloating and gas  Appetite OK.  Occasional constipation but medicine

## 2024-10-10 DIAGNOSIS — K58.9 IRRITABLE BOWEL SYNDROME, UNSPECIFIED TYPE: ICD-10-CM

## 2024-10-10 RX ORDER — DICYCLOMINE HYDROCHLORIDE 10 MG/1
CAPSULE ORAL
Qty: 120 CAPSULE | Refills: 3 | Status: SHIPPED | OUTPATIENT
Start: 2024-10-10

## 2024-10-25 DIAGNOSIS — K21.9 GASTROESOPHAGEAL REFLUX DISEASE WITHOUT ESOPHAGITIS: ICD-10-CM

## 2024-10-25 RX ORDER — SUCRALFATE 1 G/1
1 TABLET ORAL 2 TIMES DAILY
Qty: 60 TABLET | Refills: 3 | Status: SHIPPED | OUTPATIENT
Start: 2024-10-25

## 2024-11-20 ENCOUNTER — HOSPITAL ENCOUNTER (OUTPATIENT)
Age: 69
Discharge: HOME | End: 2024-11-20
Payer: MEDICARE

## 2024-11-20 LAB
ALBUMIN SERPL-MCNC: 4.6 G/DL (ref 3.4–5)
ALBUMIN/GLOB SERPL: 1.6 {RATIO} (ref 1.1–2.2)
ALP SERPL-CCNC: 92 U/L (ref 40–129)
ALT SERPL-CCNC: 19 U/L (ref 10–40)
AST SERPL-CCNC: 25 U/L (ref 15–37)
BILIRUB DIRECT SERPL-MCNC: <0.2 MG/DL (ref 0–0.3)
BILIRUB INDIRECT SERPL-MCNC: NORMAL MG/DL (ref 0–0.7)
BILIRUB SERPL-MCNC: 0.3 MG/DL (ref 0–1)
PROT SERPL-MCNC: 7.5 G/DL (ref 6.4–8.2)

## 2024-11-20 PROCEDURE — 87522 HEPATITIS C REVRS TRNSCRPJ: CPT

## 2024-11-20 PROCEDURE — 36415 COLL VENOUS BLD VENIPUNCTURE: CPT

## 2024-11-20 PROCEDURE — 80076 HEPATIC FUNCTION PANEL: CPT

## 2024-11-20 PROCEDURE — 82105 ALPHA-FETOPROTEIN SERUM: CPT

## 2024-11-22 LAB — AFP-TM SERPL-MCNC: 8 NG/ML (ref 0–9)

## 2024-12-30 ENCOUNTER — HOSPITAL ENCOUNTER (OUTPATIENT)
Age: 69
Discharge: HOME | End: 2024-12-30
Payer: MEDICARE

## 2024-12-30 LAB
T4 FREE SERPL-MCNC: 1.2 NG/DL (ref 0.9–1.8)
TSH SERPL DL<=0.05 MIU/L-ACNC: 1.08 UIU/ML (ref 0.27–4.2)

## 2024-12-30 PROCEDURE — 84443 ASSAY THYROID STIM HORMONE: CPT

## 2024-12-30 PROCEDURE — 84439 ASSAY OF FREE THYROXINE: CPT

## 2025-01-02 ENCOUNTER — OFFICE VISIT (OUTPATIENT)
Dept: INTERNAL MEDICINE CLINIC | Age: 70
End: 2025-01-02

## 2025-01-02 VITALS
HEART RATE: 79 BPM | BODY MASS INDEX: 32.68 KG/M2 | SYSTOLIC BLOOD PRESSURE: 128 MMHG | WEIGHT: 184.4 LBS | OXYGEN SATURATION: 99 % | DIASTOLIC BLOOD PRESSURE: 76 MMHG

## 2025-01-02 DIAGNOSIS — J45.20 MILD INTERMITTENT ASTHMA WITHOUT COMPLICATION: Primary | ICD-10-CM

## 2025-01-02 DIAGNOSIS — E53.8 VITAMIN B12 DEFICIENCY: ICD-10-CM

## 2025-01-02 DIAGNOSIS — D64.9 ANEMIA, UNSPECIFIED TYPE: ICD-10-CM

## 2025-01-02 DIAGNOSIS — R42 DIZZINESS: ICD-10-CM

## 2025-01-02 DIAGNOSIS — E87.20 METABOLIC ACIDOSIS: ICD-10-CM

## 2025-01-02 DIAGNOSIS — Z86.19 HISTORY OF HEPATITIS C: ICD-10-CM

## 2025-01-02 DIAGNOSIS — J30.9 ALLERGIC RHINITIS, UNSPECIFIED SEASONALITY, UNSPECIFIED TRIGGER: ICD-10-CM

## 2025-01-02 DIAGNOSIS — C50.912 MALIGNANT NEOPLASM OF LEFT BREAST IN FEMALE, ESTROGEN RECEPTOR NEGATIVE, UNSPECIFIED SITE OF BREAST (HCC): ICD-10-CM

## 2025-01-02 DIAGNOSIS — K58.9 IRRITABLE BOWEL SYNDROME, UNSPECIFIED TYPE: ICD-10-CM

## 2025-01-02 DIAGNOSIS — Z17.1 MALIGNANT NEOPLASM OF LEFT BREAST IN FEMALE, ESTROGEN RECEPTOR NEGATIVE, UNSPECIFIED SITE OF BREAST (HCC): ICD-10-CM

## 2025-01-02 DIAGNOSIS — K21.9 GASTROESOPHAGEAL REFLUX DISEASE WITHOUT ESOPHAGITIS: ICD-10-CM

## 2025-01-02 DIAGNOSIS — D75.A G6PD DEFICIENCY: ICD-10-CM

## 2025-01-02 ASSESSMENT — PATIENT HEALTH QUESTIONNAIRE - PHQ9
1. LITTLE INTEREST OR PLEASURE IN DOING THINGS: NOT AT ALL
2. FEELING DOWN, DEPRESSED OR HOPELESS: NOT AT ALL
SUM OF ALL RESPONSES TO PHQ QUESTIONS 1-9: 0
SUM OF ALL RESPONSES TO PHQ9 QUESTIONS 1 & 2: 0
SUM OF ALL RESPONSES TO PHQ QUESTIONS 1-9: 0

## 2025-01-03 PROBLEM — E53.8 VITAMIN B12 DEFICIENCY: Status: ACTIVE | Noted: 2025-01-03

## 2025-01-03 PROBLEM — E87.20 METABOLIC ACIDOSIS: Status: ACTIVE | Noted: 2025-01-03

## 2025-01-03 RX ORDER — LANOLIN ALCOHOL/MO/W.PET/CERES
1000 CREAM (GRAM) TOPICAL DAILY
Qty: 30 TABLET | Refills: 3 | Status: SHIPPED | OUTPATIENT
Start: 2025-01-03

## 2025-01-24 DIAGNOSIS — K21.9 GASTROESOPHAGEAL REFLUX DISEASE WITHOUT ESOPHAGITIS: ICD-10-CM

## 2025-01-25 DIAGNOSIS — M25.552 LEFT HIP PAIN: ICD-10-CM

## 2025-01-27 RX ORDER — MELOXICAM 7.5 MG/1
7.5 TABLET ORAL DAILY
Qty: 30 TABLET | Refills: 3 | Status: SHIPPED | OUTPATIENT
Start: 2025-01-27

## 2025-02-23 DIAGNOSIS — K21.9 GASTROESOPHAGEAL REFLUX DISEASE WITHOUT ESOPHAGITIS: ICD-10-CM

## 2025-02-23 DIAGNOSIS — R42 VERTIGO: ICD-10-CM

## 2025-02-24 RX ORDER — MECLIZINE HYDROCHLORIDE 25 MG/1
25 TABLET ORAL 3 TIMES DAILY PRN
Qty: 90 TABLET | Refills: 2 | Status: SHIPPED | OUTPATIENT
Start: 2025-02-24

## 2025-02-24 RX ORDER — SUCRALFATE 1 G/1
1 TABLET ORAL 2 TIMES DAILY
Qty: 60 TABLET | Refills: 3 | Status: SHIPPED | OUTPATIENT
Start: 2025-02-24

## 2025-03-02 NOTE — PROGRESS NOTES
Patient Name: Brady Banuelos  Patient : 1955  Patient MRN: [id number]     Primary Oncologist: Josué Cantor MD  Referring Provider: Ferny Squires MD     Date of Service: 3/27/2025      Chief Complaint:   Chief Complaint   Patient presents with    Follow-up     She came in for follow-up visit.    Patient Active Problem List:     Personal history of infectious disease     Other specified disorders of white blood cells     Neutropenia     History of hepatitis C     Allergic rhinitis     Left hip pain     G6PD deficiency     Mild persistent asthma without complication     Vertigo     HPI:   Brady Banuelos is a pleasant 69-year-old -American female patient who was referred for evaluation of mild neutropenia.  She was diagnosed with hepatitis C in . Ex spouse was IV drug user.  She was treated with Harvoni for 12 weeks and completed in May 2019.  May 15, 2019 WBC was 4.1, RBC 4.36, hemoglobin 13.9, hematocrit 42.3, MCV 97, platelet 210, absolute neutrophils 1.1, absolute lymphocytes 2.6.  She denied any history of frequent infection.  Ultrasound of abdomen in 2018 showed normal right upper quadrant ultrasound.  2018 Liver biopsy showed chronic hepatitis grade 2-3, stage II.  2019 mammogram benign. US of abdomen in 2019 showed unremarkable study.  Labs in 2019 reviewed. She has nonspecific elevated total protein. CBC unremarkable.  Labs in 2019 were reviewed. Total protein is normal. Leukopenia stable.   In 2020 she had acute viral hepatitis serology which came back positive for hepatitis C antibody.  Serum AFP 8. Hepatitis C RNA by PCR negative.  Hepatitis C RNA genotype indeterminate. Mammogram in 2020 was benign.  She was at [facility] emergency room on 2020 due to food poisoning.. WBC was 7.5. Hemoglobin was 13.2, hemoglobin 13.2, platelet 224. CMP was grossly unremarkable with normal AST at 19, ALT 25. Alk phos was 92. Total bilirubin was 0.6.

## 2025-03-03 DIAGNOSIS — J45.20 MILD INTERMITTENT ASTHMA WITHOUT COMPLICATION: ICD-10-CM

## 2025-03-03 RX ORDER — ALBUTEROL SULFATE 90 UG/1
2 INHALANT RESPIRATORY (INHALATION) EVERY 6 HOURS PRN
Qty: 25.5 G | Refills: 2 | Status: SHIPPED | OUTPATIENT
Start: 2025-03-03

## 2025-03-25 DIAGNOSIS — K58.9 IRRITABLE BOWEL SYNDROME, UNSPECIFIED TYPE: ICD-10-CM

## 2025-03-25 RX ORDER — DICYCLOMINE HYDROCHLORIDE 10 MG/1
CAPSULE ORAL
Qty: 120 CAPSULE | Refills: 3 | Status: SHIPPED | OUTPATIENT
Start: 2025-03-25

## 2025-03-27 ENCOUNTER — HOSPITAL ENCOUNTER (OUTPATIENT)
Dept: INFUSION THERAPY | Age: 70
Discharge: HOME | End: 2025-03-27
Payer: MEDICARE

## 2025-03-27 ENCOUNTER — OFFICE VISIT (OUTPATIENT)
Dept: ONCOLOGY | Age: 70
End: 2025-03-27
Payer: MEDICARE

## 2025-03-27 VITALS
TEMPERATURE: 97.5 F | OXYGEN SATURATION: 97 % | RESPIRATION RATE: 16 BRPM | WEIGHT: 183.4 LBS | DIASTOLIC BLOOD PRESSURE: 66 MMHG | HEIGHT: 63 IN | BODY MASS INDEX: 32.5 KG/M2 | SYSTOLIC BLOOD PRESSURE: 130 MMHG | HEART RATE: 96 BPM

## 2025-03-27 DIAGNOSIS — D64.9 ANEMIA, UNSPECIFIED TYPE: Primary | ICD-10-CM

## 2025-03-27 DIAGNOSIS — C50.912 MALIGNANT NEOPLASM OF LEFT BREAST IN FEMALE, ESTROGEN RECEPTOR NEGATIVE, UNSPECIFIED SITE OF BREAST: ICD-10-CM

## 2025-03-27 DIAGNOSIS — D64.9 ANEMIA, UNSPECIFIED TYPE: ICD-10-CM

## 2025-03-27 DIAGNOSIS — D72.819 LEUKOPENIA, UNSPECIFIED TYPE: ICD-10-CM

## 2025-03-27 DIAGNOSIS — Z17.1 MALIGNANT NEOPLASM OF LEFT BREAST IN FEMALE, ESTROGEN RECEPTOR NEGATIVE, UNSPECIFIED SITE OF BREAST: ICD-10-CM

## 2025-03-27 LAB
ALBUMIN SERPL-MCNC: 4.3 G/DL (ref 3.4–5)
ALBUMIN/GLOB SERPL: 1.3 {RATIO} (ref 1.1–2.2)
ALP SERPL-CCNC: 95 U/L (ref 40–129)
ALT SERPL-CCNC: 18 U/L (ref 10–40)
ANION GAP SERPL CALCULATED.3IONS-SCNC: 13 MMOL/L (ref 9–17)
AST SERPL-CCNC: 22 U/L (ref 15–37)
BASOPHILS # BLD: 0.01 K/UL
BASOPHILS NFR BLD: 0 % (ref 0–1)
BILIRUB SERPL-MCNC: 0.2 MG/DL (ref 0–1)
BUN SERPL-MCNC: 13 MG/DL (ref 7–20)
CALCIUM SERPL-MCNC: 9.4 MG/DL (ref 8.3–10.6)
CHLORIDE SERPL-SCNC: 105 MMOL/L (ref 99–110)
CO2 SERPL-SCNC: 21 MMOL/L (ref 21–32)
CREAT SERPL-MCNC: 0.9 MG/DL (ref 0.6–1.2)
EOSINOPHIL # BLD: 0.15 K/UL
EOSINOPHILS RELATIVE PERCENT: 3 % (ref 0–3)
ERYTHROCYTE [DISTWIDTH] IN BLOOD BY AUTOMATED COUNT: 14 % (ref 11.7–14.9)
FERRITIN SERPL-MCNC: 218 NG/ML (ref 15–150)
GFR, ESTIMATED: 66 ML/MIN/1.73M2
GLUCOSE SERPL-MCNC: 127 MG/DL (ref 74–99)
HCT VFR BLD AUTO: 37.3 % (ref 37–47)
HGB BLD-MCNC: 11.9 G/DL (ref 12.5–16)
IRON SATN MFR SERPL: 20 % (ref 15–50)
IRON SERPL-MCNC: 63 UG/DL (ref 37–145)
LYMPHOCYTES NFR BLD: 2.23 K/UL
LYMPHOCYTES RELATIVE PERCENT: 45 % (ref 24–44)
MCH RBC QN AUTO: 29.6 PG (ref 27–31)
MCHC RBC AUTO-ENTMCNC: 31.9 G/DL (ref 32–36)
MCV RBC AUTO: 92.8 FL (ref 78–100)
MONOCYTES NFR BLD: 0.48 K/UL
MONOCYTES NFR BLD: 10 % (ref 0–4)
NEUTROPHILS NFR BLD: 42 % (ref 36–66)
NEUTS SEG NFR BLD: 2.07 K/UL
PLATELET # BLD AUTO: 232 K/UL (ref 140–440)
PMV BLD AUTO: 9.7 FL (ref 7.5–11.1)
POTASSIUM SERPL-SCNC: 4.4 MMOL/L (ref 3.5–5.1)
PROT SERPL-MCNC: 7.6 G/DL (ref 6.4–8.2)
RBC # BLD AUTO: 4.02 M/UL (ref 4.2–5.4)
SODIUM SERPL-SCNC: 139 MMOL/L (ref 136–145)
TIBC SERPL-MCNC: 311 UG/DL (ref 260–445)
UNSATURATED IRON BINDING CAPACITY: 248 UG/DL (ref 110–370)
WBC OTHER # BLD: 4.9 K/UL (ref 4–10.5)

## 2025-03-27 PROCEDURE — 83540 ASSAY OF IRON: CPT

## 2025-03-27 PROCEDURE — 1123F ACP DISCUSS/DSCN MKR DOCD: CPT | Performed by: INTERNAL MEDICINE

## 2025-03-27 PROCEDURE — 99213 OFFICE O/P EST LOW 20 MIN: CPT | Performed by: INTERNAL MEDICINE

## 2025-03-27 PROCEDURE — 85025 COMPLETE CBC W/AUTO DIFF WBC: CPT

## 2025-03-27 PROCEDURE — 80053 COMPREHEN METABOLIC PANEL: CPT

## 2025-03-27 PROCEDURE — 1159F MED LIST DOCD IN RCRD: CPT | Performed by: INTERNAL MEDICINE

## 2025-03-27 PROCEDURE — 36415 COLL VENOUS BLD VENIPUNCTURE: CPT

## 2025-03-27 PROCEDURE — 82525 ASSAY OF COPPER: CPT

## 2025-03-27 PROCEDURE — 99212 OFFICE O/P EST SF 10 MIN: CPT

## 2025-03-27 PROCEDURE — 83550 IRON BINDING TEST: CPT

## 2025-03-27 PROCEDURE — 1126F AMNT PAIN NOTED NONE PRSNT: CPT | Performed by: INTERNAL MEDICINE

## 2025-03-27 PROCEDURE — 84630 ASSAY OF ZINC: CPT

## 2025-03-27 PROCEDURE — 82728 ASSAY OF FERRITIN: CPT

## 2025-03-27 ASSESSMENT — PATIENT HEALTH QUESTIONNAIRE - PHQ9
SUM OF ALL RESPONSES TO PHQ QUESTIONS 1-9: 0
2. FEELING DOWN, DEPRESSED OR HOPELESS: NOT AT ALL
SUM OF ALL RESPONSES TO PHQ QUESTIONS 1-9: 0
1. LITTLE INTEREST OR PLEASURE IN DOING THINGS: NOT AT ALL

## 2025-03-27 NOTE — PROGRESS NOTES
MA Rooming Questions  Patient: Brady Banuelos  MRN: [id number]    Date: 3/27/2025        1. Do you have any new issues?   no         2. Do you need any refills on medications?    no    3. Have you had any imaging done since your last visit?   yes - U/S    4. Have you been hospitalized or seen in the emergency room since your last visit here?   no    5. Did the patient have a depression screening completed today? Yes    No data recorded     PHQ-9 Given to (if applicable):               PHQ-9 Score (if applicable):                     [] Positive     []  Negative              Does question #9 need addressed (if applicable)                     [] Yes    []  No               Kacey Whitman CMA

## 2025-03-30 LAB
COPPER SERPL-MCNC: 155.2 UG/DL (ref 80–155)
ZINC SERPL-MCNC: 74.9 UG/DL (ref 60–120)

## 2025-04-02 ENCOUNTER — OFFICE VISIT (OUTPATIENT)
Dept: INTERNAL MEDICINE CLINIC | Age: 70
End: 2025-04-02
Payer: MEDICARE

## 2025-04-02 VITALS
WEIGHT: 183 LBS | OXYGEN SATURATION: 94 % | SYSTOLIC BLOOD PRESSURE: 120 MMHG | DIASTOLIC BLOOD PRESSURE: 76 MMHG | HEIGHT: 63 IN | HEART RATE: 100 BPM | BODY MASS INDEX: 32.43 KG/M2

## 2025-04-02 DIAGNOSIS — D75.A G6PD DEFICIENCY: ICD-10-CM

## 2025-04-02 DIAGNOSIS — J30.9 ALLERGIC RHINITIS, UNSPECIFIED SEASONALITY, UNSPECIFIED TRIGGER: Primary | ICD-10-CM

## 2025-04-02 DIAGNOSIS — K58.9 IRRITABLE BOWEL SYNDROME, UNSPECIFIED TYPE: ICD-10-CM

## 2025-04-02 DIAGNOSIS — K21.9 GASTROESOPHAGEAL REFLUX DISEASE WITHOUT ESOPHAGITIS: ICD-10-CM

## 2025-04-02 DIAGNOSIS — R42 DIZZINESS: ICD-10-CM

## 2025-04-02 DIAGNOSIS — D64.9 ANEMIA, UNSPECIFIED TYPE: ICD-10-CM

## 2025-04-02 DIAGNOSIS — E87.20 METABOLIC ACIDOSIS: ICD-10-CM

## 2025-04-02 DIAGNOSIS — Z00.00 MEDICARE ANNUAL WELLNESS VISIT, SUBSEQUENT: ICD-10-CM

## 2025-04-02 DIAGNOSIS — E78.00 PURE HYPERCHOLESTEROLEMIA: ICD-10-CM

## 2025-04-02 DIAGNOSIS — E53.8 VITAMIN B12 DEFICIENCY: ICD-10-CM

## 2025-04-02 DIAGNOSIS — J45.20 MILD INTERMITTENT ASTHMA WITHOUT COMPLICATION: ICD-10-CM

## 2025-04-02 DIAGNOSIS — C50.912 MALIGNANT NEOPLASM OF LEFT BREAST IN FEMALE, ESTROGEN RECEPTOR NEGATIVE, UNSPECIFIED SITE OF BREAST: ICD-10-CM

## 2025-04-02 DIAGNOSIS — Z17.1 MALIGNANT NEOPLASM OF LEFT BREAST IN FEMALE, ESTROGEN RECEPTOR NEGATIVE, UNSPECIFIED SITE OF BREAST: ICD-10-CM

## 2025-04-02 PROCEDURE — 1123F ACP DISCUSS/DSCN MKR DOCD: CPT | Performed by: INTERNAL MEDICINE

## 2025-04-02 PROCEDURE — G0439 PPPS, SUBSEQ VISIT: HCPCS | Performed by: INTERNAL MEDICINE

## 2025-04-02 PROCEDURE — 99214 OFFICE O/P EST MOD 30 MIN: CPT | Performed by: INTERNAL MEDICINE

## 2025-04-02 PROCEDURE — 1160F RVW MEDS BY RX/DR IN RCRD: CPT | Performed by: INTERNAL MEDICINE

## 2025-04-02 PROCEDURE — 1159F MED LIST DOCD IN RCRD: CPT | Performed by: INTERNAL MEDICINE

## 2025-04-02 SDOH — ECONOMIC STABILITY: FOOD INSECURITY: WITHIN THE PAST 12 MONTHS, YOU WORRIED THAT YOUR FOOD WOULD RUN OUT BEFORE YOU GOT MONEY TO BUY MORE.: SOMETIMES TRUE

## 2025-04-02 SDOH — HEALTH STABILITY: PHYSICAL HEALTH: ON AVERAGE, HOW MANY DAYS PER WEEK DO YOU ENGAGE IN MODERATE TO STRENUOUS EXERCISE (LIKE A BRISK WALK)?: 1 DAY

## 2025-04-02 SDOH — ECONOMIC STABILITY: FOOD INSECURITY: WITHIN THE PAST 12 MONTHS, THE FOOD YOU BOUGHT JUST DIDN'T LAST AND YOU DIDN'T HAVE MONEY TO GET MORE.: NEVER TRUE

## 2025-04-02 SDOH — ECONOMIC STABILITY: INCOME INSECURITY: IN THE LAST 12 MONTHS, WAS THERE A TIME WHEN YOU WERE NOT ABLE TO PAY THE MORTGAGE OR RENT ON TIME?: NO

## 2025-04-02 SDOH — ECONOMIC STABILITY: TRANSPORTATION INSECURITY
IN THE PAST 12 MONTHS, HAS THE LACK OF TRANSPORTATION KEPT YOU FROM MEDICAL APPOINTMENTS OR FROM GETTING MEDICATIONS?: NO

## 2025-04-02 SDOH — ECONOMIC STABILITY: TRANSPORTATION INSECURITY
IN THE PAST 12 MONTHS, HAS LACK OF TRANSPORTATION KEPT YOU FROM MEETINGS, WORK, OR FROM GETTING THINGS NEEDED FOR DAILY LIVING?: NO

## 2025-04-02 SDOH — HEALTH STABILITY: PHYSICAL HEALTH: ON AVERAGE, HOW MANY MINUTES DO YOU ENGAGE IN EXERCISE AT THIS LEVEL?: 10 MIN

## 2025-04-02 ASSESSMENT — PATIENT HEALTH QUESTIONNAIRE - PHQ9
1. LITTLE INTEREST OR PLEASURE IN DOING THINGS: NOT AT ALL
SUM OF ALL RESPONSES TO PHQ QUESTIONS 1-9: 1
2. FEELING DOWN, DEPRESSED OR HOPELESS: SEVERAL DAYS

## 2025-04-02 ASSESSMENT — LIFESTYLE VARIABLES
HOW MANY STANDARD DRINKS CONTAINING ALCOHOL DO YOU HAVE ON A TYPICAL DAY: 1
HOW MANY STANDARD DRINKS CONTAINING ALCOHOL DO YOU HAVE ON A TYPICAL DAY: 1 OR 2
HOW OFTEN DO YOU HAVE A DRINK CONTAINING ALCOHOL: 3
HOW OFTEN DO YOU HAVE A DRINK CONTAINING ALCOHOL: 2-4 TIMES A MONTH
HOW OFTEN DO YOU HAVE SIX OR MORE DRINKS ON ONE OCCASION: 1

## 2025-04-02 NOTE — PROGRESS NOTES
30-39 MIN [98410]    7. Metabolic acidosis  Continue sodium bicarb tablets  - ESTABLISHED, MOD MDM, 30-39 MIN [95567]    8. G6PD deficiency  Stable, being followed with Dr. Cantor periodically  - ESTABLISHED, MOD MDM, 30-39 MIN [99664]    9. Malignant neoplasm of left breast in female, estrogen receptor negative, unspecified site of breast  In remission.  Continue to follow with her oncologist as per his recommendations  - ESTABLISHED, MOD MDM, 30-39 MIN [94082]    10. Dizziness  Mild dizziness.  Takes meclizine as needed  - ESTABLISHED, MOD MDM, 30-39 MIN [79548]    11. Medicare annual wellness visit, subsequent  - ESTABLISHED, MOD MDM, 30-39 MIN [80226]    12. Pure hypercholesterolemia  Mild hyperlipidemia.  Will recheck labs.  Advise diet, exercise and weight loss  - Lipid, Fasting; Future        Care discussed with patient. Questions answered and patient verbalizes understanding and agrees with plan.   Medications reviewed and reconciled. Continue current medications.  Appropriate prescriptions are ordered. Risks and benefits of meds are discussed.     After visit summary provided.    Advised to call for any problems, questions, or concerns.   If symptoms worsen or don't improve as expected, to call us or go to ER.    Follow up as directed, sooner if needed.      Return in 1 year (on 4/2/2026) for Medicare AWV.    This dictation was performed with a verbal recognition program and it was checked for errors. It is possible that there are still dictated errors within this office note. Any errors should be brought immediately to my attention for correction. All efforts were made to ensure that this office note is accurate.     YONATHAN ALTAMIRANO MD MD

## 2025-04-02 NOTE — PATIENT INSTRUCTIONS
doctor may simply have you stare straight ahead while they move a finger into and out of your field of vision.  Color vision test   You look at pieces of printed test patterns in various colors. You say what number or symbol you see.  Your doctor may have you trace the number or symbol using a pointer.  How do these tests feel?  There is very little chance of having a problem from this test. If dilating drops are used for a vision test, they may make the eyes sting and cause a medicine taste in the mouth.  Follow-up care is a key part of your treatment and safety. Be sure to make and go to all appointments, and call your doctor if you are having problems. It's also a good idea to know your test results and keep a list of the medicines you take.  Where can you learn more?  Go to https://www.Donuts.net/patientEd and enter G551 to learn more about \"Learning About Vision Tests.\"  Current as of: July 31, 2024  Content Version: 14.4  © 9325-1495 [employer].   Care instructions adapted under license by [employer]. If you have questions about a medical condition or this instruction, always ask your healthcare professional. [facility], [employer], disclaims any warranty or liability for your use of this information.         Starting a Weight-Loss Plan: Care Instructions  Overview    It can be a challenge to lose weight. But your doctor can help you make a weight-loss plan that meets your needs.  You don't have to make a lot of big changes at once. A better idea might be to focus on small changes and stick with them. When those changes become habit, you can add a few more changes.  Some people find it helpful to take an exercise or nutrition class. If you have questions, ask your doctor about seeing a registered dietitian or an exercise specialist. You might also think about joining a weight-loss support group.  If you're not ready to make changes right now, try to pick a date in the future. Then make an

## 2025-04-04 DIAGNOSIS — J30.2 SEASONAL ALLERGIC RHINITIS, UNSPECIFIED TRIGGER: ICD-10-CM

## 2025-04-04 DIAGNOSIS — J45.20 MILD INTERMITTENT ASTHMA WITHOUT COMPLICATION: ICD-10-CM

## 2025-04-04 RX ORDER — MONTELUKAST SODIUM 10 MG/1
10 TABLET ORAL NIGHTLY
Qty: 90 TABLET | Refills: 1 | Status: SHIPPED | OUTPATIENT
Start: 2025-04-04

## 2025-04-14 ENCOUNTER — OFFICE VISIT (OUTPATIENT)
Dept: SURGERY | Age: 70
End: 2025-04-14
Payer: MEDICARE

## 2025-04-14 VITALS
SYSTOLIC BLOOD PRESSURE: 132 MMHG | HEART RATE: 89 BPM | HEIGHT: 63 IN | BODY MASS INDEX: 32.71 KG/M2 | OXYGEN SATURATION: 96 % | WEIGHT: 184.6 LBS | DIASTOLIC BLOOD PRESSURE: 92 MMHG

## 2025-04-14 DIAGNOSIS — Z12.11 ENCOUNTER FOR SCREENING COLONOSCOPY: Primary | ICD-10-CM

## 2025-04-14 PROCEDURE — 1160F RVW MEDS BY RX/DR IN RCRD: CPT | Performed by: NURSE PRACTITIONER

## 2025-04-14 PROCEDURE — 1123F ACP DISCUSS/DSCN MKR DOCD: CPT | Performed by: NURSE PRACTITIONER

## 2025-04-14 PROCEDURE — 99214 OFFICE O/P EST MOD 30 MIN: CPT | Performed by: NURSE PRACTITIONER

## 2025-04-14 PROCEDURE — 1159F MED LIST DOCD IN RCRD: CPT | Performed by: NURSE PRACTITIONER

## 2025-04-14 RX ORDER — MULTIVIT WITH MINERALS/LUTEIN
1000 TABLET ORAL DAILY
COMMUNITY

## 2025-04-14 ASSESSMENT — ENCOUNTER SYMPTOMS
COLOR CHANGE: 0
WHEEZING: 0
VOMITING: 0
NAUSEA: 0
ABDOMINAL PAIN: 0
SHORTNESS OF BREATH: 0

## 2025-04-16 ENCOUNTER — TELEPHONE (OUTPATIENT)
Dept: SURGERY | Age: 70
End: 2025-04-16

## 2025-04-16 NOTE — TELEPHONE ENCOUNTER
OZZIE DOYLE Patient    Member ID  [id number]  Date of Birth  1955  Gender  Female    Eligibility Status  A  Group Number  [id number]  Plan / Coverage Date  2025-01-01 - 2199-12-31    Transaction Type  Outpatient Authorization  Organization  [facility]  Payer  [facility]      Transaction ID: [id number]  Customer ID: [id number]  Transaction Date: 2025-04-16  ×    Based on information entered, no authorization is required.  No Authorization Required  Service Type  2 - Surgical  [facility]  Service From - To Date  2025-05-28 - 2025-08-07    Quantity  1 Units    Level of Service  Elective    Diagnosis Code 1   - Encounter for screening for malignant neoplasm of colon    Procedure Code 1  60512 - DIAGNOSTIC COLONOSCOPY  Quantity  1 Units    Status  NO AUTH REQUIRED

## 2025-04-17 ENCOUNTER — HOSPITAL ENCOUNTER (OUTPATIENT)
Age: 70
Discharge: HOME | End: 2025-04-17
Payer: MEDICARE

## 2025-04-17 ENCOUNTER — TELEPHONE (OUTPATIENT)
Dept: SURGERY | Age: 70
End: 2025-04-17

## 2025-04-17 LAB
ALBUMIN SERPL-MCNC: 4.4 G/DL (ref 3.4–5)
ALBUMIN/GLOB SERPL: 1.3 {RATIO} (ref 1.1–2.2)
ALP SERPL-CCNC: 101 U/L (ref 40–129)
ALT SERPL-CCNC: 18 U/L (ref 10–40)
AST SERPL-CCNC: 23 U/L (ref 15–37)
BILIRUB DIRECT SERPL-MCNC: <0.2 MG/DL (ref 0–0.3)
BILIRUB INDIRECT SERPL-MCNC: NORMAL MG/DL (ref 0–0.7)
BILIRUB SERPL-MCNC: 0.2 MG/DL (ref 0–1)
PROT SERPL-MCNC: 7.7 G/DL (ref 6.4–8.2)

## 2025-04-17 PROCEDURE — 82105 ALPHA-FETOPROTEIN SERUM: CPT

## 2025-04-17 PROCEDURE — 87522 HEPATITIS C REVRS TRNSCRPJ: CPT

## 2025-04-17 PROCEDURE — 80076 HEPATIC FUNCTION PANEL: CPT

## 2025-04-17 NOTE — TELEPHONE ENCOUNTER
SPOKE TO FOR Brady Banuelos REGARDING SURGERY CSOPE SCHEDULED @ [facility]. NOTIFIED OF DATES, TIMES AND LOCATION    PHONE ASSESSMENT   SURGERY - 5/28/25 at 9:00  P/O - 6/9/25 at 9:15    NPO AFTER MIDNIGHT  Miralax  HOLD BLOOD THINNERS - No blood thinners are listed on medication list

## 2025-04-19 LAB — AFP-TM SERPL-MCNC: 8 NG/ML (ref 0–9)

## 2025-05-18 DIAGNOSIS — E53.8 VITAMIN B12 DEFICIENCY: ICD-10-CM

## 2025-05-19 RX ORDER — LANOLIN ALCOHOL/MO/W.PET/CERES
1000 CREAM (GRAM) TOPICAL DAILY
Qty: 30 TABLET | Refills: 3 | Status: SHIPPED | OUTPATIENT
Start: 2025-05-19

## 2025-05-24 DIAGNOSIS — M25.552 LEFT HIP PAIN: ICD-10-CM

## 2025-05-24 DIAGNOSIS — K21.9 GASTROESOPHAGEAL REFLUX DISEASE WITHOUT ESOPHAGITIS: ICD-10-CM

## 2025-05-24 DIAGNOSIS — R42 VERTIGO: ICD-10-CM

## 2025-05-27 RX ORDER — MELOXICAM 7.5 MG/1
7.5 TABLET ORAL DAILY
Qty: 30 TABLET | Refills: 3 | Status: SHIPPED | OUTPATIENT
Start: 2025-05-27

## 2025-05-27 RX ORDER — MECLIZINE HYDROCHLORIDE 25 MG/1
25 TABLET ORAL 3 TIMES DAILY PRN
Qty: 90 TABLET | Refills: 2 | Status: SHIPPED | OUTPATIENT
Start: 2025-05-27

## 2025-05-27 RX ORDER — OMEPRAZOLE 20 MG/1
20 CAPSULE, DELAYED RELEASE ORAL DAILY
Qty: 30 CAPSULE | Refills: 3 | Status: SHIPPED | OUTPATIENT
Start: 2025-05-27

## 2025-06-12 DIAGNOSIS — J45.20 MILD INTERMITTENT ASTHMA WITHOUT COMPLICATION: ICD-10-CM

## 2025-06-12 DIAGNOSIS — J30.2 SEASONAL ALLERGIC RHINITIS, UNSPECIFIED TRIGGER: ICD-10-CM

## 2025-06-12 DIAGNOSIS — K58.9 IRRITABLE BOWEL SYNDROME, UNSPECIFIED TYPE: ICD-10-CM

## 2025-06-12 DIAGNOSIS — M25.552 LEFT HIP PAIN: ICD-10-CM

## 2025-06-12 DIAGNOSIS — E87.20 METABOLIC ACIDOSIS: ICD-10-CM

## 2025-06-12 DIAGNOSIS — K21.9 GASTROESOPHAGEAL REFLUX DISEASE WITHOUT ESOPHAGITIS: ICD-10-CM

## 2025-06-12 DIAGNOSIS — R42 VERTIGO: ICD-10-CM

## 2025-06-12 RX ORDER — ALBUTEROL SULFATE 90 UG/1
2 INHALANT RESPIRATORY (INHALATION) EVERY 6 HOURS PRN
Qty: 25.5 G | Refills: 2 | Status: SHIPPED | OUTPATIENT
Start: 2025-06-12

## 2025-06-12 RX ORDER — SODIUM BICARBONATE 325 MG/1
325 TABLET ORAL 2 TIMES DAILY
Qty: 180 TABLET | Refills: 0 | Status: SHIPPED | OUTPATIENT
Start: 2025-06-12 | End: 2026-06-12

## 2025-06-12 RX ORDER — MELOXICAM 7.5 MG/1
7.5 TABLET ORAL DAILY
Qty: 90 TABLET | Refills: 0 | Status: SHIPPED | OUTPATIENT
Start: 2025-06-12

## 2025-06-12 RX ORDER — MONTELUKAST SODIUM 10 MG/1
10 TABLET ORAL NIGHTLY
Qty: 90 TABLET | Refills: 0 | Status: SHIPPED | OUTPATIENT
Start: 2025-06-12

## 2025-06-12 RX ORDER — DICYCLOMINE HYDROCHLORIDE 10 MG/1
CAPSULE ORAL
Qty: 120 CAPSULE | Refills: 3 | Status: SHIPPED | OUTPATIENT
Start: 2025-06-12

## 2025-06-12 RX ORDER — MECLIZINE HYDROCHLORIDE 25 MG/1
25 TABLET ORAL 3 TIMES DAILY PRN
Qty: 270 TABLET | Refills: 2 | Status: SHIPPED | OUTPATIENT
Start: 2025-06-12

## 2025-06-12 RX ORDER — SUCRALFATE 1 G/1
1 TABLET ORAL 2 TIMES DAILY
Qty: 60 TABLET | Refills: 3 | Status: SHIPPED | OUTPATIENT
Start: 2025-06-12

## 2025-06-12 RX ORDER — OMEPRAZOLE 20 MG/1
20 CAPSULE, DELAYED RELEASE ORAL DAILY
Qty: 90 CAPSULE | Refills: 0 | Status: SHIPPED | OUTPATIENT
Start: 2025-06-12

## 2025-06-17 ENCOUNTER — TELEPHONE (OUTPATIENT)
Dept: INTERNAL MEDICINE CLINIC | Age: 70
End: 2025-06-17

## 2025-06-17 DIAGNOSIS — E87.20 METABOLIC ACIDOSIS: ICD-10-CM

## 2025-06-17 RX ORDER — SODIUM BICARBONATE 325 MG/1
325 TABLET ORAL 2 TIMES DAILY
Qty: 180 TABLET | Refills: 0 | Status: SHIPPED | OUTPATIENT
Start: 2025-06-17 | End: 2026-06-17

## 2025-06-17 RX ORDER — SODIUM BICARBONATE 325 MG/1
325 TABLET ORAL 2 TIMES DAILY
Qty: 180 TABLET | Refills: 0 | Status: SHIPPED | OUTPATIENT
Start: 2025-06-17 | End: 2025-06-17 | Stop reason: SDUPTHER

## 2025-06-27 ENCOUNTER — TELEPHONE (OUTPATIENT)
Dept: SURGERY | Age: 70
End: 2025-06-27

## 2025-06-27 NOTE — TELEPHONE ENCOUNTER
SPOKE TO Brady Banuelos REGARDING SURGERY (Colonoscopy) SCHEDULED @ [facility]. NOTIFIED OF DATES, TIMES AND LOCATION    PAT - PHONE ASSESSMENT  SURGERY - 7/30/25 @ 0900, [facility] to call with arrival time  P/O - 8/15/25 at 9:15 AM    NPO AFTER MIDNIGHT  Prep: Clear liquids whole day of 7/29/25.  Miralax solution to start at 4:00 PM on 7/29/25.   HOLD BLOOD THINNERS - Not on thinners

## 2025-07-01 ENCOUNTER — HOSPITAL ENCOUNTER (OUTPATIENT)
Age: 70
Discharge: HOME | End: 2025-07-01
Payer: MEDICARE

## 2025-07-01 DIAGNOSIS — E78.00 PURE HYPERCHOLESTEROLEMIA: ICD-10-CM

## 2025-07-01 LAB
CHOLEST SERPL-MCNC: 197 MG/DL (ref 125–199)
HDLC SERPL-MCNC: 88 MG/DL
LDLC SERPL CALC-MCNC: 97 MG/DL
T4 FREE SERPL-MCNC: 1.2 NG/DL (ref 0.9–1.8)
TRIGL SERPL-MCNC: 58 MG/DL
TSH SERPL DL<=0.05 MIU/L-ACNC: 1.41 UIU/ML (ref 0.27–4.2)

## 2025-07-01 PROCEDURE — 80061 LIPID PANEL: CPT

## 2025-07-01 PROCEDURE — 84439 ASSAY OF FREE THYROXINE: CPT

## 2025-07-01 PROCEDURE — 84443 ASSAY THYROID STIM HORMONE: CPT

## 2025-07-02 ENCOUNTER — RESULTS FOLLOW-UP (OUTPATIENT)
Dept: INTERNAL MEDICINE CLINIC | Age: 70
End: 2025-07-02

## 2025-07-02 ENCOUNTER — OFFICE VISIT (OUTPATIENT)
Dept: INTERNAL MEDICINE CLINIC | Age: 70
End: 2025-07-02
Payer: MEDICARE

## 2025-07-02 VITALS
OXYGEN SATURATION: 96 % | HEART RATE: 89 BPM | BODY MASS INDEX: 33.02 KG/M2 | WEIGHT: 186.4 LBS | SYSTOLIC BLOOD PRESSURE: 130 MMHG | DIASTOLIC BLOOD PRESSURE: 70 MMHG

## 2025-07-02 DIAGNOSIS — M19.90 ARTHRITIS: ICD-10-CM

## 2025-07-02 DIAGNOSIS — J45.20 MILD INTERMITTENT ASTHMA WITHOUT COMPLICATION: ICD-10-CM

## 2025-07-02 DIAGNOSIS — K21.9 GASTROESOPHAGEAL REFLUX DISEASE WITHOUT ESOPHAGITIS: ICD-10-CM

## 2025-07-02 DIAGNOSIS — E87.20 METABOLIC ACIDOSIS: ICD-10-CM

## 2025-07-02 DIAGNOSIS — K58.9 IRRITABLE BOWEL SYNDROME, UNSPECIFIED TYPE: ICD-10-CM

## 2025-07-02 DIAGNOSIS — C50.912 MALIGNANT NEOPLASM OF LEFT BREAST IN FEMALE, ESTROGEN RECEPTOR NEGATIVE, UNSPECIFIED SITE OF BREAST (HCC): ICD-10-CM

## 2025-07-02 DIAGNOSIS — D64.9 ANEMIA, UNSPECIFIED TYPE: ICD-10-CM

## 2025-07-02 DIAGNOSIS — E53.8 VITAMIN B12 DEFICIENCY: ICD-10-CM

## 2025-07-02 DIAGNOSIS — J30.9 ALLERGIC RHINITIS, UNSPECIFIED SEASONALITY, UNSPECIFIED TRIGGER: Primary | ICD-10-CM

## 2025-07-02 DIAGNOSIS — D75.A G6PD DEFICIENCY: ICD-10-CM

## 2025-07-02 DIAGNOSIS — Z17.1 MALIGNANT NEOPLASM OF LEFT BREAST IN FEMALE, ESTROGEN RECEPTOR NEGATIVE, UNSPECIFIED SITE OF BREAST (HCC): ICD-10-CM

## 2025-07-02 PROCEDURE — 1159F MED LIST DOCD IN RCRD: CPT | Performed by: INTERNAL MEDICINE

## 2025-07-02 PROCEDURE — 99214 OFFICE O/P EST MOD 30 MIN: CPT | Performed by: INTERNAL MEDICINE

## 2025-07-02 PROCEDURE — 1123F ACP DISCUSS/DSCN MKR DOCD: CPT | Performed by: INTERNAL MEDICINE

## 2025-07-02 RX ORDER — SUCRALFATE 1 G/1
1 TABLET ORAL 2 TIMES DAILY
Qty: 60 TABLET | Refills: 3 | Status: CANCELLED | OUTPATIENT
Start: 2025-07-02

## 2025-07-02 RX ORDER — MELOXICAM 15 MG/1
15 TABLET ORAL DAILY PRN
Qty: 30 TABLET | Refills: 3 | Status: SHIPPED | OUTPATIENT
Start: 2025-07-02

## 2025-07-02 RX ORDER — OMEPRAZOLE 20 MG/1
20 CAPSULE, DELAYED RELEASE ORAL DAILY
Qty: 90 CAPSULE | Refills: 0 | Status: CANCELLED | OUTPATIENT
Start: 2025-07-02

## 2025-07-02 RX ORDER — OMEPRAZOLE 20 MG/1
20 CAPSULE, DELAYED RELEASE ORAL DAILY
Qty: 90 CAPSULE | Refills: 1 | Status: SHIPPED | OUTPATIENT
Start: 2025-07-02

## 2025-07-02 RX ORDER — SUCRALFATE 1 G/1
1 TABLET ORAL 2 TIMES DAILY
Qty: 180 TABLET | Refills: 1 | Status: SHIPPED | OUTPATIENT
Start: 2025-07-02

## 2025-07-02 NOTE — PROGRESS NOTES
Name: Brady Banuelos  [id number]  Age: 69 y.o.  YOB: 1955  Sex: female    CHIEF COMPLAINT:    Chief Complaint   Patient presents with    3 Month Follow-Up     Routine visit, no new concerns       HISTORY OF PRESENT ILLNESS:     This is a pleasant  69 y.o. female  is seen today for management of chronic medical problems and medications refills.  Previous records reviewed .    Patient claims that she is doing much better.  But she still complains of dizziness off and on.  She takes meclizine at least twice daily.  Improved with vestibular rehab but again has some dizziness but it is rare.  ESR Ranginwala mild 90-30        She did see Dr. Calzada and he thought  that patient has benign paroxysmal positional vertigo .  She is not seeing neurologist at this time.     She has breast cancer.  She completed Taxol on March 21, 2022, had double mastectomy on April 5, 2022, pathology showed no residual malignancy and she also got adjuvant radiation treatment which was started on June 29, 2022 for 28 days.  She is  being followed by Dr. Cardenas and also by Dr. Pop periodically.     She is seeing Dr. Goodson for thyroid nodule.  Ultrasound of the thyroid on November 9, 2021 showed  TR 4 nodule of the left thyroid lobe corresponding to recent CT findings.  FNA recommended for further evaluation based on TI-RADS criteria.  Cytology report on November 18, 2021 showed:    Final Cytologic Diagnosis:    Left Thyroid Fine Needle Aspirate Cytology with Cell Block:   - Atypical follicular cells of undetermined significance .  She sees   Dr Goodson periodically .     Also she did see Dr. Pop for mild neutropenia and she was told it is not significant and this could be because of her ethnicity.     She has a history of hepatitis C.  Recently seen Dr. Beebe and he told her that she has cirrhosis of liver.  She is concerned about it.  She is being followed by Dr. Beebe periodically     Dr. Beebe's notes reviewed from

## 2025-07-08 ENCOUNTER — TRANSCRIBE ORDERS (OUTPATIENT)
Dept: ADMINISTRATIVE | Age: 70
End: 2025-07-08

## 2025-07-08 DIAGNOSIS — E04.9 NONTOXIC GOITER: Primary | ICD-10-CM

## 2025-07-10 ENCOUNTER — HOSPITAL ENCOUNTER (OUTPATIENT)
Dept: ULTRASOUND IMAGING | Age: 70
Discharge: HOME | End: 2025-07-10
Attending: INTERNAL MEDICINE
Payer: MEDICARE

## 2025-07-10 DIAGNOSIS — E04.9 NONTOXIC GOITER: ICD-10-CM

## 2025-07-10 PROCEDURE — 76536 US EXAM OF HEAD AND NECK: CPT

## 2025-08-14 ENCOUNTER — OFFICE VISIT (OUTPATIENT)
Dept: SURGERY | Age: 70
End: 2025-08-14
Payer: MEDICARE

## 2025-08-14 VITALS
OXYGEN SATURATION: 96 % | BODY MASS INDEX: 32.94 KG/M2 | HEART RATE: 80 BPM | RESPIRATION RATE: 18 BRPM | DIASTOLIC BLOOD PRESSURE: 72 MMHG | HEIGHT: 63 IN | SYSTOLIC BLOOD PRESSURE: 120 MMHG | WEIGHT: 185.9 LBS

## 2025-08-14 DIAGNOSIS — Z17.1 MALIGNANT NEOPLASM OF LEFT BREAST IN FEMALE, ESTROGEN RECEPTOR NEGATIVE, UNSPECIFIED SITE OF BREAST (HCC): Primary | ICD-10-CM

## 2025-08-14 DIAGNOSIS — C50.912 MALIGNANT NEOPLASM OF LEFT BREAST IN FEMALE, ESTROGEN RECEPTOR NEGATIVE, UNSPECIFIED SITE OF BREAST (HCC): Primary | ICD-10-CM

## 2025-08-14 PROCEDURE — 99213 OFFICE O/P EST LOW 20 MIN: CPT | Performed by: SURGERY

## 2025-08-14 PROCEDURE — 1159F MED LIST DOCD IN RCRD: CPT | Performed by: SURGERY

## 2025-08-14 PROCEDURE — 1123F ACP DISCUSS/DSCN MKR DOCD: CPT | Performed by: SURGERY

## 2025-08-20 ASSESSMENT — ENCOUNTER SYMPTOMS
EYE ITCHING: 0
APNEA: 0
RECTAL PAIN: 0
ANAL BLEEDING: 0
STRIDOR: 0
BACK PAIN: 0
CHOKING: 0
COLOR CHANGE: 0
PHOTOPHOBIA: 0
SORE THROAT: 0
CONSTIPATION: 0
EYE REDNESS: 0

## (undated) DEVICE — COUNTER NDL 30 COUNT FOAM STRP SGL MAG

## (undated) DEVICE — RESERVOIR,SUCTION,100CC,SILICONE: Brand: MEDLINE

## (undated) DEVICE — GLOVE ORANGE PI 7 1/2   MSG9075

## (undated) DEVICE — SPONGE LAP W18XL18IN WHT COT 4 PLY FLD STRUNG RADPQ DISP ST

## (undated) DEVICE — SUTURE VCRL SZ 3-0 L27IN ABSRB UD L17MM RB-1 1/2 CIR J215H

## (undated) DEVICE — SYSTEM SKIN CLSR 22CM DERMBND PRINEO

## (undated) DEVICE — GLOVE SURG SZ 7 L12IN FNGR THK79MIL GRN LTX FREE

## (undated) DEVICE — SUTURE VCRL SZ 3-0 L18IN ABSRB UD L26MM SH 1/2 CIR J864D

## (undated) DEVICE — ELECTRODE ES L2.75IN S STL INSUL BLDE W/ SL EDGE

## (undated) DEVICE — E-Z CLEAN, NON-STICK, PTFE COATED, ELECTROSURGICAL BLADE ELECTRODE, MODIFIED EXTENDED INSULATION, 4 INCH (10.2 CM): Brand: MEGADYNE

## (undated) DEVICE — Z DISCONTINUED USE 2272117 DRAPE SURG 3 QTR N INVASIVE 2 LAYR DISP

## (undated) DEVICE — DRAPE SHEET ULTRAGARD: Brand: MEDLINE

## (undated) DEVICE — 3M™ DURAPORE™ SURGICAL TAPE 1538-3, 3 INCH X 10 YARD (7,5CM X 9,1M), 4 ROLLS/BOX: Brand: 3M™ DURAPORE™

## (undated) DEVICE — SUTURE COAT VCRL SZ 4-0 L18IN ABSRB UD L19MM PS-2 1/2 CIR J496G

## (undated) DEVICE — NEEDLE HYPO 20GA L1.5IN YEL POLYPR HUB S STL REG BVL STR

## (undated) DEVICE — DRAIN SURG 15FR SIL RND CHN W/ TRCR FULL FLUT DBL WRP TRAD

## (undated) DEVICE — GUIDE SURG CUT FOR CAPPED VISIONAIRE

## (undated) DEVICE — SYMMETRY® RETRACTOR, BERENS MASTECTOMY, 7 7/8 IN: Brand: SYMMETRY SURGICAL®

## (undated) DEVICE — DRAPE,T,LAPARO,TRANS,STERILE: Brand: MEDLINE

## (undated) DEVICE — GLOVE SURG SZ 6 THK91MIL LTX FREE SYN POLYISOPRENE ANTI

## (undated) DEVICE — DRAPE,UTILITY,XL,4/PK,STERILE: Brand: MEDLINE

## (undated) DEVICE — TUBING, SUCTION, 9/32" X 10', STRAIGHT: Brand: MEDLINE

## (undated) DEVICE — SUTURE VCRL SZ 3-0 L27IN ABSRB UD L26MM SH 1/2 CIR J416H

## (undated) DEVICE — ELECTRODE ES AD CRDLSS PT RET REM POLYHESIVE

## (undated) DEVICE — APPLIER LIG CLP M L11IN TI STR RNG HNDL FOR 20 CLP DISP

## (undated) DEVICE — CONTAINER,SPECIMEN,OR STERILE,4OZ: Brand: MEDLINE

## (undated) DEVICE — PACK,BASIC,SIRUS,V: Brand: MEDLINE

## (undated) DEVICE — GOWN,SIRUS,POLYRNF,BRTHSLV,XLN/XL,20/CS: Brand: MEDLINE

## (undated) DEVICE — SPONGE DRN W4XL4IN RAYON/POLYESTER 6 PLY NONWOVEN PRECUT

## (undated) DEVICE — TOWEL,OR,DSP,ST,BLUE,STD,6/PK,12PK/CS: Brand: MEDLINE

## (undated) DEVICE — INTENDED FOR TISSUE SEPARATION, AND OTHER PROCEDURES THAT REQUIRE A SHARP SURGICAL BLADE TO PUNCTURE OR CUT.: Brand: BARD-PARKER ® STAINLESS STEEL BLADES

## (undated) DEVICE — SUTURE MCRYL SZ 4-0 L27IN ABSRB UD L24MM PS-1 3/8 CIR PRIM Y935H

## (undated) DEVICE — GLOVE SURG SZ 65 THK91MIL LTX FREE SYN POLYISOPRENE

## (undated) DEVICE — SUTURE VCRL SZ 4-0 L18IN ABSRB UD L19MM PS-2 3/8 CIR PRIM J496H

## (undated) DEVICE — MARKER SURG SKIN UTIL REGULAR/FINE 2 TIP W/ RUL AND 9 LBL

## (undated) DEVICE — PENCIL ES CRD L10FT HND SWCHING ROCK SWCH W/ EDGE COAT BLDE

## (undated) DEVICE — SUTURE PERMAHAND SZ 3-0 L18IN NONABSORBABLE BLK L26MM SH C013D

## (undated) DEVICE — SYRINGE IRRIG 60ML SFT PLIABLE BLB EZ TO GRP 1 HND USE W/

## (undated) DEVICE — SUTURE VCRL SZ 4-0 L27IN ABSRB UD L17MM RB-1 1/2 CIR J214H

## (undated) DEVICE — SYRINGE MED 20ML STD CLR PLAS LUERLOCK TIP N CTRL DISP

## (undated) DEVICE — JACKSON-PRATT 100CC BULB RESERVOIR: Brand: CARDINAL HEALTH

## (undated) DEVICE — SUTURE VCRL SZ 4-0 L27IN ABSRB UD L19MM FS-2 3/8 CIR REV J422H